# Patient Record
Sex: FEMALE | Race: WHITE | NOT HISPANIC OR LATINO | Employment: OTHER | URBAN - METROPOLITAN AREA
[De-identification: names, ages, dates, MRNs, and addresses within clinical notes are randomized per-mention and may not be internally consistent; named-entity substitution may affect disease eponyms.]

---

## 2019-09-17 DIAGNOSIS — M79.89 SWELLING OF LIMB: Primary | ICD-10-CM

## 2019-09-19 ENCOUNTER — TELEPHONE (OUTPATIENT)
Dept: FAMILY MEDICINE | Facility: CLINIC | Age: 69
End: 2019-09-19

## 2019-09-19 ENCOUNTER — HOSPITAL ENCOUNTER (OUTPATIENT)
Dept: RADIOLOGY | Facility: HOSPITAL | Age: 69
Discharge: HOME | End: 2019-09-19
Attending: SPECIALIST
Payer: COMMERCIAL

## 2019-09-19 DIAGNOSIS — M79.89 SWELLING OF LIMB: ICD-10-CM

## 2019-09-19 PROCEDURE — 93971 EXTREMITY STUDY: CPT | Mod: TC,PO,LT

## 2019-09-20 ENCOUNTER — TELEPHONE (OUTPATIENT)
Dept: FAMILY MEDICINE | Facility: CLINIC | Age: 69
End: 2019-09-20

## 2019-09-20 NOTE — TELEPHONE ENCOUNTER
----- Message from Hortencia Crabtree sent at 9/20/2019 10:00 AM CDT -----  - pt needs call back   [phone]

## 2019-11-26 ENCOUNTER — OFFICE VISIT (OUTPATIENT)
Dept: FAMILY MEDICINE | Facility: CLINIC | Age: 69
End: 2019-11-26
Payer: COMMERCIAL

## 2019-11-26 VITALS
DIASTOLIC BLOOD PRESSURE: 56 MMHG | WEIGHT: 208 LBS | HEART RATE: 60 BPM | HEIGHT: 64 IN | BODY MASS INDEX: 35.51 KG/M2 | SYSTOLIC BLOOD PRESSURE: 112 MMHG

## 2019-11-26 DIAGNOSIS — E11.69 TYPE 2 DIABETES MELLITUS WITH OTHER SPECIFIED COMPLICATION, UNSPECIFIED WHETHER LONG TERM INSULIN USE: ICD-10-CM

## 2019-11-26 DIAGNOSIS — I25.10 ATHEROSCLEROSIS OF CORONARY ARTERY, ANGINA PRESENCE UNSPECIFIED, UNSPECIFIED VESSEL OR LESION TYPE, UNSPECIFIED WHETHER NATIVE OR TRANSPLANTED HEART: ICD-10-CM

## 2019-11-26 DIAGNOSIS — M17.0 OSTEOARTHRITIS OF BOTH KNEES, UNSPECIFIED OSTEOARTHRITIS TYPE: ICD-10-CM

## 2019-11-26 DIAGNOSIS — R05.9 COUGH: ICD-10-CM

## 2019-11-26 DIAGNOSIS — K21.9 GASTROESOPHAGEAL REFLUX DISEASE, ESOPHAGITIS PRESENCE NOT SPECIFIED: ICD-10-CM

## 2019-11-26 DIAGNOSIS — E78.6 CHOLESTEROL DEPLETION: ICD-10-CM

## 2019-11-26 DIAGNOSIS — E78.5 HYPERLIPIDEMIA, UNSPECIFIED HYPERLIPIDEMIA TYPE: ICD-10-CM

## 2019-11-26 DIAGNOSIS — I10 HYPERTENSION, UNSPECIFIED TYPE: Primary | ICD-10-CM

## 2019-11-26 DIAGNOSIS — Z95.1 S/P CABG (CORONARY ARTERY BYPASS GRAFT): ICD-10-CM

## 2019-11-26 DIAGNOSIS — I10 ESSENTIAL HYPERTENSION: ICD-10-CM

## 2019-11-26 PROBLEM — M17.9 OSTEOARTHRITIS OF KNEE: Status: ACTIVE | Noted: 2019-05-14

## 2019-11-26 LAB — HBA1C MFR BLD: 7.9 %

## 2019-11-26 PROCEDURE — 99214 OFFICE O/P EST MOD 30 MIN: CPT | Mod: S$GLB,,, | Performed by: NURSE PRACTITIONER

## 2019-11-26 PROCEDURE — 1159F MED LIST DOCD IN RCRD: CPT | Mod: S$GLB,,, | Performed by: NURSE PRACTITIONER

## 2019-11-26 PROCEDURE — 3074F PR MOST RECENT SYSTOLIC BLOOD PRESSURE < 130 MM HG: ICD-10-PCS | Mod: S$GLB,,, | Performed by: NURSE PRACTITIONER

## 2019-11-26 PROCEDURE — 83036 HEMOGLOBIN GLYCOSYLATED A1C: CPT | Mod: QW,,, | Performed by: NURSE PRACTITIONER

## 2019-11-26 PROCEDURE — 1159F PR MEDICATION LIST DOCUMENTED IN MEDICAL RECORD: ICD-10-PCS | Mod: S$GLB,,, | Performed by: NURSE PRACTITIONER

## 2019-11-26 PROCEDURE — 3078F PR MOST RECENT DIASTOLIC BLOOD PRESSURE < 80 MM HG: ICD-10-PCS | Mod: S$GLB,,, | Performed by: NURSE PRACTITIONER

## 2019-11-26 PROCEDURE — 83036 POCT HEMOGLOBIN A1C: ICD-10-PCS | Mod: QW,,, | Performed by: NURSE PRACTITIONER

## 2019-11-26 PROCEDURE — 1101F PT FALLS ASSESS-DOCD LE1/YR: CPT | Mod: S$GLB,,, | Performed by: NURSE PRACTITIONER

## 2019-11-26 PROCEDURE — 3074F SYST BP LT 130 MM HG: CPT | Mod: S$GLB,,, | Performed by: NURSE PRACTITIONER

## 2019-11-26 PROCEDURE — 99214 PR OFFICE/OUTPT VISIT, EST, LEVL IV, 30-39 MIN: ICD-10-PCS | Mod: S$GLB,,, | Performed by: NURSE PRACTITIONER

## 2019-11-26 PROCEDURE — 1101F PR PT FALLS ASSESS DOC 0-1 FALLS W/OUT INJ PAST YR: ICD-10-PCS | Mod: S$GLB,,, | Performed by: NURSE PRACTITIONER

## 2019-11-26 PROCEDURE — 3078F DIAST BP <80 MM HG: CPT | Mod: S$GLB,,, | Performed by: NURSE PRACTITIONER

## 2019-11-26 RX ORDER — INSULIN GLARGINE 100 [IU]/ML
INJECTION, SOLUTION SUBCUTANEOUS
Qty: 3 ML | Refills: 5 | Status: SHIPPED | OUTPATIENT
Start: 2019-11-26 | End: 2020-03-26 | Stop reason: SDUPTHER

## 2019-11-26 RX ORDER — CALC/MAG/B COMPLEX/D3/HERB 61
TABLET ORAL
COMMUNITY

## 2019-11-26 RX ORDER — PHENOL 1.4 %
AEROSOL, SPRAY (ML) MUCOUS MEMBRANE
COMMUNITY

## 2019-11-26 RX ORDER — HYDROCHLOROTHIAZIDE 25 MG/1
25 TABLET ORAL DAILY
Qty: 90 TABLET | Refills: 1 | Status: SHIPPED | OUTPATIENT
Start: 2019-11-26 | End: 2020-02-26 | Stop reason: SDUPTHER

## 2019-11-26 RX ORDER — LORATADINE 10 MG/1
10 TABLET ORAL
COMMUNITY
End: 2020-04-02 | Stop reason: SDUPTHER

## 2019-11-26 RX ORDER — CYCLOBENZAPRINE HCL 10 MG
10 TABLET ORAL 2 TIMES DAILY
COMMUNITY
End: 2020-02-26 | Stop reason: SDUPTHER

## 2019-11-26 RX ORDER — CHOLECALCIFEROL (VITAMIN D3) 125 MCG
CAPSULE ORAL
COMMUNITY

## 2019-11-26 RX ORDER — HYDROCHLOROTHIAZIDE 25 MG/1
1 TABLET ORAL DAILY
COMMUNITY
End: 2019-11-26 | Stop reason: SDUPTHER

## 2019-11-26 RX ORDER — METOPROLOL SUCCINATE 50 MG/1
50 TABLET, EXTENDED RELEASE ORAL DAILY
Qty: 90 TABLET | Refills: 1 | Status: SHIPPED | OUTPATIENT
Start: 2019-11-26 | End: 2020-02-26 | Stop reason: SDUPTHER

## 2019-11-26 RX ORDER — PITAVASTATIN CALCIUM 4.18 MG/1
1 TABLET, FILM COATED ORAL DAILY
Qty: 90 TABLET | Refills: 1 | Status: SHIPPED | OUTPATIENT
Start: 2019-11-26 | End: 2020-02-26 | Stop reason: SDUPTHER

## 2019-11-26 RX ORDER — METOPROLOL SUCCINATE 50 MG/1
1 TABLET, EXTENDED RELEASE ORAL DAILY
COMMUNITY
End: 2019-11-26 | Stop reason: SDUPTHER

## 2019-11-26 RX ORDER — EPINEPHRINE 0.22MG
AEROSOL WITH ADAPTER (ML) INHALATION
COMMUNITY

## 2019-11-26 RX ORDER — PEN NEEDLE, DIABETIC 30 GX3/16"
NEEDLE, DISPOSABLE MISCELLANEOUS
COMMUNITY
End: 2020-02-26 | Stop reason: SDUPTHER

## 2019-11-26 RX ORDER — BENZONATATE 200 MG/1
200 CAPSULE ORAL 3 TIMES DAILY PRN
Qty: 90 CAPSULE | Refills: 0 | Status: SHIPPED | OUTPATIENT
Start: 2019-11-26 | End: 2020-02-24

## 2019-11-26 RX ORDER — LISINOPRIL 20 MG/1
20 TABLET ORAL DAILY
Qty: 90 TABLET | Refills: 1 | Status: SHIPPED | OUTPATIENT
Start: 2019-11-26 | End: 2020-02-26 | Stop reason: SDUPTHER

## 2019-11-26 RX ORDER — INSULIN GLARGINE 100 [IU]/ML
INJECTION, SOLUTION SUBCUTANEOUS
COMMUNITY
Start: 2019-03-14 | End: 2019-11-26 | Stop reason: SDUPTHER

## 2019-11-26 RX ORDER — PIOGLITAZONEHYDROCHLORIDE 30 MG/1
1 TABLET ORAL DAILY
COMMUNITY
End: 2020-02-27

## 2019-11-26 RX ORDER — PITAVASTATIN CALCIUM 4.18 MG/1
1 TABLET, FILM COATED ORAL DAILY
COMMUNITY
Start: 2012-12-13 | End: 2019-11-26 | Stop reason: SDUPTHER

## 2019-11-26 RX ORDER — LISINOPRIL 20 MG/1
20 TABLET ORAL DAILY
COMMUNITY
End: 2019-11-26 | Stop reason: SDUPTHER

## 2019-11-26 RX ORDER — BENZONATATE 100 MG/1
CAPSULE ORAL
COMMUNITY
End: 2020-06-03

## 2019-11-26 RX ORDER — ASPIRIN 81 MG/1
TABLET ORAL
COMMUNITY

## 2019-11-26 NOTE — PROGRESS NOTES
SUBJECTIVE:    Patient ID: Estefanía Ray is a 69 y.o. female.    Chief Complaint: Follow-up (brought bottles// SW)    PRESENTS FOR CHECK UP.  HAS BEEN FEELING GOOD LATELY.  CHECKING SUGARS AT HOME READINGS HAVE BEEN LESS THAN 130.  TAKING MEDS AS PRESCRIBED.  FOLLOWED REGULARLY BY DR. MADERA.  AT LAST VISIT WAS STARTED BACK ON LASIX DAILY.  EDEMA SEEMS TO BE WELL CONTROLLED SINCE THEN.  TRYING TO WATCH DIET AND EXERCISE.  HOWEVER ADMITS OCCASIONAL INTERMITTENT BILATERAL KNEE PAIN. TAKES OVER-THE-COUNTER MEDS AND USES A CENTRAL WELL AS WITH IMPROVEMENT.  HAS SEEN ORTHOPEDIST ABOUT THIS IN THE PAST BUT DOES NOT WANT TO DO ANY KIND OF SURGERIES.      Office Visit on 11/26/2019   Component Date Value Ref Range Status    Hemoglobin A1C 11/26/2019 7.9  % Final       History reviewed. No pertinent past medical history.  History reviewed. No pertinent surgical history.  History reviewed. No pertinent family history.    Marital Status:   Alcohol History:  reports that she does not drink alcohol.  Tobacco History:  reports that she has never smoked. She has never used smokeless tobacco.  Drug History:  reports that she does not use drugs.    Review of patient's allergies indicates:   Allergen Reactions    Ezetimibe      Other reaction(s): Other (See Comments), Unknown  Feels terrible when taking medication       Sitagliptin-metformin      Other reaction(s): diarrhea       Current Outpatient Medications:     aspirin (ECOTRIN) 81 MG EC tablet, Aspir-81 mg tablet,delayed release  Take 1 tablet every day by oral route., Disp: , Rfl:     benzonatate (TESSALON) 100 MG capsule, benzonatate 100 mg capsule  TK 1 C PO TID, Disp: , Rfl:     biotin 10,000 mcg TbDL, 1 tablet, Disp: , Rfl:     blood sugar diagnostic (FREESTYLE LITE STRIPS) Strp, as directed, Disp: , Rfl:     coenzyme Q10 (CO Q-10) 100 mg capsule, 1 capsule with a meal, Disp: , Rfl:     cyclobenzaprine (FLEXERIL) 10 MG tablet, Take 10 mg by mouth 2 (two)  "times daily., Disp: , Rfl:     ELDERBERRY FRUIT ORAL, Take by mouth., Disp: , Rfl:     hydroCHLOROthiazide (HYDRODIURIL) 25 MG tablet, Take 1 tablet (25 mg total) by mouth once daily., Disp: 90 tablet, Rfl: 1    insulin (LANTUS SOLOSTAR U-100 INSULIN) glargine 100 units/mL (3mL) SubQ pen, as directed, Disp: 3 mL, Rfl: 5    krill-om-3-dha-epa-phospho-ast 959-80-95-50 mg Cap, , Disp: , Rfl:     lansoprazole (PREVACID) 15 MG capsule, 1 capsule, Disp: , Rfl:     lisinopril (PRINIVIL,ZESTRIL) 20 MG tablet, Take 1 tablet (20 mg total) by mouth once daily., Disp: 90 tablet, Rfl: 1    loratadine (CLARITIN) 10 mg tablet, Take 10 mg by mouth., Disp: , Rfl:     metoprolol succinate (TOPROL-XL) 50 MG 24 hr tablet, Take 1 tablet (50 mg total) by mouth once daily., Disp: 90 tablet, Rfl: 1    pen needle, diabetic (PEN NEEDLE) 31 gauge x 3/16" Ndle, BD ULTRA FINE, Disp: , Rfl:     pioglitazone (ACTOS) 30 MG tablet, Take 1 tablet by mouth once daily., Disp: , Rfl:     pitavastatin calcium (LIVALO) 4 mg Tab, Take 1 tablet by mouth once daily., Disp: 90 tablet, Rfl: 1    SITagliptan-metformin (JANUMET XR) 100-1,000 mg TM24, Take 1 tablet by mouth nightly., Disp: , Rfl:     SITagliptan-metformin (JANUMET XR) 50-1,000 mg TM24, Janumet XR 50 mg-1,000 mg tablet,extended release  Take 1 tablet every day by oral route., Disp: , Rfl:     benzonatate (TESSALON) 200 MG capsule, Take 1 capsule (200 mg total) by mouth 3 (three) times daily as needed., Disp: 90 capsule, Rfl: 0    Review of Systems   Constitutional: Negative for chills, fever and unexpected weight change.   HENT: Negative for ear pain, rhinorrhea and sore throat.    Eyes: Negative for pain and visual disturbance.   Respiratory: Negative for cough and shortness of breath.    Cardiovascular: Negative for chest pain, palpitations and leg swelling.   Gastrointestinal: Negative for abdominal pain, diarrhea, nausea and vomiting.   Genitourinary: Negative for difficulty " "urinating.   Musculoskeletal: Negative for arthralgias.        KNEE   Skin: Negative for rash.   Neurological: Negative for dizziness, weakness and headaches.   Psychiatric/Behavioral: Negative for agitation and sleep disturbance. The patient is not nervous/anxious.           Objective:      Vitals:    11/26/19 1050   BP: (!) 112/56   Pulse: 60   Weight: 94.3 kg (208 lb)   Height: 5' 3.5" (1.613 m)     Body mass index is 36.27 kg/m².  Physical Exam   Constitutional: She is oriented to person, place, and time. She appears well-developed and well-nourished.   HENT:   Right Ear: External ear normal.   Left Ear: External ear normal.   Eyes: Pupils are equal, round, and reactive to light. EOM are normal.   Neck: Normal range of motion. Neck supple.   Cardiovascular: Normal rate, regular rhythm and normal heart sounds.   Pulses:       Dorsalis pedis pulses are 2+ on the right side.        Posterior tibial pulses are 2+ on the right side.   Pulmonary/Chest: Effort normal and breath sounds normal.   Abdominal: Soft. Bowel sounds are normal.   Musculoskeletal: Normal range of motion. She exhibits no edema or deformity.        Right foot: There is normal range of motion and no deformity.   CREPITUS TO KNEES BILATERALLY   Feet:   Right Foot:   Protective Sensation: 5 sites tested. 5 sites sensed.   Left Foot:   Protective Sensation: 5 sites tested. 5 sites sensed.   Skin Integrity: Negative for skin breakdown.   Lymphadenopathy:     She has no cervical adenopathy.   Neurological: She is alert and oriented to person, place, and time.   Skin: Skin is warm and dry.   Psychiatric: She has a normal mood and affect. Her behavior is normal.         Assessment:       1. Hypertension, unspecified type    2. Cholesterol depletion    3. Type 2 diabetes mellitus with other specified complication, unspecified whether long term insulin use    4. Cough    5. Atherosclerosis of coronary artery, angina presence unspecified, unspecified vessel " or lesion type, unspecified whether native or transplanted heart    6. Essential hypertension    7. Hyperlipidemia, unspecified hyperlipidemia type    8. S/P CABG (coronary artery bypass graft)    9. Gastroesophageal reflux disease, esophagitis presence not specified    10. Osteoarthritis of both knees, unspecified osteoarthritis type         Plan:       Hypertension, unspecified type  -     hydroCHLOROthiazide (HYDRODIURIL) 25 MG tablet; Take 1 tablet (25 mg total) by mouth once daily.  Dispense: 90 tablet; Refill: 1  -     lisinopril (PRINIVIL,ZESTRIL) 20 MG tablet; Take 1 tablet (20 mg total) by mouth once daily.  Dispense: 90 tablet; Refill: 1  -     metoprolol succinate (TOPROL-XL) 50 MG 24 hr tablet; Take 1 tablet (50 mg total) by mouth once daily.  Dispense: 90 tablet; Refill: 1    Cholesterol depletion  -     pitavastatin calcium (LIVALO) 4 mg Tab; Take 1 tablet by mouth once daily.  Dispense: 90 tablet; Refill: 1    Type 2 diabetes mellitus with other specified complication, unspecified whether long term insulin use  Comments:  MONITOR HER SUGAR DAILY READINGS TO OFFICE IN 2 WEEKS AND ENCOURAGE TO CUT BACK ON CARBOHYDRATES  Orders:  -     Hemoglobin A1C, POCT  -     insulin (LANTUS SOLOSTAR U-100 INSULIN) glargine 100 units/mL (3mL) SubQ pen; as directed  Dispense: 3 mL; Refill: 5    Cough  -     benzonatate (TESSALON) 200 MG capsule; Take 1 capsule (200 mg total) by mouth 3 (three) times daily as needed.  Dispense: 90 capsule; Refill: 0    Atherosclerosis of coronary artery, angina presence unspecified, unspecified vessel or lesion type, unspecified whether native or transplanted heart    Essential hypertension    Hyperlipidemia, unspecified hyperlipidemia type    S/P CABG (coronary artery bypass graft)    Gastroesophageal reflux disease, esophagitis presence not specified    Osteoarthritis of both knees, unspecified osteoarthritis type      Follow up in about 3 months (around 2/26/2020).

## 2019-12-17 DIAGNOSIS — M79.673 PAIN OF FOOT, UNSPECIFIED LATERALITY: Primary | ICD-10-CM

## 2020-02-26 ENCOUNTER — OFFICE VISIT (OUTPATIENT)
Dept: FAMILY MEDICINE | Facility: CLINIC | Age: 70
End: 2020-02-26
Payer: COMMERCIAL

## 2020-02-26 VITALS
DIASTOLIC BLOOD PRESSURE: 84 MMHG | HEART RATE: 72 BPM | HEIGHT: 64 IN | SYSTOLIC BLOOD PRESSURE: 122 MMHG | WEIGHT: 211 LBS | BODY MASS INDEX: 36.02 KG/M2

## 2020-02-26 DIAGNOSIS — M62.838 MUSCLE SPASM: ICD-10-CM

## 2020-02-26 DIAGNOSIS — I10 HYPERTENSION, UNSPECIFIED TYPE: ICD-10-CM

## 2020-02-26 DIAGNOSIS — E78.5 HYPERLIPIDEMIA, UNSPECIFIED HYPERLIPIDEMIA TYPE: ICD-10-CM

## 2020-02-26 DIAGNOSIS — J34.89 NASAL SORE: ICD-10-CM

## 2020-02-26 DIAGNOSIS — Z95.1 S/P CABG (CORONARY ARTERY BYPASS GRAFT): ICD-10-CM

## 2020-02-26 DIAGNOSIS — K21.9 GASTROESOPHAGEAL REFLUX DISEASE, ESOPHAGITIS PRESENCE NOT SPECIFIED: ICD-10-CM

## 2020-02-26 DIAGNOSIS — L30.9 DERMATITIS: ICD-10-CM

## 2020-02-26 DIAGNOSIS — E11.69 TYPE 2 DIABETES MELLITUS WITH OTHER SPECIFIED COMPLICATION, UNSPECIFIED WHETHER LONG TERM INSULIN USE: Primary | ICD-10-CM

## 2020-02-26 DIAGNOSIS — E11.69 TYPE 2 DIABETES MELLITUS WITH OTHER SPECIFIED COMPLICATION, UNSPECIFIED WHETHER LONG TERM INSULIN USE: ICD-10-CM

## 2020-02-26 LAB — HBA1C MFR BLD: 7.4 %

## 2020-02-26 PROCEDURE — 83036 POCT HEMOGLOBIN A1C: ICD-10-PCS | Mod: QW,,, | Performed by: NURSE PRACTITIONER

## 2020-02-26 PROCEDURE — 3074F SYST BP LT 130 MM HG: CPT | Mod: S$GLB,,, | Performed by: NURSE PRACTITIONER

## 2020-02-26 PROCEDURE — 1159F PR MEDICATION LIST DOCUMENTED IN MEDICAL RECORD: ICD-10-PCS | Mod: S$GLB,,, | Performed by: NURSE PRACTITIONER

## 2020-02-26 PROCEDURE — 99214 PR OFFICE/OUTPT VISIT, EST, LEVL IV, 30-39 MIN: ICD-10-PCS | Mod: S$GLB,,, | Performed by: NURSE PRACTITIONER

## 2020-02-26 PROCEDURE — 1159F MED LIST DOCD IN RCRD: CPT | Mod: S$GLB,,, | Performed by: NURSE PRACTITIONER

## 2020-02-26 PROCEDURE — 3051F PR MOST RECENT HEMOGLOBIN A1C LEVEL 7.0 - < 8.0%: ICD-10-PCS | Mod: S$GLB,,, | Performed by: NURSE PRACTITIONER

## 2020-02-26 PROCEDURE — 83036 HEMOGLOBIN GLYCOSYLATED A1C: CPT | Mod: QW,,, | Performed by: NURSE PRACTITIONER

## 2020-02-26 PROCEDURE — 3074F PR MOST RECENT SYSTOLIC BLOOD PRESSURE < 130 MM HG: ICD-10-PCS | Mod: S$GLB,,, | Performed by: NURSE PRACTITIONER

## 2020-02-26 PROCEDURE — 3079F PR MOST RECENT DIASTOLIC BLOOD PRESSURE 80-89 MM HG: ICD-10-PCS | Mod: S$GLB,,, | Performed by: NURSE PRACTITIONER

## 2020-02-26 PROCEDURE — 3051F HG A1C>EQUAL 7.0%<8.0%: CPT | Mod: S$GLB,,, | Performed by: NURSE PRACTITIONER

## 2020-02-26 PROCEDURE — 99214 OFFICE O/P EST MOD 30 MIN: CPT | Mod: S$GLB,,, | Performed by: NURSE PRACTITIONER

## 2020-02-26 PROCEDURE — 1101F PT FALLS ASSESS-DOCD LE1/YR: CPT | Mod: S$GLB,,, | Performed by: NURSE PRACTITIONER

## 2020-02-26 PROCEDURE — 1101F PR PT FALLS ASSESS DOC 0-1 FALLS W/OUT INJ PAST YR: ICD-10-PCS | Mod: S$GLB,,, | Performed by: NURSE PRACTITIONER

## 2020-02-26 PROCEDURE — 3079F DIAST BP 80-89 MM HG: CPT | Mod: S$GLB,,, | Performed by: NURSE PRACTITIONER

## 2020-02-26 RX ORDER — LISINOPRIL 20 MG/1
20 TABLET ORAL DAILY
Qty: 90 TABLET | Refills: 1 | Status: SHIPPED | OUTPATIENT
Start: 2020-02-26 | End: 2020-03-26 | Stop reason: SDUPTHER

## 2020-02-26 RX ORDER — METOPROLOL SUCCINATE 50 MG/1
50 TABLET, EXTENDED RELEASE ORAL DAILY
Qty: 90 TABLET | Refills: 1 | Status: SHIPPED | OUTPATIENT
Start: 2020-02-26 | End: 2020-02-26

## 2020-02-26 RX ORDER — MUPIROCIN 20 MG/G
OINTMENT TOPICAL 2 TIMES DAILY
Qty: 22 G | Refills: 0 | Status: SHIPPED | OUTPATIENT
Start: 2020-02-26 | End: 2020-02-26

## 2020-02-26 RX ORDER — PEN NEEDLE, DIABETIC 30 GX3/16"
1 NEEDLE, DISPOSABLE MISCELLANEOUS 3 TIMES DAILY
Qty: 100 EACH | Refills: 5 | Status: SHIPPED | OUTPATIENT
Start: 2020-02-26 | End: 2020-03-26 | Stop reason: SDUPTHER

## 2020-02-26 RX ORDER — PEN NEEDLE, DIABETIC 30 GX3/16"
NEEDLE, DISPOSABLE MISCELLANEOUS
Qty: 100 EACH | Refills: 5 | Status: SHIPPED | OUTPATIENT
Start: 2020-02-26 | End: 2020-02-26 | Stop reason: SDUPTHER

## 2020-02-26 RX ORDER — PITAVASTATIN CALCIUM 4.18 MG/1
1 TABLET, FILM COATED ORAL DAILY
Qty: 90 TABLET | Refills: 1 | Status: SHIPPED | OUTPATIENT
Start: 2020-02-26 | End: 2020-02-26

## 2020-02-26 RX ORDER — METOPROLOL SUCCINATE 50 MG/1
50 TABLET, EXTENDED RELEASE ORAL DAILY
Qty: 90 TABLET | Refills: 1 | Status: SHIPPED | OUTPATIENT
Start: 2020-02-26 | End: 2020-03-26 | Stop reason: SDUPTHER

## 2020-02-26 RX ORDER — TRIAMCINOLONE ACETONIDE 1 MG/G
OINTMENT TOPICAL 2 TIMES DAILY
Qty: 15 G | Refills: 0 | Status: SHIPPED | OUTPATIENT
Start: 2020-02-26 | End: 2020-03-26 | Stop reason: SDUPTHER

## 2020-02-26 RX ORDER — LISINOPRIL 20 MG/1
20 TABLET ORAL DAILY
Qty: 90 TABLET | Refills: 1 | Status: SHIPPED | OUTPATIENT
Start: 2020-02-26 | End: 2020-02-26

## 2020-02-26 RX ORDER — CYCLOBENZAPRINE HCL 10 MG
10 TABLET ORAL 2 TIMES DAILY
Qty: 90 TABLET | Refills: 1 | Status: SHIPPED | OUTPATIENT
Start: 2020-02-26 | End: 2020-03-26 | Stop reason: SDUPTHER

## 2020-02-26 RX ORDER — CYCLOBENZAPRINE HCL 10 MG
10 TABLET ORAL 2 TIMES DAILY
Qty: 90 TABLET | Refills: 1 | Status: SHIPPED | OUTPATIENT
Start: 2020-02-26 | End: 2020-02-26

## 2020-02-26 RX ORDER — PEN NEEDLE, DIABETIC 30 GX3/16"
NEEDLE, DISPOSABLE MISCELLANEOUS
Qty: 100 EACH | Refills: 5 | Status: SHIPPED | OUTPATIENT
Start: 2020-02-26 | End: 2020-02-26

## 2020-02-26 RX ORDER — HYDROCHLOROTHIAZIDE 25 MG/1
25 TABLET ORAL DAILY
Qty: 90 TABLET | Refills: 1 | Status: SHIPPED | OUTPATIENT
Start: 2020-02-26 | End: 2020-03-26 | Stop reason: SDUPTHER

## 2020-02-26 RX ORDER — HYDROCHLOROTHIAZIDE 25 MG/1
25 TABLET ORAL DAILY
Qty: 90 TABLET | Refills: 1 | Status: SHIPPED | OUTPATIENT
Start: 2020-02-26 | End: 2020-02-26

## 2020-02-26 RX ORDER — TRIAMCINOLONE ACETONIDE 1 MG/G
OINTMENT TOPICAL 2 TIMES DAILY
Qty: 15 G | Refills: 0 | Status: SHIPPED | OUTPATIENT
Start: 2020-02-26 | End: 2020-02-26

## 2020-02-26 RX ORDER — PITAVASTATIN CALCIUM 4.18 MG/1
1 TABLET, FILM COATED ORAL DAILY
Qty: 90 TABLET | Refills: 1 | Status: SHIPPED | OUTPATIENT
Start: 2020-02-26 | End: 2020-03-26 | Stop reason: SDUPTHER

## 2020-02-26 RX ORDER — MUPIROCIN 20 MG/G
OINTMENT TOPICAL 2 TIMES DAILY
Qty: 22 G | Refills: 0 | Status: SHIPPED | OUTPATIENT
Start: 2020-02-26 | End: 2020-03-26 | Stop reason: SDUPTHER

## 2020-02-26 NOTE — TELEPHONE ENCOUNTER
----- Message from Jacinta Torres sent at 2/26/2020 12:30 PM CST -----  Contact: Emiliana Caballero needs dispensing instructions for the RX for Pen needles. #[phone]

## 2020-02-26 NOTE — PROGRESS NOTES
SUBJECTIVE:    Patient ID: Estefanía Ray is a 69 y.o. female.    Chief Complaint: Diabetes (3 month checkup//brought bottles tb//speak about meds//refills)    69 year old female presents for check up. Treated for diabetes. Monitors blood sugar daily. Readings at home have been <130mg/dl fasting. Taking medication as prescribed. Occasionally still has some issues with knees. But has significantly improved. Followed by Dr. Gomez. Would like to discuss discontinuing actos.       Office Visit on 02/26/2020   Component Date Value Ref Range Status    Hemoglobin A1C 02/26/2020 7.4  % Final   Office Visit on 11/26/2019   Component Date Value Ref Range Status    Hemoglobin A1C 11/26/2019 7.9  % Final       History reviewed. No pertinent past medical history.  Past Surgical History:   Procedure Laterality Date    KNEE SURGERY Left     TONSILLECTOMY       History reviewed. No pertinent family history.    Marital Status:   Alcohol History:  reports that she does not drink alcohol.  Tobacco History:  reports that she has never smoked. She has never used smokeless tobacco.  Drug History:  reports that she does not use drugs.    Review of patient's allergies indicates:   Allergen Reactions    Ezetimibe      Other reaction(s): Other (See Comments), Unknown  Feels terrible when taking medication       Sitagliptin-metformin      Other reaction(s): diarrhea       Current Outpatient Medications:     aspirin (ECOTRIN) 81 MG EC tablet, Aspir-81 mg tablet,delayed release  Take 1 tablet every day by oral route., Disp: , Rfl:     biotin 10,000 mcg TbDL, 1 tablet, Disp: , Rfl:     blood sugar diagnostic (FREESTYLE LITE STRIPS) Strp, as directed, Disp: , Rfl:     coenzyme Q10 (CO Q-10) 100 mg capsule, 1 capsule with a meal, Disp: , Rfl:     cyclobenzaprine (FLEXERIL) 10 MG tablet, Take 1 tablet (10 mg total) by mouth 2 (two) times daily., Disp: 90 tablet, Rfl: 1    ELDERBERRY FRUIT ORAL, Take by mouth., Disp: , Rfl:      "hydroCHLOROthiazide (HYDRODIURIL) 25 MG tablet, Take 1 tablet (25 mg total) by mouth once daily., Disp: 90 tablet, Rfl: 1    insulin (LANTUS SOLOSTAR U-100 INSULIN) glargine 100 units/mL (3mL) SubQ pen, as directed, Disp: 3 mL, Rfl: 5    krill-om-3-dha-epa-phospho-ast 533-31-70-50 mg Cap, , Disp: , Rfl:     lansoprazole (PREVACID) 15 MG capsule, 1 capsule, Disp: , Rfl:     lisinopril (PRINIVIL,ZESTRIL) 20 MG tablet, Take 1 tablet (20 mg total) by mouth once daily., Disp: 90 tablet, Rfl: 1    loratadine (CLARITIN) 10 mg tablet, Take 10 mg by mouth., Disp: , Rfl:     metoprolol succinate (TOPROL-XL) 50 MG 24 hr tablet, Take 1 tablet (50 mg total) by mouth once daily., Disp: 90 tablet, Rfl: 1    pitavastatin calcium (LIVALO) 4 mg Tab, Take 1 tablet by mouth once daily., Disp: 90 tablet, Rfl: 1    SITagliptan-metformin (JANUMET XR) 100-1,000 mg TM24, Take 1 tablet by mouth nightly., Disp: 90 tablet, Rfl: 1    benzonatate (TESSALON) 100 MG capsule, benzonatate 100 mg capsule  TK 1 C PO TID, Disp: , Rfl:     mupirocin (BACTROBAN) 2 % ointment, Apply topically 2 (two) times daily., Disp: 22 g, Rfl: 0    pen needle, diabetic (PEN NEEDLE) 31 gauge x 3/16" Ndle, 1 each by In Vitro route 3 (three) times daily. BD ULTRA FINE, Disp: 100 each, Rfl: 5    triamcinolone acetonide 0.1% (KENALOG) 0.1 % ointment, Apply topically 2 (two) times daily., Disp: 15 g, Rfl: 0    Review of Systems   Constitutional: Negative for chills, fever and unexpected weight change.   HENT: Negative for ear pain, rhinorrhea and sore throat.    Eyes: Negative for pain and visual disturbance.   Respiratory: Negative for cough and shortness of breath.    Cardiovascular: Negative for chest pain, palpitations and leg swelling.   Gastrointestinal: Negative for abdominal pain, diarrhea, nausea and vomiting.   Genitourinary: Negative for difficulty urinating, hematuria and vaginal bleeding.   Musculoskeletal: Negative for arthralgias.   Skin: Negative " "for rash.   Neurological: Negative for dizziness, weakness and headaches.   Psychiatric/Behavioral: Negative for agitation and sleep disturbance. The patient is not nervous/anxious.           Objective:      Vitals:    02/26/20 1135   BP: 122/84   Pulse: 72   Weight: 95.7 kg (211 lb)   Height: 5' 3.5" (1.613 m)     Body mass index is 36.79 kg/m².  Physical Exam   Constitutional: She is oriented to person, place, and time. She appears well-developed and well-nourished.   HENT:   Right Ear: External ear normal.   Left Ear: External ear normal.   Nose: Rhinorrhea present.   Scaling to bilateral ear canals   Neck: Normal range of motion. Neck supple.   Cardiovascular: Normal rate, regular rhythm and normal heart sounds.   Pulses:       Dorsalis pedis pulses are 2+ on the right side.        Posterior tibial pulses are 2+ on the right side.   Pulmonary/Chest: Effort normal and breath sounds normal.   Abdominal: Soft. Bowel sounds are normal.   Musculoskeletal: Normal range of motion. She exhibits no edema or deformity.        Right foot: There is normal range of motion and no deformity.   Crepitus to right knee   Feet:   Right Foot:   Protective Sensation: 5 sites tested. 5 sites sensed.   Left Foot:   Protective Sensation: 5 sites tested. 5 sites sensed.   Skin Integrity: Negative for skin breakdown.   Lymphadenopathy:     She has no cervical adenopathy.   Neurological: She is alert and oriented to person, place, and time.   Skin: Skin is warm and dry.   Psychiatric: She has a normal mood and affect. Her behavior is normal.         Assessment:       1. Type 2 diabetes mellitus with other specified complication, unspecified whether long term insulin use    2. Hypertension, unspecified type    3. Hyperlipidemia, unspecified hyperlipidemia type    4. Dermatitis    5. Nasal sore    6. Muscle spasm    7. S/P CABG (coronary artery bypass graft)    8. Gastroesophageal reflux disease, esophagitis presence not specified       " "  Plan:       Type 2 diabetes mellitus with other specified complication, unspecified whether long term insulin use  Comments:  discussed increasing lantus by 2 units every 3 days until fbs <130. patient will d/c actos  Orders:  -     Hemoglobin A1C, POCT  -     Discontinue: pen needle, diabetic (PEN NEEDLE) 31 gauge x 3/16" Ndle; BD ULTRA FINE  Dispense: 100 each; Refill: 5  -     Discontinue: SITagliptan-metformin (JANUMET XR) 100-1,000 mg TM24; Take 1 tablet by mouth nightly.  Dispense: 90 tablet; Refill: 1  -     Discontinue: pen needle, diabetic (PEN NEEDLE) 31 gauge x 3/16" Ndle; BD ULTRA FINE  Dispense: 100 each; Refill: 5  -     SITagliptan-metformin (JANUMET XR) 100-1,000 mg TM24; Take 1 tablet by mouth nightly.  Dispense: 90 tablet; Refill: 1    Hypertension, unspecified type  -     Discontinue: hydroCHLOROthiazide (HYDRODIURIL) 25 MG tablet; Take 1 tablet (25 mg total) by mouth once daily.  Dispense: 90 tablet; Refill: 1  -     Discontinue: lisinopril (PRINIVIL,ZESTRIL) 20 MG tablet; Take 1 tablet (20 mg total) by mouth once daily.  Dispense: 90 tablet; Refill: 1  -     Discontinue: metoprolol succinate (TOPROL-XL) 50 MG 24 hr tablet; Take 1 tablet (50 mg total) by mouth once daily.  Dispense: 90 tablet; Refill: 1  -     hydroCHLOROthiazide (HYDRODIURIL) 25 MG tablet; Take 1 tablet (25 mg total) by mouth once daily.  Dispense: 90 tablet; Refill: 1  -     lisinopril (PRINIVIL,ZESTRIL) 20 MG tablet; Take 1 tablet (20 mg total) by mouth once daily.  Dispense: 90 tablet; Refill: 1  -     metoprolol succinate (TOPROL-XL) 50 MG 24 hr tablet; Take 1 tablet (50 mg total) by mouth once daily.  Dispense: 90 tablet; Refill: 1    Hyperlipidemia, unspecified hyperlipidemia type  -     Discontinue: pitavastatin calcium (LIVALO) 4 mg Tab; Take 1 tablet by mouth once daily.  Dispense: 90 tablet; Refill: 1  -     pitavastatin calcium (LIVALO) 4 mg Tab; Take 1 tablet by mouth once daily.  Dispense: 90 tablet; Refill: " 1    Dermatitis  -     Discontinue: triamcinolone acetonide 0.1% (KENALOG) 0.1 % ointment; Apply topically 2 (two) times daily.  Dispense: 15 g; Refill: 0  -     triamcinolone acetonide 0.1% (KENALOG) 0.1 % ointment; Apply topically 2 (two) times daily.  Dispense: 15 g; Refill: 0    Nasal sore  -     Discontinue: mupirocin (BACTROBAN) 2 % ointment; Apply topically 2 (two) times daily.  Dispense: 22 g; Refill: 0  -     mupirocin (BACTROBAN) 2 % ointment; Apply topically 2 (two) times daily.  Dispense: 22 g; Refill: 0    Muscle spasm  -     Discontinue: cyclobenzaprine (FLEXERIL) 10 MG tablet; Take 1 tablet (10 mg total) by mouth 2 (two) times daily.  Dispense: 90 tablet; Refill: 1  -     cyclobenzaprine (FLEXERIL) 10 MG tablet; Take 1 tablet (10 mg total) by mouth 2 (two) times daily.  Dispense: 90 tablet; Refill: 1    S/P CABG (coronary artery bypass graft)    Gastroesophageal reflux disease, esophagitis presence not specified      Follow up in about 3 months (around 5/26/2020) for Diabetic Check-Up.

## 2020-03-13 DIAGNOSIS — E11.69 TYPE 2 DIABETES MELLITUS WITH OTHER SPECIFIED COMPLICATION, UNSPECIFIED WHETHER LONG TERM INSULIN USE: ICD-10-CM

## 2020-03-13 NOTE — TELEPHONE ENCOUNTER
----- Message from Amina John sent at 3/13/2020  9:03 AM CDT -----  Refills Janument XR   Pharm [facility] [place]   Pt [phone]

## 2020-03-24 ENCOUNTER — TELEPHONE (OUTPATIENT)
Dept: FAMILY MEDICINE | Facility: CLINIC | Age: 70
End: 2020-03-24

## 2020-03-24 NOTE — TELEPHONE ENCOUNTER
Spoke to pt. Advised per Verna, take 53 units of lantus today and then starting tomorrow morning start 60 units of lantus in the AM and continue the janumet every night.

## 2020-03-24 NOTE — TELEPHONE ENCOUNTER
Lets change lantus to am and janumet to pm. Take 53 units in pm and tomorrow am take 60 units and janumet in pm

## 2020-03-24 NOTE — TELEPHONE ENCOUNTER
Spoke to pt. Advised to increase her Lantus to 60mg but she said she takes it at night along with the Janumet at night and at 10:53AM today her blood sugar was 279. Wants to know what she should do since she doesn't take her meds until tonight?

## 2020-03-24 NOTE — TELEPHONE ENCOUNTER
----- Message from Anali Obregon MA sent at 3/24/2020 10:48 AM CDT -----  Pt reports her blood sugar has been running over 200.  Please would like a call back to discuss what she needs to do.  She is currently taking 52 units of Lantus.    Pt - [phone]

## 2020-03-24 NOTE — TELEPHONE ENCOUNTER
So she can wait to take both meds tonight and don't take anything right now? Just clarifying so I don't say the wrong thing.

## 2020-03-26 ENCOUNTER — TELEPHONE (OUTPATIENT)
Dept: FAMILY MEDICINE | Facility: CLINIC | Age: 70
End: 2020-03-26

## 2020-03-26 DIAGNOSIS — E11.69 TYPE 2 DIABETES MELLITUS WITH OTHER SPECIFIED COMPLICATION, UNSPECIFIED WHETHER LONG TERM INSULIN USE: ICD-10-CM

## 2020-03-26 DIAGNOSIS — L30.9 DERMATITIS: ICD-10-CM

## 2020-03-26 DIAGNOSIS — I10 HYPERTENSION, UNSPECIFIED TYPE: ICD-10-CM

## 2020-03-26 DIAGNOSIS — J34.89 NASAL SORE: ICD-10-CM

## 2020-03-26 DIAGNOSIS — E78.5 HYPERLIPIDEMIA, UNSPECIFIED HYPERLIPIDEMIA TYPE: ICD-10-CM

## 2020-03-26 DIAGNOSIS — M62.838 MUSCLE SPASM: ICD-10-CM

## 2020-03-26 NOTE — TELEPHONE ENCOUNTER
"Anesthesia Transfer of Care Note    Patient: Gely Green    Procedure(s) Performed: Procedure(s) (LRB):  COLONOSCOPY (N/A)    Patient location: PACU    Anesthesia Type: general    Transport from OR: Transported from OR on room air with adequate spontaneous ventilation    Post pain: adequate analgesia    Post assessment: no apparent anesthetic complications    Post vital signs: stable    Level of consciousness: sedated    Nausea/Vomiting: no nausea/vomiting    Complications: none    Transfer of care protocol was followed      Last vitals:   Visit Vitals  BP (!) 148/81 (BP Location: Left arm, Patient Position: Lying)   Pulse (!) 54   Temp 36.6 °C (97.9 °F) (Temporal)   Resp 16   Ht 5' 6" (1.676 m)   Wt 76.7 kg (169 lb)   SpO2 98%   Breastfeeding? No   BMI 27.28 kg/m²     " Please call pt, everything was sent at last visit.

## 2020-03-26 NOTE — TELEPHONE ENCOUNTER
----- Message from Anali Obregon MA sent at 3/26/2020 12:56 PM CDT -----  Pt called in to report her BS is still running high, since she spoke with Verna last.  She was advised to increase 2 units every three days.  She is wondering if she should keep follow this regimen?    Pt - [phone]

## 2020-03-26 NOTE — TELEPHONE ENCOUNTER
----- Message from Myriam Woodard sent at 3/26/2020  2:29 PM CDT -----  Contact: GEOVANNY RIVAS 1:58  PT CALLED SHE NEEDS REFILLS ON ALL PRESCRIPTIONS. SHE CAN BE REACHED AT [phone].

## 2020-03-27 RX ORDER — TRIAMCINOLONE ACETONIDE 1 MG/G
OINTMENT TOPICAL 2 TIMES DAILY
Qty: 15 G | Refills: 2 | Status: SHIPPED | OUTPATIENT
Start: 2020-03-27 | End: 2020-04-02 | Stop reason: SDUPTHER

## 2020-03-27 RX ORDER — PITAVASTATIN CALCIUM 4.18 MG/1
1 TABLET, FILM COATED ORAL DAILY
Qty: 90 TABLET | Refills: 1 | Status: SHIPPED | OUTPATIENT
Start: 2020-03-27 | End: 2020-04-02 | Stop reason: SDUPTHER

## 2020-03-27 RX ORDER — METOPROLOL SUCCINATE 50 MG/1
50 TABLET, EXTENDED RELEASE ORAL DAILY
Qty: 90 TABLET | Refills: 1 | Status: SHIPPED | OUTPATIENT
Start: 2020-03-27 | End: 2020-04-02 | Stop reason: SDUPTHER

## 2020-03-27 RX ORDER — INSULIN GLARGINE 100 [IU]/ML
INJECTION, SOLUTION SUBCUTANEOUS
Qty: 3 ML | Refills: 5 | Status: SHIPPED | OUTPATIENT
Start: 2020-03-27 | End: 2020-04-02 | Stop reason: SDUPTHER

## 2020-03-27 RX ORDER — LISINOPRIL 20 MG/1
20 TABLET ORAL DAILY
Qty: 90 TABLET | Refills: 1 | Status: SHIPPED | OUTPATIENT
Start: 2020-03-27 | End: 2020-04-02 | Stop reason: SDUPTHER

## 2020-03-27 RX ORDER — MUPIROCIN 20 MG/G
OINTMENT TOPICAL 2 TIMES DAILY
Qty: 22 G | Refills: 0 | Status: SHIPPED | OUTPATIENT
Start: 2020-03-27 | End: 2020-04-02 | Stop reason: SDUPTHER

## 2020-03-27 RX ORDER — CYCLOBENZAPRINE HCL 10 MG
10 TABLET ORAL 2 TIMES DAILY
Qty: 90 TABLET | Refills: 1 | Status: SHIPPED | OUTPATIENT
Start: 2020-03-27 | End: 2020-04-02 | Stop reason: SDUPTHER

## 2020-03-27 RX ORDER — HYDROCHLOROTHIAZIDE 25 MG/1
25 TABLET ORAL DAILY
Qty: 90 TABLET | Refills: 1 | Status: SHIPPED | OUTPATIENT
Start: 2020-03-27 | End: 2020-04-02 | Stop reason: SDUPTHER

## 2020-03-27 RX ORDER — PEN NEEDLE, DIABETIC 30 GX3/16"
1 NEEDLE, DISPOSABLE MISCELLANEOUS 3 TIMES DAILY
Qty: 100 EACH | Refills: 5 | Status: SHIPPED | OUTPATIENT
Start: 2020-03-27 | End: 2020-04-02 | Stop reason: SDUPTHER

## 2020-04-02 DIAGNOSIS — M62.838 MUSCLE SPASM: ICD-10-CM

## 2020-04-02 DIAGNOSIS — I10 HYPERTENSION, UNSPECIFIED TYPE: ICD-10-CM

## 2020-04-02 DIAGNOSIS — J34.89 NASAL SORE: ICD-10-CM

## 2020-04-02 DIAGNOSIS — E78.5 HYPERLIPIDEMIA, UNSPECIFIED HYPERLIPIDEMIA TYPE: ICD-10-CM

## 2020-04-02 DIAGNOSIS — L30.9 DERMATITIS: ICD-10-CM

## 2020-04-02 DIAGNOSIS — E11.69 TYPE 2 DIABETES MELLITUS WITH OTHER SPECIFIED COMPLICATION, UNSPECIFIED WHETHER LONG TERM INSULIN USE: ICD-10-CM

## 2020-04-02 NOTE — TELEPHONE ENCOUNTER
----- Message from Cesilia Palma sent at 4/2/2020  2:29 PM CDT -----  Contact: Estefanía Ray  Pt says that she can't get into her mychart she has tried multiple ways but it's not working. She also says that the pharmacy is telling her they didn't receive her refills that were sent over last week, can the nurse please re-send them over ??   Please give the pt a call back # [phone]

## 2020-04-02 NOTE — TELEPHONE ENCOUNTER
Rx's set up to be sent.    Spoke to pt. Resent email link to set up portal.  Also [facility] says they didn't receive any of the scripts we sent last week so re set them up.

## 2020-04-03 ENCOUNTER — TELEPHONE (OUTPATIENT)
Dept: FAMILY MEDICINE | Facility: CLINIC | Age: 70
End: 2020-04-03

## 2020-04-03 RX ORDER — PEN NEEDLE, DIABETIC 30 GX3/16"
1 NEEDLE, DISPOSABLE MISCELLANEOUS 3 TIMES DAILY
Qty: 100 EACH | Refills: 5 | Status: SHIPPED | OUTPATIENT
Start: 2020-04-03 | End: 2020-04-16 | Stop reason: SDUPTHER

## 2020-04-03 RX ORDER — CYCLOBENZAPRINE HCL 10 MG
10 TABLET ORAL 2 TIMES DAILY
Qty: 90 TABLET | Refills: 1 | Status: SHIPPED | OUTPATIENT
Start: 2020-04-03 | End: 2020-04-16 | Stop reason: SDUPTHER

## 2020-04-03 RX ORDER — PITAVASTATIN CALCIUM 4.18 MG/1
1 TABLET, FILM COATED ORAL DAILY
Qty: 90 TABLET | Refills: 1 | Status: SHIPPED | OUTPATIENT
Start: 2020-04-03 | End: 2020-09-08 | Stop reason: SDUPTHER

## 2020-04-03 RX ORDER — LISINOPRIL 20 MG/1
20 TABLET ORAL DAILY
Qty: 90 TABLET | Refills: 1 | Status: SHIPPED | OUTPATIENT
Start: 2020-04-03 | End: 2020-09-08 | Stop reason: SDUPTHER

## 2020-04-03 RX ORDER — MUPIROCIN 20 MG/G
OINTMENT TOPICAL 2 TIMES DAILY
Qty: 22 G | Refills: 0 | Status: SHIPPED | OUTPATIENT
Start: 2020-04-03 | End: 2020-04-16 | Stop reason: SDUPTHER

## 2020-04-03 RX ORDER — TRIAMCINOLONE ACETONIDE 1 MG/G
OINTMENT TOPICAL 2 TIMES DAILY
Qty: 15 G | Refills: 2 | Status: SHIPPED | OUTPATIENT
Start: 2020-04-03 | End: 2020-04-16 | Stop reason: SDUPTHER

## 2020-04-03 RX ORDER — HYDROCHLOROTHIAZIDE 25 MG/1
25 TABLET ORAL DAILY
Qty: 90 TABLET | Refills: 1 | Status: SHIPPED | OUTPATIENT
Start: 2020-04-03 | End: 2020-09-08 | Stop reason: SDUPTHER

## 2020-04-03 RX ORDER — LORATADINE 10 MG/1
10 TABLET ORAL DAILY
Qty: 90 TABLET | Refills: 1 | Status: SHIPPED | OUTPATIENT
Start: 2020-04-03 | End: 2020-04-16 | Stop reason: SDUPTHER

## 2020-04-03 RX ORDER — INSULIN GLARGINE 100 [IU]/ML
INJECTION, SOLUTION SUBCUTANEOUS
Qty: 3 ML | Refills: 5 | Status: SHIPPED | OUTPATIENT
Start: 2020-04-03 | End: 2020-04-16 | Stop reason: SDUPTHER

## 2020-04-03 RX ORDER — METOPROLOL SUCCINATE 50 MG/1
50 TABLET, EXTENDED RELEASE ORAL DAILY
Qty: 90 TABLET | Refills: 1 | Status: SHIPPED | OUTPATIENT
Start: 2020-04-03 | End: 2020-06-03 | Stop reason: SDUPTHER

## 2020-04-03 NOTE — TELEPHONE ENCOUNTER
----- Message from Anali Obregon MA sent at 4/3/2020 11:36 AM CDT -----  Pt called in due to Verna requesting her blood sugar readings.  Please see below and advise.    3/25 - 199  3/26 - 189  3/27 - 186  3/27 - 190  3-/29 - 200 increased to 64 units  3/30 - 178  3/31 - 200  4/1 - 179 increased to 66 units  4/2 - 188  4/3 - 240 increased to 68 units    Pt - [phone]

## 2020-04-06 NOTE — TELEPHONE ENCOUNTER
Spoke with pt and let her know the below per Verna. Is going to have her  help her on the leonid and will call back to get scheduled.

## 2020-04-16 DIAGNOSIS — M62.838 MUSCLE SPASM: ICD-10-CM

## 2020-04-16 DIAGNOSIS — L30.9 DERMATITIS: ICD-10-CM

## 2020-04-16 DIAGNOSIS — J34.89 NASAL SORE: ICD-10-CM

## 2020-04-16 DIAGNOSIS — E11.69 TYPE 2 DIABETES MELLITUS WITH OTHER SPECIFIED COMPLICATION, UNSPECIFIED WHETHER LONG TERM INSULIN USE: ICD-10-CM

## 2020-04-16 RX ORDER — LORATADINE 10 MG/1
10 TABLET ORAL DAILY
Qty: 90 TABLET | Refills: 1 | Status: SHIPPED | OUTPATIENT
Start: 2020-04-16 | End: 2022-07-21 | Stop reason: SDUPTHER

## 2020-04-16 RX ORDER — INSULIN GLARGINE 100 [IU]/ML
INJECTION, SOLUTION SUBCUTANEOUS
Qty: 15 ML | Refills: 5 | Status: SHIPPED | OUTPATIENT
Start: 2020-04-16 | End: 2020-04-21 | Stop reason: SDUPTHER

## 2020-04-16 RX ORDER — TRIAMCINOLONE ACETONIDE 1 MG/G
OINTMENT TOPICAL 2 TIMES DAILY
Qty: 15 G | Refills: 2 | Status: SHIPPED | OUTPATIENT
Start: 2020-04-16 | End: 2022-08-17

## 2020-04-16 RX ORDER — PEN NEEDLE, DIABETIC 30 GX3/16"
1 NEEDLE, DISPOSABLE MISCELLANEOUS 3 TIMES DAILY
Qty: 100 EACH | Refills: 5 | Status: SHIPPED | OUTPATIENT
Start: 2020-04-16 | End: 2020-09-08 | Stop reason: SDUPTHER

## 2020-04-16 RX ORDER — CYCLOBENZAPRINE HCL 10 MG
10 TABLET ORAL 2 TIMES DAILY
Qty: 90 TABLET | Refills: 1 | Status: SHIPPED | OUTPATIENT
Start: 2020-04-16 | End: 2020-06-03 | Stop reason: SDUPTHER

## 2020-04-16 RX ORDER — MUPIROCIN 20 MG/G
OINTMENT TOPICAL 2 TIMES DAILY
Qty: 22 G | Refills: 0 | Status: SHIPPED | OUTPATIENT
Start: 2020-04-16 | End: 2022-08-17

## 2020-04-16 NOTE — TELEPHONE ENCOUNTER
Spoke with pharmacy they state they only received five of the rx and sent them to the pt they don't know what happened to the other one's. Spoke with pt and let her know we will resend in the other's. Pt state's she also takes 70 units of Lantus so she wants to know if we need to up it.

## 2020-04-16 NOTE — TELEPHONE ENCOUNTER
----- Message from Myriam Woodard sent at 4/16/2020 10:00 AM CDT -----  Contact: [facility] JINGWAGNER  HAS NOT RECEIVED HER PRESCRIPTIONS YET. [phone]

## 2020-04-21 ENCOUNTER — TELEPHONE (OUTPATIENT)
Dept: FAMILY MEDICINE | Facility: CLINIC | Age: 70
End: 2020-04-21

## 2020-04-21 DIAGNOSIS — E11.69 TYPE 2 DIABETES MELLITUS WITH OTHER SPECIFIED COMPLICATION, UNSPECIFIED WHETHER LONG TERM INSULIN USE: ICD-10-CM

## 2020-04-21 RX ORDER — INSULIN GLARGINE 100 [IU]/ML
INJECTION, SOLUTION SUBCUTANEOUS
Qty: 90 ML | Refills: 5 | Status: SHIPPED | OUTPATIENT
Start: 2020-04-21 | End: 2020-09-08

## 2020-04-21 NOTE — TELEPHONE ENCOUNTER
"Received a call to my VM from Jennifer at [facility]. They are trying to get clarification on the patients Lantus prescription. They need dosing and directions, cannot dispense "as needed".     Call back [phone] option 2  Reference # [id number]  "

## 2020-04-21 NOTE — TELEPHONE ENCOUNTER
Pt states she is take 76 units daily. Her sugars are around 145-150. She says you told her to take an additional 2 units daily until she is under 130. Please advise.

## 2020-05-19 ENCOUNTER — TELEPHONE (OUTPATIENT)
Dept: FAMILY MEDICINE | Facility: CLINIC | Age: 70
End: 2020-05-19

## 2020-05-19 NOTE — TELEPHONE ENCOUNTER
----- Message from Reshma Ford sent at 5/19/2020  4:51 PM CDT -----  vm @ 4:47 pt called wanted to check If the lab is open I tried to call her back no answer    # [phone]

## 2020-05-19 NOTE — TELEPHONE ENCOUNTER
----- Message from Jacinta Torres sent at 5/19/2020  9:18 AM CDT -----  Refill for Lantus solo pen did not go through. There is something wrong with the RX per the pharmacy. It is missing some key information, please contact the [facility] mail service to find out what is wrong. Pt #[phone]

## 2020-05-19 NOTE — TELEPHONE ENCOUNTER
Called pt first to verify how much she's taking. She said she is currently taking 73 Units once daily.    Spoke to pharmacy tech with [facility]. She stated she doesn't see an issue with her Lantus prescription. She said it was sent out for 76 units QD on April 22nd for a 90 day supply.    Called pt back to see what issue she thinks there is and to see if she received the Lantus that they mailed out. Patient did not answer.

## 2020-06-02 ENCOUNTER — TELEPHONE (OUTPATIENT)
Dept: FAMILY MEDICINE | Facility: CLINIC | Age: 70
End: 2020-06-02

## 2020-06-02 NOTE — TELEPHONE ENCOUNTER
----- Message from Anali Obregon MA sent at 6/2/2020  3:39 PM CDT -----  Pt called in to cancel her appt for tomorrow.  She states she is not willing to utilize the [employer] leonid for her appointments.  She states if that is the case, she appreciates Verna's time, but will find another provider.  She states if she can do an in office visit, she will continue to see Verna, however at NO time will she do visits through [facility].    Pt - [phone]

## 2020-06-03 ENCOUNTER — OFFICE VISIT (OUTPATIENT)
Dept: FAMILY MEDICINE | Facility: CLINIC | Age: 70
End: 2020-06-03
Payer: COMMERCIAL

## 2020-06-03 VITALS
SYSTOLIC BLOOD PRESSURE: 138 MMHG | DIASTOLIC BLOOD PRESSURE: 70 MMHG | TEMPERATURE: 98 F | BODY MASS INDEX: 34.31 KG/M2 | HEART RATE: 76 BPM | WEIGHT: 201 LBS | HEIGHT: 64 IN

## 2020-06-03 DIAGNOSIS — E11.69 TYPE 2 DIABETES MELLITUS WITH OTHER SPECIFIED COMPLICATION, UNSPECIFIED WHETHER LONG TERM INSULIN USE: Primary | ICD-10-CM

## 2020-06-03 DIAGNOSIS — K21.9 GASTROESOPHAGEAL REFLUX DISEASE, ESOPHAGITIS PRESENCE NOT SPECIFIED: ICD-10-CM

## 2020-06-03 DIAGNOSIS — Z95.1 S/P CABG (CORONARY ARTERY BYPASS GRAFT): ICD-10-CM

## 2020-06-03 DIAGNOSIS — M62.838 MUSCLE SPASM: ICD-10-CM

## 2020-06-03 DIAGNOSIS — I10 HYPERTENSION, UNSPECIFIED TYPE: ICD-10-CM

## 2020-06-03 DIAGNOSIS — I10 ESSENTIAL HYPERTENSION: ICD-10-CM

## 2020-06-03 DIAGNOSIS — E78.5 HYPERLIPIDEMIA, UNSPECIFIED HYPERLIPIDEMIA TYPE: ICD-10-CM

## 2020-06-03 DIAGNOSIS — I25.10 ATHEROSCLEROSIS OF CORONARY ARTERY, ANGINA PRESENCE UNSPECIFIED, UNSPECIFIED VESSEL OR LESION TYPE, UNSPECIFIED WHETHER NATIVE OR TRANSPLANTED HEART: ICD-10-CM

## 2020-06-03 DIAGNOSIS — M17.0 OSTEOARTHRITIS OF BOTH KNEES, UNSPECIFIED OSTEOARTHRITIS TYPE: ICD-10-CM

## 2020-06-03 LAB — HBA1C MFR BLD: 8.7 %

## 2020-06-03 PROCEDURE — 99214 OFFICE O/P EST MOD 30 MIN: CPT | Mod: S$GLB,,, | Performed by: NURSE PRACTITIONER

## 2020-06-03 PROCEDURE — 1101F PR PT FALLS ASSESS DOC 0-1 FALLS W/OUT INJ PAST YR: ICD-10-PCS | Mod: S$GLB,,, | Performed by: NURSE PRACTITIONER

## 2020-06-03 PROCEDURE — 1101F PT FALLS ASSESS-DOCD LE1/YR: CPT | Mod: S$GLB,,, | Performed by: NURSE PRACTITIONER

## 2020-06-03 PROCEDURE — 3075F SYST BP GE 130 - 139MM HG: CPT | Mod: S$GLB,,, | Performed by: NURSE PRACTITIONER

## 2020-06-03 PROCEDURE — 3075F PR MOST RECENT SYSTOLIC BLOOD PRESS GE 130-139MM HG: ICD-10-PCS | Mod: S$GLB,,, | Performed by: NURSE PRACTITIONER

## 2020-06-03 PROCEDURE — 3078F PR MOST RECENT DIASTOLIC BLOOD PRESSURE < 80 MM HG: ICD-10-PCS | Mod: S$GLB,,, | Performed by: NURSE PRACTITIONER

## 2020-06-03 PROCEDURE — 3051F HG A1C>EQUAL 7.0%<8.0%: CPT | Mod: S$GLB,,, | Performed by: NURSE PRACTITIONER

## 2020-06-03 PROCEDURE — 1159F MED LIST DOCD IN RCRD: CPT | Mod: S$GLB,,, | Performed by: NURSE PRACTITIONER

## 2020-06-03 PROCEDURE — 99214 PR OFFICE/OUTPT VISIT, EST, LEVL IV, 30-39 MIN: ICD-10-PCS | Mod: S$GLB,,, | Performed by: NURSE PRACTITIONER

## 2020-06-03 PROCEDURE — 1159F PR MEDICATION LIST DOCUMENTED IN MEDICAL RECORD: ICD-10-PCS | Mod: S$GLB,,, | Performed by: NURSE PRACTITIONER

## 2020-06-03 PROCEDURE — 3078F DIAST BP <80 MM HG: CPT | Mod: S$GLB,,, | Performed by: NURSE PRACTITIONER

## 2020-06-03 PROCEDURE — 83036 POCT HEMOGLOBIN A1C: ICD-10-PCS | Mod: QW,,, | Performed by: NURSE PRACTITIONER

## 2020-06-03 PROCEDURE — 83036 HEMOGLOBIN GLYCOSYLATED A1C: CPT | Mod: QW,,, | Performed by: NURSE PRACTITIONER

## 2020-06-03 PROCEDURE — 3051F PR MOST RECENT HEMOGLOBIN A1C LEVEL 7.0 - < 8.0%: ICD-10-PCS | Mod: S$GLB,,, | Performed by: NURSE PRACTITIONER

## 2020-06-03 RX ORDER — METOPROLOL SUCCINATE 50 MG/1
50 TABLET, EXTENDED RELEASE ORAL DAILY
Qty: 90 TABLET | Refills: 1 | Status: SHIPPED | OUTPATIENT
Start: 2020-06-03 | End: 2020-09-08 | Stop reason: SDUPTHER

## 2020-06-03 RX ORDER — CYCLOBENZAPRINE HCL 10 MG
10 TABLET ORAL 2 TIMES DAILY
Qty: 90 TABLET | Refills: 1 | Status: SHIPPED | OUTPATIENT
Start: 2020-06-03 | End: 2020-11-09 | Stop reason: SDUPTHER

## 2020-06-03 NOTE — LETTER
[address] [facility] [address]  [place] [address]  Phone: [phone]   [phone]                        MD Austin Beltrán MD Chequita Williams, MD Matthew Bassett, PA-C Allison Hoffritz, KIERA Torres, KIERA      Date: 06/03/2020        Patient: Estefanía Ray  YOB: 1950       Please fax over pt most recent labs.      Sincerely,     Renay Anderson MA

## 2020-06-03 NOTE — PROGRESS NOTES
SUBJECTIVE:    Patient ID: Estefanía Ray is a 69 y.o. female.    Chief Complaint: Diabetes (brought bottles, requested labs from Dr. Gomez, A1C was 8.7// SW)    69 year old female presents for check up. Treated for diabetes. Monitors blood sugar daily. Readings at home have been good in am but increase throughout the day. . Taking medication as prescribed.recently started trulicity weekly.  Occasionally still has some issues with knees. But has significantly improved. Followed by Dr. Gomez.             Office Visit on 02/26/2020   Component Date Value Ref Range Status    Hemoglobin A1C 02/26/2020 7.4  % Final       History reviewed. No pertinent past medical history.  Past Surgical History:   Procedure Laterality Date    KNEE SURGERY Left     TONSILLECTOMY       History reviewed. No pertinent family history.    Marital Status:   Alcohol History:  reports that she does not drink alcohol.  Tobacco History:  reports that she has never smoked. She has never used smokeless tobacco.  Drug History:  reports that she does not use drugs.    Review of patient's allergies indicates:   Allergen Reactions    Ezetimibe      Other reaction(s): Other (See Comments), Unknown  Feels terrible when taking medication       Sitagliptin-metformin      Other reaction(s): diarrhea       Current Outpatient Medications:     aspirin (ECOTRIN) 81 MG EC tablet, Aspir-81 mg tablet,delayed release  Take 1 tablet every day by oral route., Disp: , Rfl:     biotin 10,000 mcg TbDL, 1 tablet, Disp: , Rfl:     blood sugar diagnostic (FREESTYLE LITE STRIPS) Strp, as directed, Disp: 100 each, Rfl: 5    coenzyme Q10 (CO Q-10) 100 mg capsule, 1 capsule with a meal, Disp: , Rfl:     cyclobenzaprine (FLEXERIL) 10 MG tablet, Take 1 tablet (10 mg total) by mouth 2 (two) times daily., Disp: 90 tablet, Rfl: 1    ELDERBERRY FRUIT ORAL, Take by mouth., Disp: , Rfl:     hydroCHLOROthiazide (HYDRODIURIL) 25 MG tablet, Take 1 tablet (25 mg  "total) by mouth once daily., Disp: 90 tablet, Rfl: 1    insulin (LANTUS SOLOSTAR U-100 INSULIN) glargine 100 units/mL (3mL) SubQ pen, as directed, Disp: 90 mL, Rfl: 5    krill-om-3-dha-epa-phospho-ast 357-14-45-50 mg Cap, , Disp: , Rfl:     lansoprazole (PREVACID) 15 MG capsule, 1 capsule, Disp: , Rfl:     lisinopriL (PRINIVIL,ZESTRIL) 20 MG tablet, Take 1 tablet (20 mg total) by mouth once daily., Disp: 90 tablet, Rfl: 1    loratadine (CLARITIN) 10 mg tablet, Take 1 tablet (10 mg total) by mouth once daily., Disp: 90 tablet, Rfl: 1    metoprolol succinate (TOPROL-XL) 50 MG 24 hr tablet, Take 1 tablet (50 mg total) by mouth once daily., Disp: 90 tablet, Rfl: 1    mupirocin (BACTROBAN) 2 % ointment, Apply topically 2 (two) times daily., Disp: 22 g, Rfl: 0    pen needle, diabetic (PEN NEEDLE) 31 gauge x 3/16" Ndle, 1 each by In Vitro route 3 (three) times daily. BD ULTRA FINE, Disp: 100 each, Rfl: 5    pitavastatin calcium (LIVALO) 4 mg Tab, Take 1 tablet by mouth once daily., Disp: 90 tablet, Rfl: 1    SITagliptan-metformin (JANUMET XR) 100-1,000 mg TM24, Take 1 tablet by mouth nightly., Disp: 90 tablet, Rfl: 1    tobramycin-dexamethasone 0.3-0.1% (TOBRADEX) 0.3-0.1 % DrpS, INSTILL 1 DROP IN LEFT EYE  EVERY 2 TODAY THEN FOUR TIMES DAILY FOR ONE WEEK, Disp: , Rfl:     triamcinolone acetonide 0.1% (KENALOG) 0.1 % ointment, Apply topically 2 (two) times daily., Disp: 15 g, Rfl: 2    dulaglutide (TRULICITY) 1.5 mg/0.5 mL PnIj, Inject 1.5 mg into the skin once a week., Disp: 4 Syringe, Rfl: 3    Review of Systems   Constitutional: Negative for chills, fever and unexpected weight change.   HENT: Negative for ear pain, rhinorrhea and sore throat.    Eyes: Negative for pain and visual disturbance.   Respiratory: Negative for cough and shortness of breath.    Cardiovascular: Negative for chest pain, palpitations and leg swelling.   Gastrointestinal: Negative for abdominal pain, diarrhea, nausea and vomiting. " "  Genitourinary: Negative for difficulty urinating, hematuria and vaginal bleeding.   Musculoskeletal: Negative for arthralgias.   Skin: Negative for rash.   Neurological: Negative for dizziness, weakness and headaches.   Psychiatric/Behavioral: Negative for agitation and sleep disturbance. The patient is not nervous/anxious.           Objective:      Vitals:    06/03/20 1127   BP: 138/70   Pulse: 76   Temp: 97.5 °F (36.4 °C)   Weight: 91.2 kg (201 lb)   Height: 5' 3.5" (1.613 m)     Body mass index is 35.05 kg/m².  Physical Exam   Constitutional: She is oriented to person, place, and time. She appears well-developed and well-nourished.   HENT:   Right Ear: External ear normal.   Left Ear: External ear normal.   Eyes: Pupils are equal, round, and reactive to light. EOM are normal.   Neck: Normal range of motion. Neck supple.   Cardiovascular: Normal rate, regular rhythm and normal heart sounds.   Pulses:       Dorsalis pedis pulses are 2+ on the right side.        Posterior tibial pulses are 2+ on the right side.   Pulmonary/Chest: Effort normal and breath sounds normal.   Abdominal: Soft. Bowel sounds are normal.   Musculoskeletal: Normal range of motion. She exhibits no edema or deformity.        Right foot: There is normal range of motion and no deformity.   Feet:   Right Foot:   Protective Sensation: 5 sites tested. 5 sites sensed.   Left Foot:   Protective Sensation: 5 sites tested. 5 sites sensed.   Skin Integrity: Negative for skin breakdown.   Lymphadenopathy:     She has no cervical adenopathy.   Neurological: She is alert and oriented to person, place, and time.   Skin: Skin is warm and dry.   Psychiatric: She has a normal mood and affect. Her behavior is normal.         Assessment:       1. Type 2 diabetes mellitus with other specified complication, unspecified whether long term insulin use    2. Muscle spasm    3. Hypertension, unspecified type    4. Atherosclerosis of coronary artery, angina presence " unspecified, unspecified vessel or lesion type, unspecified whether native or transplanted heart    5. Essential hypertension    6. Hyperlipidemia, unspecified hyperlipidemia type    7. S/P CABG (coronary artery bypass graft)    8. Gastroesophageal reflux disease, esophagitis presence not specified    9. Osteoarthritis of both knees, unspecified osteoarthritis type         Plan:       Type 2 diabetes mellitus with other specified complication, unspecified whether long term insulin use  Comments:  increase trulicity as discussed. 1.5 call with effiacy.   Orders:  -     Hemoglobin A1C, POCT    Muscle spasm  -     cyclobenzaprine (FLEXERIL) 10 MG tablet; Take 1 tablet (10 mg total) by mouth 2 (two) times daily.  Dispense: 90 tablet; Refill: 1    Hypertension, unspecified type  -     metoprolol succinate (TOPROL-XL) 50 MG 24 hr tablet; Take 1 tablet (50 mg total) by mouth once daily.  Dispense: 90 tablet; Refill: 1    Atherosclerosis of coronary artery, angina presence unspecified, unspecified vessel or lesion type, unspecified whether native or transplanted heart    Essential hypertension    Hyperlipidemia, unspecified hyperlipidemia type    S/P CABG (coronary artery bypass graft)    Gastroesophageal reflux disease, esophagitis presence not specified    Osteoarthritis of both knees, unspecified osteoarthritis type    Other orders  -     dulaglutide (TRULICITY) 1.5 mg/0.5 mL PnIj; Inject 1.5 mg into the skin once a week.  Dispense: 4 Syringe; Refill: 3      Follow up in about 3 months (around 9/3/2020) for Diabetic Check-Up.

## 2020-06-15 ENCOUNTER — TELEPHONE (OUTPATIENT)
Dept: FAMILY MEDICINE | Facility: CLINIC | Age: 70
End: 2020-06-15

## 2020-06-15 NOTE — TELEPHONE ENCOUNTER
----- Message from Anali Obregon MA sent at 6/15/2020  9:09 AM CDT -----  Regarding: BS readings  Pt called in BS readings per Verna's request.  See below:    6/5 - 128 @ 6:25a           151 @ 2:16p after lunch    6/8 - 141 @ 7:40a            6/9 - 155 @ 7:10a           161 @ 2:59p after lunch    6/13 - 158 @ 6:28a            210 @ 8:20 p (two hours after dinner)    6/14 - 144 @ 7:12a    6/15 - 156 @ 6:45a    Please advise pt.  She is also requesting a copy of her blood work.  Please call.    Pt - [phone]

## 2020-06-15 NOTE — TELEPHONE ENCOUNTER
Spoke to pt. Advised per Verna to continue monitoring her sugars until Thursday and then give us a call with her sugar levels up until Thurs morning and Verna will decide what to do then.

## 2020-06-15 NOTE — TELEPHONE ENCOUNTER
Spoke to pt. She said she is on her 2nd week of increased trulicity. She states she is doing as she was told.

## 2020-06-18 ENCOUNTER — TELEPHONE (OUTPATIENT)
Dept: FAMILY MEDICINE | Facility: CLINIC | Age: 70
End: 2020-06-18

## 2020-06-18 NOTE — TELEPHONE ENCOUNTER
----- Message from Anali Obregon MA sent at 6/18/2020  3:28 PM CDT -----  VM @ 3:06p - Pt left message to advise she is calling in to give Verna her blood sugar numbers.  Please return call.    Pt - [phone]

## 2020-06-18 NOTE — TELEPHONE ENCOUNTER
Spoke to pt regarding message below. Pt stated she doesn't think it is necessary to give her blood sugar numbers,due to her  going back to see some one else for her diabetes,pt also stated she is thinking about stopping her trulicity injection, due to it hurting her every time she has to give herself the injection.

## 2020-06-18 NOTE — TELEPHONE ENCOUNTER
Please let her know that JOSE ANTONIO is out on vacation and wont be back until week after next. Please send to Dhruv after you speak with her.

## 2020-07-30 ENCOUNTER — TELEPHONE (OUTPATIENT)
Dept: FAMILY MEDICINE | Facility: CLINIC | Age: 70
End: 2020-07-30

## 2020-07-30 DIAGNOSIS — I10 HYPERTENSION, UNSPECIFIED TYPE: Primary | ICD-10-CM

## 2020-07-30 DIAGNOSIS — Z79.899 HIGH RISK MEDICATION USE: ICD-10-CM

## 2020-07-30 DIAGNOSIS — Z95.1 S/P CABG (CORONARY ARTERY BYPASS GRAFT): ICD-10-CM

## 2020-07-30 DIAGNOSIS — E78.5 HYPERLIPIDEMIA, UNSPECIFIED HYPERLIPIDEMIA TYPE: ICD-10-CM

## 2020-07-30 DIAGNOSIS — E11.69 TYPE 2 DIABETES MELLITUS WITH OTHER SPECIFIED COMPLICATION, UNSPECIFIED WHETHER LONG TERM INSULIN USE: ICD-10-CM

## 2020-07-30 DIAGNOSIS — I25.10 ATHEROSCLEROSIS OF CORONARY ARTERY, ANGINA PRESENCE UNSPECIFIED, UNSPECIFIED VESSEL OR LESION TYPE, UNSPECIFIED WHETHER NATIVE OR TRANSPLANTED HEART: ICD-10-CM

## 2020-07-30 NOTE — TELEPHONE ENCOUNTER
----- Message from Reshma Ford sent at 7/30/2020 10:26 AM CDT -----  Pt calling she would like to know will she needs labs before her next appointment, being she is diabetic.    # [phone]

## 2020-07-31 ENCOUNTER — TELEPHONE (OUTPATIENT)
Dept: FAMILY MEDICINE | Facility: CLINIC | Age: 70
End: 2020-07-31

## 2020-07-31 NOTE — TELEPHONE ENCOUNTER
----- Message from Reshma Ford sent at 7/31/2020  8:45 AM CDT -----  Pt called back to previous message about needing labs.  I advised the pt she does have labs that were put in yesterday. Cb # [phone]

## 2020-08-11 ENCOUNTER — TELEPHONE (OUTPATIENT)
Dept: FAMILY MEDICINE | Facility: CLINIC | Age: 70
End: 2020-08-11

## 2020-08-11 RX ORDER — DULAGLUTIDE 1.5 MG/.5ML
1.5 INJECTION, SOLUTION SUBCUTANEOUS WEEKLY
Qty: 12 PEN | Refills: 3 | Status: SHIPPED | OUTPATIENT
Start: 2020-08-11 | End: 2020-10-21 | Stop reason: SDUPTHER

## 2020-08-11 NOTE — TELEPHONE ENCOUNTER
----- Message from Reshma Ford sent at 8/11/2020 10:30 AM CDT -----  Pt calling said she will be out of her Trulicity 1.5 mg/0.5ml SDP 4X 0.5ml  once a week.  Stated [place] Ray NP increased her dose. Requesting refills be sent to her mail order pharm she does not know the name. Pt did state she is going to stay with Verna only moving forward.   Cb # [phone] or cell is [phone]     Patient Seen in: [facility]    History   Patient presents with:  Cough/URI    Stated Complaint: COLD X2 WEEKS     HPI    This 29-year-old female presents to the office with a two-week history of sinus congestion, postnasal drip, OR    Family History   Problem Relation Age of Onset   • Heart Disease Father    • Stroke Mother    • hypothyroidism [OTHER] Mother    • Cancer Maternal Grandmother      breast cancer       Smoking status: Never Smoker I refilled the patient's albuterol inhaler and Flonase. Usage and side effects are reviewed. Patient is instructed to go to the emergency room she has increased difficulty breathing.   She is to follow-up with her primary doctor in 3-5 days if not improvi

## 2020-09-01 ENCOUNTER — TELEPHONE (OUTPATIENT)
Dept: FAMILY MEDICINE | Facility: CLINIC | Age: 70
End: 2020-09-01

## 2020-09-03 LAB
ALBUMIN SERPL-MCNC: 4.2 G/DL (ref 3.6–5.1)
ALBUMIN/CREAT UR: 9 MCG/MG CREAT
ALBUMIN/GLOB SERPL: 1.4 (CALC) (ref 1–2.5)
ALP SERPL-CCNC: 79 U/L (ref 37–153)
ALT SERPL-CCNC: 17 U/L (ref 6–29)
APPEARANCE UR: CLEAR
AST SERPL-CCNC: 29 U/L (ref 10–35)
BACTERIA #/AREA URNS HPF: ABNORMAL /HPF
BACTERIA UR CULT: ABNORMAL
BACTERIA UR CULT: NORMAL
BASOPHILS # BLD AUTO: 40 CELLS/UL (ref 0–200)
BASOPHILS NFR BLD AUTO: 0.4 %
BILIRUB SERPL-MCNC: 0.3 MG/DL (ref 0.2–1.2)
BILIRUB UR QL STRIP: NEGATIVE
BUN SERPL-MCNC: 17 MG/DL (ref 7–25)
BUN/CREAT SERPL: NORMAL (CALC) (ref 6–22)
CALCIUM SERPL-MCNC: 9.6 MG/DL (ref 8.6–10.4)
CHLORIDE SERPL-SCNC: 98 MMOL/L (ref 98–110)
CHOLEST SERPL-MCNC: 167 MG/DL
CHOLEST/HDLC SERPL: 3.7 (CALC)
CO2 SERPL-SCNC: 26 MMOL/L (ref 20–32)
COLOR UR: YELLOW
CREAT SERPL-MCNC: 0.96 MG/DL (ref 0.5–0.99)
CREAT UR-MCNC: 46 MG/DL (ref 20–275)
EOSINOPHIL # BLD AUTO: 230 CELLS/UL (ref 15–500)
EOSINOPHIL NFR BLD AUTO: 2.3 %
ERYTHROCYTE [DISTWIDTH] IN BLOOD BY AUTOMATED COUNT: 14 % (ref 11–15)
GFRSERPLBLD MDRD-ARVRAT: 60 ML/MIN/1.73M2
GLOBULIN SER CALC-MCNC: 2.9 G/DL (CALC) (ref 1.9–3.7)
GLUCOSE SERPL-MCNC: 88 MG/DL (ref 65–99)
GLUCOSE UR QL STRIP: NEGATIVE
HCT VFR BLD AUTO: 39.9 % (ref 35–45)
HDLC SERPL-MCNC: 45 MG/DL
HGB BLD-MCNC: 12.6 G/DL (ref 11.7–15.5)
HGB UR QL STRIP: NEGATIVE
HYALINE CASTS #/AREA URNS LPF: ABNORMAL /LPF
KETONES UR QL STRIP: NEGATIVE
LDLC SERPL CALC-MCNC: 98 MG/DL (CALC)
LEUKOCYTE ESTERASE UR QL STRIP: ABNORMAL
LYMPHOCYTES # BLD AUTO: 2850 CELLS/UL (ref 850–3900)
LYMPHOCYTES NFR BLD AUTO: 28.5 %
MCH RBC QN AUTO: 26.8 PG (ref 27–33)
MCHC RBC AUTO-ENTMCNC: 31.6 G/DL (ref 32–36)
MCV RBC AUTO: 84.9 FL (ref 80–100)
MICROALBUMIN UR-MCNC: 0.4 MG/DL
MONOCYTES # BLD AUTO: 720 CELLS/UL (ref 200–950)
MONOCYTES NFR BLD AUTO: 7.2 %
NEUTROPHILS # BLD AUTO: 6160 CELLS/UL (ref 1500–7800)
NEUTROPHILS NFR BLD AUTO: 61.6 %
NITRITE UR QL STRIP: NEGATIVE
NONHDLC SERPL-MCNC: 122 MG/DL (CALC)
PH UR STRIP: 7.5 [PH] (ref 5–8)
PLATELET # BLD AUTO: 309 THOUSAND/UL (ref 140–400)
PMV BLD REES-ECKER: 10.2 FL (ref 7.5–12.5)
POTASSIUM SERPL-SCNC: 4.7 MMOL/L (ref 3.5–5.3)
PROT SERPL-MCNC: 7.1 G/DL (ref 6.1–8.1)
PROT UR QL STRIP: NEGATIVE
RBC # BLD AUTO: 4.7 MILLION/UL (ref 3.8–5.1)
RBC #/AREA URNS HPF: ABNORMAL /HPF
SODIUM SERPL-SCNC: 136 MMOL/L (ref 135–146)
SP GR UR STRIP: 1.01 (ref 1–1.03)
SQUAMOUS #/AREA URNS HPF: ABNORMAL /HPF
TRIGL SERPL-MCNC: 141 MG/DL
TSH SERPL-ACNC: 2.67 MIU/L (ref 0.4–4.5)
WBC # BLD AUTO: 10 THOUSAND/UL (ref 3.8–10.8)
WBC #/AREA URNS HPF: ABNORMAL /HPF

## 2020-09-08 ENCOUNTER — OFFICE VISIT (OUTPATIENT)
Dept: FAMILY MEDICINE | Facility: CLINIC | Age: 70
End: 2020-09-08
Payer: COMMERCIAL

## 2020-09-08 ENCOUNTER — TELEPHONE (OUTPATIENT)
Dept: FAMILY MEDICINE | Facility: CLINIC | Age: 70
End: 2020-09-08

## 2020-09-08 VITALS
WEIGHT: 195 LBS | BODY MASS INDEX: 33.29 KG/M2 | HEART RATE: 76 BPM | SYSTOLIC BLOOD PRESSURE: 110 MMHG | HEIGHT: 64 IN | DIASTOLIC BLOOD PRESSURE: 60 MMHG

## 2020-09-08 DIAGNOSIS — I10 ESSENTIAL HYPERTENSION: ICD-10-CM

## 2020-09-08 DIAGNOSIS — Z95.1 S/P CABG (CORONARY ARTERY BYPASS GRAFT): ICD-10-CM

## 2020-09-08 DIAGNOSIS — E11.69 TYPE 2 DIABETES MELLITUS WITH OTHER SPECIFIED COMPLICATION, UNSPECIFIED WHETHER LONG TERM INSULIN USE: ICD-10-CM

## 2020-09-08 DIAGNOSIS — I10 HYPERTENSION, UNSPECIFIED TYPE: ICD-10-CM

## 2020-09-08 DIAGNOSIS — Z79.899 HIGH RISK MEDICATION USE: ICD-10-CM

## 2020-09-08 DIAGNOSIS — K21.9 GASTROESOPHAGEAL REFLUX DISEASE, ESOPHAGITIS PRESENCE NOT SPECIFIED: ICD-10-CM

## 2020-09-08 DIAGNOSIS — E78.5 HYPERLIPIDEMIA, UNSPECIFIED HYPERLIPIDEMIA TYPE: ICD-10-CM

## 2020-09-08 DIAGNOSIS — I25.10 ATHEROSCLEROSIS OF CORONARY ARTERY, ANGINA PRESENCE UNSPECIFIED, UNSPECIFIED VESSEL OR LESION TYPE, UNSPECIFIED WHETHER NATIVE OR TRANSPLANTED HEART: ICD-10-CM

## 2020-09-08 DIAGNOSIS — Z78.0 MENOPAUSE: Primary | ICD-10-CM

## 2020-09-08 PROCEDURE — 3078F DIAST BP <80 MM HG: CPT | Mod: S$GLB,,, | Performed by: NURSE PRACTITIONER

## 2020-09-08 PROCEDURE — 3008F BODY MASS INDEX DOCD: CPT | Mod: S$GLB,,, | Performed by: NURSE PRACTITIONER

## 2020-09-08 PROCEDURE — 1159F PR MEDICATION LIST DOCUMENTED IN MEDICAL RECORD: ICD-10-PCS | Mod: S$GLB,,, | Performed by: NURSE PRACTITIONER

## 2020-09-08 PROCEDURE — 83036 POCT HEMOGLOBIN A1C: ICD-10-PCS | Mod: QW,,, | Performed by: NURSE PRACTITIONER

## 2020-09-08 PROCEDURE — 83036 HEMOGLOBIN GLYCOSYLATED A1C: CPT | Mod: QW,,, | Performed by: NURSE PRACTITIONER

## 2020-09-08 PROCEDURE — 3078F PR MOST RECENT DIASTOLIC BLOOD PRESSURE < 80 MM HG: ICD-10-PCS | Mod: S$GLB,,, | Performed by: NURSE PRACTITIONER

## 2020-09-08 PROCEDURE — 3074F PR MOST RECENT SYSTOLIC BLOOD PRESSURE < 130 MM HG: ICD-10-PCS | Mod: S$GLB,,, | Performed by: NURSE PRACTITIONER

## 2020-09-08 PROCEDURE — 1101F PR PT FALLS ASSESS DOC 0-1 FALLS W/OUT INJ PAST YR: ICD-10-PCS | Mod: S$GLB,,, | Performed by: NURSE PRACTITIONER

## 2020-09-08 PROCEDURE — 3044F HG A1C LEVEL LT 7.0%: CPT | Mod: S$GLB,,, | Performed by: NURSE PRACTITIONER

## 2020-09-08 PROCEDURE — 99214 OFFICE O/P EST MOD 30 MIN: CPT | Mod: S$GLB,,, | Performed by: NURSE PRACTITIONER

## 2020-09-08 PROCEDURE — 3074F SYST BP LT 130 MM HG: CPT | Mod: S$GLB,,, | Performed by: NURSE PRACTITIONER

## 2020-09-08 PROCEDURE — 99214 PR OFFICE/OUTPT VISIT, EST, LEVL IV, 30-39 MIN: ICD-10-PCS | Mod: S$GLB,,, | Performed by: NURSE PRACTITIONER

## 2020-09-08 PROCEDURE — 1101F PT FALLS ASSESS-DOCD LE1/YR: CPT | Mod: S$GLB,,, | Performed by: NURSE PRACTITIONER

## 2020-09-08 PROCEDURE — 3044F PR MOST RECENT HEMOGLOBIN A1C LEVEL <7.0%: ICD-10-PCS | Mod: S$GLB,,, | Performed by: NURSE PRACTITIONER

## 2020-09-08 PROCEDURE — 1159F MED LIST DOCD IN RCRD: CPT | Mod: S$GLB,,, | Performed by: NURSE PRACTITIONER

## 2020-09-08 PROCEDURE — 3008F PR BODY MASS INDEX (BMI) DOCUMENTED: ICD-10-PCS | Mod: S$GLB,,, | Performed by: NURSE PRACTITIONER

## 2020-09-08 RX ORDER — METOPROLOL SUCCINATE 50 MG/1
50 TABLET, EXTENDED RELEASE ORAL DAILY
Qty: 90 TABLET | Refills: 1 | Status: SHIPPED | OUTPATIENT
Start: 2020-09-08 | End: 2020-11-09 | Stop reason: SDUPTHER

## 2020-09-08 RX ORDER — LANCETS 28 GAUGE
1 EACH MISCELLANEOUS 2 TIMES DAILY
Qty: 200 EACH | Refills: 3 | Status: SHIPPED | OUTPATIENT
Start: 2020-09-08 | End: 2022-09-10 | Stop reason: SDUPTHER

## 2020-09-08 RX ORDER — SITAGLIPTIN AND METFORMIN HYDROCHLORIDE 1000; 100 MG/1; MG/1
1 TABLET, FILM COATED, EXTENDED RELEASE ORAL NIGHTLY
Qty: 90 TABLET | Refills: 1 | Status: SHIPPED | OUTPATIENT
Start: 2020-09-08 | End: 2020-11-09 | Stop reason: SDUPTHER

## 2020-09-08 RX ORDER — CHOLECALCIFEROL (VITAMIN D3) 25 MCG
5000 TABLET ORAL
COMMUNITY

## 2020-09-08 RX ORDER — PIOGLITAZONEHYDROCHLORIDE 30 MG/1
1 TABLET ORAL DAILY
COMMUNITY
Start: 2020-07-30 | End: 2021-06-01 | Stop reason: SDUPTHER

## 2020-09-08 RX ORDER — LISINOPRIL 20 MG/1
20 TABLET ORAL DAILY
Qty: 90 TABLET | Refills: 1 | Status: SHIPPED | OUTPATIENT
Start: 2020-09-08 | End: 2020-11-09 | Stop reason: SDUPTHER

## 2020-09-08 RX ORDER — INSULIN GLARGINE 100 [IU]/ML
INJECTION, SOLUTION SUBCUTANEOUS
Qty: 90 ML | Refills: 5 | Status: SHIPPED | OUTPATIENT
Start: 2020-09-08 | End: 2020-12-08 | Stop reason: SDUPTHER

## 2020-09-08 RX ORDER — HYDROCHLOROTHIAZIDE 25 MG/1
25 TABLET ORAL DAILY
Qty: 90 TABLET | Refills: 1 | Status: SHIPPED | OUTPATIENT
Start: 2020-09-08 | End: 2020-11-09 | Stop reason: SDUPTHER

## 2020-09-08 RX ORDER — PEN NEEDLE, DIABETIC 30 GX3/16"
1 NEEDLE, DISPOSABLE MISCELLANEOUS 3 TIMES DAILY
Qty: 100 EACH | Refills: 5 | Status: SHIPPED | OUTPATIENT
Start: 2020-09-08 | End: 2021-09-09 | Stop reason: SDUPTHER

## 2020-09-08 RX ORDER — PITAVASTATIN CALCIUM 4.18 MG/1
1 TABLET, FILM COATED ORAL DAILY
Qty: 90 TABLET | Refills: 1 | Status: SHIPPED | OUTPATIENT
Start: 2020-09-08 | End: 2020-11-09 | Stop reason: SDUPTHER

## 2020-09-08 NOTE — TELEPHONE ENCOUNTER
Spoke to pts  to let him know pts recent labs and handicap form will be up front for her to stop by and

## 2020-09-08 NOTE — TELEPHONE ENCOUNTER
----- Message from Reshma Ford sent at 9/8/2020  2:08 PM CDT -----  Pt seen today she had requested a print out of most recent labs and the handicap paperwork. She can come  radha if we can have it ready.     # [phone]

## 2020-09-08 NOTE — PROGRESS NOTES
SUBJECTIVE:    Patient ID: Estefanía Ray is a 70 y.o. female.    Chief Complaint: Diabetes (brought bottles, DEXA ordered, EYE exam pt will sched// SW)     70 y.o. female who presents for a follow up. . She is currently taking 62 units of Lantus insulin daily and her Trulicity  1.5 mg a week. Patient states that her sugars are coming down. She recently started back on actos.she reports that she has been watching her diet more and not snacking as much.    she occasionally has readings in the 70's. She reports feeling shaky during these periods. Numbers are quickly brought up with juice  she is treated for hypertension. She is currently prescribed lisinopril 20 mg p.o. daily, metoprolol 50 mg p.o. daily and HCTZ 25 mg daily as needed along with Lasix 20 mg p.o. daily. She reports readings at home have been <130/90/. She is also treated  hyperlipidemia. She is currently prescribed omega-3 Krill oil 350 mg twice a day and pravastatin 40 mg p.o nightly. Her last labs in may showed normal cholesterol levels        Telephone on 07/30/2020   Component Date Value Ref Range Status    WBC 09/01/2020 10.0  3.8 - 10.8 Thousand/uL Final    RBC 09/01/2020 4.70  3.80 - 5.10 Million/uL Final    Hemoglobin 09/01/2020 12.6  11.7 - 15.5 g/dL Final    Hematocrit 09/01/2020 39.9  35.0 - 45.0 % Final    Mean Corpuscular Volume 09/01/2020 84.9  80.0 - 100.0 fL Final    Mean Corpuscular Hemoglobin 09/01/2020 26.8* 27.0 - 33.0 pg Final    Mean Corpuscular Hemoglobin Conc 09/01/2020 31.6* 32.0 - 36.0 g/dL Final    RDW 09/01/2020 14.0  11.0 - 15.0 % Final    Platelets 09/01/2020 309  140 - 400 Thousand/uL Final    MPV 09/01/2020 10.2  7.5 - 12.5 fL Final    Neutrophils Absolute 09/01/2020 6,160  1,500 - 7,800 cells/uL Final    Lymph # 09/01/2020 2,850  850 - 3,900 cells/uL Final    Mono # 09/01/2020 720  200 - 950 cells/uL Final    Eos # 09/01/2020 230  15 - 500 cells/uL Final    Baso # 09/01/2020 40  0 - 200 cells/uL Final     Neutrophils Relative 09/01/2020 61.6  % Final    Lymph% 09/01/2020 28.5  % Final    Mono% 09/01/2020 7.2  % Final    Eosinophil% 09/01/2020 2.3  % Final    Basophil% 09/01/2020 0.4  % Final    Glucose 09/01/2020 88  65 - 99 mg/dL Final    BUN, Bld 09/01/2020 17  7 - 25 mg/dL Final    Creatinine 09/01/2020 0.96  0.50 - 0.99 mg/dL Final    eGFR if non African American 09/01/2020 60  > OR = 60 mL/min/1.73m2 Final    eGFR if African American 09/01/2020 70  > OR = 60 mL/min/1.73m2 Final    BUN/Creatinine Ratio 09/01/2020 NOT APPLICABLE  6 - 22 (calc) Final    Sodium 09/01/2020 136  135 - 146 mmol/L Final    Potassium 09/01/2020 4.7  3.5 - 5.3 mmol/L Final    Chloride 09/01/2020 98  98 - 110 mmol/L Final    CO2 09/01/2020 26  20 - 32 mmol/L Final    Calcium 09/01/2020 9.6  8.6 - 10.4 mg/dL Final    Total Protein 09/01/2020 7.1  6.1 - 8.1 g/dL Final    Albumin 09/01/2020 4.2  3.6 - 5.1 g/dL Final    Globulin, Total 09/01/2020 2.9  1.9 - 3.7 g/dL (calc) Final    Albumin/Globulin Ratio 09/01/2020 1.4  1.0 - 2.5 (calc) Final    Total Bilirubin 09/01/2020 0.3  0.2 - 1.2 mg/dL Final    Alkaline Phosphatase 09/01/2020 79  37 - 153 U/L Final    AST 09/01/2020 29  10 - 35 U/L Final    ALT 09/01/2020 17  6 - 29 U/L Final    Cholesterol 09/01/2020 167  <200 mg/dL Final    HDL 09/01/2020 45* > OR = 50 mg/dL Final    Triglycerides 09/01/2020 141  <150 mg/dL Final    LDL Cholesterol 09/01/2020 98  mg/dL (calc) Final    Hdl/Cholesterol Ratio 09/01/2020 3.7  <5.0 (calc) Final    Non HDL Chol. (LDL+VLDL) 09/01/2020 122  <130 mg/dL (calc) Final    TSH w/reflex to FT4 09/01/2020 2.67  0.40 - 4.50 mIU/L Final    Creatinine, Random Ur 09/01/2020 46  20 - 275 mg/dL Final    Microalb, Ur 09/01/2020 0.4  See Note: mg/dL Final    Microalb Creat Ratio 09/01/2020 9  <30 mcg/mg creat Final    Color, UA 09/01/2020 YELLOW  YELLOW Final    Appearance, UA 09/01/2020 CLEAR  CLEAR Final    Specific [place], UA  09/01/2020 1.009  1.001 - 1.035 Final    pH, UA 09/01/2020 7.5  5.0 - 8.0 Final    Glucose, UA 09/01/2020 NEGATIVE  NEGATIVE Final    Bilirubin, UA 09/01/2020 NEGATIVE  NEGATIVE Final    Ketones, UA 09/01/2020 NEGATIVE  NEGATIVE Final    Occult Blood UA 09/01/2020 NEGATIVE  NEGATIVE Final    Protein, UA 09/01/2020 NEGATIVE  NEGATIVE Final    Nitrite, UA 09/01/2020 NEGATIVE  NEGATIVE Final    Leukocytes, UA 09/01/2020 TRACE* NEGATIVE Final    WBC Casts, UA 09/01/2020 NONE SEEN  < OR = 5 /HPF Final    RBC Casts, UA 09/01/2020 NONE SEEN  < OR = 2 /HPF Final    Squam Epithel, UA 09/01/2020 NONE SEEN  < OR = 5 /HPF Final    Bacteria, UA 09/01/2020 NONE SEEN  NONE SEEN /HPF Final    Hyaline Casts, UA 09/01/2020 NONE SEEN  NONE SEEN /LPF Final    Reflexive Urine Culture 09/01/2020 CULTURE INDICATED - RESULTS TO FOLLOW   Final    Urine Culture, Routine 09/01/2020    Final   Office Visit on 06/03/2020   Component Date Value Ref Range Status    Hemoglobin A1C 06/03/2020 8.7  % Final       History reviewed. No pertinent past medical history.  Social History     Socioeconomic History    Marital status:      Spouse name: Not on file    Number of children: Not on file    Years of education: Not on file    Highest education level: Not on file   Occupational History    Not on file   Social Needs    Financial resource strain: Not on file    Food insecurity     Worry: Not on file     Inability: Not on file    Transportation needs     Medical: Not on file     Non-medical: Not on file   Tobacco Use    Smoking status: Never Smoker    Smokeless tobacco: Never Used   Substance and Sexual Activity    Alcohol use: Never     Frequency: Never    Drug use: Never    Sexual activity: Not on file   Lifestyle    Physical activity     Days per week: Not on file     Minutes per session: Not on file    Stress: Not on file   Relationships    Social connections     Talks on phone: Not on file     Gets together:  Not on file     Attends Baptism service: Not on file     Active member of club or organization: Not on file     Attends meetings of clubs or organizations: Not on file     Relationship status: Not on file   Other Topics Concern    Not on file   Social History Narrative    Not on file     Past Surgical History:   Procedure Laterality Date    KNEE SURGERY Left     TONSILLECTOMY       History reviewed. No pertinent family history.    Review of patient's allergies indicates:   Allergen Reactions    Ezetimibe      Other reaction(s): Other (See Comments), Unknown  Feels terrible when taking medication       Sitagliptin-metformin      Other reaction(s): diarrhea       Current Outpatient Medications:     aspirin (ECOTRIN) 81 MG EC tablet, Aspir-81 mg tablet,delayed release  Take 1 tablet every day by oral route., Disp: , Rfl:     biotin 10,000 mcg TbDL, 1 tablet, Disp: , Rfl:     blood sugar diagnostic (FREESTYLE LITE STRIPS) Strp, as directed, Disp: 100 each, Rfl: 5    coenzyme Q10 (CO Q-10) 100 mg capsule, 1 capsule with a meal, Disp: , Rfl:     cyclobenzaprine (FLEXERIL) 10 MG tablet, Take 1 tablet (10 mg total) by mouth 2 (two) times daily., Disp: 90 tablet, Rfl: 1    dulaglutide (TRULICITY) 1.5 mg/0.5 mL pen injector, Inject 1.5 mg into the skin once a week., Disp: 12 pen, Rfl: 3    ELDERBERRY FRUIT ORAL, Take by mouth., Disp: , Rfl:     FREESTYLE LANCETS MISC, 1 each by Misc.(Non-Drug; Combo Route) route 2 (two) times a day., Disp: , Rfl:     hydroCHLOROthiazide (HYDRODIURIL) 25 MG tablet, Take 1 tablet (25 mg total) by mouth once daily., Disp: 90 tablet, Rfl: 1    insulin (LANTUS SOLOSTAR U-100 INSULIN) glargine 100 units/mL (3mL) SubQ pen, as directed, Disp: 90 mL, Rfl: 5    krill-om-3-dha-epa-phospho-ast 344-55-52-50 mg Cap, , Disp: , Rfl:     lansoprazole (PREVACID) 15 MG capsule, 1 capsule, Disp: , Rfl:     lisinopriL (PRINIVIL,ZESTRIL) 20 MG tablet, Take 1 tablet (20 mg total) by mouth once  "daily., Disp: 90 tablet, Rfl: 1    loratadine (CLARITIN) 10 mg tablet, Take 1 tablet (10 mg total) by mouth once daily., Disp: 90 tablet, Rfl: 1    metoprolol succinate (TOPROL-XL) 50 MG 24 hr tablet, Take 1 tablet (50 mg total) by mouth once daily., Disp: 90 tablet, Rfl: 1    mupirocin (BACTROBAN) 2 % ointment, Apply topically 2 (two) times daily., Disp: 22 g, Rfl: 0    pen needle, diabetic (PEN NEEDLE) 31 gauge x 3/16" Ndle, 1 each by In Vitro route 3 (three) times daily. BD ULTRA FINE, Disp: 100 each, Rfl: 5    pioglitazone (ACTOS) 30 MG tablet, Take 1 tablet by mouth once daily., Disp: , Rfl:     pitavastatin calcium (LIVALO) 4 mg Tab, Take 1 tablet by mouth once daily., Disp: 90 tablet, Rfl: 1    SITagliptan-metformin (JANUMET XR) 100-1,000 mg TM24, Take 1 tablet by mouth nightly., Disp: 90 tablet, Rfl: 1    triamcinolone acetonide 0.1% (KENALOG) 0.1 % ointment, Apply topically 2 (two) times daily., Disp: 15 g, Rfl: 2    vitamin D (VITAMIN D3) 1000 units Tab, Take 5,000 Units by mouth., Disp: , Rfl:     Review of Systems   Constitutional: Negative for chills, fever and unexpected weight change.   HENT: Negative for ear pain, rhinorrhea and sore throat.    Eyes: Negative for pain and visual disturbance.   Respiratory: Negative for cough and shortness of breath.    Cardiovascular: Negative for chest pain, palpitations and leg swelling.   Gastrointestinal: Negative for abdominal pain, diarrhea, nausea and vomiting.   Genitourinary: Negative for difficulty urinating, hematuria and vaginal bleeding.   Musculoskeletal: Negative for arthralgias.   Skin: Negative for rash.   Neurological: Negative for dizziness, weakness and headaches.   Psychiatric/Behavioral: Negative for agitation and sleep disturbance. The patient is not nervous/anxious.           Objective:      Vitals:    09/08/20 1148   BP: 110/60   Pulse: 76   Weight: 88.5 kg (195 lb)   Height: 5' 3.5" (1.613 m)     Physical Exam  Constitutional:       " Appearance: She is well-developed.   HENT:      Right Ear: External ear normal.      Left Ear: External ear normal.   Eyes:      Pupils: Pupils are equal, round, and reactive to light.   Neck:      Musculoskeletal: Normal range of motion and neck supple.   Cardiovascular:      Rate and Rhythm: Normal rate and regular rhythm.      Pulses:           Dorsalis pedis pulses are 2+ on the right side.        Posterior tibial pulses are 2+ on the right side.      Heart sounds: Normal heart sounds.   Pulmonary:      Effort: Pulmonary effort is normal.      Breath sounds: Normal breath sounds.   Abdominal:      General: Bowel sounds are normal.      Palpations: Abdomen is soft.   Musculoskeletal: Normal range of motion.         General: No deformity.      Right foot: Normal range of motion. No deformity.   Feet:      Right foot:      Protective Sensation: 5 sites tested. 5 sites sensed.      Left foot:      Protective Sensation: 5 sites tested. 5 sites sensed.      Skin integrity: No skin breakdown.   Lymphadenopathy:      Cervical: No cervical adenopathy.   Skin:     General: Skin is warm and dry.   Neurological:      Mental Status: She is alert and oriented to person, place, and time.   Psychiatric:         Behavior: Behavior normal.           Assessment:       1. Menopause    2. Hypertension, unspecified type    3. Hyperlipidemia, unspecified hyperlipidemia type    4. Type 2 diabetes mellitus with other specified complication, unspecified whether long term insulin use         Plan:       Menopause    Hypertension, unspecified type    Hyperlipidemia, unspecified hyperlipidemia type    Type 2 diabetes mellitus with other specified complication, unspecified whether long term insulin use  Comments:  discussed increasing lantus by 2 units every 3 days until fbs <130. patient will d/c actos      No follow-ups on file.        9/8/2020 Verna Shah

## 2020-09-09 LAB — HBA1C MFR BLD: 6.9 %

## 2020-09-14 DIAGNOSIS — Z12.31 ENCOUNTER FOR SCREENING MAMMOGRAM FOR BREAST CANCER: Primary | ICD-10-CM

## 2020-09-23 ENCOUNTER — TELEPHONE (OUTPATIENT)
Dept: FAMILY MEDICINE | Facility: CLINIC | Age: 70
End: 2020-09-23

## 2020-09-23 NOTE — TELEPHONE ENCOUNTER
----- Message from Amina John sent at 9/23/2020 11:28 AM CDT -----  Regarding: needing call back  Pt is needing paperwork for a permanent [place]  parking decal for her window shield for her heart and surgery on left knee   Pt [phone]

## 2020-09-28 ENCOUNTER — HOSPITAL ENCOUNTER (OUTPATIENT)
Dept: RADIOLOGY | Facility: HOSPITAL | Age: 70
Discharge: HOME | End: 2020-09-28
Attending: NURSE PRACTITIONER
Payer: COMMERCIAL

## 2020-09-28 DIAGNOSIS — Z12.31 ENCOUNTER FOR SCREENING MAMMOGRAM FOR BREAST CANCER: ICD-10-CM

## 2020-09-28 PROCEDURE — 77067 SCR MAMMO BI INCL CAD: CPT | Mod: TC,PO

## 2020-09-29 ENCOUNTER — TELEPHONE (OUTPATIENT)
Dept: FAMILY MEDICINE | Facility: CLINIC | Age: 70
End: 2020-09-29

## 2020-09-29 NOTE — TELEPHONE ENCOUNTER
I spoke to patient who states that she gets SOB if she needs to walk long distances to and from the car and has no energy left for the event.   She will talk to Dr Gomez about it at her next visit in case he wants to give it to her. She understands about the knee/ba

## 2020-09-29 NOTE — TELEPHONE ENCOUNTER
Spoke to patient who wonders why she got a temporary handicap sticker again when she requested a permanent one.  Is there a reason why  She cannot have it?/ba

## 2020-10-21 RX ORDER — DULAGLUTIDE 1.5 MG/.5ML
1.5 INJECTION, SOLUTION SUBCUTANEOUS WEEKLY
Qty: 12 PEN | Refills: 3 | Status: CANCELLED | OUTPATIENT
Start: 2020-10-21

## 2020-10-21 RX ORDER — DULAGLUTIDE 1.5 MG/.5ML
1.5 INJECTION, SOLUTION SUBCUTANEOUS WEEKLY
Qty: 12 PEN | Refills: 3 | Status: SHIPPED | OUTPATIENT
Start: 2020-10-21 | End: 2020-10-27 | Stop reason: SDUPTHER

## 2020-10-21 NOTE — TELEPHONE ENCOUNTER
----- Message from Cesilia Palma sent at 10/21/2020  9:34 AM CDT -----  Regarding: Refill  Contact: Estefanía Ray  [facility]   [facility] to [facility]  Pt# [phone]

## 2020-10-27 DIAGNOSIS — E11.69 TYPE 2 DIABETES MELLITUS WITH OTHER SPECIFIED COMPLICATION, UNSPECIFIED WHETHER LONG TERM INSULIN USE: Primary | ICD-10-CM

## 2020-10-27 RX ORDER — DULAGLUTIDE 1.5 MG/.5ML
1.5 INJECTION, SOLUTION SUBCUTANEOUS WEEKLY
Qty: 1 PEN | Refills: 0 | Status: SHIPPED | OUTPATIENT
Start: 2020-10-27 | End: 2020-11-30 | Stop reason: SDUPTHER

## 2020-10-27 RX ORDER — DULAGLUTIDE 1.5 MG/.5ML
1.5 INJECTION, SOLUTION SUBCUTANEOUS WEEKLY
Qty: 12 PEN | Refills: 3 | Status: SHIPPED | OUTPATIENT
Start: 2020-10-27 | End: 2020-11-09 | Stop reason: SDUPTHER

## 2020-10-27 NOTE — TELEPHONE ENCOUNTER
----- Message from Cesilia Palma sent at 10/27/2020  3:38 PM CDT -----  Regarding: Refil  Contact: Estefanía Ray  [facility] states that they never received the Trulicity..can nurse please re-send this to [facility] and she would like a 1 week supply sent to the [facility]   Pt#[phone]

## 2020-11-09 DIAGNOSIS — E11.69 TYPE 2 DIABETES MELLITUS WITH OTHER SPECIFIED COMPLICATION, UNSPECIFIED WHETHER LONG TERM INSULIN USE: ICD-10-CM

## 2020-11-09 DIAGNOSIS — I10 HYPERTENSION, UNSPECIFIED TYPE: ICD-10-CM

## 2020-11-09 DIAGNOSIS — M62.838 MUSCLE SPASM: ICD-10-CM

## 2020-11-09 DIAGNOSIS — E78.5 HYPERLIPIDEMIA, UNSPECIFIED HYPERLIPIDEMIA TYPE: ICD-10-CM

## 2020-11-09 RX ORDER — PITAVASTATIN CALCIUM 4.18 MG/1
1 TABLET, FILM COATED ORAL DAILY
Qty: 90 TABLET | Refills: 1 | Status: SHIPPED | OUTPATIENT
Start: 2020-11-09 | End: 2020-12-08 | Stop reason: SDUPTHER

## 2020-11-09 RX ORDER — CYCLOBENZAPRINE HCL 10 MG
10 TABLET ORAL 2 TIMES DAILY
Qty: 90 TABLET | Refills: 1 | Status: SHIPPED | OUTPATIENT
Start: 2020-11-09 | End: 2020-12-08 | Stop reason: SDUPTHER

## 2020-11-09 RX ORDER — LISINOPRIL 20 MG/1
20 TABLET ORAL DAILY
Qty: 90 TABLET | Refills: 1 | Status: SHIPPED | OUTPATIENT
Start: 2020-11-09 | End: 2020-12-08 | Stop reason: SDUPTHER

## 2020-11-09 RX ORDER — SITAGLIPTIN AND METFORMIN HYDROCHLORIDE 1000; 100 MG/1; MG/1
1 TABLET, FILM COATED, EXTENDED RELEASE ORAL NIGHTLY
Qty: 90 TABLET | Refills: 1 | Status: SHIPPED | OUTPATIENT
Start: 2020-11-09 | End: 2020-12-08 | Stop reason: SDUPTHER

## 2020-11-09 RX ORDER — DULAGLUTIDE 1.5 MG/.5ML
1.5 INJECTION, SOLUTION SUBCUTANEOUS WEEKLY
Qty: 12 PEN | Refills: 3 | Status: SHIPPED | OUTPATIENT
Start: 2020-11-09 | End: 2020-12-08

## 2020-11-09 RX ORDER — HYDROCHLOROTHIAZIDE 25 MG/1
25 TABLET ORAL DAILY
Qty: 90 TABLET | Refills: 1 | Status: SHIPPED | OUTPATIENT
Start: 2020-11-09 | End: 2020-12-08 | Stop reason: SDUPTHER

## 2020-11-09 RX ORDER — METOPROLOL SUCCINATE 50 MG/1
50 TABLET, EXTENDED RELEASE ORAL DAILY
Qty: 90 TABLET | Refills: 1 | Status: SHIPPED | OUTPATIENT
Start: 2020-11-09 | End: 2020-12-08 | Stop reason: SDUPTHER

## 2020-11-09 NOTE — TELEPHONE ENCOUNTER
Spoke to pt regarding message below. Pt stated her meds were never sent to the [facility]. Let pt know meds were sent on 9/8 that I would give the pharmacy a call.

## 2020-11-09 NOTE — TELEPHONE ENCOUNTER
Spoke to sylwia from [facility] regarding message below. Pharmacist stated the only meds they receicved was the insulin lantus, lacets/pen needles. And janumet XR.

## 2020-11-09 NOTE — TELEPHONE ENCOUNTER
----- Message from Cesilia Palma sent at 11/9/2020  2:22 PM CST -----  Regarding: Needs call back from nurse  Contact: Estefanía Ray  Pt needs the nurse to give her a call back in regards to her prescriptions. She would rather explain everything to the nurse  Pt# [phone]

## 2020-11-09 NOTE — TELEPHONE ENCOUNTER
Spoke to pt to let her know message below. Meds setup to be sent in that pharmacist did not receive

## 2020-11-30 DIAGNOSIS — E11.69 TYPE 2 DIABETES MELLITUS WITH OTHER SPECIFIED COMPLICATION, UNSPECIFIED WHETHER LONG TERM INSULIN USE: ICD-10-CM

## 2020-11-30 RX ORDER — DULAGLUTIDE 1.5 MG/.5ML
1.5 INJECTION, SOLUTION SUBCUTANEOUS WEEKLY
Qty: 4 PEN | Refills: 0 | Status: SHIPPED | OUTPATIENT
Start: 2020-11-30 | End: 2020-12-08 | Stop reason: SDUPTHER

## 2020-11-30 NOTE — TELEPHONE ENCOUNTER
Spoke to [facility] to see when pt meds was shipped. pharmacist stated meds were never sent out due to pts middle initial being wrong on file. Pharmacist stated pt will need to call in to fix the initial.

## 2020-11-30 NOTE — TELEPHONE ENCOUNTER
----- Message from Cesilia Palma sent at 11/30/2020  8:51 AM CST -----  Regarding: Refill  Contact: Estefanía Ray  [facility] tomorrow  Send to [facility] on [address] [place]   Pt# [phone]

## 2020-12-08 ENCOUNTER — OFFICE VISIT (OUTPATIENT)
Dept: FAMILY MEDICINE | Facility: CLINIC | Age: 70
End: 2020-12-08
Payer: COMMERCIAL

## 2020-12-08 VITALS
HEIGHT: 64 IN | DIASTOLIC BLOOD PRESSURE: 54 MMHG | SYSTOLIC BLOOD PRESSURE: 110 MMHG | WEIGHT: 198 LBS | HEART RATE: 64 BPM | BODY MASS INDEX: 33.8 KG/M2

## 2020-12-08 DIAGNOSIS — E11.69 TYPE 2 DIABETES MELLITUS WITH OTHER SPECIFIED COMPLICATION, UNSPECIFIED WHETHER LONG TERM INSULIN USE: ICD-10-CM

## 2020-12-08 DIAGNOSIS — M62.838 MUSCLE SPASM: ICD-10-CM

## 2020-12-08 DIAGNOSIS — I10 HYPERTENSION, UNSPECIFIED TYPE: ICD-10-CM

## 2020-12-08 DIAGNOSIS — E78.5 HYPERLIPIDEMIA, UNSPECIFIED HYPERLIPIDEMIA TYPE: ICD-10-CM

## 2020-12-08 DIAGNOSIS — M17.0 OSTEOARTHRITIS OF BOTH KNEES, UNSPECIFIED OSTEOARTHRITIS TYPE: Primary | ICD-10-CM

## 2020-12-08 DIAGNOSIS — I25.10 ATHEROSCLEROSIS OF CORONARY ARTERY, ANGINA PRESENCE UNSPECIFIED, UNSPECIFIED VESSEL OR LESION TYPE, UNSPECIFIED WHETHER NATIVE OR TRANSPLANTED HEART: ICD-10-CM

## 2020-12-08 LAB — HBA1C MFR BLD: 7.4 %

## 2020-12-08 PROCEDURE — 83036 POCT HEMOGLOBIN A1C: ICD-10-PCS | Mod: QW,,, | Performed by: NURSE PRACTITIONER

## 2020-12-08 PROCEDURE — 3074F PR MOST RECENT SYSTOLIC BLOOD PRESSURE < 130 MM HG: ICD-10-PCS | Mod: S$GLB,,, | Performed by: NURSE PRACTITIONER

## 2020-12-08 PROCEDURE — 3008F BODY MASS INDEX DOCD: CPT | Mod: S$GLB,,, | Performed by: NURSE PRACTITIONER

## 2020-12-08 PROCEDURE — 83036 HEMOGLOBIN GLYCOSYLATED A1C: CPT | Mod: QW,,, | Performed by: NURSE PRACTITIONER

## 2020-12-08 PROCEDURE — 1159F MED LIST DOCD IN RCRD: CPT | Mod: S$GLB,,, | Performed by: NURSE PRACTITIONER

## 2020-12-08 PROCEDURE — 3008F PR BODY MASS INDEX (BMI) DOCUMENTED: ICD-10-PCS | Mod: S$GLB,,, | Performed by: NURSE PRACTITIONER

## 2020-12-08 PROCEDURE — 3074F SYST BP LT 130 MM HG: CPT | Mod: S$GLB,,, | Performed by: NURSE PRACTITIONER

## 2020-12-08 PROCEDURE — 3078F PR MOST RECENT DIASTOLIC BLOOD PRESSURE < 80 MM HG: ICD-10-PCS | Mod: S$GLB,,, | Performed by: NURSE PRACTITIONER

## 2020-12-08 PROCEDURE — 3051F HG A1C>EQUAL 7.0%<8.0%: CPT | Mod: S$GLB,,, | Performed by: NURSE PRACTITIONER

## 2020-12-08 PROCEDURE — 3078F DIAST BP <80 MM HG: CPT | Mod: S$GLB,,, | Performed by: NURSE PRACTITIONER

## 2020-12-08 PROCEDURE — 99214 PR OFFICE/OUTPT VISIT, EST, LEVL IV, 30-39 MIN: ICD-10-PCS | Mod: S$GLB,,, | Performed by: NURSE PRACTITIONER

## 2020-12-08 PROCEDURE — 1159F PR MEDICATION LIST DOCUMENTED IN MEDICAL RECORD: ICD-10-PCS | Mod: S$GLB,,, | Performed by: NURSE PRACTITIONER

## 2020-12-08 PROCEDURE — 99214 OFFICE O/P EST MOD 30 MIN: CPT | Mod: S$GLB,,, | Performed by: NURSE PRACTITIONER

## 2020-12-08 PROCEDURE — 3051F PR MOST RECENT HEMOGLOBIN A1C LEVEL 7.0 - < 8.0%: ICD-10-PCS | Mod: S$GLB,,, | Performed by: NURSE PRACTITIONER

## 2020-12-08 RX ORDER — METOPROLOL SUCCINATE 50 MG/1
50 TABLET, EXTENDED RELEASE ORAL DAILY
Qty: 90 TABLET | Refills: 1 | Status: SHIPPED | OUTPATIENT
Start: 2020-12-08 | End: 2021-09-09 | Stop reason: SDUPTHER

## 2020-12-08 RX ORDER — INSULIN GLARGINE 100 [IU]/ML
INJECTION, SOLUTION SUBCUTANEOUS
Qty: 90 ML | Refills: 5 | Status: SHIPPED | OUTPATIENT
Start: 2020-12-08 | End: 2021-09-09 | Stop reason: SDUPTHER

## 2020-12-08 RX ORDER — LISINOPRIL 20 MG/1
20 TABLET ORAL DAILY
Qty: 90 TABLET | Refills: 1 | Status: SHIPPED | OUTPATIENT
Start: 2020-12-08 | End: 2021-09-09 | Stop reason: SDUPTHER

## 2020-12-08 RX ORDER — DULAGLUTIDE 1.5 MG/.5ML
1.5 INJECTION, SOLUTION SUBCUTANEOUS WEEKLY
Qty: 4 PEN | Refills: 0 | Status: SHIPPED | OUTPATIENT
Start: 2020-12-08 | End: 2021-01-07

## 2020-12-08 RX ORDER — HYDROCHLOROTHIAZIDE 25 MG/1
25 TABLET ORAL DAILY
Qty: 90 TABLET | Refills: 1 | Status: SHIPPED | OUTPATIENT
Start: 2020-12-08 | End: 2021-07-01 | Stop reason: SDUPTHER

## 2020-12-08 RX ORDER — PITAVASTATIN CALCIUM 4.18 MG/1
1 TABLET, FILM COATED ORAL DAILY
Qty: 90 TABLET | Refills: 1 | Status: SHIPPED | OUTPATIENT
Start: 2020-12-08 | End: 2021-09-09 | Stop reason: SDUPTHER

## 2020-12-08 RX ORDER — CYCLOBENZAPRINE HCL 10 MG
10 TABLET ORAL 2 TIMES DAILY
Qty: 90 TABLET | Refills: 1 | Status: SHIPPED | OUTPATIENT
Start: 2020-12-08 | End: 2021-09-09 | Stop reason: SDUPTHER

## 2020-12-08 RX ORDER — SITAGLIPTIN AND METFORMIN HYDROCHLORIDE 1000; 100 MG/1; MG/1
1 TABLET, FILM COATED, EXTENDED RELEASE ORAL NIGHTLY
Qty: 90 TABLET | Refills: 1 | Status: SHIPPED | OUTPATIENT
Start: 2020-12-08 | End: 2021-08-24 | Stop reason: SDUPTHER

## 2020-12-08 NOTE — PROGRESS NOTES
SUBJECTIVE:    Patient ID: Estefanía Ray is a 70 y.o. female.    Chief Complaint: Diabetes (brought bottles, Eye exam req [place], Flu declined// SW)    70 year old female presents for check up. Treated for diabetes. Monitors blood sugar daily. Readings at home have been <150mg/dl fasting. Taking medication as prescribed. Needs refills.  Occasionally still has some issues with knees. But has significantly improved. Followed by Dr. Gomez.       Office Visit on 12/08/2020   Component Date Value Ref Range Status    Hemoglobin A1C 12/08/2020 7.4  % Final   Office Visit on 09/08/2020   Component Date Value Ref Range Status    Hemoglobin A1C 09/08/2020 6.9  % Final   Telephone on 07/30/2020   Component Date Value Ref Range Status    WBC 09/01/2020 10.0  3.8 - 10.8 Thousand/uL Final    RBC 09/01/2020 4.70  3.80 - 5.10 Million/uL Final    Hemoglobin 09/01/2020 12.6  11.7 - 15.5 g/dL Final    Hematocrit 09/01/2020 39.9  35.0 - 45.0 % Final    MCV 09/01/2020 84.9  80.0 - 100.0 fL Final    MCH 09/01/2020 26.8* 27.0 - 33.0 pg Final    MCHC 09/01/2020 31.6* 32.0 - 36.0 g/dL Final    RDW 09/01/2020 14.0  11.0 - 15.0 % Final    Platelets 09/01/2020 309  140 - 400 Thousand/uL Final    MPV 09/01/2020 10.2  7.5 - 12.5 fL Final    Neutrophils Absolute 09/01/2020 6,160  1,500 - 7,800 cells/uL Final    Lymph # 09/01/2020 2,850  850 - 3,900 cells/uL Final    Mono # 09/01/2020 720  200 - 950 cells/uL Final    Eos # 09/01/2020 230  15 - 500 cells/uL Final    Baso # 09/01/2020 40  0 - 200 cells/uL Final    Neutrophils Relative 09/01/2020 61.6  % Final    Lymph % 09/01/2020 28.5  % Final    Mono % 09/01/2020 7.2  % Final    Eosinophil % 09/01/2020 2.3  % Final    Basophil % 09/01/2020 0.4  % Final    Glucose 09/01/2020 88  65 - 99 mg/dL Final    BUN 09/01/2020 17  7 - 25 mg/dL Final    Creatinine 09/01/2020 0.96  0.50 - 0.99 mg/dL Final    eGFR if non African American 09/01/2020 60  > OR = 60 mL/min/1.73m2 Final     eGFR if African American 09/01/2020 70  > OR = 60 mL/min/1.73m2 Final    BUN/Creatinine Ratio 09/01/2020 NOT APPLICABLE  6 - 22 (calc) Final    Sodium 09/01/2020 136  135 - 146 mmol/L Final    Potassium 09/01/2020 4.7  3.5 - 5.3 mmol/L Final    Chloride 09/01/2020 98  98 - 110 mmol/L Final    CO2 09/01/2020 26  20 - 32 mmol/L Final    Calcium 09/01/2020 9.6  8.6 - 10.4 mg/dL Final    Total Protein 09/01/2020 7.1  6.1 - 8.1 g/dL Final    Albumin 09/01/2020 4.2  3.6 - 5.1 g/dL Final    Globulin, Total 09/01/2020 2.9  1.9 - 3.7 g/dL (calc) Final    Albumin/Globulin Ratio 09/01/2020 1.4  1.0 - 2.5 (calc) Final    Total Bilirubin 09/01/2020 0.3  0.2 - 1.2 mg/dL Final    Alkaline Phosphatase 09/01/2020 79  37 - 153 U/L Final    AST 09/01/2020 29  10 - 35 U/L Final    ALT 09/01/2020 17  6 - 29 U/L Final    Cholesterol 09/01/2020 167  <200 mg/dL Final    HDL 09/01/2020 45* > OR = 50 mg/dL Final    Triglycerides 09/01/2020 141  <150 mg/dL Final    LDL Cholesterol 09/01/2020 98  mg/dL (calc) Final    HDL/Cholesterol Ratio 09/01/2020 3.7  <5.0 (calc) Final    Non HDL Chol. (LDL+VLDL) 09/01/2020 122  <130 mg/dL (calc) Final    TSH w/reflex to FT4 09/01/2020 2.67  0.40 - 4.50 mIU/L Final    Creatinine, Urine 09/01/2020 46  20 - 275 mg/dL Final    Microalb, Ur 09/01/2020 0.4  See Note: mg/dL Final    Microalb/Creat Ratio 09/01/2020 9  <30 mcg/mg creat Final    Color, UA 09/01/2020 YELLOW  YELLOW Final    Appearance, UA 09/01/2020 CLEAR  CLEAR Final    Specific [place], UA 09/01/2020 1.009  1.001 - 1.035 Final    pH, UA 09/01/2020 7.5  5.0 - 8.0 Final    Glucose, UA 09/01/2020 NEGATIVE  NEGATIVE Final    Bilirubin, UA 09/01/2020 NEGATIVE  NEGATIVE Final    Ketones, UA 09/01/2020 NEGATIVE  NEGATIVE Final    Occult Blood UA 09/01/2020 NEGATIVE  NEGATIVE Final    Protein, UA 09/01/2020 NEGATIVE  NEGATIVE Final    Nitrite, UA 09/01/2020 NEGATIVE  NEGATIVE Final    Leukocytes, UA 09/01/2020 TRACE*  NEGATIVE Final    WBC Casts, UA 09/01/2020 NONE SEEN  < OR = 5 /HPF Final    RBC Casts, UA 09/01/2020 NONE SEEN  < OR = 2 /HPF Final    Squam Epithel, UA 09/01/2020 NONE SEEN  < OR = 5 /HPF Final    Bacteria, UA 09/01/2020 NONE SEEN  NONE SEEN /HPF Final    Hyaline Casts, UA 09/01/2020 NONE SEEN  NONE SEEN /LPF Final    Reflexive Urine Culture 09/01/2020 CULTURE INDICATED - RESULTS TO FOLLOW   Final    Urine Culture, Routine 09/01/2020    Final       History reviewed. No pertinent past medical history.  Social History     Socioeconomic History    Marital status:      Spouse name: Not on file    Number of children: Not on file    Years of education: Not on file    Highest education level: Not on file   Occupational History    Not on file   Social Needs    Financial resource strain: Not on file    Food insecurity     Worry: Not on file     Inability: Not on file    Transportation needs     Medical: Not on file     Non-medical: Not on file   Tobacco Use    Smoking status: Never Smoker    Smokeless tobacco: Never Used   Substance and Sexual Activity    Alcohol use: Never     Frequency: Never    Drug use: Never    Sexual activity: Not on file   Lifestyle    Physical activity     Days per week: Not on file     Minutes per session: Not on file    Stress: Not on file   Relationships    Social connections     Talks on phone: Not on file     Gets together: Not on file     Attends [employer] service: Not on file     Active member of club or organization: Not on file     Attends meetings of clubs or organizations: Not on file     Relationship status: Not on file   Other Topics Concern    Not on file   Social History Narrative    Not on file     Past Surgical History:   Procedure Laterality Date    KNEE SURGERY Left     TONSILLECTOMY       History reviewed. No pertinent family history.    Review of patient's allergies indicates:   Allergen Reactions    Ezetimibe      Other reaction(s): Other  "(See Comments), Unknown  Feels terrible when taking medication       Sitagliptin-metformin      Other reaction(s): diarrhea       Current Outpatient Medications:     aspirin (ECOTRIN) 81 MG EC tablet, Aspir-81 mg tablet,delayed release  Take 1 tablet every day by oral route., Disp: , Rfl:     biotin 10,000 mcg TbDL, 1 tablet, Disp: , Rfl:     blood sugar diagnostic (FREESTYLE LITE STRIPS) Strp, as directed, Disp: 100 each, Rfl: 5    coenzyme Q10 (CO Q-10) 100 mg capsule, 1 capsule with a meal, Disp: , Rfl:     cyclobenzaprine (FLEXERIL) 10 MG tablet, Take 1 tablet (10 mg total) by mouth 2 (two) times daily., Disp: 90 tablet, Rfl: 1    dulaglutide (TRULICITY) 1.5 mg/0.5 mL pen injector, Inject 1.5 mg into the skin once a week., Disp: 4 pen, Rfl: 0    ELDERBERRY FRUIT ORAL, Take by mouth., Disp: , Rfl:     hydroCHLOROthiazide (HYDRODIURIL) 25 MG tablet, Take 1 tablet (25 mg total) by mouth once daily., Disp: 90 tablet, Rfl: 1    insulin (LANTUS SOLOSTAR U-100 INSULIN) glargine 100 units/mL (3mL) SubQ pen, as directed, Disp: 90 mL, Rfl: 5    krill-om-3-dha-epa-phospho-ast 879-08-02-50 mg Cap, , Disp: , Rfl:     lancets (FREESTYLE LANCETS) 28 gauge Misc, 1 lancet by Misc.(Non-Drug; Combo Route) route 2 (two) times a day., Disp: 200 each, Rfl: 3    lisinopriL (PRINIVIL,ZESTRIL) 20 MG tablet, Take 1 tablet (20 mg total) by mouth once daily., Disp: 90 tablet, Rfl: 1    metoprolol succinate (TOPROL-XL) 50 MG 24 hr tablet, Take 1 tablet (50 mg total) by mouth once daily., Disp: 90 tablet, Rfl: 1    pen needle, diabetic (PEN NEEDLE) 31 gauge x 3/16" Ndle, 1 each by In Vitro route 3 (three) times daily. BD ULTRA FINE, Disp: 100 each, Rfl: 5    pitavastatin calcium (LIVALO) 4 mg Tab, Take 1 tablet by mouth once daily., Disp: 90 tablet, Rfl: 1    SITagliptan-metformin (JANUMET XR) 100-1,000 mg TM24, Take 1 tablet by mouth nightly., Disp: 90 tablet, Rfl: 1    UNABLE TO FIND, medication name: Prevagen, Disp: , " "Rfl:     vitamin D (VITAMIN D3) 1000 units Tab, Take 5,000 Units by mouth., Disp: , Rfl:     lansoprazole (PREVACID) 15 MG capsule, 1 capsule, Disp: , Rfl:     loratadine (CLARITIN) 10 mg tablet, Take 1 tablet (10 mg total) by mouth once daily., Disp: 90 tablet, Rfl: 1    mupirocin (BACTROBAN) 2 % ointment, Apply topically 2 (two) times daily., Disp: 22 g, Rfl: 0    pioglitazone (ACTOS) 30 MG tablet, Take 1 tablet by mouth once daily., Disp: , Rfl:     triamcinolone acetonide 0.1% (KENALOG) 0.1 % ointment, Apply topically 2 (two) times daily., Disp: 15 g, Rfl: 2    Review of Systems   Constitutional: Negative for chills, fever and unexpected weight change.   HENT: Negative for ear pain, rhinorrhea and sore throat.    Eyes: Negative for pain and visual disturbance.   Respiratory: Negative for cough and shortness of breath.    Cardiovascular: Negative for chest pain, palpitations and leg swelling.   Gastrointestinal: Negative for abdominal pain, diarrhea, nausea and vomiting.   Genitourinary: Negative for difficulty urinating, hematuria and vaginal bleeding.   Musculoskeletal: Negative for arthralgias.   Skin: Negative for rash.   Neurological: Negative for dizziness, weakness and headaches.   Psychiatric/Behavioral: Negative for agitation and sleep disturbance. The patient is not nervous/anxious.           Objective:      Vitals:    12/08/20 1137   BP: (!) 110/54   Pulse: 64   Weight: 89.8 kg (198 lb)   Height: 5' 3.5" (1.613 m)     Physical Exam  Constitutional:       Appearance: She is well-developed.   HENT:      Right Ear: External ear normal.      Left Ear: External ear normal.   Eyes:      Pupils: Pupils are equal, round, and reactive to light.   Neck:      Musculoskeletal: Normal range of motion and neck supple.   Cardiovascular:      Rate and Rhythm: Normal rate and regular rhythm.      Pulses:           Dorsalis pedis pulses are 2+ on the right side.        Posterior tibial pulses are 2+ on the right " side.      Heart sounds: Normal heart sounds.   Pulmonary:      Effort: Pulmonary effort is normal.      Breath sounds: Normal breath sounds.   Abdominal:      General: Bowel sounds are normal.      Palpations: Abdomen is soft.   Musculoskeletal: Normal range of motion.         General: No deformity.      Right foot: Normal range of motion. No deformity.   Feet:      Right foot:      Protective Sensation: 5 sites tested. 5 sites sensed.      Left foot:      Protective Sensation: 5 sites tested. 5 sites sensed.      Skin integrity: No skin breakdown.   Lymphadenopathy:      Cervical: No cervical adenopathy.   Skin:     General: Skin is warm and dry.   Neurological:      Mental Status: She is alert and oriented to person, place, and time.   Psychiatric:         Behavior: Behavior normal.           Assessment:       1. Osteoarthritis of both knees, unspecified osteoarthritis type    2. Muscle spasm    3. Type 2 diabetes mellitus with other specified complication, unspecified whether long term insulin use    4. Hypertension, unspecified type    5. Type 2 diabetes mellitus with other specified complication, unspecified whether long term insulin use    6. Hyperlipidemia, unspecified hyperlipidemia type    7. Atherosclerosis of coronary artery, angina presence unspecified, unspecified vessel or lesion type, unspecified whether native or transplanted heart         Plan:       Osteoarthritis of both knees, unspecified osteoarthritis type  Comments:  handicapped form completed.     Muscle spasm  -     cyclobenzaprine (FLEXERIL) 10 MG tablet; Take 1 tablet (10 mg total) by mouth 2 (two) times daily.  Dispense: 90 tablet; Refill: 1    Type 2 diabetes mellitus with other specified complication, unspecified whether long term insulin use  Comments:  will monitor sugar 2 hours after eating and send to office in 1 week.   Orders:  -     dulaglutide (TRULICITY) 1.5 mg/0.5 mL pen injector; Inject 1.5 mg into the skin once a week.   Dispense: 4 pen; Refill: 0  -     insulin (LANTUS SOLOSTAR U-100 INSULIN) glargine 100 units/mL (3mL) SubQ pen; as directed  Dispense: 90 mL; Refill: 5  -     SITagliptan-metformin (JANUMET XR) 100-1,000 mg TM24; Take 1 tablet by mouth nightly.  Dispense: 90 tablet; Refill: 1  -     Hemoglobin A1C, POCT    Hypertension, unspecified type  -     hydroCHLOROthiazide (HYDRODIURIL) 25 MG tablet; Take 1 tablet (25 mg total) by mouth once daily.  Dispense: 90 tablet; Refill: 1  -     lisinopriL (PRINIVIL,ZESTRIL) 20 MG tablet; Take 1 tablet (20 mg total) by mouth once daily.  Dispense: 90 tablet; Refill: 1  -     metoprolol succinate (TOPROL-XL) 50 MG 24 hr tablet; Take 1 tablet (50 mg total) by mouth once daily.  Dispense: 90 tablet; Refill: 1    Type 2 diabetes mellitus with other specified complication, unspecified whether long term insulin use  Comments:  ok to take 1/2 actos  Orders:  -     dulaglutide (TRULICITY) 1.5 mg/0.5 mL pen injector; Inject 1.5 mg into the skin once a week.  Dispense: 4 pen; Refill: 0  -     insulin (LANTUS SOLOSTAR U-100 INSULIN) glargine 100 units/mL (3mL) SubQ pen; as directed  Dispense: 90 mL; Refill: 5  -     SITagliptan-metformin (JANUMET XR) 100-1,000 mg TM24; Take 1 tablet by mouth nightly.  Dispense: 90 tablet; Refill: 1  -     Hemoglobin A1C, POCT    Hyperlipidemia, unspecified hyperlipidemia type  -     pitavastatin calcium (LIVALO) 4 mg Tab; Take 1 tablet by mouth once daily.  Dispense: 90 tablet; Refill: 1    Atherosclerosis of coronary artery, angina presence unspecified, unspecified vessel or lesion type, unspecified whether native or transplanted heart      Follow up in about 3 months (around 3/8/2021) for Diabetic Check-Up.        12/13/2020 Verna Shah

## 2021-01-12 ENCOUNTER — TELEPHONE (OUTPATIENT)
Dept: FAMILY MEDICINE | Facility: CLINIC | Age: 71
End: 2021-01-12

## 2021-02-01 ENCOUNTER — OFFICE VISIT (OUTPATIENT)
Dept: FAMILY MEDICINE | Facility: CLINIC | Age: 71
End: 2021-02-01
Payer: COMMERCIAL

## 2021-02-01 ENCOUNTER — TELEPHONE (OUTPATIENT)
Dept: FAMILY MEDICINE | Facility: CLINIC | Age: 71
End: 2021-02-01

## 2021-02-01 VITALS
WEIGHT: 196 LBS | TEMPERATURE: 98 F | HEART RATE: 72 BPM | SYSTOLIC BLOOD PRESSURE: 132 MMHG | BODY MASS INDEX: 33.46 KG/M2 | HEIGHT: 64 IN | DIASTOLIC BLOOD PRESSURE: 60 MMHG

## 2021-02-01 DIAGNOSIS — E11.69 TYPE 2 DIABETES MELLITUS WITH OTHER SPECIFIED COMPLICATION, UNSPECIFIED WHETHER LONG TERM INSULIN USE: Primary | ICD-10-CM

## 2021-02-01 DIAGNOSIS — H65.20 CHRONIC SEROUS OTITIS MEDIA, UNSPECIFIED LATERALITY: ICD-10-CM

## 2021-02-01 DIAGNOSIS — M17.0 OSTEOARTHRITIS OF BOTH KNEES, UNSPECIFIED OSTEOARTHRITIS TYPE: ICD-10-CM

## 2021-02-01 DIAGNOSIS — J06.9 UPPER RESPIRATORY TRACT INFECTION, UNSPECIFIED TYPE: ICD-10-CM

## 2021-02-01 DIAGNOSIS — I25.10 ATHEROSCLEROSIS OF CORONARY ARTERY, ANGINA PRESENCE UNSPECIFIED, UNSPECIFIED VESSEL OR LESION TYPE, UNSPECIFIED WHETHER NATIVE OR TRANSPLANTED HEART: ICD-10-CM

## 2021-02-01 DIAGNOSIS — I10 HYPERTENSION, UNSPECIFIED TYPE: ICD-10-CM

## 2021-02-01 DIAGNOSIS — Z79.899 HIGH RISK MEDICATION USE: ICD-10-CM

## 2021-02-01 DIAGNOSIS — E78.5 HYPERLIPIDEMIA, UNSPECIFIED HYPERLIPIDEMIA TYPE: ICD-10-CM

## 2021-02-01 DIAGNOSIS — Z95.1 S/P CABG (CORONARY ARTERY BYPASS GRAFT): ICD-10-CM

## 2021-02-01 PROCEDURE — 3051F PR MOST RECENT HEMOGLOBIN A1C LEVEL 7.0 - < 8.0%: ICD-10-PCS | Mod: S$GLB,,, | Performed by: NURSE PRACTITIONER

## 2021-02-01 PROCEDURE — 1159F MED LIST DOCD IN RCRD: CPT | Mod: S$GLB,,, | Performed by: NURSE PRACTITIONER

## 2021-02-01 PROCEDURE — 3051F HG A1C>EQUAL 7.0%<8.0%: CPT | Mod: S$GLB,,, | Performed by: NURSE PRACTITIONER

## 2021-02-01 PROCEDURE — 3078F DIAST BP <80 MM HG: CPT | Mod: S$GLB,,, | Performed by: NURSE PRACTITIONER

## 2021-02-01 PROCEDURE — 1159F PR MEDICATION LIST DOCUMENTED IN MEDICAL RECORD: ICD-10-PCS | Mod: S$GLB,,, | Performed by: NURSE PRACTITIONER

## 2021-02-01 PROCEDURE — 3078F PR MOST RECENT DIASTOLIC BLOOD PRESSURE < 80 MM HG: ICD-10-PCS | Mod: S$GLB,,, | Performed by: NURSE PRACTITIONER

## 2021-02-01 PROCEDURE — 99213 OFFICE O/P EST LOW 20 MIN: CPT | Mod: S$GLB,,, | Performed by: NURSE PRACTITIONER

## 2021-02-01 PROCEDURE — 3075F SYST BP GE 130 - 139MM HG: CPT | Mod: S$GLB,,, | Performed by: NURSE PRACTITIONER

## 2021-02-01 PROCEDURE — 3075F PR MOST RECENT SYSTOLIC BLOOD PRESS GE 130-139MM HG: ICD-10-PCS | Mod: S$GLB,,, | Performed by: NURSE PRACTITIONER

## 2021-02-01 PROCEDURE — 3008F PR BODY MASS INDEX (BMI) DOCUMENTED: ICD-10-PCS | Mod: S$GLB,,, | Performed by: NURSE PRACTITIONER

## 2021-02-01 PROCEDURE — 99213 PR OFFICE/OUTPT VISIT, EST, LEVL III, 20-29 MIN: ICD-10-PCS | Mod: S$GLB,,, | Performed by: NURSE PRACTITIONER

## 2021-02-01 PROCEDURE — 3008F BODY MASS INDEX DOCD: CPT | Mod: S$GLB,,, | Performed by: NURSE PRACTITIONER

## 2021-02-01 RX ORDER — AZITHROMYCIN 250 MG/1
TABLET, FILM COATED ORAL
Qty: 6 TABLET | Refills: 0 | Status: SHIPPED | OUTPATIENT
Start: 2021-02-01 | End: 2021-02-06

## 2021-02-17 ENCOUNTER — TELEPHONE (OUTPATIENT)
Dept: FAMILY MEDICINE | Facility: CLINIC | Age: 71
End: 2021-02-17

## 2021-02-17 DIAGNOSIS — Z79.899 ENCOUNTER FOR LONG-TERM (CURRENT) USE OF OTHER MEDICATIONS: Primary | ICD-10-CM

## 2021-02-25 ENCOUNTER — TELEPHONE (OUTPATIENT)
Dept: FAMILY MEDICINE | Facility: CLINIC | Age: 71
End: 2021-02-25

## 2021-03-02 ENCOUNTER — OFFICE VISIT (OUTPATIENT)
Dept: FAMILY MEDICINE | Facility: CLINIC | Age: 71
End: 2021-03-02
Payer: COMMERCIAL

## 2021-03-02 VITALS
WEIGHT: 192 LBS | HEART RATE: 69 BPM | DIASTOLIC BLOOD PRESSURE: 84 MMHG | HEIGHT: 64 IN | SYSTOLIC BLOOD PRESSURE: 136 MMHG | BODY MASS INDEX: 32.78 KG/M2

## 2021-03-02 DIAGNOSIS — E78.5 HYPERLIPIDEMIA, UNSPECIFIED HYPERLIPIDEMIA TYPE: ICD-10-CM

## 2021-03-02 DIAGNOSIS — E11.69 TYPE 2 DIABETES MELLITUS WITH OTHER SPECIFIED COMPLICATION, UNSPECIFIED WHETHER LONG TERM INSULIN USE: Primary | ICD-10-CM

## 2021-03-02 DIAGNOSIS — K21.9 GASTROESOPHAGEAL REFLUX DISEASE, UNSPECIFIED WHETHER ESOPHAGITIS PRESENT: ICD-10-CM

## 2021-03-02 DIAGNOSIS — Z78.0 MENOPAUSE: ICD-10-CM

## 2021-03-02 DIAGNOSIS — Z95.1 S/P CABG (CORONARY ARTERY BYPASS GRAFT): ICD-10-CM

## 2021-03-02 DIAGNOSIS — Z79.899 HIGH RISK MEDICATION USE: ICD-10-CM

## 2021-03-02 DIAGNOSIS — H65.20 CHRONIC SEROUS OTITIS MEDIA, UNSPECIFIED LATERALITY: ICD-10-CM

## 2021-03-02 DIAGNOSIS — I10 ESSENTIAL HYPERTENSION: ICD-10-CM

## 2021-03-02 DIAGNOSIS — I10 HYPERTENSION, UNSPECIFIED TYPE: ICD-10-CM

## 2021-03-02 DIAGNOSIS — I25.10 ATHEROSCLEROSIS OF CORONARY ARTERY, ANGINA PRESENCE UNSPECIFIED, UNSPECIFIED VESSEL OR LESION TYPE, UNSPECIFIED WHETHER NATIVE OR TRANSPLANTED HEART: ICD-10-CM

## 2021-03-02 DIAGNOSIS — M17.0 OSTEOARTHRITIS OF BOTH KNEES, UNSPECIFIED OSTEOARTHRITIS TYPE: ICD-10-CM

## 2021-03-02 LAB — HBA1C MFR BLD: 7.1 %

## 2021-03-02 PROCEDURE — 3075F SYST BP GE 130 - 139MM HG: CPT | Mod: S$GLB,,, | Performed by: NURSE PRACTITIONER

## 2021-03-02 PROCEDURE — 99214 PR OFFICE/OUTPT VISIT, EST, LEVL IV, 30-39 MIN: ICD-10-PCS | Mod: S$GLB,,, | Performed by: NURSE PRACTITIONER

## 2021-03-02 PROCEDURE — 1101F PR PT FALLS ASSESS DOC 0-1 FALLS W/OUT INJ PAST YR: ICD-10-PCS | Mod: S$GLB,,, | Performed by: NURSE PRACTITIONER

## 2021-03-02 PROCEDURE — 3051F HG A1C>EQUAL 7.0%<8.0%: CPT | Mod: S$GLB,,, | Performed by: NURSE PRACTITIONER

## 2021-03-02 PROCEDURE — 1101F PT FALLS ASSESS-DOCD LE1/YR: CPT | Mod: S$GLB,,, | Performed by: NURSE PRACTITIONER

## 2021-03-02 PROCEDURE — 1159F MED LIST DOCD IN RCRD: CPT | Mod: S$GLB,,, | Performed by: NURSE PRACTITIONER

## 2021-03-02 PROCEDURE — 1159F PR MEDICATION LIST DOCUMENTED IN MEDICAL RECORD: ICD-10-PCS | Mod: S$GLB,,, | Performed by: NURSE PRACTITIONER

## 2021-03-02 PROCEDURE — 3288F FALL RISK ASSESSMENT DOCD: CPT | Mod: S$GLB,,, | Performed by: NURSE PRACTITIONER

## 2021-03-02 PROCEDURE — 3288F PR FALLS RISK ASSESSMENT DOCUMENTED: ICD-10-PCS | Mod: S$GLB,,, | Performed by: NURSE PRACTITIONER

## 2021-03-02 PROCEDURE — 99214 OFFICE O/P EST MOD 30 MIN: CPT | Mod: S$GLB,,, | Performed by: NURSE PRACTITIONER

## 2021-03-02 PROCEDURE — 3008F BODY MASS INDEX DOCD: CPT | Mod: S$GLB,,, | Performed by: NURSE PRACTITIONER

## 2021-03-02 PROCEDURE — 3079F PR MOST RECENT DIASTOLIC BLOOD PRESSURE 80-89 MM HG: ICD-10-PCS | Mod: S$GLB,,, | Performed by: NURSE PRACTITIONER

## 2021-03-02 PROCEDURE — 3051F PR MOST RECENT HEMOGLOBIN A1C LEVEL 7.0 - < 8.0%: ICD-10-PCS | Mod: S$GLB,,, | Performed by: NURSE PRACTITIONER

## 2021-03-02 PROCEDURE — 3008F PR BODY MASS INDEX (BMI) DOCUMENTED: ICD-10-PCS | Mod: S$GLB,,, | Performed by: NURSE PRACTITIONER

## 2021-03-02 PROCEDURE — 3075F PR MOST RECENT SYSTOLIC BLOOD PRESS GE 130-139MM HG: ICD-10-PCS | Mod: S$GLB,,, | Performed by: NURSE PRACTITIONER

## 2021-03-02 PROCEDURE — 83036 POCT HEMOGLOBIN A1C: ICD-10-PCS | Mod: QW,,, | Performed by: NURSE PRACTITIONER

## 2021-03-02 PROCEDURE — 83036 HEMOGLOBIN GLYCOSYLATED A1C: CPT | Mod: QW,,, | Performed by: NURSE PRACTITIONER

## 2021-03-02 PROCEDURE — 3079F DIAST BP 80-89 MM HG: CPT | Mod: S$GLB,,, | Performed by: NURSE PRACTITIONER

## 2021-03-02 RX ORDER — CEPHALEXIN 500 MG/1
500 CAPSULE ORAL EVERY 8 HOURS
Qty: 30 CAPSULE | Refills: 0 | Status: SHIPPED | OUTPATIENT
Start: 2021-03-02 | End: 2021-06-01

## 2021-03-03 DIAGNOSIS — E11.69 TYPE 2 DIABETES MELLITUS WITH OTHER SPECIFIED COMPLICATION, UNSPECIFIED WHETHER LONG TERM INSULIN USE: Primary | ICD-10-CM

## 2021-03-10 ENCOUNTER — TELEPHONE (OUTPATIENT)
Dept: FAMILY MEDICINE | Facility: CLINIC | Age: 71
End: 2021-03-10

## 2021-03-10 DIAGNOSIS — M54.2 CERVICALGIA: ICD-10-CM

## 2021-03-10 DIAGNOSIS — M54.12 CERVICAL RADICULOPATHY: Primary | ICD-10-CM

## 2021-03-16 ENCOUNTER — TELEPHONE (OUTPATIENT)
Dept: FAMILY MEDICINE | Facility: CLINIC | Age: 71
End: 2021-03-16

## 2021-03-18 ENCOUNTER — TELEPHONE (OUTPATIENT)
Dept: FAMILY MEDICINE | Facility: CLINIC | Age: 71
End: 2021-03-18

## 2021-04-16 ENCOUNTER — TELEPHONE (OUTPATIENT)
Dept: FAMILY MEDICINE | Facility: CLINIC | Age: 71
End: 2021-04-16

## 2021-05-10 ENCOUNTER — TELEPHONE (OUTPATIENT)
Dept: FAMILY MEDICINE | Facility: CLINIC | Age: 71
End: 2021-05-10

## 2021-05-27 ENCOUNTER — TELEPHONE (OUTPATIENT)
Dept: FAMILY MEDICINE | Facility: CLINIC | Age: 71
End: 2021-05-27

## 2021-06-01 ENCOUNTER — OFFICE VISIT (OUTPATIENT)
Dept: FAMILY MEDICINE | Facility: CLINIC | Age: 71
End: 2021-06-01
Payer: COMMERCIAL

## 2021-06-01 VITALS
BODY MASS INDEX: 33.09 KG/M2 | HEIGHT: 64 IN | DIASTOLIC BLOOD PRESSURE: 64 MMHG | HEART RATE: 72 BPM | SYSTOLIC BLOOD PRESSURE: 120 MMHG | WEIGHT: 193.81 LBS

## 2021-06-01 DIAGNOSIS — K21.9 GASTROESOPHAGEAL REFLUX DISEASE, UNSPECIFIED WHETHER ESOPHAGITIS PRESENT: ICD-10-CM

## 2021-06-01 DIAGNOSIS — Z95.1 S/P CABG (CORONARY ARTERY BYPASS GRAFT): ICD-10-CM

## 2021-06-01 DIAGNOSIS — E78.5 HYPERLIPIDEMIA, UNSPECIFIED HYPERLIPIDEMIA TYPE: ICD-10-CM

## 2021-06-01 DIAGNOSIS — I10 HYPERTENSION, UNSPECIFIED TYPE: ICD-10-CM

## 2021-06-01 DIAGNOSIS — Z79.899 HIGH RISK MEDICATION USE: ICD-10-CM

## 2021-06-01 DIAGNOSIS — Z78.0 MENOPAUSE: ICD-10-CM

## 2021-06-01 DIAGNOSIS — E11.69 TYPE 2 DIABETES MELLITUS WITH OTHER SPECIFIED COMPLICATION, UNSPECIFIED WHETHER LONG TERM INSULIN USE: Primary | ICD-10-CM

## 2021-06-01 DIAGNOSIS — I10 ESSENTIAL HYPERTENSION: ICD-10-CM

## 2021-06-01 DIAGNOSIS — I25.10 ATHEROSCLEROSIS OF CORONARY ARTERY, ANGINA PRESENCE UNSPECIFIED, UNSPECIFIED VESSEL OR LESION TYPE, UNSPECIFIED WHETHER NATIVE OR TRANSPLANTED HEART: ICD-10-CM

## 2021-06-01 DIAGNOSIS — M17.0 OSTEOARTHRITIS OF BOTH KNEES, UNSPECIFIED OSTEOARTHRITIS TYPE: ICD-10-CM

## 2021-06-01 LAB — HBA1C MFR BLD: 7.5 %

## 2021-06-01 PROCEDURE — 1170F FXNL STATUS ASSESSED: CPT | Mod: S$GLB,,, | Performed by: NURSE PRACTITIONER

## 2021-06-01 PROCEDURE — 3288F FALL RISK ASSESSMENT DOCD: CPT | Mod: S$GLB,,, | Performed by: NURSE PRACTITIONER

## 2021-06-01 PROCEDURE — 1126F PR PAIN SEVERITY QUANTIFIED, NO PAIN PRESENT: ICD-10-PCS | Mod: S$GLB,,, | Performed by: NURSE PRACTITIONER

## 2021-06-01 PROCEDURE — 3288F PR FALLS RISK ASSESSMENT DOCUMENTED: ICD-10-PCS | Mod: S$GLB,,, | Performed by: NURSE PRACTITIONER

## 2021-06-01 PROCEDURE — 3051F HG A1C>EQUAL 7.0%<8.0%: CPT | Mod: S$GLB,,, | Performed by: NURSE PRACTITIONER

## 2021-06-01 PROCEDURE — 83036 HEMOGLOBIN GLYCOSYLATED A1C: CPT | Mod: QW,,, | Performed by: NURSE PRACTITIONER

## 2021-06-01 PROCEDURE — 1101F PT FALLS ASSESS-DOCD LE1/YR: CPT | Mod: S$GLB,,, | Performed by: NURSE PRACTITIONER

## 2021-06-01 PROCEDURE — 99214 OFFICE O/P EST MOD 30 MIN: CPT | Mod: S$GLB,,, | Performed by: NURSE PRACTITIONER

## 2021-06-01 PROCEDURE — 99214 PR OFFICE/OUTPT VISIT, EST, LEVL IV, 30-39 MIN: ICD-10-PCS | Mod: S$GLB,,, | Performed by: NURSE PRACTITIONER

## 2021-06-01 PROCEDURE — 1126F AMNT PAIN NOTED NONE PRSNT: CPT | Mod: S$GLB,,, | Performed by: NURSE PRACTITIONER

## 2021-06-01 PROCEDURE — 1170F PR FUNCTIONAL STATUS ASSESSED: ICD-10-PCS | Mod: S$GLB,,, | Performed by: NURSE PRACTITIONER

## 2021-06-01 PROCEDURE — 3008F PR BODY MASS INDEX (BMI) DOCUMENTED: ICD-10-PCS | Mod: S$GLB,,, | Performed by: NURSE PRACTITIONER

## 2021-06-01 PROCEDURE — 3051F PR MOST RECENT HEMOGLOBIN A1C LEVEL 7.0 - < 8.0%: ICD-10-PCS | Mod: S$GLB,,, | Performed by: NURSE PRACTITIONER

## 2021-06-01 PROCEDURE — 1101F PR PT FALLS ASSESS DOC 0-1 FALLS W/OUT INJ PAST YR: ICD-10-PCS | Mod: S$GLB,,, | Performed by: NURSE PRACTITIONER

## 2021-06-01 PROCEDURE — 83036 POCT HEMOGLOBIN A1C: ICD-10-PCS | Mod: QW,,, | Performed by: NURSE PRACTITIONER

## 2021-06-01 PROCEDURE — 1159F MED LIST DOCD IN RCRD: CPT | Mod: S$GLB,,, | Performed by: NURSE PRACTITIONER

## 2021-06-01 PROCEDURE — 3008F BODY MASS INDEX DOCD: CPT | Mod: S$GLB,,, | Performed by: NURSE PRACTITIONER

## 2021-06-01 PROCEDURE — 1159F PR MEDICATION LIST DOCUMENTED IN MEDICAL RECORD: ICD-10-PCS | Mod: S$GLB,,, | Performed by: NURSE PRACTITIONER

## 2021-06-01 RX ORDER — PIOGLITAZONEHYDROCHLORIDE 30 MG/1
30 TABLET ORAL DAILY
Qty: 90 TABLET | Refills: 1 | Status: CANCELLED | OUTPATIENT
Start: 2021-06-01

## 2021-06-01 RX ORDER — INSULIN ASPART 100 [IU]/ML
5 INJECTION, SOLUTION INTRAVENOUS; SUBCUTANEOUS
Qty: 3 ML | Refills: 0 | Status: SHIPPED | OUTPATIENT
Start: 2021-06-01 | End: 2022-09-27

## 2021-06-01 RX ORDER — PIOGLITAZONEHYDROCHLORIDE 30 MG/1
30 TABLET ORAL DAILY
Qty: 90 TABLET | Refills: 1 | Status: SHIPPED | OUTPATIENT
Start: 2021-06-01 | End: 2021-09-09 | Stop reason: SDUPTHER

## 2021-06-02 LAB
ALBUMIN SERPL-MCNC: 4.4 G/DL (ref 3.6–5.1)
ALBUMIN/CREAT UR: 7 MCG/MG CREAT
ALBUMIN/GLOB SERPL: 1.4 (CALC) (ref 1–2.5)
ALP SERPL-CCNC: 90 U/L (ref 37–153)
ALT SERPL-CCNC: 16 U/L (ref 6–29)
APPEARANCE UR: CLEAR
AST SERPL-CCNC: 22 U/L (ref 10–35)
BACTERIA #/AREA URNS HPF: NORMAL /HPF
BACTERIA UR CULT: NORMAL
BASOPHILS # BLD AUTO: 69 CELLS/UL (ref 0–200)
BASOPHILS NFR BLD AUTO: 0.6 %
BILIRUB SERPL-MCNC: 0.3 MG/DL (ref 0.2–1.2)
BILIRUB UR QL STRIP: NEGATIVE
BUN SERPL-MCNC: 17 MG/DL (ref 7–25)
BUN/CREAT SERPL: ABNORMAL (CALC) (ref 6–22)
CALCIUM SERPL-MCNC: 9.8 MG/DL (ref 8.6–10.4)
CHLORIDE SERPL-SCNC: 96 MMOL/L (ref 98–110)
CHOLEST SERPL-MCNC: 195 MG/DL
CHOLEST/HDLC SERPL: 4.4 (CALC)
CO2 SERPL-SCNC: 30 MMOL/L (ref 20–32)
COLOR UR: YELLOW
CREAT SERPL-MCNC: 0.98 MG/DL (ref 0.5–0.99)
CREAT UR-MCNC: 42 MG/DL (ref 20–275)
EOSINOPHIL # BLD AUTO: 334 CELLS/UL (ref 15–500)
EOSINOPHIL NFR BLD AUTO: 2.9 %
ERYTHROCYTE [DISTWIDTH] IN BLOOD BY AUTOMATED COUNT: 14 % (ref 11–15)
GLOBULIN SER CALC-MCNC: 3.1 G/DL (CALC) (ref 1.9–3.7)
GLUCOSE SERPL-MCNC: 98 MG/DL (ref 65–139)
GLUCOSE UR QL STRIP: NEGATIVE
HCT VFR BLD AUTO: 41 % (ref 35–45)
HDLC SERPL-MCNC: 44 MG/DL
HGB BLD-MCNC: 13 G/DL (ref 11.7–15.5)
HGB UR QL STRIP: NEGATIVE
HYALINE CASTS #/AREA URNS LPF: NORMAL /LPF
KETONES UR QL STRIP: NEGATIVE
LDLC SERPL CALC-MCNC: 110 MG/DL (CALC)
LEUKOCYTE ESTERASE UR QL STRIP: NEGATIVE
LYMPHOCYTES # BLD AUTO: 3611 CELLS/UL (ref 850–3900)
LYMPHOCYTES NFR BLD AUTO: 31.4 %
MCH RBC QN AUTO: 27.4 PG (ref 27–33)
MCHC RBC AUTO-ENTMCNC: 31.7 G/DL (ref 32–36)
MCV RBC AUTO: 86.5 FL (ref 80–100)
MICROALBUMIN UR-MCNC: 0.3 MG/DL
MONOCYTES # BLD AUTO: 748 CELLS/UL (ref 200–950)
MONOCYTES NFR BLD AUTO: 6.5 %
NEUTROPHILS # BLD AUTO: 6739 CELLS/UL (ref 1500–7800)
NEUTROPHILS NFR BLD AUTO: 58.6 %
NITRITE UR QL STRIP: NEGATIVE
NONHDLC SERPL-MCNC: 151 MG/DL (CALC)
PH UR STRIP: 5.5 [PH] (ref 5–8)
PLATELET # BLD AUTO: 312 THOUSAND/UL (ref 140–400)
PMV BLD REES-ECKER: 9.9 FL (ref 7.5–12.5)
POTASSIUM SERPL-SCNC: 4.6 MMOL/L (ref 3.5–5.3)
PROT SERPL-MCNC: 7.5 G/DL (ref 6.1–8.1)
PROT UR QL STRIP: NEGATIVE
RBC # BLD AUTO: 4.74 MILLION/UL (ref 3.8–5.1)
RBC #/AREA URNS HPF: NORMAL /HPF
SODIUM SERPL-SCNC: 137 MMOL/L (ref 135–146)
SP GR UR STRIP: 1.01 (ref 1–1.03)
SQUAMOUS #/AREA URNS HPF: NORMAL /HPF
T4 FREE SERPL-MCNC: 0.8 NG/DL (ref 0.8–1.8)
TRIGL SERPL-MCNC: 287 MG/DL
TSH SERPL-ACNC: 5.38 MIU/L (ref 0.4–4.5)
WBC # BLD AUTO: 11.5 THOUSAND/UL (ref 3.8–10.8)
WBC #/AREA URNS HPF: NORMAL /HPF

## 2021-06-03 ENCOUNTER — TELEPHONE (OUTPATIENT)
Dept: FAMILY MEDICINE | Facility: CLINIC | Age: 71
End: 2021-06-03

## 2021-06-08 DIAGNOSIS — E11.69 TYPE 2 DIABETES MELLITUS WITH OTHER SPECIFIED COMPLICATION, UNSPECIFIED WHETHER LONG TERM INSULIN USE: ICD-10-CM

## 2021-06-23 ENCOUNTER — TELEPHONE (OUTPATIENT)
Dept: FAMILY MEDICINE | Facility: CLINIC | Age: 71
End: 2021-06-23

## 2021-07-01 DIAGNOSIS — I10 HYPERTENSION, UNSPECIFIED TYPE: ICD-10-CM

## 2021-07-01 RX ORDER — HYDROCHLOROTHIAZIDE 25 MG/1
25 TABLET ORAL DAILY
Qty: 90 TABLET | Refills: 1 | Status: SHIPPED | OUTPATIENT
Start: 2021-07-01 | End: 2021-09-09 | Stop reason: SDUPTHER

## 2021-08-24 ENCOUNTER — TELEPHONE (OUTPATIENT)
Dept: FAMILY MEDICINE | Facility: CLINIC | Age: 71
End: 2021-08-24

## 2021-08-24 DIAGNOSIS — E11.69 TYPE 2 DIABETES MELLITUS WITH OTHER SPECIFIED COMPLICATION, UNSPECIFIED WHETHER LONG TERM INSULIN USE: ICD-10-CM

## 2021-08-24 RX ORDER — SITAGLIPTIN AND METFORMIN HYDROCHLORIDE 1000; 100 MG/1; MG/1
1 TABLET, FILM COATED, EXTENDED RELEASE ORAL NIGHTLY
Qty: 90 TABLET | Refills: 1 | Status: SHIPPED | OUTPATIENT
Start: 2021-08-24 | End: 2022-02-08 | Stop reason: SDUPTHER

## 2021-09-02 ENCOUNTER — TELEPHONE (OUTPATIENT)
Dept: FAMILY MEDICINE | Facility: CLINIC | Age: 71
End: 2021-09-02

## 2021-09-09 ENCOUNTER — OFFICE VISIT (OUTPATIENT)
Dept: FAMILY MEDICINE | Facility: CLINIC | Age: 71
End: 2021-09-09
Payer: COMMERCIAL

## 2021-09-09 VITALS
DIASTOLIC BLOOD PRESSURE: 56 MMHG | BODY MASS INDEX: 34.31 KG/M2 | HEART RATE: 72 BPM | SYSTOLIC BLOOD PRESSURE: 118 MMHG | HEIGHT: 64 IN | WEIGHT: 201 LBS

## 2021-09-09 DIAGNOSIS — M62.838 MUSCLE SPASM: ICD-10-CM

## 2021-09-09 DIAGNOSIS — I10 ESSENTIAL HYPERTENSION: ICD-10-CM

## 2021-09-09 DIAGNOSIS — Z13.820 SPECIAL SCREENING FOR OSTEOPOROSIS: ICD-10-CM

## 2021-09-09 DIAGNOSIS — Z78.0 MENOPAUSE: ICD-10-CM

## 2021-09-09 DIAGNOSIS — I10 HYPERTENSION, UNSPECIFIED TYPE: ICD-10-CM

## 2021-09-09 DIAGNOSIS — E78.5 HYPERLIPIDEMIA, UNSPECIFIED HYPERLIPIDEMIA TYPE: ICD-10-CM

## 2021-09-09 DIAGNOSIS — Z12.31 OTHER SCREENING MAMMOGRAM: Primary | ICD-10-CM

## 2021-09-09 DIAGNOSIS — E11.69 TYPE 2 DIABETES MELLITUS WITH OTHER SPECIFIED COMPLICATION, UNSPECIFIED WHETHER LONG TERM INSULIN USE: ICD-10-CM

## 2021-09-09 DIAGNOSIS — K76.0 FATTY LIVER: ICD-10-CM

## 2021-09-09 DIAGNOSIS — K21.9 GASTROESOPHAGEAL REFLUX DISEASE, UNSPECIFIED WHETHER ESOPHAGITIS PRESENT: ICD-10-CM

## 2021-09-09 DIAGNOSIS — I25.10 ATHEROSCLEROSIS OF CORONARY ARTERY, ANGINA PRESENCE UNSPECIFIED, UNSPECIFIED VESSEL OR LESION TYPE, UNSPECIFIED WHETHER NATIVE OR TRANSPLANTED HEART: ICD-10-CM

## 2021-09-09 LAB — HBA1C MFR BLD: 7.3 %

## 2021-09-09 PROCEDURE — 3066F PR DOCUMENTATION OF TREATMENT FOR NEPHROPATHY: ICD-10-PCS | Mod: S$GLB,,, | Performed by: NURSE PRACTITIONER

## 2021-09-09 PROCEDURE — 3061F PR NEG MICROALBUMINURIA RESULT DOCUMENTED/REVIEW: ICD-10-PCS | Mod: S$GLB,,, | Performed by: NURSE PRACTITIONER

## 2021-09-09 PROCEDURE — 83036 HEMOGLOBIN GLYCOSYLATED A1C: CPT | Mod: QW,,, | Performed by: NURSE PRACTITIONER

## 2021-09-09 PROCEDURE — 3061F NEG MICROALBUMINURIA REV: CPT | Mod: S$GLB,,, | Performed by: NURSE PRACTITIONER

## 2021-09-09 PROCEDURE — 4010F PR ACE/ARB THEARPY RXD/TAKEN: ICD-10-PCS | Mod: S$GLB,,, | Performed by: NURSE PRACTITIONER

## 2021-09-09 PROCEDURE — 3051F HG A1C>EQUAL 7.0%<8.0%: CPT | Mod: S$GLB,,, | Performed by: NURSE PRACTITIONER

## 2021-09-09 PROCEDURE — 83036 POCT HEMOGLOBIN A1C: ICD-10-PCS | Mod: QW,,, | Performed by: NURSE PRACTITIONER

## 2021-09-09 PROCEDURE — 3078F PR MOST RECENT DIASTOLIC BLOOD PRESSURE < 80 MM HG: ICD-10-PCS | Mod: S$GLB,,, | Performed by: NURSE PRACTITIONER

## 2021-09-09 PROCEDURE — 3074F PR MOST RECENT SYSTOLIC BLOOD PRESSURE < 130 MM HG: ICD-10-PCS | Mod: S$GLB,,, | Performed by: NURSE PRACTITIONER

## 2021-09-09 PROCEDURE — 1160F RVW MEDS BY RX/DR IN RCRD: CPT | Mod: S$GLB,,, | Performed by: NURSE PRACTITIONER

## 2021-09-09 PROCEDURE — 3008F BODY MASS INDEX DOCD: CPT | Mod: S$GLB,,, | Performed by: NURSE PRACTITIONER

## 2021-09-09 PROCEDURE — 99214 PR OFFICE/OUTPT VISIT, EST, LEVL IV, 30-39 MIN: ICD-10-PCS | Mod: S$GLB,,, | Performed by: NURSE PRACTITIONER

## 2021-09-09 PROCEDURE — 4010F ACE/ARB THERAPY RXD/TAKEN: CPT | Mod: S$GLB,,, | Performed by: NURSE PRACTITIONER

## 2021-09-09 PROCEDURE — 3288F PR FALLS RISK ASSESSMENT DOCUMENTED: ICD-10-PCS | Mod: S$GLB,,, | Performed by: NURSE PRACTITIONER

## 2021-09-09 PROCEDURE — 1159F PR MEDICATION LIST DOCUMENTED IN MEDICAL RECORD: ICD-10-PCS | Mod: S$GLB,,, | Performed by: NURSE PRACTITIONER

## 2021-09-09 PROCEDURE — 3074F SYST BP LT 130 MM HG: CPT | Mod: S$GLB,,, | Performed by: NURSE PRACTITIONER

## 2021-09-09 PROCEDURE — 3051F PR MOST RECENT HEMOGLOBIN A1C LEVEL 7.0 - < 8.0%: ICD-10-PCS | Mod: S$GLB,,, | Performed by: NURSE PRACTITIONER

## 2021-09-09 PROCEDURE — 3008F PR BODY MASS INDEX (BMI) DOCUMENTED: ICD-10-PCS | Mod: S$GLB,,, | Performed by: NURSE PRACTITIONER

## 2021-09-09 PROCEDURE — 99214 OFFICE O/P EST MOD 30 MIN: CPT | Mod: S$GLB,,, | Performed by: NURSE PRACTITIONER

## 2021-09-09 PROCEDURE — 1101F PT FALLS ASSESS-DOCD LE1/YR: CPT | Mod: S$GLB,,, | Performed by: NURSE PRACTITIONER

## 2021-09-09 PROCEDURE — 1101F PR PT FALLS ASSESS DOC 0-1 FALLS W/OUT INJ PAST YR: ICD-10-PCS | Mod: S$GLB,,, | Performed by: NURSE PRACTITIONER

## 2021-09-09 PROCEDURE — 3066F NEPHROPATHY DOC TX: CPT | Mod: S$GLB,,, | Performed by: NURSE PRACTITIONER

## 2021-09-09 PROCEDURE — 1159F MED LIST DOCD IN RCRD: CPT | Mod: S$GLB,,, | Performed by: NURSE PRACTITIONER

## 2021-09-09 PROCEDURE — 1160F PR REVIEW ALL MEDS BY PRESCRIBER/CLIN PHARMACIST DOCUMENTED: ICD-10-PCS | Mod: S$GLB,,, | Performed by: NURSE PRACTITIONER

## 2021-09-09 PROCEDURE — 3288F FALL RISK ASSESSMENT DOCD: CPT | Mod: S$GLB,,, | Performed by: NURSE PRACTITIONER

## 2021-09-09 PROCEDURE — 3078F DIAST BP <80 MM HG: CPT | Mod: S$GLB,,, | Performed by: NURSE PRACTITIONER

## 2021-09-09 RX ORDER — LISINOPRIL 20 MG/1
20 TABLET ORAL DAILY
Qty: 90 TABLET | Refills: 1 | Status: SHIPPED | OUTPATIENT
Start: 2021-09-09 | End: 2022-02-08 | Stop reason: SDUPTHER

## 2021-09-09 RX ORDER — INSULIN GLARGINE 100 [IU]/ML
INJECTION, SOLUTION SUBCUTANEOUS
Qty: 3 BOX | Refills: 1 | Status: SHIPPED | OUTPATIENT
Start: 2021-09-09 | End: 2022-02-21

## 2021-09-09 RX ORDER — HYDROCHLOROTHIAZIDE 25 MG/1
25 TABLET ORAL DAILY
Qty: 90 TABLET | Refills: 1 | Status: SHIPPED | OUTPATIENT
Start: 2021-09-09 | End: 2022-02-24 | Stop reason: SDUPTHER

## 2021-09-09 RX ORDER — METOPROLOL SUCCINATE 50 MG/1
50 TABLET, EXTENDED RELEASE ORAL DAILY
Qty: 90 TABLET | Refills: 1 | Status: SHIPPED | OUTPATIENT
Start: 2021-09-09 | End: 2022-02-08 | Stop reason: SDUPTHER

## 2021-09-09 RX ORDER — CYCLOBENZAPRINE HCL 10 MG
10 TABLET ORAL 2 TIMES DAILY
Qty: 180 TABLET | Refills: 1 | Status: SHIPPED | OUTPATIENT
Start: 2021-09-09 | End: 2022-09-27

## 2021-09-09 RX ORDER — PEN NEEDLE, DIABETIC 30 GX3/16"
1 NEEDLE, DISPOSABLE MISCELLANEOUS 3 TIMES DAILY
Qty: 100 EACH | Refills: 5 | Status: SHIPPED | OUTPATIENT
Start: 2021-09-09 | End: 2022-02-24 | Stop reason: SDUPTHER

## 2021-09-09 RX ORDER — PITAVASTATIN CALCIUM 4.18 MG/1
1 TABLET, FILM COATED ORAL DAILY
Qty: 90 TABLET | Refills: 1 | Status: SHIPPED | OUTPATIENT
Start: 2021-09-09 | End: 2022-02-08 | Stop reason: SDUPTHER

## 2021-09-09 RX ORDER — PIOGLITAZONEHYDROCHLORIDE 30 MG/1
30 TABLET ORAL DAILY
Qty: 90 TABLET | Refills: 1 | Status: SHIPPED | OUTPATIENT
Start: 2021-09-09 | End: 2022-02-08

## 2021-09-15 ENCOUNTER — TELEPHONE (OUTPATIENT)
Dept: FAMILY MEDICINE | Facility: CLINIC | Age: 71
End: 2021-09-15

## 2021-09-21 RX ORDER — FLASH GLUCOSE SENSOR
1 KIT MISCELLANEOUS DAILY
Qty: 1 KIT | Refills: 11 | Status: SHIPPED | OUTPATIENT
Start: 2021-09-21 | End: 2022-10-12 | Stop reason: SDUPTHER

## 2021-09-21 RX ORDER — FLASH GLUCOSE SCANNING READER
1 EACH MISCELLANEOUS
Qty: 1 EACH | Refills: 3 | Status: SHIPPED | OUTPATIENT
Start: 2021-09-21 | End: 2022-09-27

## 2021-09-29 ENCOUNTER — HOSPITAL ENCOUNTER (OUTPATIENT)
Dept: RADIOLOGY | Facility: HOSPITAL | Age: 71
Discharge: HOME | End: 2021-09-29
Attending: NURSE PRACTITIONER
Payer: COMMERCIAL

## 2021-09-29 DIAGNOSIS — Z78.0 MENOPAUSE: ICD-10-CM

## 2021-09-29 DIAGNOSIS — Z12.31 OTHER SCREENING MAMMOGRAM: ICD-10-CM

## 2021-09-29 DIAGNOSIS — Z13.820 SPECIAL SCREENING FOR OSTEOPOROSIS: ICD-10-CM

## 2021-09-29 PROCEDURE — 77067 SCR MAMMO BI INCL CAD: CPT | Mod: TC,PO

## 2021-09-29 PROCEDURE — 77080 DXA BONE DENSITY AXIAL: CPT | Mod: TC,PO

## 2021-10-04 DIAGNOSIS — R92.8 ABNORMAL MAMMOGRAM: Primary | ICD-10-CM

## 2021-10-04 DIAGNOSIS — N63.10 BREAST MASS, RIGHT: ICD-10-CM

## 2021-10-05 ENCOUNTER — TELEPHONE (OUTPATIENT)
Dept: FAMILY MEDICINE | Facility: CLINIC | Age: 71
End: 2021-10-05

## 2021-11-30 ENCOUNTER — OFFICE VISIT (OUTPATIENT)
Dept: FAMILY MEDICINE | Facility: CLINIC | Age: 71
End: 2021-11-30
Payer: COMMERCIAL

## 2021-11-30 VITALS
DIASTOLIC BLOOD PRESSURE: 84 MMHG | WEIGHT: 201 LBS | SYSTOLIC BLOOD PRESSURE: 136 MMHG | HEIGHT: 64 IN | BODY MASS INDEX: 34.31 KG/M2 | HEART RATE: 84 BPM

## 2021-11-30 DIAGNOSIS — I10 HYPERTENSION, UNSPECIFIED TYPE: ICD-10-CM

## 2021-11-30 DIAGNOSIS — Z78.0 MENOPAUSE: ICD-10-CM

## 2021-11-30 DIAGNOSIS — K76.0 FATTY LIVER: ICD-10-CM

## 2021-11-30 DIAGNOSIS — E78.5 HYPERLIPIDEMIA, UNSPECIFIED HYPERLIPIDEMIA TYPE: Primary | ICD-10-CM

## 2021-11-30 DIAGNOSIS — K21.9 GASTROESOPHAGEAL REFLUX DISEASE, UNSPECIFIED WHETHER ESOPHAGITIS PRESENT: ICD-10-CM

## 2021-11-30 DIAGNOSIS — E11.69 TYPE 2 DIABETES MELLITUS WITH OTHER SPECIFIED COMPLICATION, UNSPECIFIED WHETHER LONG TERM INSULIN USE: ICD-10-CM

## 2021-11-30 DIAGNOSIS — Z79.899 HIGH RISK MEDICATION USE: ICD-10-CM

## 2021-11-30 DIAGNOSIS — I25.10 ATHEROSCLEROSIS OF CORONARY ARTERY, UNSPECIFIED VESSEL OR LESION TYPE, UNSPECIFIED WHETHER ANGINA PRESENT, UNSPECIFIED WHETHER NATIVE OR TRANSPLANTED HEART: ICD-10-CM

## 2021-11-30 LAB — HBA1C MFR BLD: 7.5 %

## 2021-11-30 PROCEDURE — 99214 OFFICE O/P EST MOD 30 MIN: CPT | Mod: S$GLB,,, | Performed by: NURSE PRACTITIONER

## 2021-11-30 PROCEDURE — 4010F ACE/ARB THERAPY RXD/TAKEN: CPT | Mod: S$GLB,,, | Performed by: NURSE PRACTITIONER

## 2021-11-30 PROCEDURE — 4010F PR ACE/ARB THEARPY RXD/TAKEN: ICD-10-PCS | Mod: S$GLB,,, | Performed by: NURSE PRACTITIONER

## 2021-11-30 PROCEDURE — 3066F PR DOCUMENTATION OF TREATMENT FOR NEPHROPATHY: ICD-10-PCS | Mod: S$GLB,,, | Performed by: NURSE PRACTITIONER

## 2021-11-30 PROCEDURE — 83036 POCT HEMOGLOBIN A1C: ICD-10-PCS | Mod: QW,,, | Performed by: NURSE PRACTITIONER

## 2021-11-30 PROCEDURE — 99214 PR OFFICE/OUTPT VISIT, EST, LEVL IV, 30-39 MIN: ICD-10-PCS | Mod: S$GLB,,, | Performed by: NURSE PRACTITIONER

## 2021-11-30 PROCEDURE — 3061F PR NEG MICROALBUMINURIA RESULT DOCUMENTED/REVIEW: ICD-10-PCS | Mod: S$GLB,,, | Performed by: NURSE PRACTITIONER

## 2021-11-30 PROCEDURE — 83036 HEMOGLOBIN GLYCOSYLATED A1C: CPT | Mod: QW,,, | Performed by: NURSE PRACTITIONER

## 2021-11-30 PROCEDURE — 3066F NEPHROPATHY DOC TX: CPT | Mod: S$GLB,,, | Performed by: NURSE PRACTITIONER

## 2021-11-30 PROCEDURE — 3061F NEG MICROALBUMINURIA REV: CPT | Mod: S$GLB,,, | Performed by: NURSE PRACTITIONER

## 2021-12-15 ENCOUNTER — CLINICAL SUPPORT (OUTPATIENT)
Dept: DIABETES | Facility: CLINIC | Age: 71
End: 2021-12-15
Payer: COMMERCIAL

## 2021-12-15 VITALS — HEIGHT: 64 IN | WEIGHT: 201.06 LBS | BODY MASS INDEX: 34.33 KG/M2

## 2021-12-15 DIAGNOSIS — Z79.4 TYPE 2 DIABETES MELLITUS WITH BOTH EYES AFFECTED BY MILD NONPROLIFERATIVE RETINOPATHY AND MACULAR EDEMA, WITH LONG-TERM CURRENT USE OF INSULIN: Primary | ICD-10-CM

## 2021-12-15 DIAGNOSIS — E11.69 TYPE 2 DIABETES MELLITUS WITH OTHER SPECIFIED COMPLICATION, UNSPECIFIED WHETHER LONG TERM INSULIN USE: ICD-10-CM

## 2021-12-15 DIAGNOSIS — E11.3213 TYPE 2 DIABETES MELLITUS WITH BOTH EYES AFFECTED BY MILD NONPROLIFERATIVE RETINOPATHY AND MACULAR EDEMA, WITH LONG-TERM CURRENT USE OF INSULIN: Primary | ICD-10-CM

## 2021-12-15 PROCEDURE — G0108 PR DIAB MANAGE TRN  PER INDIV: ICD-10-PCS | Mod: S$GLB,,, | Performed by: DIETITIAN, REGISTERED

## 2021-12-15 PROCEDURE — 99999 PR PBB SHADOW E&M-EST. PATIENT-LVL II: ICD-10-PCS | Mod: PBBFAC,,, | Performed by: DIETITIAN, REGISTERED

## 2021-12-15 PROCEDURE — G0108 DIAB MANAGE TRN  PER INDIV: HCPCS | Mod: S$GLB,,, | Performed by: DIETITIAN, REGISTERED

## 2021-12-15 PROCEDURE — 99999 PR PBB SHADOW E&M-EST. PATIENT-LVL II: CPT | Mod: PBBFAC,,, | Performed by: DIETITIAN, REGISTERED

## 2021-12-15 RX ORDER — BLOOD-GLUCOSE SENSOR
1 EACH MISCELLANEOUS
Qty: 3 EACH | Refills: 12 | Status: SHIPPED | OUTPATIENT
Start: 2021-12-15 | End: 2022-09-27

## 2021-12-15 RX ORDER — BLOOD-GLUCOSE TRANSMITTER
1 EACH MISCELLANEOUS DAILY
Qty: 1 EACH | Refills: 4 | Status: SHIPPED | OUTPATIENT
Start: 2021-12-15 | End: 2022-09-27

## 2021-12-27 ENCOUNTER — HOSPITAL ENCOUNTER (OUTPATIENT)
Dept: RADIOLOGY | Facility: HOSPITAL | Age: 71
Discharge: HOME | End: 2021-12-27
Attending: NURSE PRACTITIONER
Payer: COMMERCIAL

## 2021-12-27 DIAGNOSIS — R92.8 ABNORMAL MAMMOGRAM: ICD-10-CM

## 2021-12-27 DIAGNOSIS — N63.10 BREAST MASS, RIGHT: ICD-10-CM

## 2021-12-27 PROCEDURE — 76642 ULTRASOUND BREAST LIMITED: CPT | Mod: TC,PO,RT

## 2021-12-27 PROCEDURE — 77061 BREAST TOMOSYNTHESIS UNI: CPT | Mod: TC,PO,RT

## 2021-12-29 ENCOUNTER — TELEPHONE (OUTPATIENT)
Dept: FAMILY MEDICINE | Facility: CLINIC | Age: 71
End: 2021-12-29
Payer: COMMERCIAL

## 2021-12-29 ENCOUNTER — CLINICAL SUPPORT (OUTPATIENT)
Dept: DIABETES | Facility: CLINIC | Age: 71
End: 2021-12-29
Payer: COMMERCIAL

## 2021-12-29 DIAGNOSIS — E11.69 TYPE 2 DIABETES MELLITUS WITH OTHER SPECIFIED COMPLICATION, UNSPECIFIED WHETHER LONG TERM INSULIN USE: ICD-10-CM

## 2021-12-29 PROCEDURE — 99999 PR PBB SHADOW E&M-EST. PATIENT-LVL II: ICD-10-PCS | Mod: PBBFAC,,, | Performed by: DIETITIAN, REGISTERED

## 2021-12-29 PROCEDURE — 99999 PR PBB SHADOW E&M-EST. PATIENT-LVL II: CPT | Mod: PBBFAC,,, | Performed by: DIETITIAN, REGISTERED

## 2021-12-29 PROCEDURE — G0108 DIAB MANAGE TRN  PER INDIV: HCPCS | Mod: S$GLB,,, | Performed by: DIETITIAN, REGISTERED

## 2021-12-29 PROCEDURE — G0108 PR DIAB MANAGE TRN  PER INDIV: ICD-10-PCS | Mod: S$GLB,,, | Performed by: DIETITIAN, REGISTERED

## 2021-12-30 VITALS — BODY MASS INDEX: 34.33 KG/M2 | WEIGHT: 201.06 LBS | HEIGHT: 64 IN

## 2021-12-30 NOTE — TELEPHONE ENCOUNTER
----- Message from Heena Rankin RD, CDE sent at 12/29/2021  2:24 PM CST -----  Ricky Gillespie,     I am with Estefanía Ray and she is doing really well with her diet.  And her Dexcom CGMS just started.  She is taking 1.5mg Trulicity weekly.  I would like her to increase to 3mg weekly (she has a few boxes at home) she could try with your permission.  She reports even with balanced meals and healthy snacks she has cravings at times.  Really believe upping the dose will help.    Your thoughts thanks in advance!    Heena

## 2022-01-04 NOTE — PROGRESS NOTES
Diabetes Care Specialist Progress Note  Author: Heena Rankin RD, CDE  Date: 1/4/2022    Program Intake  Reason for Diabetes Program Visit:: Intervention  Type of Intervention:: Individual  Individual: Device Training  Device Training: Personal CGM  Current diabetes risk level:: low  In the last 12 months, have you:: none  Permission to speak with others about care:: no    Lab Results   Component Value Date    HGBA1C 7.5 11/30/2021       Clinical    Patient Health Rating  Compared to other people your age, how would you rate your health?: Fair    Problem Review  Reviewed Problem List with Patient: yes  Active comorbidities affecting diabetes self-care.: no  Reviewed health maintenance: yes    Clinical Assessment  Current Diabetes Treatment: Oral Medication,Insulin,Injectable  In the last month, how often have you experienced low blood sugar?: once every other week  Are you able to tell when your blood sugar is low?: Yes  What symptoms do you experience?: Anxious/nervous,Other,Tiredness  Have you ever been hospitalized because your blood sugar was too low?: no  Have you ever experienced hyperglycemia (high blood sugar)?: no  Are you able to tell when your blood sugar is high?: No (comment)  Have you ever been hospitalized because your blood sugar was high?: no    Medication Information  How do you obtain your medications?: Patient drives  How many days a week do you miss your medications?: Never  Do you use a pill box or medication chart to help you manage your medications?: No  Medication adherence impacting ability to self-manage diabetes?: Yes    Labs  Do you have regular lab work to monitor your medications?: Yes  Type of Regular Lab Work: A1c,Cholesterol  Where do you get your labs drawn?: [facility]  Lab Compliance Barriers: No    Nutritional Status  Diet: Regular (Has reduced portions/calories and is now consuming protein shake in am which has helped.)  Change in appetite?: No  Dentation:: Intact  Recent  Changes in Weight: No Recent Weight Change  Current nutritional status an area of need that is impacting patient's ability to self-manage diabetes?: Yes    Additional Social History    Support  Does anyone support you with your diabetes care?: yes  Who supports you?: self  Who takes you to your medical appointments?: self  Does the current support meet the patient's needs?: Yes  Is Support an area impacting ability to self-manage diabetes?: No    Access to Mass Media & Technology  Does the patient have access to any of the following devices or technologies?: Smart phone,Internet Access  Media or technology needs impacting ability to self-manage diabetes?: No    Cognitive/Behavioral Health  Alert and Oriented: Yes  Difficulty Thinking: No  Requires Prompting: No  Requires assistance for routine expression?: No  Cognitive or behavioral barriers impacting ability to self-manage diabetes?: No    Culture/Episcopal  Culture or Orthodox beliefs that may impact ability to access healthcare: No    Communication  Language preference: English  Hearing Problems: No  Vision Problems: Yes  Vision problem type:: Decreased Vision  Vision Assistance: Glasses  Communication needs impacting ability to self-manage diabetes?: No    Health Literacy  Preferred Learning Method: Face to Face,Demonstration,Video,Hands On,Reading Materials  How often do you need to have someone help you read instructions, pamphlets, or written material from your doctor or pharmacy?: Never  Health literacy needs impacting ability to self-manage diabetes?: No      Diabetes Self-Management Skills Assessment    Diabetes Disease Process/Treatment Options  Patient/caregiver able to state what happens when someone has diabetes.: yes  Patient/caregiver knows what type of diabetes they have.: yes  Diabetes Type : Type II  Patient/caregiver able to identify at least three signs and symptoms of diabetes.: yes  Identified signs and symptoms:: fatigue,increased  thirst,frequent urination  Patient able to identify at least three risk factors for diabetes.: yes  Identified risk factors:: being overweight,family history,reduced activity  Assessment indicates:: Adequate understanding  Area of need?: No    Nutrition/Healthy Eating  Challenges to healthy eating:: eating out, going to parties,portion control,snacking between meals and at night,lack of will power  Method of carbohydrate measurement:: carb counting/reading labels,portion plate  Patient can identify foods that impact blood sugar.: yes  Patient-identified foods:: fruit/fruit juice,soda,starches (bread, pasta, rice, cereal),sweets  Nutrition/Healthy Eating Skills Assessment Completed:: Yes  Assessment indicates:: Adequate understanding  Area of need?: No    Physical Activity/Exercise  Patient's daily activity level:: lightly active  Patient formally exercises outside of work.: no  Patient can identify forms of physical activity.: yes  Stated forms of physical activity:: any movement performed by muscles that uses energy  Patient can identify reasons why exercise/physical activity is important in diabetes management.: yes  Identified reasons:: helps insulin work better  Physical Activity/Exercise Skills Assessment Completed: : Yes  Assessment indicates:: Adequate understanding  Area of need?: No    Medications  Patient is able to describe current diabetes management routine.: yes  Diabetes management routine:: insulin,oral medications,injectable medications  Patient is able to identify current diabetes medications, dosages, and appropriate timing of medications.: yes  Patient understands the purpose of the medications taken for diabetes.: yes  Patient reports problems or concerns with current medication regimen.: yes  Medication regimen problems/concerns:: does not feel like regimen is working  Medication Skills Assessment Completed:: Yes  Assessment indicates:: Instruction Needed  Area of need?: Yes    Home Blood  Glucose Monitoring  Patient states that blood sugar is checked at home daily.: yes  Monitoring Method:: personal continuous glucose monitor  Personal CGM type:: Dexcom G6  Patient is able to use personal CGM appropriately.: yes  CGM Report reviewed?: yes  Home Blood Glucose Monitoring Skills Assessment Completed: : Yes  Assessment indicates:: Adequate understanding  Area of need?: No              Psychosocial/Coping  Patient can identify ways of coping with chronic disease.: yes  Patient-stated ways of coping with chronic disease:: support from loved ones  Psychosocial/Coping Skills Assessment Completed: : Yes  Assessment indicates:: Adequate understanding  Area of need?: No      Diabetes Self Support Plan         Assessment Summary and Plan    Based on today's diabetes care assessment, the following areas of need were identified:      Social 12/29/2021   Support No   Access to Mass Media/Tech No   Cognitive/Behavioral Health No   Culture/Congregational No   Communication No   Health Literacy No        Clinical 12/29/2021   Medication Adherence Yes   Lab Compliance No   Nutritional Status Yes        Diabetes Self-Management Skills 12/29/2021   Diabetes Disease Process/Treatment Options No   Nutrition/Healthy Eating No   Physical Activity/Exercise No   Medication Yes See Care Plan   Home Blood Glucose Monitoring No   Acute Complications No   Chronic Complications No   Psychosocial/Coping No        Today's interventions were provided through individual discussion, instruction, and written materials were provided.      Patient verbalized understanding of instruction and written materials.  Pt was able to return back demonstration of instructions today. Patient understood key points, needs reinforcement and further instruction.     Diabetes Self-Management Care Plan:    Today's Diabetes Self-Management Care Plan was developed with Estefanía's input. Estefanía has agreed to work toward the following goal(s) to improve his/her overall  diabetes control.      Care Plan: Diabetes Management   Updates made since 12/5/2021 12:00 AM      Problem: Healthy Eating       Goal: Eat 3 meals daily with 30g/2 servings of Carbohydrate per meal.    Start Date: 12/15/2021   Expected End Date: 3/15/2022   This Visit's Progress: On track   Note:    Educated patient on plate method with carb limit set to 30 grams per meal and 15 grams or less per snack.  Educated patient on how to read nutrition labels for carb, protein, fiber and fat content.  Provided Carb counting and meal planning book for reference. Instructed patient on the importance of eating three times per day.      Task: Reviewed the sources and role of Carbohydrate, Protein, and Fat and how each nutrient impacts blood sugar. Completed 12/15/2021         Task: Explained how to count carbohydrates using the food label and the use of dry measuring cups for accurate carb counting. Completed 1/4/2022      Task: Discussed strategies for choosing healthier menu options when dining out. Completed 12/15/2021      Task: Recommended replacing beverages containing high sugar content with noncaloric/sugar free options and/or water. Completed 12/15/2021      Task: Review the importance of balancing carbohydrates with each meal using portion control techniques to count servings of carbohydrate and label reading to identify serving size and amount of total carbs per serving. Completed 12/15/2021      Task: Provided Sample plate method and reviewed the use of the plate to estimate amounts of carbohydrate per meal. Completed 12/15/2021      Problem: Medications       Goal: Patient Agrees to take Diabetes Medication(s) Lantus as prescribed.    Start Date: 12/15/2021   Expected End Date: 3/15/2022   This Visit's Progress: On track   Priority: High   Note:    Patient was taking Lantus at different times each day, so we agreed that she would take her insulin at 8 am every day. Intern helped patient set reminder on her phone.  Consider increasing Trulicity.    Per agreement with Verna Shah NP will increase Trulicity to 3mg           Task: Instructed patient on how to increase Trulicity to 3mg weekly Completed 1/4/2022      Task: Discussed guidelines for preventing, detecting and treating hypoglycemia and hyperglycemia and reviewed the importance of meal and medication timing with diabetes mediations for prevention of hypoglycemia and maximum drug benefit. Completed 12/15/2021          Follow Up Plan     Today's care plan and follow up schedule was discussed with patient.  Estefanía verbalized understanding of the care plan, goals, and agrees to follow up plan.        The patient was encouraged to communicate with his/her health care provider/physician and care team regarding his/her condition(s) and treatment.  I provided the patient with my contact information today and encouraged to contact me via phone or [facility] Patient Portal as needed.     Length of Visit   Total Time: 60 Minutes

## 2022-01-25 ENCOUNTER — TELEPHONE (OUTPATIENT)
Dept: FAMILY MEDICINE | Facility: CLINIC | Age: 72
End: 2022-01-25
Payer: COMMERCIAL

## 2022-01-25 NOTE — TELEPHONE ENCOUNTER
----- Message from Emma Carver sent at 1/25/2022  4:12 PM CST -----   3:53   She thinks she has Covid. She has a cough no fever and very very low energy. She is taking the cough pills and OTC med's Please call Pt's # [phone] GH

## 2022-01-25 NOTE — TELEPHONE ENCOUNTER
Spoke with pt who refused an appointment. States she doesn't want to be tested for covid, just wanted to see if there was anything else that needed to be done.

## 2022-02-08 ENCOUNTER — OFFICE VISIT (OUTPATIENT)
Dept: FAMILY MEDICINE | Facility: CLINIC | Age: 72
End: 2022-02-08
Payer: COMMERCIAL

## 2022-02-08 VITALS
WEIGHT: 199 LBS | HEIGHT: 64 IN | DIASTOLIC BLOOD PRESSURE: 60 MMHG | SYSTOLIC BLOOD PRESSURE: 124 MMHG | HEART RATE: 76 BPM | BODY MASS INDEX: 33.97 KG/M2

## 2022-02-08 DIAGNOSIS — M17.0 OSTEOARTHRITIS OF BOTH KNEES, UNSPECIFIED OSTEOARTHRITIS TYPE: ICD-10-CM

## 2022-02-08 DIAGNOSIS — I25.10 ATHEROSCLEROSIS OF CORONARY ARTERY, UNSPECIFIED VESSEL OR LESION TYPE, UNSPECIFIED WHETHER ANGINA PRESENT, UNSPECIFIED WHETHER NATIVE OR TRANSPLANTED HEART: ICD-10-CM

## 2022-02-08 DIAGNOSIS — E78.5 HYPERLIPIDEMIA, UNSPECIFIED HYPERLIPIDEMIA TYPE: ICD-10-CM

## 2022-02-08 DIAGNOSIS — Z78.0 MENOPAUSE: ICD-10-CM

## 2022-02-08 DIAGNOSIS — I10 HYPERTENSION, UNSPECIFIED TYPE: ICD-10-CM

## 2022-02-08 DIAGNOSIS — Z79.899 HIGH RISK MEDICATION USE: ICD-10-CM

## 2022-02-08 DIAGNOSIS — Z95.1 S/P CABG (CORONARY ARTERY BYPASS GRAFT): ICD-10-CM

## 2022-02-08 DIAGNOSIS — E11.69 TYPE 2 DIABETES MELLITUS WITH OTHER SPECIFIED COMPLICATION, UNSPECIFIED WHETHER LONG TERM INSULIN USE: ICD-10-CM

## 2022-02-08 DIAGNOSIS — E11.69 TYPE 2 DIABETES MELLITUS WITH OTHER SPECIFIED COMPLICATION, UNSPECIFIED WHETHER LONG TERM INSULIN USE: Primary | ICD-10-CM

## 2022-02-08 DIAGNOSIS — K21.9 GASTROESOPHAGEAL REFLUX DISEASE, UNSPECIFIED WHETHER ESOPHAGITIS PRESENT: ICD-10-CM

## 2022-02-08 LAB — HBA1C MFR BLD: 8 %

## 2022-02-08 PROCEDURE — 4010F ACE/ARB THERAPY RXD/TAKEN: CPT | Mod: S$GLB,,, | Performed by: NURSE PRACTITIONER

## 2022-02-08 PROCEDURE — 3052F HG A1C>EQUAL 8.0%<EQUAL 9.0%: CPT | Mod: S$GLB,,, | Performed by: NURSE PRACTITIONER

## 2022-02-08 PROCEDURE — 83036 HEMOGLOBIN GLYCOSYLATED A1C: CPT | Mod: QW,,, | Performed by: NURSE PRACTITIONER

## 2022-02-08 PROCEDURE — 99214 OFFICE O/P EST MOD 30 MIN: CPT | Mod: S$GLB,,, | Performed by: NURSE PRACTITIONER

## 2022-02-08 PROCEDURE — 3074F SYST BP LT 130 MM HG: CPT | Mod: S$GLB,,, | Performed by: NURSE PRACTITIONER

## 2022-02-08 PROCEDURE — 1159F MED LIST DOCD IN RCRD: CPT | Mod: S$GLB,,, | Performed by: NURSE PRACTITIONER

## 2022-02-08 PROCEDURE — 3074F PR MOST RECENT SYSTOLIC BLOOD PRESSURE < 130 MM HG: ICD-10-PCS | Mod: S$GLB,,, | Performed by: NURSE PRACTITIONER

## 2022-02-08 PROCEDURE — 3078F DIAST BP <80 MM HG: CPT | Mod: S$GLB,,, | Performed by: NURSE PRACTITIONER

## 2022-02-08 PROCEDURE — 3061F PR NEG MICROALBUMINURIA RESULT DOCUMENTED/REVIEW: ICD-10-PCS | Mod: S$GLB,,, | Performed by: NURSE PRACTITIONER

## 2022-02-08 PROCEDURE — 3066F NEPHROPATHY DOC TX: CPT | Mod: S$GLB,,, | Performed by: NURSE PRACTITIONER

## 2022-02-08 PROCEDURE — 1159F PR MEDICATION LIST DOCUMENTED IN MEDICAL RECORD: ICD-10-PCS | Mod: S$GLB,,, | Performed by: NURSE PRACTITIONER

## 2022-02-08 PROCEDURE — 3052F PR MOST RECENT HEMOGLOBIN A1C LEVEL 8.0 - < 9.0%: ICD-10-PCS | Mod: S$GLB,,, | Performed by: NURSE PRACTITIONER

## 2022-02-08 PROCEDURE — 4010F PR ACE/ARB THEARPY RXD/TAKEN: ICD-10-PCS | Mod: S$GLB,,, | Performed by: NURSE PRACTITIONER

## 2022-02-08 PROCEDURE — 99214 PR OFFICE/OUTPT VISIT, EST, LEVL IV, 30-39 MIN: ICD-10-PCS | Mod: S$GLB,,, | Performed by: NURSE PRACTITIONER

## 2022-02-08 PROCEDURE — 3078F PR MOST RECENT DIASTOLIC BLOOD PRESSURE < 80 MM HG: ICD-10-PCS | Mod: S$GLB,,, | Performed by: NURSE PRACTITIONER

## 2022-02-08 PROCEDURE — 3066F PR DOCUMENTATION OF TREATMENT FOR NEPHROPATHY: ICD-10-PCS | Mod: S$GLB,,, | Performed by: NURSE PRACTITIONER

## 2022-02-08 PROCEDURE — 83036 POCT HEMOGLOBIN A1C: ICD-10-PCS | Mod: QW,,, | Performed by: NURSE PRACTITIONER

## 2022-02-08 PROCEDURE — 3008F BODY MASS INDEX DOCD: CPT | Mod: S$GLB,,, | Performed by: NURSE PRACTITIONER

## 2022-02-08 PROCEDURE — 3061F NEG MICROALBUMINURIA REV: CPT | Mod: S$GLB,,, | Performed by: NURSE PRACTITIONER

## 2022-02-08 PROCEDURE — 3008F PR BODY MASS INDEX (BMI) DOCUMENTED: ICD-10-PCS | Mod: S$GLB,,, | Performed by: NURSE PRACTITIONER

## 2022-02-08 RX ORDER — METOPROLOL SUCCINATE 50 MG/1
50 TABLET, EXTENDED RELEASE ORAL DAILY
Qty: 90 TABLET | Refills: 1 | Status: SHIPPED | OUTPATIENT
Start: 2022-02-08 | End: 2022-06-23 | Stop reason: SDUPTHER

## 2022-02-08 RX ORDER — PIOGLITAZONEHYDROCHLORIDE 30 MG/1
30 TABLET ORAL DAILY
Qty: 90 TABLET | Refills: 1 | Status: CANCELLED | OUTPATIENT
Start: 2022-02-08

## 2022-02-08 RX ORDER — INSULIN LISPRO 100 [IU]/ML
12 INJECTION, SOLUTION INTRAVENOUS; SUBCUTANEOUS 3 TIMES DAILY
Qty: 10.8 ML | Refills: 11 | Status: SHIPPED | OUTPATIENT
Start: 2022-02-08 | End: 2022-07-21 | Stop reason: SDUPTHER

## 2022-02-08 RX ORDER — SITAGLIPTIN AND METFORMIN HYDROCHLORIDE 1000; 100 MG/1; MG/1
1 TABLET, FILM COATED, EXTENDED RELEASE ORAL NIGHTLY
Qty: 90 TABLET | Refills: 1 | Status: SHIPPED | OUTPATIENT
Start: 2022-02-08 | End: 2022-02-24 | Stop reason: SDUPTHER

## 2022-02-08 RX ORDER — PITAVASTATIN CALCIUM 4.18 MG/1
1 TABLET, FILM COATED ORAL DAILY
Qty: 90 TABLET | Refills: 1 | Status: SHIPPED | OUTPATIENT
Start: 2022-02-08 | End: 2022-06-23 | Stop reason: SDUPTHER

## 2022-02-08 RX ORDER — LANCETS 28 GAUGE
1 EACH MISCELLANEOUS 2 TIMES DAILY
Qty: 200 EACH | Refills: 3 | Status: CANCELLED | OUTPATIENT
Start: 2022-02-08

## 2022-02-08 RX ORDER — LISINOPRIL 20 MG/1
20 TABLET ORAL DAILY
Qty: 90 TABLET | Refills: 1 | Status: SHIPPED | OUTPATIENT
Start: 2022-02-08 | End: 2022-06-23 | Stop reason: SDUPTHER

## 2022-02-08 NOTE — PROGRESS NOTES
SUBJECTIVE:    Patient ID: Estefanía Ray is a 71 y.o. female.    Chief Complaint: Diabetes (Brought bottles// SW)    71 y.o. female who presents for check up. She is treated for diabetes, cad, hyperlipidemia, htn, gerd, oa.  . She is currently taking  Lantus insulin daily and her Trulicity  1.5 mg a week. Her a1c in the office today is8.0 mg/dl. This has increased from 7.5mg/dl. she is wearing a dexcom monitor. Sugars seems to be all over the place. She saw dietician at [facility] which seemed to help some. Enjoys sweets. .Eating frequent snacks. Eating more since  being home.    she is treated for hypertension. She is currently prescribed lisinopril 20 mg p.o. daily, metoprolol 50 mg p.o. daily and HCTZ 25 mg daily as needed along with Lasix 20 mg p.o. daily. She reports readings at home have been <130/90/. Her bp in office today it 124/60.   Feels good otherwise. Plans to do labs today . Patient would like to discuss getting off of actos      Office Visit on 02/08/2022   Component Date Value Ref Range Status    Hemoglobin A1C 02/08/2022 8.0  % Final    WBC 02/08/2022 11.3* 3.8 - 10.8 Thousand/uL Final    RBC 02/08/2022 4.61  3.80 - 5.10 Million/uL Final    Hemoglobin 02/08/2022 12.8  11.7 - 15.5 g/dL Final    Hematocrit 02/08/2022 40.1  35.0 - 45.0 % Final    MCV 02/08/2022 87.0  80.0 - 100.0 fL Final    MCH 02/08/2022 27.8  27.0 - 33.0 pg Final    MCHC 02/08/2022 31.9* 32.0 - 36.0 g/dL Final    RDW 02/08/2022 13.7  11.0 - 15.0 % Final    Platelets 02/08/2022 292  140 - 400 Thousand/uL Final    MPV 02/08/2022 9.5  7.5 - 12.5 fL Final    Neutrophils, Abs 02/08/2022 7,650  1,500 - 7,800 cells/uL Final    Lymph # 02/08/2022 2,576  850 - 3,900 cells/uL Final    Mono # 02/08/2022 802  200 - 950 cells/uL Final    Eos # 02/08/2022 226  15 - 500 cells/uL Final    Baso # 02/08/2022 45  0 - 200 cells/uL Final    Neutrophils Relative 02/08/2022 67.7  % Final    Lymph % 02/08/2022 22.8  % Final    Mono  % 02/08/2022 7.1  % Final    Eosinophil % 02/08/2022 2.0  % Final    Basophil % 02/08/2022 0.4  % Final    Glucose 02/08/2022 181* 65 - 99 mg/dL Final    BUN 02/08/2022 16  7 - 25 mg/dL Final    Creatinine 02/08/2022 0.94* 0.60 - 0.93 mg/dL Final    eGFR if non  02/08/2022 61  > OR = 60 mL/min/1.73m2 Final    eGFR if  02/08/2022 71  > OR = 60 mL/min/1.73m2 Final    BUN/Creatinine Ratio 02/08/2022 17  6 - 22 (calc) Final    Sodium 02/08/2022 136  135 - 146 mmol/L Final    Potassium 02/08/2022 4.3  3.5 - 5.3 mmol/L Final    Chloride 02/08/2022 98  98 - 110 mmol/L Final    CO2 02/08/2022 28  20 - 32 mmol/L Final    Calcium 02/08/2022 9.8  8.6 - 10.4 mg/dL Final    Total Protein 02/08/2022 7.1  6.1 - 8.1 g/dL Final    Albumin 02/08/2022 4.4  3.6 - 5.1 g/dL Final    Globulin, Total 02/08/2022 2.7  1.9 - 3.7 g/dL (calc) Final    Albumin/Globulin Ratio 02/08/2022 1.6  1.0 - 2.5 (calc) Final    Total Bilirubin 02/08/2022 0.4  0.2 - 1.2 mg/dL Final    Alkaline Phosphatase 02/08/2022 74  37 - 153 U/L Final    AST 02/08/2022 18  10 - 35 U/L Final    ALT 02/08/2022 14  6 - 29 U/L Final    TSH w/reflex to FT4 02/08/2022 2.57  0.40 - 4.50 mIU/L Final    Color, UA 02/08/2022 YELLOW  YELLOW Final    Appearance, UA 02/08/2022 CLEAR  CLEAR Final    Specific Gravity, UA 02/08/2022 1.009  1.001 - 1.035 Final    pH, UA 02/08/2022 5.5  5.0 - 8.0 Final    Glucose, UA 02/08/2022 NEGATIVE  NEGATIVE Final    Bilirubin, UA 02/08/2022 NEGATIVE  NEGATIVE Final    Ketones, UA 02/08/2022 NEGATIVE  NEGATIVE Final    Occult Blood UA 02/08/2022 NEGATIVE  NEGATIVE Final    Protein, UA 02/08/2022 NEGATIVE  NEGATIVE Final    Nitrite, UA 02/08/2022 NEGATIVE  NEGATIVE Final    Leukocytes, UA 02/08/2022 1+* NEGATIVE Final    WBC Casts, UA 02/08/2022 0-5  < OR = 5 /HPF Final    RBC Casts, UA 02/08/2022 NONE SEEN  < OR = 2 /HPF Final    Squam Epithel, UA 02/08/2022 0-5  < OR = 5 /HPF Final     Bacteria, UA 02/08/2022 NONE SEEN  NONE SEEN /HPF Final    Hyaline Casts, UA 02/08/2022 NONE SEEN  NONE SEEN /LPF Final    Reflexive Urine Culture 02/08/2022    Final    Urine Culture, Routine 02/08/2022    Final    Creatinine, Urine 02/08/2022 39  20 - 275 mg/dL Final    Microalb, Ur 02/08/2022 0.6  See Note: mg/dL Final    Microalb/Creat Ratio 02/08/2022 15  <30 mcg/mg creat Final   Office Visit on 11/30/2021   Component Date Value Ref Range Status    Hemoglobin A1C 11/30/2021 7.5  % Final   Office Visit on 09/09/2021   Component Date Value Ref Range Status    Hemoglobin A1C 09/09/2021 7.3  % Final       Past Medical History:   Diagnosis Date    Allergy     Diabetes mellitus, type 2     GERD (gastroesophageal reflux disease)     Hyperlipidemia     Hypertension      Social History     Socioeconomic History    Marital status:    Tobacco Use    Smoking status: Never Smoker    Smokeless tobacco: Never Used   Substance and Sexual Activity    Alcohol use: Never    Drug use: Never     Past Surgical History:   Procedure Laterality Date    HYSTERECTOMY      KNEE SURGERY Left     TONSILLECTOMY       History reviewed. No pertinent family history.    Review of patient's allergies indicates:   Allergen Reactions    Ezetimibe      Other reaction(s): Other (See Comments), Unknown  Feels terrible when taking medication       Sitagliptin-metformin      Other reaction(s): diarrhea       Current Outpatient Medications:     aspirin (ECOTRIN) 81 MG EC tablet, Aspir-81 mg tablet,delayed release  Take 1 tablet every day by oral route., Disp: , Rfl:     biotin 10,000 mcg TbDL, 1 tablet, Disp: , Rfl:     blood sugar diagnostic (FREESTYLE LITE STRIPS) Strp, as directed, Disp: 100 each, Rfl: 5    blood-glucose sensor (DEXCOM G6 SENSOR) Kina, Use 1 sensor every 10 days., Disp: 3 each, Rfl: 12    blood-glucose transmitter (DEXCOM G6 TRANSMITTER) Kina, Use 1 transmitter every 90 days, Disp: 1 each, Rfl:  "4    coenzyme Q10 100 mg capsule, 1 capsule with a meal, Disp: , Rfl:     cyclobenzaprine (FLEXERIL) 10 MG tablet, Take 1 tablet (10 mg total) by mouth 2 (two) times daily., Disp: 180 tablet, Rfl: 1    ELDERBERRY FRUIT ORAL, Take by mouth., Disp: , Rfl:     flash glucose scanning reader (FREESTYLE KARINA 14 DAY READER) Misc, 1 Units by Misc.(Non-Drug; Combo Route) route 5 (five) times daily., Disp: 1 each, Rfl: 3    flash glucose sensor (FREESTYLE KARINA 14 DAY SENSOR) Kit, 1 kit by Misc.(Non-Drug; Combo Route) route once daily., Disp: 1 kit, Rfl: 11    hydroCHLOROthiazide (HYDRODIURIL) 25 MG tablet, Take 1 tablet (25 mg total) by mouth once daily., Disp: 90 tablet, Rfl: 1    insulin (LANTUS SOLOSTAR U-100 INSULIN) glargine 100 units/mL (3mL) SubQ pen, as directed, Disp: 3 Box, Rfl: 1    insulin aspart U-100 (NOVOLOG FLEXPEN U-100 INSULIN) 100 unit/mL (3 mL) InPn pen, Inject 5 Units into the skin before meals and at bedtime as needed., Disp: 3 mL, Rfl: 0    krill-om-3-dha-epa-phospho-ast 939-95-45-50 mg Cap, , Disp: , Rfl:     lancets (FREESTYLE LANCETS) 28 gauge Misc, 1 lancet by Misc.(Non-Drug; Combo Route) route 2 (two) times a day., Disp: 200 each, Rfl: 3    lansoprazole (PREVACID) 15 MG capsule, 1 capsule, Disp: , Rfl:     loratadine (CLARITIN) 10 mg tablet, Take 1 tablet (10 mg total) by mouth once daily., Disp: 90 tablet, Rfl: 1    mupirocin (BACTROBAN) 2 % ointment, Apply topically 2 (two) times daily., Disp: 22 g, Rfl: 0    pen needle, diabetic (PEN NEEDLE) 31 gauge x 3/16" Ndle, 1 each by In Vitro route 3 (three) times daily. BD ULTRA FINE, Disp: 100 each, Rfl: 5    triamcinolone acetonide 0.1% (KENALOG) 0.1 % ointment, Apply topically 2 (two) times daily., Disp: 15 g, Rfl: 2    UNABLE TO FIND, multivit topical patch, Disp: , Rfl:     vitamin D (VITAMIN D3) 1000 units Tab, Take 5,000 Units by mouth., Disp: , Rfl:     insulin lispro (HUMALOG KWIKPEN INSULIN) 100 unit/mL pen, Inject 12 Units " "into the skin 3 (three) times daily., Disp: 10.8 mL, Rfl: 11    lisinopriL (PRINIVIL,ZESTRIL) 20 MG tablet, Take 1 tablet (20 mg total) by mouth once daily., Disp: 90 tablet, Rfl: 1    metoprolol succinate (TOPROL-XL) 50 MG 24 hr tablet, Take 1 tablet (50 mg total) by mouth once daily., Disp: 90 tablet, Rfl: 1    pitavastatin calcium (LIVALO) 4 mg Tab, Take 1 tablet by mouth once daily., Disp: 90 tablet, Rfl: 1    semaglutide (OZEMPIC) 1 mg/dose (4 mg/3 mL), Inject 1 mg into the skin every 7 days., Disp: 1 pen, Rfl: 11    SITagliptan-metformin (JANUMET XR) 100-1,000 mg TM24, Take 1 tablet by mouth nightly., Disp: 90 tablet, Rfl: 1    Review of Systems   Constitutional: Negative for chills, fever and unexpected weight change.   HENT: Negative for ear pain, rhinorrhea and sore throat.    Eyes: Negative for pain and visual disturbance.   Respiratory: Negative for cough and shortness of breath.    Cardiovascular: Negative for chest pain, palpitations and leg swelling.   Gastrointestinal: Negative for abdominal pain, diarrhea, nausea and vomiting.   Genitourinary: Negative for difficulty urinating, hematuria and vaginal bleeding.   Musculoskeletal: Negative for arthralgias.   Skin: Negative for rash.   Neurological: Negative for dizziness, weakness and headaches.   Psychiatric/Behavioral: Negative for agitation and sleep disturbance. The patient is not nervous/anxious.           Objective:      Vitals:    02/08/22 1329   BP: 124/60   Pulse: 76   Weight: 90.3 kg (199 lb)   Height: 5' 3.5" (1.613 m)     Physical Exam  Constitutional:       Appearance: She is well-developed and well-nourished.   HENT:      Head: Normocephalic.      Right Ear: External ear normal.      Left Ear: External ear normal.      Mouth/Throat:      Mouth: Oropharynx is clear and moist.   Eyes:      Conjunctiva/sclera: Conjunctivae normal.      Pupils: Pupils are equal, round, and reactive to light.   Neck:      Vascular: No JVD. "   Cardiovascular:      Rate and Rhythm: Normal rate and regular rhythm.      Heart sounds: No murmur heard.      Pulmonary:      Effort: Pulmonary effort is normal.      Breath sounds: Normal breath sounds.   Abdominal:      General: Bowel sounds are normal.      Palpations: Abdomen is soft.   Musculoskeletal:         General: No deformity or edema. Normal range of motion.      Cervical back: Normal range of motion and neck supple.   Feet:      Right foot:      Protective Sensation: 5 sites tested. 5 sites sensed.      Left foot:      Protective Sensation: 5 sites tested. 5 sites sensed.   Lymphadenopathy:      Cervical: No cervical adenopathy.   Skin:     General: Skin is warm, dry and intact.      Findings: No rash.   Neurological:      Mental Status: She is alert and oriented to person, place, and time.      Gait: Gait normal.   Psychiatric:         Mood and Affect: Mood and affect normal.         Speech: Speech normal.         Behavior: Behavior normal.           Assessment:       1. Type 2 diabetes mellitus with other specified complication, unspecified whether long term insulin use    2. Type 2 diabetes mellitus with other specified complication, unspecified whether long term insulin use    3. Hypertension, unspecified type    4. Hyperlipidemia, unspecified hyperlipidemia type    5. High risk medication use    6. Menopause    7. Gastroesophageal reflux disease, unspecified whether esophagitis present    8. Atherosclerosis of coronary artery, unspecified vessel or lesion type, unspecified whether angina present, unspecified whether native or transplanted heart    9. S/P CABG (coronary artery bypass graft)    10. Osteoarthritis of both knees, unspecified osteoarthritis type         Plan:       Type 2 diabetes mellitus with other specified complication, unspecified whether long term insulin use  Comments:  bs to office next week  Orders:  -     Hemoglobin A1C, POCT  -     SITagliptan-metformin (JANUMET XR)  100-1,000 mg TM24; Take 1 tablet by mouth nightly.  Dispense: 90 tablet; Refill: 1  -     CBC Auto Differential; Future; Expected date: 02/08/2022  -     Comprehensive Metabolic Panel; Future; Expected date: 02/08/2022  -     TSH w/reflex to FT4; Future; Expected date: 02/08/2022  -     Urinalysis, Reflex to Urine Culture Urine, Clean Catch; Future; Expected date: 02/08/2022  -     Microalbumin/Creatinine Ratio, Urine; Future; Expected date: 02/08/2022  -     semaglutide (OZEMPIC) 1 mg/dose (4 mg/3 mL); Inject 1 mg into the skin every 7 days.  Dispense: 1 pen; Refill: 11    Type 2 diabetes mellitus with other specified complication, unspecified whether long term insulin use  Comments:  stop actos. stop trulicity. start ozempic  Orders:  -     Hemoglobin A1C, POCT  -     SITagliptan-metformin (JANUMET XR) 100-1,000 mg TM24; Take 1 tablet by mouth nightly.  Dispense: 90 tablet; Refill: 1  -     CBC Auto Differential; Future; Expected date: 02/08/2022  -     Comprehensive Metabolic Panel; Future; Expected date: 02/08/2022  -     TSH w/reflex to FT4; Future; Expected date: 02/08/2022  -     Urinalysis, Reflex to Urine Culture Urine, Clean Catch; Future; Expected date: 02/08/2022  -     Microalbumin/Creatinine Ratio, Urine; Future; Expected date: 02/08/2022  -     semaglutide (OZEMPIC) 1 mg/dose (4 mg/3 mL); Inject 1 mg into the skin every 7 days.  Dispense: 1 pen; Refill: 11    Hypertension, unspecified type  -     lisinopriL (PRINIVIL,ZESTRIL) 20 MG tablet; Take 1 tablet (20 mg total) by mouth once daily.  Dispense: 90 tablet; Refill: 1  -     metoprolol succinate (TOPROL-XL) 50 MG 24 hr tablet; Take 1 tablet (50 mg total) by mouth once daily.  Dispense: 90 tablet; Refill: 1    Hyperlipidemia, unspecified hyperlipidemia type  -     pitavastatin calcium (LIVALO) 4 mg Tab; Take 1 tablet by mouth once daily.  Dispense: 90 tablet; Refill: 1    High risk medication use    Menopause    Gastroesophageal reflux disease,  unspecified whether esophagitis present    Atherosclerosis of coronary artery, unspecified vessel or lesion type, unspecified whether angina present, unspecified whether native or transplanted heart    S/P CABG (coronary artery bypass graft)    Osteoarthritis of both knees, unspecified osteoarthritis type    Other orders  -     insulin lispro (HUMALOG KWIKPEN INSULIN) 100 unit/mL pen; Inject 12 Units into the skin 3 (three) times daily.  Dispense: 10.8 mL; Refill: 11      Follow up in about 3 months (around 5/8/2022), or if symptoms worsen or fail to improve, for medication management.        2/14/2022 Verna Shah

## 2022-02-09 ENCOUNTER — TELEPHONE (OUTPATIENT)
Dept: FAMILY MEDICINE | Facility: CLINIC | Age: 72
End: 2022-02-09
Payer: COMMERCIAL

## 2022-02-09 NOTE — TELEPHONE ENCOUNTER
----- Message from Hortencia Crabtree sent at 2/9/2022  3:20 PM CST -----  Pt is returning a call   [phone]

## 2022-02-10 LAB
ALBUMIN SERPL-MCNC: 4.4 G/DL (ref 3.6–5.1)
ALBUMIN/CREAT UR: 15 MCG/MG CREAT
ALBUMIN/GLOB SERPL: 1.6 (CALC) (ref 1–2.5)
ALP SERPL-CCNC: 74 U/L (ref 37–153)
ALT SERPL-CCNC: 14 U/L (ref 6–29)
APPEARANCE UR: CLEAR
AST SERPL-CCNC: 18 U/L (ref 10–35)
BACTERIA #/AREA URNS HPF: ABNORMAL /HPF
BACTERIA UR CULT: ABNORMAL
BACTERIA UR CULT: ABNORMAL
BASOPHILS # BLD AUTO: 45 CELLS/UL (ref 0–200)
BASOPHILS NFR BLD AUTO: 0.4 %
BILIRUB SERPL-MCNC: 0.4 MG/DL (ref 0.2–1.2)
BILIRUB UR QL STRIP: NEGATIVE
BUN SERPL-MCNC: 16 MG/DL (ref 7–25)
BUN/CREAT SERPL: 17 (CALC) (ref 6–22)
CALCIUM SERPL-MCNC: 9.8 MG/DL (ref 8.6–10.4)
CHLORIDE SERPL-SCNC: 98 MMOL/L (ref 98–110)
CO2 SERPL-SCNC: 28 MMOL/L (ref 20–32)
COLOR UR: YELLOW
CREAT SERPL-MCNC: 0.94 MG/DL (ref 0.6–0.93)
CREAT UR-MCNC: 39 MG/DL (ref 20–275)
EOSINOPHIL # BLD AUTO: 226 CELLS/UL (ref 15–500)
EOSINOPHIL NFR BLD AUTO: 2 %
ERYTHROCYTE [DISTWIDTH] IN BLOOD BY AUTOMATED COUNT: 13.7 % (ref 11–15)
GLOBULIN SER CALC-MCNC: 2.7 G/DL (CALC) (ref 1.9–3.7)
GLUCOSE SERPL-MCNC: 181 MG/DL (ref 65–99)
GLUCOSE UR QL STRIP: NEGATIVE
HCT VFR BLD AUTO: 40.1 % (ref 35–45)
HGB BLD-MCNC: 12.8 G/DL (ref 11.7–15.5)
HGB UR QL STRIP: NEGATIVE
HYALINE CASTS #/AREA URNS LPF: ABNORMAL /LPF
KETONES UR QL STRIP: NEGATIVE
LEUKOCYTE ESTERASE UR QL STRIP: ABNORMAL
LYMPHOCYTES # BLD AUTO: 2576 CELLS/UL (ref 850–3900)
LYMPHOCYTES NFR BLD AUTO: 22.8 %
MCH RBC QN AUTO: 27.8 PG (ref 27–33)
MCHC RBC AUTO-ENTMCNC: 31.9 G/DL (ref 32–36)
MCV RBC AUTO: 87 FL (ref 80–100)
MICROALBUMIN UR-MCNC: 0.6 MG/DL
MONOCYTES # BLD AUTO: 802 CELLS/UL (ref 200–950)
MONOCYTES NFR BLD AUTO: 7.1 %
NEUTROPHILS # BLD AUTO: 7650 CELLS/UL (ref 1500–7800)
NEUTROPHILS NFR BLD AUTO: 67.7 %
NITRITE UR QL STRIP: NEGATIVE
PH UR STRIP: 5.5 [PH] (ref 5–8)
PLATELET # BLD AUTO: 292 THOUSAND/UL (ref 140–400)
PMV BLD REES-ECKER: 9.5 FL (ref 7.5–12.5)
POTASSIUM SERPL-SCNC: 4.3 MMOL/L (ref 3.5–5.3)
PROT SERPL-MCNC: 7.1 G/DL (ref 6.1–8.1)
PROT UR QL STRIP: NEGATIVE
RBC # BLD AUTO: 4.61 MILLION/UL (ref 3.8–5.1)
RBC #/AREA URNS HPF: ABNORMAL /HPF
SODIUM SERPL-SCNC: 136 MMOL/L (ref 135–146)
SP GR UR STRIP: 1.01 (ref 1–1.03)
SQUAMOUS #/AREA URNS HPF: ABNORMAL /HPF
TSH SERPL-ACNC: 2.57 MIU/L (ref 0.4–4.5)
WBC # BLD AUTO: 11.3 THOUSAND/UL (ref 3.8–10.8)
WBC #/AREA URNS HPF: ABNORMAL /HPF

## 2022-02-11 ENCOUNTER — TELEPHONE (OUTPATIENT)
Dept: FAMILY MEDICINE | Facility: CLINIC | Age: 72
End: 2022-02-11
Payer: COMMERCIAL

## 2022-02-14 ENCOUNTER — TELEPHONE (OUTPATIENT)
Dept: FAMILY MEDICINE | Facility: CLINIC | Age: 72
End: 2022-02-14
Payer: COMMERCIAL

## 2022-02-14 NOTE — TELEPHONE ENCOUNTER
----- Message from Hortencia Crabtree sent at 2/14/2022  3:32 PM CST -----  - pt would like to know if the dexcom lady came to help get he numbers   [phone]

## 2022-02-14 NOTE — TELEPHONE ENCOUNTER
Spoke with pt she stated she wanted to know if the Dexcom lady came in and got her numbers for Verna to see, pt stated Verna told her the dexcom lady was coming here to set up for Verna to be able to see the pt numbers online.

## 2022-02-15 ENCOUNTER — TELEPHONE (OUTPATIENT)
Dept: FAMILY MEDICINE | Facility: CLINIC | Age: 72
End: 2022-02-15
Payer: COMMERCIAL

## 2022-02-15 NOTE — PATIENT INSTRUCTIONS
"Patient Education       Diabetes and Diet   The Basics   Written by the doctors and editors at [facility]   Why is diet important in diabetes? -- Diet is important because it is part of diabetes treatment. Many people need to change what they eat and how much they eat to help treat their diabetes. It is important for people to treat their diabetes so that they:  · Keep their blood sugar at or near a normal level  · Prevent long-term problems, such as heart or kidney problems, that can happen in people with diabetes  Changing your diet can also help treat obesity, high blood pressure, and high cholesterol. These conditions can affect people with diabetes and can lead to future problems, such as heart attacks or strokes.  Who will work with me to change my diet? -- Your doctor or nurse will work with you to make a food plan to change your diet. They might also recommend that you work with a "dietitian." A dietitian is an expert on food and eating.  Do I need to eat at the same times every day? -- When and how often you should eat depends, in part, on the diabetes medicines you take. For example:  · People who take about the same amount of insulin at the same time each day (called a "fixed regimen") should eat meals at the same times. This is also true for people who take pills that increase insulin levels, such as sulfonylureas. Eating meals at the same time every day helps prevent low blood sugar.  · People who adjust the dose and timing of their insulin each day (called a "flexible regimen") do not always have to eat meals at the same time. That's because they can time their insulin dose for before they plan to eat, and also adjust the dose for how much they plan to eat.  · People who take medicines that don't usually cause low blood sugar, such as metformin, don't have to eat meals at the same time every day.  What do I need to think about when planning what to eat? -- Our bodies break down the food we eat into small " "pieces called carbohydrates, proteins, and fats.  When planning what to eat, people with diabetes need to think about:  · Carbohydrates (or "carbs") - Carbohydrates, which are sugars that our bodies use for energy, can raise a person's blood sugar level. Your doctor, nurse, or dietitian will tell you how many carbohydrates you should eat at each meal or snack. Foods that have carbohydrates include:  ? Bread, pasta, and rice  ? Vegetables and fruits  ? Dairy foods  ? Foods and drinks with added sugar  It is best to get your carbohydrates from fruits, vegetables, whole grains, and low-fat milk. It is best to avoid drinks with added sugar, like soda, juices, and sports drinks.   · Protein - Your doctor, nurse, or dietitian will tell you how much protein you should eat each day. It is best to eat lean meats, fish, eggs, beans, peas, soy products, nuts, and seeds.  · Fats - The type of fat you eat is more important than the amount of fat. "Saturated" and "trans" fats can increase your risk for heart problems, like a heart attack.  ? Foods that have saturated fats include meat, butter, cheese, and ice cream.  ? Foods that have trans fats include processed food with "partially hydrogenated oils" on the ingredient list. This may include fried foods, store bought cookies, muffins, pies, and cakes.  "Monounsaturated" and "polyunsaturated" fats are better for you. Foods with these types of fat include fish, avocado, olive oil, and nuts.  · Calories - People need to eat a certain amount of calories each day to keep their weight the same. People who are overweight and want to lose weight need to eat fewer calories each day.  · Fiber - Eating foods with a lot of fiber can help control a person's blood sugar level. Foods that have a lot of fiber include apples, green beans, peas, beans, lentils, nuts, oatmeal, and whole grains.  · Salt - People who have high blood pressure should not eat foods that contain a lot of salt (also " called sodium). People with high blood pressure should also eat healthy foods, such as fruits, vegetables, and low-fat dairy foods.  · Alcohol - Having more than 1 drink (for women) or 2 drinks (for men) a day can raise blood sugar levels. Also, drinks that have fruit juice or soda in them can raise blood sugar levels.  What can I do if I need to lose weight? -- If you need to lose weight, you can:  · Exercise - Try to get at least 30 minutes of physical activity a day, most days of the week. Even gentle exercise, like walking, is good for your health. Some people with diabetes need to change their medicine dose before they exercise. They might also need to check their blood sugar levels before and after exercising.  · Eat fewer calories - Your doctor, nurse, or dietitian can tell you how many calories you should eat each day in order to lose weight.  If you are worried about your weight, size, or shape, talk with your doctor, nurse, or dietitian. They can help you make changes to improve your health.  Can I eat the same foods as my family? -- Yes. You do not need to eat special foods if you have diabetes. You and your family can eat the same foods. Changing your diet is mostly about eating healthy foods and not eating too much.  What are the other parts of diabetes treatment? -- Besides changing your diet, the other parts of diabetes treatment are:  · Exercise  · Medicines  Some people with diabetes need to learn how to match their diet and exercise with their medicine dose. For example, people who use insulin might need to choose the dose of insulin they give themselves. To choose their dose, they need to think about:  · What they plan to eat at the next meal  · How much exercise they plan to do  · What their blood sugar level is  If the diet and exercise do not match the medicine dose, a person's blood sugar level can get too low or too high. Blood sugar levels that are too low or too high can cause  problems.  All topics are updated as new evidence becomes available and our peer review process is complete.  This topic retrieved from [employer] on: Sep 21, 2021.  Topic 35731 Version 7.0  Release: 29.4.2 - C29.263  © 2021 UpToDate, Inc. and/or its affiliates. All rights reserved.  Consumer Information Use and Disclaimer   This information is not specific medical advice and does not replace information you receive from your health care provider. This is only a brief summary of general information. It does NOT include all information about conditions, illnesses, injuries, tests, procedures, treatments, therapies, discharge instructions or life-style choices that may apply to you. You must talk with your health care provider for complete information about your health and treatment options. This information should not be used to decide whether or not to accept your health care provider's advice, instructions or recommendations. Only your health care provider has the knowledge and training to provide advice that is right for you. The use of this information is governed by the [employer] End User License Agreement, available at https://www.Telepathy.[employer] SoupMan/en/solutions/Promobucket/about/kandy.The use of [employer] content is governed by the [employer] Terms of Use. ©2021 UpToDate, Inc. All rights reserved.  Copyright   © 2021 UpToDate, Inc. and/or its affiliates. All rights reserved.

## 2022-02-15 NOTE — TELEPHONE ENCOUNTER
----- Message from Hortencia Crabtree sent at 2/15/2022  1:30 PM CST -----  Vm- pt is calling about the ozempic. She just took .5 she is not sure if she is supposed to take another dose as well

## 2022-02-21 DIAGNOSIS — E11.69 TYPE 2 DIABETES MELLITUS WITH OTHER SPECIFIED COMPLICATION, UNSPECIFIED WHETHER LONG TERM INSULIN USE: ICD-10-CM

## 2022-02-21 RX ORDER — INSULIN GLARGINE 100 [IU]/ML
INJECTION, SOLUTION SUBCUTANEOUS
Qty: 3 EACH | Refills: 1
Start: 2022-02-21 | End: 2022-07-21 | Stop reason: SDUPTHER

## 2022-02-21 NOTE — TELEPHONE ENCOUNTER
Spoke with pt, states the Lantus increased to 30 units from 26. Pt agrees to increase to 40 units per Verna.   Also states she needs a prescription for Ozempic to [facility] in [place]

## 2022-02-21 NOTE — TELEPHONE ENCOUNTER
----- Message from Miya Bynum sent at 2/21/2022 10:36 AM CST -----  Patient called and stated that she glucose numbers are running a little higher she would like to speak to the nurse she has a few questions please give her a call at [phone]

## 2022-02-21 NOTE — TELEPHONE ENCOUNTER
"Spoke with pt who states she moved her long acting insulin to 30 units 2 days ago. States her sugar is uncontrolled and she is having to give herself a lot of short acting. Her "goal is 100-180." but Verna told her if she was 150 and she was about to eat a meal she could take accordingly how many units of fast acting insulin and she feels like she has been using a lot.    Dexcom readings in media.   "

## 2022-02-24 DIAGNOSIS — I10 HYPERTENSION, UNSPECIFIED TYPE: ICD-10-CM

## 2022-02-24 DIAGNOSIS — E11.69 TYPE 2 DIABETES MELLITUS WITH OTHER SPECIFIED COMPLICATION, UNSPECIFIED WHETHER LONG TERM INSULIN USE: ICD-10-CM

## 2022-02-24 RX ORDER — PEN NEEDLE, DIABETIC 30 GX3/16"
1 NEEDLE, DISPOSABLE MISCELLANEOUS 3 TIMES DAILY
Qty: 100 EACH | Refills: 5 | Status: SHIPPED | OUTPATIENT
Start: 2022-02-24 | End: 2022-08-19 | Stop reason: SDUPTHER

## 2022-02-24 RX ORDER — HYDROCHLOROTHIAZIDE 25 MG/1
25 TABLET ORAL DAILY
Qty: 90 TABLET | Refills: 1 | Status: SHIPPED | OUTPATIENT
Start: 2022-02-24 | End: 2022-07-21 | Stop reason: SDUPTHER

## 2022-02-24 RX ORDER — SITAGLIPTIN AND METFORMIN HYDROCHLORIDE 1000; 100 MG/1; MG/1
1 TABLET, FILM COATED, EXTENDED RELEASE ORAL NIGHTLY
Qty: 90 TABLET | Refills: 1 | Status: SHIPPED | OUTPATIENT
Start: 2022-02-24 | End: 2022-06-23 | Stop reason: SDUPTHER

## 2022-02-24 NOTE — TELEPHONE ENCOUNTER
Spoke with pt who states she needs a refill on janumet, pen needles, and hctz. States she would like them all resent because the pharmacy states they do not have them.

## 2022-02-24 NOTE — TELEPHONE ENCOUNTER
----- Message from Hortencia Crabtree sent at 2/24/2022 11:55 AM CST -----  Vm- pt needs refill on her janumet and metformin   [facility]   [phone]

## 2022-03-07 ENCOUNTER — TELEPHONE (OUTPATIENT)
Dept: FAMILY MEDICINE | Facility: CLINIC | Age: 72
End: 2022-03-07
Payer: COMMERCIAL

## 2022-03-07 NOTE — TELEPHONE ENCOUNTER
----- Message from Hortencia Crabtree sent at 3/7/2022  3:23 PM CST -----  - pt is having a reaction to ozempic   [phone]

## 2022-03-07 NOTE — TELEPHONE ENCOUNTER
Spoke with pt,  on 2/28, 3/2, 3/6, and today she has had vomiting and diarrhea. States at first she thought it was just a virus but now that it is happening again she is realizing it must be a reaction to the Ozempic. States she took her last dose last Tuesday at 1mg which would mean she needs another dose tomorrow. She still has Trulicity at home so she wants to know if she needs to go back to this. Also wants something sent in for nausea and diarrhea.

## 2022-03-22 ENCOUNTER — TELEPHONE (OUTPATIENT)
Dept: FAMILY MEDICINE | Facility: CLINIC | Age: 72
End: 2022-03-22
Payer: COMMERCIAL

## 2022-03-22 NOTE — TELEPHONE ENCOUNTER
----- Message from Reshma Ford sent at 3/22/2022 11:02 AM CDT -----  Pt calling wants to be seen asap for a left ear infection said it is the same as las time.  # [phone]

## 2022-03-23 ENCOUNTER — OFFICE VISIT (OUTPATIENT)
Dept: FAMILY MEDICINE | Facility: CLINIC | Age: 72
End: 2022-03-23
Payer: COMMERCIAL

## 2022-03-23 VITALS
SYSTOLIC BLOOD PRESSURE: 112 MMHG | WEIGHT: 195 LBS | BODY MASS INDEX: 33.29 KG/M2 | HEART RATE: 76 BPM | HEIGHT: 64 IN | DIASTOLIC BLOOD PRESSURE: 60 MMHG

## 2022-03-23 DIAGNOSIS — E11.69 TYPE 2 DIABETES MELLITUS WITH OTHER SPECIFIED COMPLICATION, UNSPECIFIED WHETHER LONG TERM INSULIN USE: Primary | ICD-10-CM

## 2022-03-23 DIAGNOSIS — J30.1 ALLERGIC RHINITIS DUE TO POLLEN, UNSPECIFIED SEASONALITY: ICD-10-CM

## 2022-03-23 DIAGNOSIS — Z79.899 HIGH RISK MEDICATION USE: ICD-10-CM

## 2022-03-23 DIAGNOSIS — I10 ESSENTIAL HYPERTENSION: ICD-10-CM

## 2022-03-23 DIAGNOSIS — E78.5 HYPERLIPIDEMIA, UNSPECIFIED HYPERLIPIDEMIA TYPE: ICD-10-CM

## 2022-03-23 DIAGNOSIS — Z95.1 S/P CABG (CORONARY ARTERY BYPASS GRAFT): ICD-10-CM

## 2022-03-23 DIAGNOSIS — Z78.0 MENOPAUSE: ICD-10-CM

## 2022-03-23 DIAGNOSIS — I10 HYPERTENSION, UNSPECIFIED TYPE: ICD-10-CM

## 2022-03-23 DIAGNOSIS — I25.10 ATHEROSCLEROSIS OF CORONARY ARTERY, UNSPECIFIED VESSEL OR LESION TYPE, UNSPECIFIED WHETHER ANGINA PRESENT, UNSPECIFIED WHETHER NATIVE OR TRANSPLANTED HEART: ICD-10-CM

## 2022-03-23 DIAGNOSIS — K21.9 GASTROESOPHAGEAL REFLUX DISEASE, UNSPECIFIED WHETHER ESOPHAGITIS PRESENT: ICD-10-CM

## 2022-03-23 PROCEDURE — 1160F PR REVIEW ALL MEDS BY PRESCRIBER/CLIN PHARMACIST DOCUMENTED: ICD-10-PCS | Mod: S$GLB,,, | Performed by: NURSE PRACTITIONER

## 2022-03-23 PROCEDURE — 3078F DIAST BP <80 MM HG: CPT | Mod: S$GLB,,, | Performed by: NURSE PRACTITIONER

## 2022-03-23 PROCEDURE — 99214 OFFICE O/P EST MOD 30 MIN: CPT | Mod: S$GLB,,, | Performed by: NURSE PRACTITIONER

## 2022-03-23 PROCEDURE — 3061F NEG MICROALBUMINURIA REV: CPT | Mod: S$GLB,,, | Performed by: NURSE PRACTITIONER

## 2022-03-23 PROCEDURE — 3066F NEPHROPATHY DOC TX: CPT | Mod: S$GLB,,, | Performed by: NURSE PRACTITIONER

## 2022-03-23 PROCEDURE — 3008F BODY MASS INDEX DOCD: CPT | Mod: S$GLB,,, | Performed by: NURSE PRACTITIONER

## 2022-03-23 PROCEDURE — 4010F ACE/ARB THERAPY RXD/TAKEN: CPT | Mod: S$GLB,,, | Performed by: NURSE PRACTITIONER

## 2022-03-23 PROCEDURE — 1160F RVW MEDS BY RX/DR IN RCRD: CPT | Mod: S$GLB,,, | Performed by: NURSE PRACTITIONER

## 2022-03-23 PROCEDURE — 3074F PR MOST RECENT SYSTOLIC BLOOD PRESSURE < 130 MM HG: ICD-10-PCS | Mod: S$GLB,,, | Performed by: NURSE PRACTITIONER

## 2022-03-23 PROCEDURE — 3074F SYST BP LT 130 MM HG: CPT | Mod: S$GLB,,, | Performed by: NURSE PRACTITIONER

## 2022-03-23 PROCEDURE — 3008F PR BODY MASS INDEX (BMI) DOCUMENTED: ICD-10-PCS | Mod: S$GLB,,, | Performed by: NURSE PRACTITIONER

## 2022-03-23 PROCEDURE — 4010F PR ACE/ARB THEARPY RXD/TAKEN: ICD-10-PCS | Mod: S$GLB,,, | Performed by: NURSE PRACTITIONER

## 2022-03-23 PROCEDURE — 3052F HG A1C>EQUAL 8.0%<EQUAL 9.0%: CPT | Mod: S$GLB,,, | Performed by: NURSE PRACTITIONER

## 2022-03-23 PROCEDURE — 3052F PR MOST RECENT HEMOGLOBIN A1C LEVEL 8.0 - < 9.0%: ICD-10-PCS | Mod: S$GLB,,, | Performed by: NURSE PRACTITIONER

## 2022-03-23 PROCEDURE — 1159F PR MEDICATION LIST DOCUMENTED IN MEDICAL RECORD: ICD-10-PCS | Mod: S$GLB,,, | Performed by: NURSE PRACTITIONER

## 2022-03-23 PROCEDURE — 3078F PR MOST RECENT DIASTOLIC BLOOD PRESSURE < 80 MM HG: ICD-10-PCS | Mod: S$GLB,,, | Performed by: NURSE PRACTITIONER

## 2022-03-23 PROCEDURE — 3066F PR DOCUMENTATION OF TREATMENT FOR NEPHROPATHY: ICD-10-PCS | Mod: S$GLB,,, | Performed by: NURSE PRACTITIONER

## 2022-03-23 PROCEDURE — 1159F MED LIST DOCD IN RCRD: CPT | Mod: S$GLB,,, | Performed by: NURSE PRACTITIONER

## 2022-03-23 PROCEDURE — 3061F PR NEG MICROALBUMINURIA RESULT DOCUMENTED/REVIEW: ICD-10-PCS | Mod: S$GLB,,, | Performed by: NURSE PRACTITIONER

## 2022-03-23 PROCEDURE — 99214 PR OFFICE/OUTPT VISIT, EST, LEVL IV, 30-39 MIN: ICD-10-PCS | Mod: S$GLB,,, | Performed by: NURSE PRACTITIONER

## 2022-03-29 NOTE — PROGRESS NOTES
SUBJECTIVE:    Patient ID: Estefanía Ray is a 71 y.o. female.    Chief Complaint: Otalgia (Left ear, x3 days, has not taken anything, no bottles// SW)    71 y.o. female who presents for urgent visit. She is treated for diabetes, cad, hyperlipidemia, htn, gerd, oa. She is complaining of ear pain. However this has since resolved. Occurs intermittently. Seems to be worse THIS Past weekend no drainage. No cogestion  . No fever. Stopped ozempic since last visit. Reports was causing vomiting. Quit taking and started trulicity. Sugars have improved      Office Visit on 02/08/2022   Component Date Value Ref Range Status    Hemoglobin A1C 02/08/2022 8.0  % Final    WBC 02/08/2022 11.3 (A) 3.8 - 10.8 Thousand/uL Final    RBC 02/08/2022 4.61  3.80 - 5.10 Million/uL Final    Hemoglobin 02/08/2022 12.8  11.7 - 15.5 g/dL Final    Hematocrit 02/08/2022 40.1  35.0 - 45.0 % Final    MCV 02/08/2022 87.0  80.0 - 100.0 fL Final    MCH 02/08/2022 27.8  27.0 - 33.0 pg Final    MCHC 02/08/2022 31.9 (A) 32.0 - 36.0 g/dL Final    RDW 02/08/2022 13.7  11.0 - 15.0 % Final    Platelets 02/08/2022 292  140 - 400 Thousand/uL Final    MPV 02/08/2022 9.5  7.5 - 12.5 fL Final    Neutrophils, Abs 02/08/2022 7,650  1,500 - 7,800 cells/uL Final    Lymph # 02/08/2022 2,576  850 - 3,900 cells/uL Final    Mono # 02/08/2022 802  200 - 950 cells/uL Final    Eos # 02/08/2022 226  15 - 500 cells/uL Final    Baso # 02/08/2022 45  0 - 200 cells/uL Final    Neutrophils Relative 02/08/2022 67.7  % Final    Lymph % 02/08/2022 22.8  % Final    Mono % 02/08/2022 7.1  % Final    Eosinophil % 02/08/2022 2.0  % Final    Basophil % 02/08/2022 0.4  % Final    Glucose 02/08/2022 181 (A) 65 - 99 mg/dL Final    BUN 02/08/2022 16  7 - 25 mg/dL Final    Creatinine 02/08/2022 0.94 (A) 0.60 - 0.93 mg/dL Final    eGFR if non  02/08/2022 61  > OR = 60 mL/min/1.73m2 Final    eGFR if  02/08/2022 71  > OR = 60 mL/min/1.73m2  Final    BUN/Creatinine Ratio 02/08/2022 17  6 - 22 (calc) Final    Sodium 02/08/2022 136  135 - 146 mmol/L Final    Potassium 02/08/2022 4.3  3.5 - 5.3 mmol/L Final    Chloride 02/08/2022 98  98 - 110 mmol/L Final    CO2 02/08/2022 28  20 - 32 mmol/L Final    Calcium 02/08/2022 9.8  8.6 - 10.4 mg/dL Final    Total Protein 02/08/2022 7.1  6.1 - 8.1 g/dL Final    Albumin 02/08/2022 4.4  3.6 - 5.1 g/dL Final    Globulin, Total 02/08/2022 2.7  1.9 - 3.7 g/dL (calc) Final    Albumin/Globulin Ratio 02/08/2022 1.6  1.0 - 2.5 (calc) Final    Total Bilirubin 02/08/2022 0.4  0.2 - 1.2 mg/dL Final    Alkaline Phosphatase 02/08/2022 74  37 - 153 U/L Final    AST 02/08/2022 18  10 - 35 U/L Final    ALT 02/08/2022 14  6 - 29 U/L Final    TSH w/reflex to FT4 02/08/2022 2.57  0.40 - 4.50 mIU/L Final    Color, UA 02/08/2022 YELLOW  YELLOW Final    Appearance, UA 02/08/2022 CLEAR  CLEAR Final    Specific Gravity, UA 02/08/2022 1.009  1.001 - 1.035 Final    pH, UA 02/08/2022 5.5  5.0 - 8.0 Final    Glucose, UA 02/08/2022 NEGATIVE  NEGATIVE Final    Bilirubin, UA 02/08/2022 NEGATIVE  NEGATIVE Final    Ketones, UA 02/08/2022 NEGATIVE  NEGATIVE Final    Occult Blood UA 02/08/2022 NEGATIVE  NEGATIVE Final    Protein, UA 02/08/2022 NEGATIVE  NEGATIVE Final    Nitrite, UA 02/08/2022 NEGATIVE  NEGATIVE Final    Leukocytes, UA 02/08/2022 1+ (A) NEGATIVE Final    WBC Casts, UA 02/08/2022 0-5  < OR = 5 /HPF Final    RBC Casts, UA 02/08/2022 NONE SEEN  < OR = 2 /HPF Final    Squam Epithel, UA 02/08/2022 0-5  < OR = 5 /HPF Final    Bacteria, UA 02/08/2022 NONE SEEN  NONE SEEN /HPF Final    Hyaline Casts, UA 02/08/2022 NONE SEEN  NONE SEEN /LPF Final    Reflexive Urine Culture 02/08/2022    Final    Urine Culture, Routine 02/08/2022    Final    Creatinine, Urine 02/08/2022 39  20 - 275 mg/dL Final    Microalb, Ur 02/08/2022 0.6  See Note: mg/dL Final    Microalb/Creat Ratio 02/08/2022 15  <30 mcg/mg creat Final    Office Visit on 11/30/2021   Component Date Value Ref Range Status    Hemoglobin A1C 11/30/2021 7.5  % Final       Past Medical History:   Diagnosis Date    Allergy     Diabetes mellitus, type 2     GERD (gastroesophageal reflux disease)     Hyperlipidemia     Hypertension      Social History     Socioeconomic History    Marital status:    Tobacco Use    Smoking status: Never Smoker    Smokeless tobacco: Never Used   Substance and Sexual Activity    Alcohol use: Never    Drug use: Never     Past Surgical History:   Procedure Laterality Date    HYSTERECTOMY      KNEE SURGERY Left     TONSILLECTOMY       History reviewed. No pertinent family history.    Review of patient's allergies indicates:   Allergen Reactions    Ezetimibe      Other reaction(s): Other (See Comments), Unknown  Feels terrible when taking medication       Sitagliptin-metformin      Other reaction(s): diarrhea       Current Outpatient Medications:     aspirin (ECOTRIN) 81 MG EC tablet, Aspir-81 mg tablet,delayed release  Take 1 tablet every day by oral route., Disp: , Rfl:     biotin 10,000 mcg TbDL, 1 tablet, Disp: , Rfl:     blood sugar diagnostic (FREESTYLE LITE STRIPS) Strp, as directed, Disp: 100 each, Rfl: 5    blood-glucose sensor (DEXCOM G6 SENSOR) Kina, Use 1 sensor every 10 days., Disp: 3 each, Rfl: 12    blood-glucose transmitter (DEXCOM G6 TRANSMITTER) Kina, Use 1 transmitter every 90 days, Disp: 1 each, Rfl: 4    coenzyme Q10 100 mg capsule, 1 capsule with a meal, Disp: , Rfl:     cyclobenzaprine (FLEXERIL) 10 MG tablet, Take 1 tablet (10 mg total) by mouth 2 (two) times daily., Disp: 180 tablet, Rfl: 1    ELDERBERRY FRUIT ORAL, Take by mouth., Disp: , Rfl:     flash glucose scanning reader (FREESTYLE KARINA 14 DAY READER) Misc, 1 Units by Misc.(Non-Drug; Combo Route) route 5 (five) times daily., Disp: 1 each, Rfl: 3    flash glucose sensor (FREESTYLE KARINA 14 DAY SENSOR) Kit, 1 kit by Misc.(Non-Drug;  "Combo Route) route once daily., Disp: 1 kit, Rfl: 11    hydroCHLOROthiazide (HYDRODIURIL) 25 MG tablet, Take 1 tablet (25 mg total) by mouth once daily., Disp: 90 tablet, Rfl: 1    insulin (LANTUS SOLOSTAR U-100 INSULIN) glargine 100 units/mL (3mL) SubQ pen, 40 units daily., Disp: 3 each, Rfl: 1    insulin aspart U-100 (NOVOLOG FLEXPEN U-100 INSULIN) 100 unit/mL (3 mL) InPn pen, Inject 5 Units into the skin before meals and at bedtime as needed., Disp: 3 mL, Rfl: 0    insulin lispro (HUMALOG KWIKPEN INSULIN) 100 unit/mL pen, Inject 12 Units into the skin 3 (three) times daily., Disp: 10.8 mL, Rfl: 11    krill-om-3-dha-epa-phospho-ast 250-92-06-50 mg Cap, , Disp: , Rfl:     lancets (FREESTYLE LANCETS) 28 gauge Misc, 1 lancet by Misc.(Non-Drug; Combo Route) route 2 (two) times a day., Disp: 200 each, Rfl: 3    lansoprazole (PREVACID) 15 MG capsule, 1 capsule, Disp: , Rfl:     lisinopriL (PRINIVIL,ZESTRIL) 20 MG tablet, Take 1 tablet (20 mg total) by mouth once daily., Disp: 90 tablet, Rfl: 1    loratadine (CLARITIN) 10 mg tablet, Take 1 tablet (10 mg total) by mouth once daily., Disp: 90 tablet, Rfl: 1    metoprolol succinate (TOPROL-XL) 50 MG 24 hr tablet, Take 1 tablet (50 mg total) by mouth once daily., Disp: 90 tablet, Rfl: 1    mupirocin (BACTROBAN) 2 % ointment, Apply topically 2 (two) times daily., Disp: 22 g, Rfl: 0    pen needle, diabetic (PEN NEEDLE) 31 gauge x 3/16" Ndle, 1 each by In Vitro route 3 (three) times daily. BD ULTRA FINE, Disp: 100 each, Rfl: 5    pitavastatin calcium (LIVALO) 4 mg Tab, Take 1 tablet by mouth once daily., Disp: 90 tablet, Rfl: 1    SITagliptan-metformin (JANUMET XR) 100-1,000 mg TM24, Take 1 tablet by mouth nightly., Disp: 90 tablet, Rfl: 1    triamcinolone acetonide 0.1% (KENALOG) 0.1 % ointment, Apply topically 2 (two) times daily., Disp: 15 g, Rfl: 2    UNABLE TO FIND, multivit topical patch, Disp: , Rfl:     vitamin D (VITAMIN D3) 1000 units Tab, Take 5,000 " "Units by mouth., Disp: , Rfl:     Review of Systems   Constitutional: Negative for chills, fever and unexpected weight change.   HENT: Positive for ear pain. Negative for rhinorrhea and sore throat.    Eyes: Negative for pain and visual disturbance.   Respiratory: Negative for cough and shortness of breath.    Cardiovascular: Negative for chest pain, palpitations and leg swelling.   Gastrointestinal: Negative for abdominal pain, diarrhea, nausea and vomiting.   Genitourinary: Negative for difficulty urinating, hematuria and vaginal bleeding.   Musculoskeletal: Negative for arthralgias.   Skin: Negative for rash.   Neurological: Negative for dizziness, weakness and headaches.   Psychiatric/Behavioral: Negative for agitation and sleep disturbance. The patient is not nervous/anxious.           Objective:      Vitals:    03/23/22 1254   BP: 112/60   Pulse: 76   Weight: 88.5 kg (195 lb)   Height: 5' 3.5" (1.613 m)     Physical Exam  Vitals and nursing note reviewed.   Constitutional:       General: She is not in acute distress.     Appearance: She is well-developed. She is not ill-appearing.   HENT:      Right Ear: External ear normal. No middle ear effusion. There is no impacted cerumen.      Left Ear: External ear normal.  No middle ear effusion. There is no impacted cerumen.      Nose:      Right Turbinates: Pale.      Left Turbinates: Pale.      Right Sinus: Frontal sinus tenderness present.      Left Sinus: Frontal sinus tenderness present.      Mouth/Throat:      Palate: No mass.      Pharynx: No posterior oropharyngeal erythema.   Neck:      Vascular: No JVD.   Cardiovascular:      Rate and Rhythm: Normal rate and regular rhythm.      Heart sounds: No murmur heard.  Pulmonary:      Effort: Pulmonary effort is normal.      Breath sounds: Normal breath sounds.   Abdominal:      General: Bowel sounds are normal.      Palpations: Abdomen is soft.   Musculoskeletal:         General: No deformity. Normal range of " motion.      Cervical back: Normal range of motion and neck supple.   Lymphadenopathy:      Cervical: No cervical adenopathy.   Skin:     General: Skin is warm and dry.      Findings: No rash.   Neurological:      Mental Status: She is alert and oriented to person, place, and time.      Gait: Gait normal.   Psychiatric:         Speech: Speech normal.         Behavior: Behavior normal.           Assessment:       1. Type 2 diabetes mellitus with other specified complication, unspecified whether long term insulin use    2. Atherosclerosis of coronary artery, unspecified vessel or lesion type, unspecified whether angina present, unspecified whether native or transplanted heart    3. Essential hypertension    4. Hyperlipidemia, unspecified hyperlipidemia type    5. S/P CABG (coronary artery bypass graft)    6. High risk medication use    7. Hypertension, unspecified type    8. Menopause    9. Gastroesophageal reflux disease, unspecified whether esophagitis present    10. Allergic rhinitis due to pollen, unspecified seasonality         Plan:       Type 2 diabetes mellitus with other specified complication, unspecified whether long term insulin use  Comments:  continue trulicity. increase lantus.     Atherosclerosis of coronary artery, unspecified vessel or lesion type, unspecified whether angina present, unspecified whether native or transplanted heart    Essential hypertension    Hyperlipidemia, unspecified hyperlipidemia type    S/P CABG (coronary artery bypass graft)    High risk medication use    Hypertension, unspecified type    Menopause    Gastroesophageal reflux disease, unspecified whether esophagitis present    Allergic rhinitis due to pollen, unspecified seasonality  Comments:  try otc zyrtec. call if symptoms do not resolve      Follow up in about 3 months (around 6/23/2022) for medication management.        3/29/2022 Verna Shah

## 2022-04-27 ENCOUNTER — TELEPHONE (OUTPATIENT)
Dept: FAMILY MEDICINE | Facility: CLINIC | Age: 72
End: 2022-04-27

## 2022-04-27 NOTE — TELEPHONE ENCOUNTER
----- Message from Hortencia Crabtree sent at 4/27/2022  2:24 PM CDT -----  Vm- pt is taking 60 units of her long acting insulin. She is taking the short acting twice a day. She also needs her trulicity sent to pharmacy   [phone]

## 2022-04-27 NOTE — TELEPHONE ENCOUNTER
Spoke with pt who states she is doing Trulicity 1.5mg weekly. Wants to know what demetrio thinks of her current regimen. Dexcom readings uploaded to portal.

## 2022-04-28 NOTE — TELEPHONE ENCOUNTER
Still over 200 primarily in afternoon  Is she doing the short actiing bid everyday? Needs to increase afternnon short acting for now. Increase by 2 units

## 2022-05-09 NOTE — TELEPHONE ENCOUNTER
----- Message from Hortencia Crabtree sent at 5/9/2022  1:09 PM CDT -----  - pt needs refill on [facility]   [phone]

## 2022-06-17 ENCOUNTER — TELEPHONE (OUTPATIENT)
Dept: FAMILY MEDICINE | Facility: CLINIC | Age: 72
End: 2022-06-17

## 2022-06-17 NOTE — TELEPHONE ENCOUNTER
I would advise to get a bottle of mag citrate from the pharmacy and consume the entire bottle. Should have complete BM within a few hours. Would warrant further evaluation with her PCP if this persists

## 2022-06-17 NOTE — TELEPHONE ENCOUNTER
States she is very constipated. She has eaten fruit and prunes. She will have a BM but it is very hard. Wants to know if she can get something called in. Allergies and pharmacy have been verified.

## 2022-06-17 NOTE — TELEPHONE ENCOUNTER
----- Message from Hortencia Crabtree sent at 6/17/2022 10:19 AM CDT -----  Vm- pt has used miralax and docolax and needs something more   [phone]

## 2022-06-20 ENCOUNTER — TELEPHONE (OUTPATIENT)
Dept: FAMILY MEDICINE | Facility: CLINIC | Age: 72
End: 2022-06-20

## 2022-06-20 NOTE — TELEPHONE ENCOUNTER
----- Message from Miya Bynum sent at 6/20/2022  9:36 AM CDT -----  VM 06/17/22 @12:47 PM :patient called and was calling Ting back please give her a call at [phone]

## 2022-06-23 ENCOUNTER — OFFICE VISIT (OUTPATIENT)
Dept: FAMILY MEDICINE | Facility: CLINIC | Age: 72
End: 2022-06-23
Payer: COMMERCIAL

## 2022-06-23 VITALS
SYSTOLIC BLOOD PRESSURE: 118 MMHG | HEIGHT: 64 IN | HEART RATE: 80 BPM | DIASTOLIC BLOOD PRESSURE: 68 MMHG | BODY MASS INDEX: 33.63 KG/M2 | WEIGHT: 197 LBS

## 2022-06-23 DIAGNOSIS — I25.10 ATHEROSCLEROSIS OF CORONARY ARTERY, UNSPECIFIED VESSEL OR LESION TYPE, UNSPECIFIED WHETHER ANGINA PRESENT, UNSPECIFIED WHETHER NATIVE OR TRANSPLANTED HEART: ICD-10-CM

## 2022-06-23 DIAGNOSIS — Z79.899 HIGH RISK MEDICATION USE: ICD-10-CM

## 2022-06-23 DIAGNOSIS — Z95.1 S/P CABG (CORONARY ARTERY BYPASS GRAFT): ICD-10-CM

## 2022-06-23 DIAGNOSIS — M17.0 OSTEOARTHRITIS OF BOTH KNEES, UNSPECIFIED OSTEOARTHRITIS TYPE: ICD-10-CM

## 2022-06-23 DIAGNOSIS — Z78.0 MENOPAUSE: ICD-10-CM

## 2022-06-23 DIAGNOSIS — E11.69 TYPE 2 DIABETES MELLITUS WITH OTHER SPECIFIED COMPLICATION, UNSPECIFIED WHETHER LONG TERM INSULIN USE: Primary | ICD-10-CM

## 2022-06-23 DIAGNOSIS — E78.5 HYPERLIPIDEMIA, UNSPECIFIED HYPERLIPIDEMIA TYPE: ICD-10-CM

## 2022-06-23 DIAGNOSIS — I10 HYPERTENSION, UNSPECIFIED TYPE: ICD-10-CM

## 2022-06-23 DIAGNOSIS — E11.69 TYPE 2 DIABETES MELLITUS WITH OTHER SPECIFIED COMPLICATION, UNSPECIFIED WHETHER LONG TERM INSULIN USE: ICD-10-CM

## 2022-06-23 DIAGNOSIS — I10 ESSENTIAL HYPERTENSION: ICD-10-CM

## 2022-06-23 DIAGNOSIS — K21.9 GASTROESOPHAGEAL REFLUX DISEASE, UNSPECIFIED WHETHER ESOPHAGITIS PRESENT: ICD-10-CM

## 2022-06-23 LAB — HBA1C MFR BLD: 7.8 %

## 2022-06-23 PROCEDURE — 3066F PR DOCUMENTATION OF TREATMENT FOR NEPHROPATHY: ICD-10-PCS | Mod: CPTII,S$GLB,, | Performed by: NURSE PRACTITIONER

## 2022-06-23 PROCEDURE — 3078F DIAST BP <80 MM HG: CPT | Mod: CPTII,S$GLB,, | Performed by: NURSE PRACTITIONER

## 2022-06-23 PROCEDURE — 3074F SYST BP LT 130 MM HG: CPT | Mod: CPTII,S$GLB,, | Performed by: NURSE PRACTITIONER

## 2022-06-23 PROCEDURE — 4010F ACE/ARB THERAPY RXD/TAKEN: CPT | Mod: CPTII,S$GLB,, | Performed by: NURSE PRACTITIONER

## 2022-06-23 PROCEDURE — 3078F PR MOST RECENT DIASTOLIC BLOOD PRESSURE < 80 MM HG: ICD-10-PCS | Mod: CPTII,S$GLB,, | Performed by: NURSE PRACTITIONER

## 2022-06-23 PROCEDURE — 3288F PR FALLS RISK ASSESSMENT DOCUMENTED: ICD-10-PCS | Mod: CPTII,S$GLB,, | Performed by: NURSE PRACTITIONER

## 2022-06-23 PROCEDURE — 3052F PR MOST RECENT HEMOGLOBIN A1C LEVEL 8.0 - < 9.0%: ICD-10-PCS | Mod: CPTII,S$GLB,, | Performed by: NURSE PRACTITIONER

## 2022-06-23 PROCEDURE — 4010F PR ACE/ARB THEARPY RXD/TAKEN: ICD-10-PCS | Mod: CPTII,S$GLB,, | Performed by: NURSE PRACTITIONER

## 2022-06-23 PROCEDURE — 99214 OFFICE O/P EST MOD 30 MIN: CPT | Mod: S$GLB,,, | Performed by: NURSE PRACTITIONER

## 2022-06-23 PROCEDURE — 1159F MED LIST DOCD IN RCRD: CPT | Mod: CPTII,S$GLB,, | Performed by: NURSE PRACTITIONER

## 2022-06-23 PROCEDURE — 3061F NEG MICROALBUMINURIA REV: CPT | Mod: CPTII,S$GLB,, | Performed by: NURSE PRACTITIONER

## 2022-06-23 PROCEDURE — 3061F PR NEG MICROALBUMINURIA RESULT DOCUMENTED/REVIEW: ICD-10-PCS | Mod: CPTII,S$GLB,, | Performed by: NURSE PRACTITIONER

## 2022-06-23 PROCEDURE — 3288F FALL RISK ASSESSMENT DOCD: CPT | Mod: CPTII,S$GLB,, | Performed by: NURSE PRACTITIONER

## 2022-06-23 PROCEDURE — 3052F HG A1C>EQUAL 8.0%<EQUAL 9.0%: CPT | Mod: CPTII,S$GLB,, | Performed by: NURSE PRACTITIONER

## 2022-06-23 PROCEDURE — 3074F PR MOST RECENT SYSTOLIC BLOOD PRESSURE < 130 MM HG: ICD-10-PCS | Mod: CPTII,S$GLB,, | Performed by: NURSE PRACTITIONER

## 2022-06-23 PROCEDURE — 1159F PR MEDICATION LIST DOCUMENTED IN MEDICAL RECORD: ICD-10-PCS | Mod: CPTII,S$GLB,, | Performed by: NURSE PRACTITIONER

## 2022-06-23 PROCEDURE — 83036 POCT HEMOGLOBIN A1C: ICD-10-PCS | Mod: QW,,, | Performed by: NURSE PRACTITIONER

## 2022-06-23 PROCEDURE — 3066F NEPHROPATHY DOC TX: CPT | Mod: CPTII,S$GLB,, | Performed by: NURSE PRACTITIONER

## 2022-06-23 PROCEDURE — 1160F RVW MEDS BY RX/DR IN RCRD: CPT | Mod: CPTII,S$GLB,, | Performed by: NURSE PRACTITIONER

## 2022-06-23 PROCEDURE — 3008F BODY MASS INDEX DOCD: CPT | Mod: CPTII,S$GLB,, | Performed by: NURSE PRACTITIONER

## 2022-06-23 PROCEDURE — 1101F PT FALLS ASSESS-DOCD LE1/YR: CPT | Mod: CPTII,S$GLB,, | Performed by: NURSE PRACTITIONER

## 2022-06-23 PROCEDURE — 99214 PR OFFICE/OUTPT VISIT, EST, LEVL IV, 30-39 MIN: ICD-10-PCS | Mod: S$GLB,,, | Performed by: NURSE PRACTITIONER

## 2022-06-23 PROCEDURE — 1160F PR REVIEW ALL MEDS BY PRESCRIBER/CLIN PHARMACIST DOCUMENTED: ICD-10-PCS | Mod: CPTII,S$GLB,, | Performed by: NURSE PRACTITIONER

## 2022-06-23 PROCEDURE — 1101F PR PT FALLS ASSESS DOC 0-1 FALLS W/OUT INJ PAST YR: ICD-10-PCS | Mod: CPTII,S$GLB,, | Performed by: NURSE PRACTITIONER

## 2022-06-23 PROCEDURE — 3008F PR BODY MASS INDEX (BMI) DOCUMENTED: ICD-10-PCS | Mod: CPTII,S$GLB,, | Performed by: NURSE PRACTITIONER

## 2022-06-23 PROCEDURE — 83036 HEMOGLOBIN GLYCOSYLATED A1C: CPT | Mod: QW,,, | Performed by: NURSE PRACTITIONER

## 2022-06-23 RX ORDER — CETIRIZINE HYDROCHLORIDE 10 MG/1
10 TABLET ORAL DAILY
COMMUNITY

## 2022-06-23 RX ORDER — EMPAGLIFLOZIN 25 MG/1
25 TABLET, FILM COATED ORAL DAILY
Qty: 30 TABLET | Refills: 1 | Status: SHIPPED | OUTPATIENT
Start: 2022-06-23 | End: 2022-07-21 | Stop reason: SDUPTHER

## 2022-06-23 RX ORDER — PITAVASTATIN CALCIUM 4.18 MG/1
1 TABLET, FILM COATED ORAL DAILY
Qty: 90 TABLET | Refills: 1 | Status: SHIPPED | OUTPATIENT
Start: 2022-06-23 | End: 2022-07-21 | Stop reason: SDUPTHER

## 2022-06-23 RX ORDER — LISINOPRIL 20 MG/1
20 TABLET ORAL DAILY
Qty: 90 TABLET | Refills: 1 | Status: SHIPPED | OUTPATIENT
Start: 2022-06-23 | End: 2022-07-21 | Stop reason: SDUPTHER

## 2022-06-23 RX ORDER — METOPROLOL SUCCINATE 50 MG/1
50 TABLET, EXTENDED RELEASE ORAL DAILY
Qty: 90 TABLET | Refills: 1 | Status: SHIPPED | OUTPATIENT
Start: 2022-06-23 | End: 2022-07-21 | Stop reason: SDUPTHER

## 2022-06-23 RX ORDER — SITAGLIPTIN AND METFORMIN HYDROCHLORIDE 1000; 100 MG/1; MG/1
1 TABLET, FILM COATED, EXTENDED RELEASE ORAL NIGHTLY
Qty: 90 TABLET | Refills: 1 | Status: SHIPPED | OUTPATIENT
Start: 2022-06-23 | End: 2022-07-21 | Stop reason: SDUPTHER

## 2022-06-23 NOTE — PROGRESS NOTES
SUBJECTIVE:    Patient ID: Estefanía Ray is a 72 y.o. female.    Chief Complaint: Hypertension (Brought bottles//needs A1c & foot exam today//bs), Hyperlipidemia, and Diabetes    72 y.o. female who presents for check up. She is treated for diabetes, cad, hyperlipidemia, htn, gerd, oa. She is currently using dexcom for continuous monitoring.  a1c today is 7.8. which is down from 8.0. she has stopped actos since last visit. She is interested in decreasing dose of insulin. She would like to discuss options. Trying to watch diet more since  is working out of town. Sleeps well. bp is well controlled. Last labs were 2/8/22. Last colonoscopy was 2016 . 1o year old  Follow up. Last mammogram was 12/21. No further complaints today   f      Office Visit on 02/08/2022   Component Date Value Ref Range Status    Hemoglobin A1C 02/08/2022 8.0  % Final    WBC 02/08/2022 11.3 (A) 3.8 - 10.8 Thousand/uL Final    RBC 02/08/2022 4.61  3.80 - 5.10 Million/uL Final    Hemoglobin 02/08/2022 12.8  11.7 - 15.5 g/dL Final    Hematocrit 02/08/2022 40.1  35.0 - 45.0 % Final    MCV 02/08/2022 87.0  80.0 - 100.0 fL Final    MCH 02/08/2022 27.8  27.0 - 33.0 pg Final    MCHC 02/08/2022 31.9 (A) 32.0 - 36.0 g/dL Final    RDW 02/08/2022 13.7  11.0 - 15.0 % Final    Platelets 02/08/2022 292  140 - 400 Thousand/uL Final    MPV 02/08/2022 9.5  7.5 - 12.5 fL Final    Neutrophils, Abs 02/08/2022 7,650  1,500 - 7,800 cells/uL Final    Lymph # 02/08/2022 2,576  850 - 3,900 cells/uL Final    Mono # 02/08/2022 802  200 - 950 cells/uL Final    Eos # 02/08/2022 226  15 - 500 cells/uL Final    Baso # 02/08/2022 45  0 - 200 cells/uL Final    Neutrophils Relative 02/08/2022 67.7  % Final    Lymph % 02/08/2022 22.8  % Final    Mono % 02/08/2022 7.1  % Final    Eosinophil % 02/08/2022 2.0  % Final    Basophil % 02/08/2022 0.4  % Final    Glucose 02/08/2022 181 (A) 65 - 99 mg/dL Final    BUN 02/08/2022 16  7 - 25 mg/dL Final     Creatinine 02/08/2022 0.94 (A) 0.60 - 0.93 mg/dL Final    eGFR if non  02/08/2022 61  > OR = 60 mL/min/1.73m2 Final    eGFR if  02/08/2022 71  > OR = 60 mL/min/1.73m2 Final    BUN/Creatinine Ratio 02/08/2022 17  6 - 22 (calc) Final    Sodium 02/08/2022 136  135 - 146 mmol/L Final    Potassium 02/08/2022 4.3  3.5 - 5.3 mmol/L Final    Chloride 02/08/2022 98  98 - 110 mmol/L Final    CO2 02/08/2022 28  20 - 32 mmol/L Final    Calcium 02/08/2022 9.8  8.6 - 10.4 mg/dL Final    Total Protein 02/08/2022 7.1  6.1 - 8.1 g/dL Final    Albumin 02/08/2022 4.4  3.6 - 5.1 g/dL Final    Globulin, Total 02/08/2022 2.7  1.9 - 3.7 g/dL (calc) Final    Albumin/Globulin Ratio 02/08/2022 1.6  1.0 - 2.5 (calc) Final    Total Bilirubin 02/08/2022 0.4  0.2 - 1.2 mg/dL Final    Alkaline Phosphatase 02/08/2022 74  37 - 153 U/L Final    AST 02/08/2022 18  10 - 35 U/L Final    ALT 02/08/2022 14  6 - 29 U/L Final    TSH w/reflex to FT4 02/08/2022 2.57  0.40 - 4.50 mIU/L Final    Color, UA 02/08/2022 YELLOW  YELLOW Final    Appearance, UA 02/08/2022 CLEAR  CLEAR Final    Specific Gravity, UA 02/08/2022 1.009  1.001 - 1.035 Final    pH, UA 02/08/2022 5.5  5.0 - 8.0 Final    Glucose, UA 02/08/2022 NEGATIVE  NEGATIVE Final    Bilirubin, UA 02/08/2022 NEGATIVE  NEGATIVE Final    Ketones, UA 02/08/2022 NEGATIVE  NEGATIVE Final    Occult Blood UA 02/08/2022 NEGATIVE  NEGATIVE Final    Protein, UA 02/08/2022 NEGATIVE  NEGATIVE Final    Nitrite, UA 02/08/2022 NEGATIVE  NEGATIVE Final    Leukocytes, UA 02/08/2022 1+ (A) NEGATIVE Final    WBC Casts, UA 02/08/2022 0-5  < OR = 5 /HPF Final    RBC Casts, UA 02/08/2022 NONE SEEN  < OR = 2 /HPF Final    Squam Epithel, UA 02/08/2022 0-5  < OR = 5 /HPF Final    Bacteria, UA 02/08/2022 NONE SEEN  NONE SEEN /HPF Final    Hyaline Casts, UA 02/08/2022 NONE SEEN  NONE SEEN /LPF Final    Reflexive Urine Culture 02/08/2022    Final    Urine Culture,  Routine 02/08/2022    Final    Creatinine, Urine 02/08/2022 39  20 - 275 mg/dL Final    Microalb, Ur 02/08/2022 0.6  See Note: mg/dL Final    Microalb/Creat Ratio 02/08/2022 15  <30 mcg/mg creat Final       Past Medical History:   Diagnosis Date    Allergy     Diabetes mellitus, type 2     GERD (gastroesophageal reflux disease)     Hyperlipidemia     Hypertension      Social History     Socioeconomic History    Marital status:    Tobacco Use    Smoking status: Never Smoker    Smokeless tobacco: Never Used   Substance and Sexual Activity    Alcohol use: Never    Drug use: Never     Past Surgical History:   Procedure Laterality Date    HYSTERECTOMY      KNEE SURGERY Left     TONSILLECTOMY       History reviewed. No pertinent family history.    Review of patient's allergies indicates:   Allergen Reactions    Ezetimibe      Other reaction(s): Other (See Comments), Unknown  Feels terrible when taking medication       Sitagliptin-metformin      Other reaction(s): diarrhea       Current Outpatient Medications:     aspirin (ECOTRIN) 81 MG EC tablet, Aspir-81 mg tablet,delayed release  Take 1 tablet every day by oral route., Disp: , Rfl:     biotin 10,000 mcg TbDL, 1 tablet, Disp: , Rfl:     blood-glucose sensor (DEXCOM G6 SENSOR) Kina, Use 1 sensor every 10 days., Disp: 3 each, Rfl: 12    blood-glucose transmitter (DEXCOM G6 TRANSMITTER) Kina, Use 1 transmitter every 90 days, Disp: 1 each, Rfl: 4    coenzyme Q10 100 mg capsule, 1 capsule with a meal, Disp: , Rfl:     dulaglutide (TRULICITY) 1.5 mg/0.5 mL pen injector, Inject 1.5 mg into the skin every 7 days., Disp: 12 pen, Rfl: 0    ELDERBERRY FRUIT ORAL, Take by mouth., Disp: , Rfl:     hydroCHLOROthiazide (HYDRODIURIL) 25 MG tablet, Take 1 tablet (25 mg total) by mouth once daily., Disp: 90 tablet, Rfl: 1    insulin (LANTUS SOLOSTAR U-100 INSULIN) glargine 100 units/mL (3mL) SubQ pen, 40 units daily., Disp: 3 each, Rfl: 1    insulin  lispro (HUMALOG KWIKPEN INSULIN) 100 unit/mL pen, Inject 12 Units into the skin 3 (three) times daily., Disp: 10.8 mL, Rfl: 11    krill-om-3-dha-epa-phospho-ast 300-28-87-50 mg Cap, , Disp: , Rfl:     loratadine (CLARITIN) 10 mg tablet, Take 1 tablet (10 mg total) by mouth once daily., Disp: 90 tablet, Rfl: 1    vitamin D (VITAMIN D3) 1000 units Tab, Take 5,000 Units by mouth., Disp: , Rfl:     blood sugar diagnostic (FREESTYLE LITE STRIPS) Strp, as directed (Patient not taking: Reported on 6/23/2022), Disp: 100 each, Rfl: 5    cetirizine (ZYRTEC) 10 MG tablet, Take 10 mg by mouth once daily., Disp: , Rfl:     cyclobenzaprine (FLEXERIL) 10 MG tablet, Take 1 tablet (10 mg total) by mouth 2 (two) times daily. (Patient not taking: Reported on 6/23/2022), Disp: 180 tablet, Rfl: 1    empagliflozin (JARDIANCE) 25 mg tablet, Take 1 tablet (25 mg total) by mouth once daily., Disp: 30 tablet, Rfl: 1    flash glucose scanning reader (FREESTYLE KARINA 14 DAY READER) Misc, 1 Units by Misc.(Non-Drug; Combo Route) route 5 (five) times daily. (Patient not taking: Reported on 6/23/2022), Disp: 1 each, Rfl: 3    flash glucose sensor (FREESTYLE KARINA 14 DAY SENSOR) Kit, 1 kit by Misc.(Non-Drug; Combo Route) route once daily. (Patient not taking: Reported on 6/23/2022), Disp: 1 kit, Rfl: 11    insulin aspart U-100 (NOVOLOG FLEXPEN U-100 INSULIN) 100 unit/mL (3 mL) InPn pen, Inject 5 Units into the skin before meals and at bedtime as needed., Disp: 3 mL, Rfl: 0    lancets (FREESTYLE LANCETS) 28 gauge Misc, 1 lancet by Misc.(Non-Drug; Combo Route) route 2 (two) times a day. (Patient not taking: Reported on 6/23/2022), Disp: 200 each, Rfl: 3    lansoprazole (PREVACID) 15 MG capsule, 1 capsule, Disp: , Rfl:     lisinopriL (PRINIVIL,ZESTRIL) 20 MG tablet, Take 1 tablet (20 mg total) by mouth once daily., Disp: 90 tablet, Rfl: 1    metoprolol succinate (TOPROL-XL) 50 MG 24 hr tablet, Take 1 tablet (50 mg total) by mouth once  "daily., Disp: 90 tablet, Rfl: 1    mupirocin (BACTROBAN) 2 % ointment, Apply topically 2 (two) times daily. (Patient not taking: Reported on 6/23/2022), Disp: 22 g, Rfl: 0    pen needle, diabetic (PEN NEEDLE) 31 gauge x 3/16" Ndle, 1 each by In Vitro route 3 (three) times daily. BD ULTRA FINE (Patient not taking: Reported on 6/23/2022), Disp: 100 each, Rfl: 5    pitavastatin calcium (LIVALO) 4 mg Tab, Take 1 tablet by mouth once daily., Disp: 90 tablet, Rfl: 1    SITagliptan-metformin (JANUMET XR) 100-1,000 mg TM24, Take 1 tablet by mouth nightly., Disp: 90 tablet, Rfl: 1    triamcinolone acetonide 0.1% (KENALOG) 0.1 % ointment, Apply topically 2 (two) times daily. (Patient not taking: Reported on 6/23/2022), Disp: 15 g, Rfl: 2    UNABLE TO FIND, multivit topical patch, Disp: , Rfl:     Review of Systems   Constitutional: Negative for chills, fever and unexpected weight change.   HENT: Negative for ear pain, rhinorrhea and sore throat.    Eyes: Negative for pain and visual disturbance.   Respiratory: Negative for cough and shortness of breath.    Cardiovascular: Negative for chest pain, palpitations and leg swelling.   Gastrointestinal: Negative for abdominal pain, diarrhea, nausea and vomiting.   Genitourinary: Negative for difficulty urinating, hematuria and vaginal bleeding.   Musculoskeletal: Negative for arthralgias.   Skin: Negative for rash.   Neurological: Negative for dizziness, weakness and headaches.   Psychiatric/Behavioral: Negative for agitation and sleep disturbance. The patient is not nervous/anxious.           Objective:      Vitals:    06/23/22 1222   BP: 118/68   Pulse: 80   Weight: 89.4 kg (197 lb)   Height: 5' 3.5" (1.613 m)     Physical Exam  Constitutional:       General: She is not in acute distress.     Appearance: Normal appearance. She is well-developed and normal weight. She is not ill-appearing.   HENT:      Head: Normocephalic and atraumatic.      Right Ear: External ear normal. "      Left Ear: External ear normal.   Eyes:      Pupils: Pupils are equal, round, and reactive to light.   Cardiovascular:      Rate and Rhythm: Normal rate and regular rhythm.      Pulses:           Dorsalis pedis pulses are 2+ on the right side.        Posterior tibial pulses are 2+ on the right side.      Heart sounds: Normal heart sounds.   Pulmonary:      Effort: Pulmonary effort is normal.      Breath sounds: Normal breath sounds.   Abdominal:      General: Bowel sounds are normal.      Palpations: Abdomen is soft.   Musculoskeletal:         General: No deformity. Normal range of motion.      Cervical back: Normal range of motion and neck supple.      Right foot: Normal range of motion. No deformity.   Feet:      Right foot:      Protective Sensation: 5 sites tested. 5 sites sensed.      Left foot:      Protective Sensation: 5 sites tested. 5 sites sensed.      Skin integrity: No skin breakdown.   Lymphadenopathy:      Cervical: No cervical adenopathy.   Skin:     General: Skin is warm and dry.   Neurological:      Mental Status: She is alert and oriented to person, place, and time.   Psychiatric:         Behavior: Behavior normal.           Assessment:       1. Type 2 diabetes mellitus with other specified complication, unspecified whether long term insulin use    2. Hypertension, unspecified type    3. Hyperlipidemia, unspecified hyperlipidemia type    4. Type 2 diabetes mellitus with other specified complication, unspecified whether long term insulin use    5. Gastroesophageal reflux disease, unspecified whether esophagitis present    6. S/P CABG (coronary artery bypass graft)    7. Atherosclerosis of coronary artery, unspecified vessel or lesion type, unspecified whether angina present, unspecified whether native or transplanted heart    8. Essential hypertension    9. Menopause    10. High risk medication use    11. Osteoarthritis of both knees, unspecified osteoarthritis type         Plan:       Type 2  diabetes mellitus with other specified complication, unspecified whether long term insulin use  -     Foot Exam Performed  -     POCT HEMOGLOBIN A1C  -     SITagliptan-metformin (JANUMET XR) 100-1,000 mg TM24; Take 1 tablet by mouth nightly.  Dispense: 90 tablet; Refill: 1    Hypertension, unspecified type  -     lisinopriL (PRINIVIL,ZESTRIL) 20 MG tablet; Take 1 tablet (20 mg total) by mouth once daily.  Dispense: 90 tablet; Refill: 1  -     metoprolol succinate (TOPROL-XL) 50 MG 24 hr tablet; Take 1 tablet (50 mg total) by mouth once daily.  Dispense: 90 tablet; Refill: 1    Hyperlipidemia, unspecified hyperlipidemia type  -     pitavastatin calcium (LIVALO) 4 mg Tab; Take 1 tablet by mouth once daily.  Dispense: 90 tablet; Refill: 1    Type 2 diabetes mellitus with other specified complication, unspecified whether long term insulin use  Comments:  stop actos.  try jardiance  Orders:  -     Foot Exam Performed  -     POCT HEMOGLOBIN A1C  -     SITagliptan-metformin (JANUMET XR) 100-1,000 mg TM24; Take 1 tablet by mouth nightly.  Dispense: 90 tablet; Refill: 1    Gastroesophageal reflux disease, unspecified whether esophagitis present    S/P CABG (coronary artery bypass graft)    Atherosclerosis of coronary artery, unspecified vessel or lesion type, unspecified whether angina present, unspecified whether native or transplanted heart    Essential hypertension    Menopause    High risk medication use    Osteoarthritis of both knees, unspecified osteoarthritis type    Other orders  -     empagliflozin (JARDIANCE) 25 mg tablet; Take 1 tablet (25 mg total) by mouth once daily.  Dispense: 30 tablet; Refill: 1      Follow up in about 3 months (around 9/23/2022), or if symptoms worsen or fail to improve, for medication management.        6/27/2022 Verna Shah

## 2022-07-11 ENCOUNTER — TELEPHONE (OUTPATIENT)
Dept: FAMILY MEDICINE | Facility: CLINIC | Age: 72
End: 2022-07-11

## 2022-07-11 NOTE — TELEPHONE ENCOUNTER
----- Message from Hortencia Crabtree sent at 7/11/2022 12:52 PM CDT -----  Vm- pt stopped the jardiance because it gave her pain in her stomach and heartburn. Would like to know what to do. She is doing the insulin. [facility] needs refill on all of her medications. They have no refills. She was calling in with her blood sugar readings and was told she had to send them in the mail from the . She has a month's worth of them written down.   She has had low sugar for 2 nights and her dexcom alerted her   Blood sugar 07/2-160 am 7/4-170 am 7/5-135am 160pm   [phone]

## 2022-07-11 NOTE — TELEPHONE ENCOUNTER
----- Message from Miya Bynum sent at 7/11/2022  7:22 AM CDT -----   07/08/22 @7:43 PM :patient called but no message was left she only stated she needed a call back

## 2022-07-14 ENCOUNTER — TELEPHONE (OUTPATIENT)
Dept: FAMILY MEDICINE | Facility: CLINIC | Age: 72
End: 2022-07-14

## 2022-07-14 DIAGNOSIS — E11.69 TYPE 2 DIABETES MELLITUS WITH OTHER SPECIFIED COMPLICATION, UNSPECIFIED WHETHER LONG TERM INSULIN USE: Primary | ICD-10-CM

## 2022-07-14 NOTE — TELEPHONE ENCOUNTER
Spoke with pt who states she needs a referral to the endocrinologist, Dr Surya [facility] in [facility] [phone] Fax number [phone].    7/12 at 5:20am she drank cold water and took a pepcid due to her stomach burning, her sugar was 121  1:04pm 155  6:26 126 - took her trulicity    7/13 7:14am 140    Today 135 at 11:30am.     She said those were the only readings she got because she said she forgot.

## 2022-07-14 NOTE — TELEPHONE ENCOUNTER
----- Message from Hortencia Crabtree sent at 7/14/2022  2:53 PM CDT -----  - pt is calling for a referral and to give the info demetrio busch   [phone]

## 2022-07-21 DIAGNOSIS — E11.69 TYPE 2 DIABETES MELLITUS WITH OTHER SPECIFIED COMPLICATION, UNSPECIFIED WHETHER LONG TERM INSULIN USE: ICD-10-CM

## 2022-07-21 DIAGNOSIS — E78.5 HYPERLIPIDEMIA, UNSPECIFIED HYPERLIPIDEMIA TYPE: ICD-10-CM

## 2022-07-21 DIAGNOSIS — I10 HYPERTENSION, UNSPECIFIED TYPE: ICD-10-CM

## 2022-07-21 RX ORDER — INSULIN LISPRO 100 [IU]/ML
12 INJECTION, SOLUTION INTRAVENOUS; SUBCUTANEOUS 3 TIMES DAILY
Qty: 10.8 ML | Refills: 11 | Status: SHIPPED | OUTPATIENT
Start: 2022-07-21 | End: 2022-09-27

## 2022-07-21 RX ORDER — SITAGLIPTIN AND METFORMIN HYDROCHLORIDE 1000; 100 MG/1; MG/1
1 TABLET, FILM COATED, EXTENDED RELEASE ORAL NIGHTLY
Qty: 90 TABLET | Refills: 1 | Status: SHIPPED | OUTPATIENT
Start: 2022-07-21 | End: 2022-08-17

## 2022-07-21 RX ORDER — HYDROCHLOROTHIAZIDE 25 MG/1
25 TABLET ORAL DAILY
Qty: 90 TABLET | Refills: 1 | Status: SHIPPED | OUTPATIENT
Start: 2022-07-21 | End: 2022-12-14 | Stop reason: SDUPTHER

## 2022-07-21 RX ORDER — PITAVASTATIN CALCIUM 4.18 MG/1
1 TABLET, FILM COATED ORAL DAILY
Qty: 90 TABLET | Refills: 1 | Status: SHIPPED | OUTPATIENT
Start: 2022-07-21 | End: 2022-12-14 | Stop reason: SDUPTHER

## 2022-07-21 RX ORDER — EMPAGLIFLOZIN 25 MG/1
25 TABLET, FILM COATED ORAL DAILY
Qty: 30 TABLET | Refills: 1 | Status: SHIPPED | OUTPATIENT
Start: 2022-07-21 | End: 2022-08-17

## 2022-07-21 RX ORDER — METOPROLOL SUCCINATE 50 MG/1
50 TABLET, EXTENDED RELEASE ORAL DAILY
Qty: 90 TABLET | Refills: 1 | Status: SHIPPED | OUTPATIENT
Start: 2022-07-21 | End: 2022-12-14 | Stop reason: SDUPTHER

## 2022-07-21 RX ORDER — LISINOPRIL 20 MG/1
20 TABLET ORAL DAILY
Qty: 90 TABLET | Refills: 1 | Status: SHIPPED | OUTPATIENT
Start: 2022-07-21 | End: 2022-12-14 | Stop reason: SDUPTHER

## 2022-07-21 RX ORDER — INSULIN GLARGINE 100 [IU]/ML
INJECTION, SOLUTION SUBCUTANEOUS
Qty: 3 EACH | Refills: 1
Start: 2022-07-21 | End: 2022-09-27

## 2022-07-21 RX ORDER — LORATADINE 10 MG/1
10 TABLET ORAL DAILY
Qty: 90 TABLET | Refills: 1 | Status: SHIPPED | OUTPATIENT
Start: 2022-07-21 | End: 2022-08-17

## 2022-07-21 NOTE — TELEPHONE ENCOUNTER
----- Message from Hortencia Crabtree sent at 7/21/2022 10:34 AM CDT -----  - pt needs refills on her all her medications with [facility]   [phone]

## 2022-08-02 ENCOUNTER — TELEPHONE (OUTPATIENT)
Dept: FAMILY MEDICINE | Facility: CLINIC | Age: 72
End: 2022-08-02

## 2022-08-02 NOTE — TELEPHONE ENCOUNTER
----- Message from Miya Bynum sent at 8/2/2022  8:03 AM CDT -----  Elia  with the ENT office called and stated that the patient was referred to them and she has an appointment  today but they did not received any type of paper work and they need it ASAP please fax over office notes,med list ,lab work ,demographic information please fax to [phone] if any questions please give her a call at [phone]

## 2022-08-09 ENCOUNTER — TELEPHONE (OUTPATIENT)
Dept: FAMILY MEDICINE | Facility: CLINIC | Age: 72
End: 2022-08-09

## 2022-08-09 ENCOUNTER — HOSPITAL ENCOUNTER (OUTPATIENT)
Dept: RADIOLOGY | Facility: HOSPITAL | Age: 72
Discharge: HOME | End: 2022-08-09
Attending: NURSE PRACTITIONER
Payer: COMMERCIAL

## 2022-08-09 DIAGNOSIS — M25.561 RIGHT KNEE PAIN, UNSPECIFIED CHRONICITY: ICD-10-CM

## 2022-08-09 DIAGNOSIS — M25.561 RIGHT KNEE PAIN, UNSPECIFIED CHRONICITY: Primary | ICD-10-CM

## 2022-08-09 PROCEDURE — 73562 X-RAY EXAM OF KNEE 3: CPT | Mod: TC,PO,RT

## 2022-08-09 NOTE — TELEPHONE ENCOUNTER
----- Message from Ashwini Olvera MA sent at 8/9/2022  8:46 AM CDT -----  Regarding: pain in right leg behind knee  Pain behind right knee for appx 3 weeks and worsening.   [phone]

## 2022-08-10 ENCOUNTER — PATIENT MESSAGE (OUTPATIENT)
Dept: FAMILY MEDICINE | Facility: CLINIC | Age: 72
End: 2022-08-10

## 2022-08-17 ENCOUNTER — OFFICE VISIT (OUTPATIENT)
Dept: FAMILY MEDICINE | Facility: CLINIC | Age: 72
End: 2022-08-17
Payer: COMMERCIAL

## 2022-08-17 ENCOUNTER — TELEPHONE (OUTPATIENT)
Dept: FAMILY MEDICINE | Facility: CLINIC | Age: 72
End: 2022-08-17

## 2022-08-17 VITALS
SYSTOLIC BLOOD PRESSURE: 136 MMHG | OXYGEN SATURATION: 98 % | HEIGHT: 64 IN | BODY MASS INDEX: 31.99 KG/M2 | TEMPERATURE: 98 F | DIASTOLIC BLOOD PRESSURE: 78 MMHG | HEART RATE: 88 BPM | WEIGHT: 187.38 LBS

## 2022-08-17 DIAGNOSIS — E11.69 TYPE 2 DIABETES MELLITUS WITH OTHER SPECIFIED COMPLICATION, UNSPECIFIED WHETHER LONG TERM INSULIN USE: ICD-10-CM

## 2022-08-17 DIAGNOSIS — E78.5 HYPERLIPIDEMIA, UNSPECIFIED HYPERLIPIDEMIA TYPE: ICD-10-CM

## 2022-08-17 DIAGNOSIS — I10 HYPERTENSION, UNSPECIFIED TYPE: ICD-10-CM

## 2022-08-17 DIAGNOSIS — Z95.1 S/P CABG (CORONARY ARTERY BYPASS GRAFT): ICD-10-CM

## 2022-08-17 DIAGNOSIS — K59.01 SLOW TRANSIT CONSTIPATION: ICD-10-CM

## 2022-08-17 DIAGNOSIS — R55 SYNCOPE AND COLLAPSE: Primary | ICD-10-CM

## 2022-08-17 DIAGNOSIS — I77.89 OTHER SPECIFIED DISORDERS OF ARTERIES AND ARTERIOLES: ICD-10-CM

## 2022-08-17 PROCEDURE — 99214 OFFICE O/P EST MOD 30 MIN: CPT | Mod: S$GLB,,, | Performed by: PHYSICIAN ASSISTANT

## 2022-08-17 PROCEDURE — 1160F RVW MEDS BY RX/DR IN RCRD: CPT | Mod: CPTII,S$GLB,, | Performed by: PHYSICIAN ASSISTANT

## 2022-08-17 PROCEDURE — 3075F PR MOST RECENT SYSTOLIC BLOOD PRESS GE 130-139MM HG: ICD-10-PCS | Mod: CPTII,S$GLB,, | Performed by: PHYSICIAN ASSISTANT

## 2022-08-17 PROCEDURE — 3078F PR MOST RECENT DIASTOLIC BLOOD PRESSURE < 80 MM HG: ICD-10-PCS | Mod: CPTII,S$GLB,, | Performed by: PHYSICIAN ASSISTANT

## 2022-08-17 PROCEDURE — 3075F SYST BP GE 130 - 139MM HG: CPT | Mod: CPTII,S$GLB,, | Performed by: PHYSICIAN ASSISTANT

## 2022-08-17 PROCEDURE — 99214 PR OFFICE/OUTPT VISIT, EST, LEVL IV, 30-39 MIN: ICD-10-PCS | Mod: S$GLB,,, | Performed by: PHYSICIAN ASSISTANT

## 2022-08-17 PROCEDURE — 3061F NEG MICROALBUMINURIA REV: CPT | Mod: CPTII,S$GLB,, | Performed by: PHYSICIAN ASSISTANT

## 2022-08-17 PROCEDURE — 3061F PR NEG MICROALBUMINURIA RESULT DOCUMENTED/REVIEW: ICD-10-PCS | Mod: CPTII,S$GLB,, | Performed by: PHYSICIAN ASSISTANT

## 2022-08-17 PROCEDURE — 1100F PTFALLS ASSESS-DOCD GE2>/YR: CPT | Mod: CPTII,S$GLB,, | Performed by: PHYSICIAN ASSISTANT

## 2022-08-17 PROCEDURE — 3288F FALL RISK ASSESSMENT DOCD: CPT | Mod: CPTII,S$GLB,, | Performed by: PHYSICIAN ASSISTANT

## 2022-08-17 PROCEDURE — 3066F PR DOCUMENTATION OF TREATMENT FOR NEPHROPATHY: ICD-10-PCS | Mod: CPTII,S$GLB,, | Performed by: PHYSICIAN ASSISTANT

## 2022-08-17 PROCEDURE — 3008F PR BODY MASS INDEX (BMI) DOCUMENTED: ICD-10-PCS | Mod: CPTII,S$GLB,, | Performed by: PHYSICIAN ASSISTANT

## 2022-08-17 PROCEDURE — 3051F HG A1C>EQUAL 7.0%<8.0%: CPT | Mod: CPTII,S$GLB,, | Performed by: PHYSICIAN ASSISTANT

## 2022-08-17 PROCEDURE — 1100F PR PT FALLS ASSESS DOC 2+ FALLS/FALL W/INJURY/YR: ICD-10-PCS | Mod: CPTII,S$GLB,, | Performed by: PHYSICIAN ASSISTANT

## 2022-08-17 PROCEDURE — 3051F PR MOST RECENT HEMOGLOBIN A1C LEVEL 7.0 - < 8.0%: ICD-10-PCS | Mod: CPTII,S$GLB,, | Performed by: PHYSICIAN ASSISTANT

## 2022-08-17 PROCEDURE — 3008F BODY MASS INDEX DOCD: CPT | Mod: CPTII,S$GLB,, | Performed by: PHYSICIAN ASSISTANT

## 2022-08-17 PROCEDURE — 1160F PR REVIEW ALL MEDS BY PRESCRIBER/CLIN PHARMACIST DOCUMENTED: ICD-10-PCS | Mod: CPTII,S$GLB,, | Performed by: PHYSICIAN ASSISTANT

## 2022-08-17 PROCEDURE — 1159F MED LIST DOCD IN RCRD: CPT | Mod: CPTII,S$GLB,, | Performed by: PHYSICIAN ASSISTANT

## 2022-08-17 PROCEDURE — 4010F PR ACE/ARB THEARPY RXD/TAKEN: ICD-10-PCS | Mod: CPTII,S$GLB,, | Performed by: PHYSICIAN ASSISTANT

## 2022-08-17 PROCEDURE — 3078F DIAST BP <80 MM HG: CPT | Mod: CPTII,S$GLB,, | Performed by: PHYSICIAN ASSISTANT

## 2022-08-17 PROCEDURE — 3288F PR FALLS RISK ASSESSMENT DOCUMENTED: ICD-10-PCS | Mod: CPTII,S$GLB,, | Performed by: PHYSICIAN ASSISTANT

## 2022-08-17 PROCEDURE — 3066F NEPHROPATHY DOC TX: CPT | Mod: CPTII,S$GLB,, | Performed by: PHYSICIAN ASSISTANT

## 2022-08-17 PROCEDURE — 4010F ACE/ARB THERAPY RXD/TAKEN: CPT | Mod: CPTII,S$GLB,, | Performed by: PHYSICIAN ASSISTANT

## 2022-08-17 PROCEDURE — 1159F PR MEDICATION LIST DOCUMENTED IN MEDICAL RECORD: ICD-10-PCS | Mod: CPTII,S$GLB,, | Performed by: PHYSICIAN ASSISTANT

## 2022-08-17 RX ORDER — DAPAGLIFLOZIN 5 MG/1
TABLET, FILM COATED ORAL
COMMUNITY
End: 2022-11-07 | Stop reason: SDUPTHER

## 2022-08-17 RX ORDER — METFORMIN HYDROCHLORIDE 500 MG/1
TABLET, EXTENDED RELEASE ORAL
COMMUNITY
End: 2022-11-07 | Stop reason: SDUPTHER

## 2022-08-17 NOTE — TELEPHONE ENCOUNTER
----- Message from Hortencia Crabtree sent at 8/17/2022  8:32 AM CDT -----  Pt fainted yesterday and hit the bathroom tile floor. Hurt her forehead and both of her hands. Put her on carolynn's schedule at 9:30 today

## 2022-08-17 NOTE — PROGRESS NOTES
"  SUBJECTIVE:    Patient ID: Estefanía Ray is a 72 y.o. female.    Chief Complaint: Fall (No bottles// pt states she fainted yesterday. Fell on bathroom tile, pt hit her head, c/o left arm and shoulder soreness. Pt denies headache or vision changes. States 6 weeks ago she fainted-MJ)    Kristian is a 72 y.o. female who presents today for an urgent visit with a CC of "passing out yesterday." She reports being constipated for nearly a week and took Dulcolax yesterday.  While sitting on the commode and attempting to have a BM, she reports a syncopal episode that resulted in her falling forward, landing on the left shoulder and arm, as well as hitting the left side of her forehead on the floor.  The syncopal episode was unwitnessed and she was unable to recall how long she was unconscious.  She was able to get up under her own ability and did not call EMS or present to the ER.  After this syncopal episode, she did not check her BP or her blood sugar.  She went about her ADLs without any difficulty after suffering this event.  She denies any CP, palpitations, dizziness, lightheadedness, upper extremity weakness or numbness, headaches, seizures, tremors, or vision changes.    She recently completed a cardiac stress test and echocardiogram on 08/08/2022 - normal myocardial perfusion, stress test negative for ischemia, no wall motion abnormalities, EF: 54%.    Office Visit on 06/23/2022   Component Date Value Ref Range Status    Hemoglobin A1C 06/23/2022 7.8  % Final       Past Medical History:   Diagnosis Date    Allergy     Diabetes mellitus, type 2     GERD (gastroesophageal reflux disease)     Hyperlipidemia     Hypertension      Past Surgical History:   Procedure Laterality Date    HYSTERECTOMY      KNEE SURGERY Left     TONSILLECTOMY       History reviewed. No pertinent family history.    Marital Status:   Alcohol History:  reports no history of alcohol use.  Tobacco History:  reports that she has never " smoked. She has never used smokeless tobacco.  Drug History:  reports no history of drug use.    Health Maintenance Topics with due status: Not Due       Topic Last Completion Date    TETANUS VACCINE 04/07/2015    Colorectal Cancer Screening 09/07/2016    Influenza Vaccine 09/09/2016    Eye Exam 09/21/2021    DEXA Scan 09/29/2021    Mammogram 12/27/2021    Diabetes Urine Screening 02/08/2022    Foot Exam 06/23/2022    Hemoglobin A1c 06/23/2022    High Dose Statin 08/17/2022    Aspirin/Antiplatelet Therapy 08/17/2022     Immunization History   Administered Date(s) Administered    Influenza - High Dose - PF (65 years and older) 09/09/2016    Pneumococcal Conjugate - 13 Valent 03/10/2017    Pneumococcal Polysaccharide - 23 Valent 04/12/2018    Tdap 04/07/2015       Review of patient's allergies indicates:   Allergen Reactions    Ezetimibe      Other reaction(s): Other (See Comments), Unknown  Feels terrible when taking medication       Sitagliptin-metformin      Other reaction(s): diarrhea       Current Outpatient Medications:     aspirin (ECOTRIN) 81 MG EC tablet, Aspir-81 mg tablet,delayed release  Take 1 tablet every day by oral route., Disp: , Rfl:     blood-glucose sensor (DEXCOM G6 SENSOR) Kina, Use 1 sensor every 10 days., Disp: 3 each, Rfl: 12    blood-glucose transmitter (DEXCOM G6 TRANSMITTER) Kina, Use 1 transmitter every 90 days, Disp: 1 each, Rfl: 4    cyclobenzaprine (FLEXERIL) 10 MG tablet, Take 1 tablet (10 mg total) by mouth 2 (two) times daily., Disp: 180 tablet, Rfl: 1    dapagliflozin (FARXIGA) 5 mg Tab tablet, Farxiga 5 mg tablet  TAKE 1 TABLET BY MOUTH EVERY DAY, Disp: , Rfl:     dulaglutide (TRULICITY) 1.5 mg/0.5 mL pen injector, Inject 1.5 mg into the skin every 7 days., Disp: 12 pen, Rfl: 0    hydroCHLOROthiazide (HYDRODIURIL) 25 MG tablet, Take 1 tablet (25 mg total) by mouth once daily., Disp: 90 tablet, Rfl: 1    insulin (LANTUS SOLOSTAR U-100 INSULIN) glargine 100 units/mL  SubQ pen, 40 units daily., Disp: 3 each, Rfl: 1    insulin lispro (HUMALOG KWIKPEN INSULIN) 100 unit/mL pen, Inject 12 Units into the skin 3 (three) times daily., Disp: 10.8 mL, Rfl: 11    lisinopriL (PRINIVIL,ZESTRIL) 20 MG tablet, Take 1 tablet (20 mg total) by mouth once daily., Disp: 90 tablet, Rfl: 1    metFORMIN (GLUCOPHAGE-XR) 500 MG ER 24hr tablet, metformin  mg tablet,extended release 24 hr  TAKE 2 TABLETS BY MOUTH TWICE DAILY, Disp: , Rfl:     metoprolol succinate (TOPROL-XL) 50 MG 24 hr tablet, Take 1 tablet (50 mg total) by mouth once daily., Disp: 90 tablet, Rfl: 1    pitavastatin calcium (LIVALO) 4 mg Tab, Take 1 tablet by mouth once daily., Disp: 90 tablet, Rfl: 1    biotin 10,000 mcg TbDL, 1 tablet, Disp: , Rfl:     blood sugar diagnostic (FREESTYLE LITE STRIPS) Strp, as directed (Patient not taking: No sig reported), Disp: 100 each, Rfl: 5    cetirizine (ZYRTEC) 10 MG tablet, Take 10 mg by mouth once daily., Disp: , Rfl:     coenzyme Q10 100 mg capsule, 1 capsule with a meal, Disp: , Rfl:     ELDERBERRY FRUIT ORAL, Take by mouth., Disp: , Rfl:     flash glucose scanning reader (FREESTYLE KARINA 14 DAY READER) Misc, 1 Units by Misc.(Non-Drug; Combo Route) route 5 (five) times daily. (Patient not taking: Reported on 8/17/2022), Disp: 1 each, Rfl: 3    flash glucose sensor (FREESTYLE KARINA 14 DAY SENSOR) Kit, 1 kit by Misc.(Non-Drug; Combo Route) route once daily. (Patient not taking: No sig reported), Disp: 1 kit, Rfl: 11    insulin aspart U-100 (NOVOLOG FLEXPEN U-100 INSULIN) 100 unit/mL (3 mL) InPn pen, Inject 5 Units into the skin before meals and at bedtime as needed., Disp: 3 mL, Rfl: 0    krill-om-3-dha-epa-phospho-ast 245-36-75-50 mg Cap, , Disp: , Rfl:     lancets (FREESTYLE LANCETS) 28 gauge Misc, 1 lancet by Misc.(Non-Drug; Combo Route) route 2 (two) times a day. (Patient not taking: Reported on 6/23/2022), Disp: 200 each, Rfl: 3    lansoprazole (PREVACID) 15 MG capsule,  "1 capsule, Disp: , Rfl:     linaCLOtide (LINZESS) 72 mcg Cap capsule, Take 1 capsule (72 mcg total) by mouth before breakfast., Disp: 30 capsule, Rfl: 0    pen needle, diabetic (PEN NEEDLE) 31 gauge x 3/16" Ndle, 1 each by In Vitro route 3 (three) times daily. BD ULTRA FINE (Patient not taking: Reported on 6/23/2022), Disp: 100 each, Rfl: 5    UNABLE TO FIND, multivit topical patch, Disp: , Rfl:     vitamin D (VITAMIN D3) 1000 units Tab, Take 5,000 Units by mouth., Disp: , Rfl:     Review of Systems   Constitutional: Negative for chills, fatigue and fever.   Respiratory: Negative for cough, shortness of breath and wheezing.    Cardiovascular: Negative for chest pain, palpitations and leg swelling.   Gastrointestinal: Positive for constipation. Negative for abdominal pain, diarrhea, nausea and vomiting.   Genitourinary: Negative for difficulty urinating, dysuria, frequency, hematuria and urgency.   Musculoskeletal: Positive for arthralgias ("Left shoulder, arm, and left index finger."). Negative for myalgias.   Neurological: Positive for syncope. Negative for dizziness, weakness and light-headedness.   Hematological: Bruises/bleeds easily ("Bruising on the left arm and above the left eye.").   Psychiatric/Behavioral: Negative for behavioral problems.          Objective:      Vitals:    08/17/22 0949   BP: 136/78   Pulse: 88   Temp: 98.2 °F (36.8 °C)   SpO2: 98%   Weight: 85 kg (187 lb 6.4 oz)   Height: 5' 3.5" (1.613 m)     Physical Exam  Vitals and nursing note reviewed.   Constitutional:       General: She is not in acute distress.     Appearance: Normal appearance. She is obese. She is not ill-appearing, toxic-appearing or diaphoretic.      Comments: Obese WF sitting erect in an office chair in no acute distress.   HENT:      Head: Normocephalic and atraumatic.        Right Ear: External ear normal.      Left Ear: External ear normal.      Nose: Nose normal. No rhinorrhea.      Mouth/Throat:      Mouth: Mucous " membranes are moist.      Pharynx: Oropharynx is clear.   Eyes:      General: No visual field deficit or scleral icterus.     Extraocular Movements: Extraocular movements intact.      Conjunctiva/sclera: Conjunctivae normal.      Pupils: Pupils are equal, round, and reactive to light.      Comments: Direct and consensual pupillary response intact bilaterally.  Patient successfully tract in all 6 cardinal gazes.  No vertical or horizontal nystagmus noted on rapid eye movement.   Neck:      Vascular: No carotid bruit.      Comments: No bony abnormalities or step-offs appreciated upon palpation of the cervical spine.  Neck has full ROM on flexion, extension, and bilateral rotation.  Cardiovascular:      Rate and Rhythm: Normal rate and regular rhythm.      Pulses: Normal pulses.      Heart sounds: Normal heart sounds. No murmur heard.    No friction rub.      Comments: No JVD noted  Pulmonary:      Effort: Pulmonary effort is normal. No respiratory distress.      Breath sounds: Normal breath sounds. No wheezing, rhonchi or rales.   Abdominal:      General: There is no distension.      Palpations: Abdomen is soft.      Tenderness: There is no abdominal tenderness. There is no guarding or rebound.   Musculoskeletal:      Cervical back: Normal range of motion and neck supple. No rigidity or tenderness.      Right lower leg: No edema.      Left lower leg: No edema.      Comments: Left shoulder has full ROM.  5/5 strength against resistance in the bilateral upper extremities.  5/5  strength appreciated bilaterally.   Skin:     General: Skin is warm and dry.      Findings: Bruising (Noted on the left forearm and above the left eye.) present.   Neurological:      General: No focal deficit present.      Mental Status: She is alert and oriented to person, place, and time. Mental status is at baseline.      GCS: GCS eye subscore is 4. GCS verbal subscore is 5. GCS motor subscore is 6.      Cranial Nerves: Cranial nerves 2-12  are intact. No cranial nerve deficit, dysarthria or facial asymmetry.      Sensory: Sensation is intact. No sensory deficit.      Motor: Motor function is intact. No weakness, tremor, atrophy, abnormal muscle tone or seizure activity.      Coordination: Coordination normal. Finger-Nose-Finger Test and Heel to Shin Test normal.      Gait: Gait is intact. Gait and tandem walk normal.   Psychiatric:         Mood and Affect: Mood normal.         Behavior: Behavior normal. Behavior is cooperative.         Thought Content: Thought content normal.         Judgment: Judgment normal.           Assessment:       1. Syncope and collapse    2. Slow transit constipation    3. Other specified disorders of arteries and arterioles    4. S/P CABG (coronary artery bypass graft)    5. Type 2 diabetes mellitus with other specified complication, unspecified whether long term insulin use    6. Hyperlipidemia, unspecified hyperlipidemia type    7. Hypertension, unspecified type           Plan:       Syncope and collapse  UTD cardiac stress test and echocardiogram (08/08/2022) - normal myocardial perfusion, stress test negative for ischemia, no wall motion abnormalities, EF: 54%.  Will obtain bilateral carotid ultrasound to r/o carotid stenosis and a 48 hour Holter monitor to rule out arrhythmia as a possible cause.  Suspected cause of her syncopal episode is secondary to bradying down with Valsalva.  -     US Carotid Bilateral; Future; Expected date: 08/17/2022  -     Holter monitor - 48 hour; Future    Slow transit constipation  Start Linzess 72mcg q.d. and increase daily water intake.  -     linaCLOtide (LINZESS) 72 mcg Cap capsule; Take 1 capsule (72 mcg total) by mouth before breakfast.  Dispense: 30 capsule; Refill: 0    Other specified disorders of arteries and arterioles  -     US Carotid Bilateral; Future; Expected date: 08/17/2022    S/P CABG (coronary artery bypass graft)    Type 2 diabetes mellitus with other specified  complication, unspecified whether long term insulin use  Begin monitoring blood sugar daily and provide a blood sugar log on follow-up visit to determine if hypoglycemic events are occurring, which could be leading to her syncopal episodes.    Hyperlipidemia, unspecified hyperlipidemia type    Hypertension, unspecified type  Currently stable and controlled.  Continue as is    Follow up in about 5 weeks (around 9/21/2022) for Keep follow-up appointment as scheduled with Verna.        8/17/2022 Giorgi Serrano PA-C

## 2022-08-19 DIAGNOSIS — E11.69 TYPE 2 DIABETES MELLITUS WITH OTHER SPECIFIED COMPLICATION, UNSPECIFIED WHETHER LONG TERM INSULIN USE: ICD-10-CM

## 2022-08-19 NOTE — TELEPHONE ENCOUNTER
----- Message from Hortencia Crabtree sent at 8/19/2022 11:06 AM CDT -----  Contact: patient  Vm- pt needs a refill on her bd pen needles   [facility] [place] [facility]   [phone]

## 2022-08-22 ENCOUNTER — TELEPHONE (OUTPATIENT)
Dept: FAMILY MEDICINE | Facility: CLINIC | Age: 72
End: 2022-08-22

## 2022-08-22 DIAGNOSIS — E11.69 TYPE 2 DIABETES MELLITUS WITH OTHER SPECIFIED COMPLICATION, UNSPECIFIED WHETHER LONG TERM INSULIN USE: ICD-10-CM

## 2022-08-22 RX ORDER — PEN NEEDLE, DIABETIC 30 GX3/16"
1 NEEDLE, DISPOSABLE MISCELLANEOUS 3 TIMES DAILY
Qty: 100 EACH | Refills: 5 | Status: SHIPPED | OUTPATIENT
Start: 2022-08-22 | End: 2022-09-10 | Stop reason: SDUPTHER

## 2022-08-22 RX ORDER — PEN NEEDLE, DIABETIC 30 GX3/16"
1 NEEDLE, DISPOSABLE MISCELLANEOUS 3 TIMES DAILY
Qty: 100 EACH | Refills: 5 | Status: SHIPPED | OUTPATIENT
Start: 2022-08-22 | End: 2022-08-22 | Stop reason: SDUPTHER

## 2022-08-22 NOTE — TELEPHONE ENCOUNTER
----- Message from Yany Ramachandran sent at 8/22/2022  9:38 AM CDT -----  Contact: patient  Pt need prior auth linsess / pt also needs a refill for pin needles for diabetic insulin   [facility] on [address] in [place]  [phone]

## 2022-08-30 ENCOUNTER — TELEPHONE (OUTPATIENT)
Dept: FAMILY MEDICINE | Facility: CLINIC | Age: 72
End: 2022-08-30

## 2022-08-30 DIAGNOSIS — R19.7 DIARRHEA, UNSPECIFIED TYPE: Primary | ICD-10-CM

## 2022-08-30 RX ORDER — CHOLESTYRAMINE 4 G/9G
4 POWDER, FOR SUSPENSION ORAL 2 TIMES DAILY
Qty: 10 PACKET | Refills: 0 | Status: SHIPPED | OUTPATIENT
Start: 2022-08-30 | End: 2022-09-27

## 2022-08-30 NOTE — TELEPHONE ENCOUNTER
Pt states she had diarrhea all last night into this morning. Pt denies blood in stool. Pt denies black stool. Pt states she only took the medication one day and the diarrhea hasn't stopped. Please advise.

## 2022-08-30 NOTE — TELEPHONE ENCOUNTER
Stop Linzess.   Start Cholestyramine powder b.i.d x 5 days.   This was sent to her pharmacy listed. Increase daily fiber intake and water intake.

## 2022-08-30 NOTE — TELEPHONE ENCOUNTER
----- Message from Hortencia Crabtree sent at 8/30/2022 12:57 PM CDT -----  Vm- pt saw carolynn 2 weeks ago and was given linzess and she is still having diarrhea with it   [phone]

## 2022-08-31 ENCOUNTER — TELEPHONE (OUTPATIENT)
Dept: FAMILY MEDICINE | Facility: CLINIC | Age: 72
End: 2022-08-31

## 2022-08-31 NOTE — TELEPHONE ENCOUNTER
Spoke to pt. She is asking what foods to eat to help her diarrhea. Pt asking if soup would be ok to eat. Advised pt she can try soup, but it wont help with formed stool. Advised pt to eat a bland diet and increase fiber intake. Pt voiced understanding.

## 2022-08-31 NOTE — TELEPHONE ENCOUNTER
----- Message from Hortencia Crabtree sent at 8/31/2022  3:48 PM CDT -----  Vm- pt would like to talk to someone about her medication. It is very important   [phone]

## 2022-09-12 ENCOUNTER — TELEPHONE (OUTPATIENT)
Dept: FAMILY MEDICINE | Facility: CLINIC | Age: 72
End: 2022-09-12

## 2022-09-12 NOTE — TELEPHONE ENCOUNTER
----- Message from Miya Bynum sent at 9/12/2022  9:06 AM CDT -----  Patient called and stated that she had diarrhea for three weeks and she stated that she spoke to the nurse on call with [employer] and they advised her to see someone within three days and today make the third day she stated that she only want to see Verna so she need to come in today please give her a call at [phone]

## 2022-09-12 NOTE — TELEPHONE ENCOUNTER
Spoke with pt and got her scheduled. States she couldn't come in tomorrow because she has another appointment.

## 2022-09-13 ENCOUNTER — OFFICE VISIT (OUTPATIENT)
Dept: ORTHOPEDICS | Facility: CLINIC | Age: 72
End: 2022-09-13
Payer: COMMERCIAL

## 2022-09-13 VITALS
SYSTOLIC BLOOD PRESSURE: 140 MMHG | DIASTOLIC BLOOD PRESSURE: 80 MMHG | BODY MASS INDEX: 31.92 KG/M2 | WEIGHT: 187 LBS | HEIGHT: 64 IN

## 2022-09-13 DIAGNOSIS — M17.12 PRIMARY OSTEOARTHRITIS OF LEFT KNEE: ICD-10-CM

## 2022-09-13 DIAGNOSIS — Z98.890 HISTORY OF ARTHROSCOPY OF LEFT KNEE: ICD-10-CM

## 2022-09-13 DIAGNOSIS — M17.11 PRIMARY OSTEOARTHRITIS OF RIGHT KNEE: Primary | ICD-10-CM

## 2022-09-13 PROCEDURE — 1160F PR REVIEW ALL MEDS BY PRESCRIBER/CLIN PHARMACIST DOCUMENTED: ICD-10-PCS | Mod: CPTII,S$GLB,, | Performed by: ORTHOPAEDIC SURGERY

## 2022-09-13 PROCEDURE — 3051F PR MOST RECENT HEMOGLOBIN A1C LEVEL 7.0 - < 8.0%: ICD-10-PCS | Mod: CPTII,S$GLB,, | Performed by: ORTHOPAEDIC SURGERY

## 2022-09-13 PROCEDURE — 3051F HG A1C>EQUAL 7.0%<8.0%: CPT | Mod: CPTII,S$GLB,, | Performed by: ORTHOPAEDIC SURGERY

## 2022-09-13 PROCEDURE — 3008F BODY MASS INDEX DOCD: CPT | Mod: CPTII,S$GLB,, | Performed by: ORTHOPAEDIC SURGERY

## 2022-09-13 PROCEDURE — 99203 PR OFFICE/OUTPT VISIT, NEW, LEVL III, 30-44 MIN: ICD-10-PCS | Mod: S$GLB,,, | Performed by: ORTHOPAEDIC SURGERY

## 2022-09-13 PROCEDURE — 3077F SYST BP >= 140 MM HG: CPT | Mod: CPTII,S$GLB,, | Performed by: ORTHOPAEDIC SURGERY

## 2022-09-13 PROCEDURE — 1125F PR PAIN SEVERITY QUANTIFIED, PAIN PRESENT: ICD-10-PCS | Mod: CPTII,S$GLB,, | Performed by: ORTHOPAEDIC SURGERY

## 2022-09-13 PROCEDURE — 3008F PR BODY MASS INDEX (BMI) DOCUMENTED: ICD-10-PCS | Mod: CPTII,S$GLB,, | Performed by: ORTHOPAEDIC SURGERY

## 2022-09-13 PROCEDURE — 3077F PR MOST RECENT SYSTOLIC BLOOD PRESSURE >= 140 MM HG: ICD-10-PCS | Mod: CPTII,S$GLB,, | Performed by: ORTHOPAEDIC SURGERY

## 2022-09-13 PROCEDURE — 3079F DIAST BP 80-89 MM HG: CPT | Mod: CPTII,S$GLB,, | Performed by: ORTHOPAEDIC SURGERY

## 2022-09-13 PROCEDURE — 3288F PR FALLS RISK ASSESSMENT DOCUMENTED: ICD-10-PCS | Mod: CPTII,S$GLB,, | Performed by: ORTHOPAEDIC SURGERY

## 2022-09-13 PROCEDURE — 1159F MED LIST DOCD IN RCRD: CPT | Mod: CPTII,S$GLB,, | Performed by: ORTHOPAEDIC SURGERY

## 2022-09-13 PROCEDURE — 99203 OFFICE O/P NEW LOW 30 MIN: CPT | Mod: S$GLB,,, | Performed by: ORTHOPAEDIC SURGERY

## 2022-09-13 PROCEDURE — 1159F PR MEDICATION LIST DOCUMENTED IN MEDICAL RECORD: ICD-10-PCS | Mod: CPTII,S$GLB,, | Performed by: ORTHOPAEDIC SURGERY

## 2022-09-13 PROCEDURE — 3061F NEG MICROALBUMINURIA REV: CPT | Mod: CPTII,S$GLB,, | Performed by: ORTHOPAEDIC SURGERY

## 2022-09-13 PROCEDURE — 3061F PR NEG MICROALBUMINURIA RESULT DOCUMENTED/REVIEW: ICD-10-PCS | Mod: CPTII,S$GLB,, | Performed by: ORTHOPAEDIC SURGERY

## 2022-09-13 PROCEDURE — 4010F ACE/ARB THERAPY RXD/TAKEN: CPT | Mod: CPTII,S$GLB,, | Performed by: ORTHOPAEDIC SURGERY

## 2022-09-13 PROCEDURE — 1125F AMNT PAIN NOTED PAIN PRSNT: CPT | Mod: CPTII,S$GLB,, | Performed by: ORTHOPAEDIC SURGERY

## 2022-09-13 PROCEDURE — 1101F PT FALLS ASSESS-DOCD LE1/YR: CPT | Mod: CPTII,S$GLB,, | Performed by: ORTHOPAEDIC SURGERY

## 2022-09-13 PROCEDURE — 1101F PR PT FALLS ASSESS DOC 0-1 FALLS W/OUT INJ PAST YR: ICD-10-PCS | Mod: CPTII,S$GLB,, | Performed by: ORTHOPAEDIC SURGERY

## 2022-09-13 PROCEDURE — 3079F PR MOST RECENT DIASTOLIC BLOOD PRESSURE 80-89 MM HG: ICD-10-PCS | Mod: CPTII,S$GLB,, | Performed by: ORTHOPAEDIC SURGERY

## 2022-09-13 PROCEDURE — 4010F PR ACE/ARB THEARPY RXD/TAKEN: ICD-10-PCS | Mod: CPTII,S$GLB,, | Performed by: ORTHOPAEDIC SURGERY

## 2022-09-13 PROCEDURE — 3066F PR DOCUMENTATION OF TREATMENT FOR NEPHROPATHY: ICD-10-PCS | Mod: CPTII,S$GLB,, | Performed by: ORTHOPAEDIC SURGERY

## 2022-09-13 PROCEDURE — 3066F NEPHROPATHY DOC TX: CPT | Mod: CPTII,S$GLB,, | Performed by: ORTHOPAEDIC SURGERY

## 2022-09-13 PROCEDURE — 3288F FALL RISK ASSESSMENT DOCD: CPT | Mod: CPTII,S$GLB,, | Performed by: ORTHOPAEDIC SURGERY

## 2022-09-13 PROCEDURE — 1160F RVW MEDS BY RX/DR IN RCRD: CPT | Mod: CPTII,S$GLB,, | Performed by: ORTHOPAEDIC SURGERY

## 2022-09-13 NOTE — PROGRESS NOTES
[facility]    Subjective:     Chief Complaint:   Chief Complaint   Patient presents with    Left Knee - Pain     BL knee pain that started about 6 weeks ago. States that the pain does affect her walk. Right is worse than left.    Right Knee - Pain     BL knee pain that started about 6 weeks ago. States that the pain does affect her walk. Right is worse than left. She was told by chiropractor that she has a bakers cyst on her right knee       Past Medical History:   Diagnosis Date    Allergy     Diabetes mellitus, type 2     GERD (gastroesophageal reflux disease)     Hyperlipidemia     Hypertension        Past Surgical History:   Procedure Laterality Date    HYSTERECTOMY      KNEE SURGERY Left     TONSILLECTOMY         Current Outpatient Medications   Medication Sig    aspirin (ECOTRIN) 81 MG EC tablet Aspir-81 mg tablet,delayed release   Take 1 tablet every day by oral route.    blood sugar diagnostic (FREESTYLE LITE STRIPS) Strp as directed    cetirizine (ZYRTEC) 10 MG tablet Take 10 mg by mouth once daily.    cyclobenzaprine (FLEXERIL) 10 MG tablet Take 1 tablet (10 mg total) by mouth 2 (two) times daily.    dapagliflozin (FARXIGA) 5 mg Tab tablet Farxiga 5 mg tablet   TAKE 1 TABLET BY MOUTH EVERY DAY    dulaglutide (TRULICITY) 1.5 mg/0.5 mL pen injector Inject 1.5 mg into the skin every 7 days.    flash glucose scanning reader (SoonrSTYLE KARINA 14 DAY READER) Misc 1 Units by Misc.(Non-Drug; Combo Route) route 5 (five) times daily.    hydroCHLOROthiazide (HYDRODIURIL) 25 MG tablet Take 1 tablet (25 mg total) by mouth once daily.    insulin (LANTUS SOLOSTAR U-100 INSULIN) glargine 100 units/mL SubQ pen 40 units daily.    insulin lispro (HUMALOG KWIKPEN INSULIN) 100 unit/mL pen Inject 12 Units into the skin 3 (three) times daily.    dprfp-cy-8-dha-epa-phospho-ast 800-48-76-50 mg Cap     lancets (FREESTYLE LANCETS) 28 gauge Misc 1 lancet by Misc.(Non-Drug; Combo Route) route 2 (two) times a day.     "lansoprazole (PREVACID) 15 MG capsule 1 capsule    lisinopriL (PRINIVIL,ZESTRIL) 20 MG tablet Take 1 tablet (20 mg total) by mouth once daily.    metFORMIN (GLUCOPHAGE-XR) 500 MG ER 24hr tablet metformin  mg tablet,extended release 24 hr   TAKE 2 TABLETS BY MOUTH TWICE DAILY    metoprolol succinate (TOPROL-XL) 50 MG 24 hr tablet Take 1 tablet (50 mg total) by mouth once daily.    pen needle, diabetic (PEN NEEDLE) 31 gauge x 3/16" Ndle 1 each by In Vitro route 4 (four) times daily. BD ULTRA FINE    pitavastatin calcium (LIVALO) 4 mg Tab Take 1 tablet by mouth once daily.    UNABLE TO FIND multivit topical patch    biotin 10,000 mcg TbDL 1 tablet    blood-glucose sensor (DEXCOM G6 SENSOR) Kina Use 1 sensor every 10 days.    blood-glucose transmitter (DEXCOM G6 TRANSMITTER) Kina Use 1 transmitter every 90 days    cholestyramine (QUESTRAN) 4 gram packet Take 1 packet (4 g total) by mouth 2 (two) times daily. for 5 days    coenzyme Q10 100 mg capsule 1 capsule with a meal    ELDERBERRY FRUIT ORAL Take by mouth.    flash glucose sensor (FREESTYLE KARINA 14 DAY SENSOR) Kit 1 kit by Misc.(Non-Drug; Combo Route) route once daily. (Patient not taking: No sig reported)    insulin aspart U-100 (NOVOLOG FLEXPEN U-100 INSULIN) 100 unit/mL (3 mL) InPn pen Inject 5 Units into the skin before meals and at bedtime as needed.    linaCLOtide (LINZESS) 72 mcg Cap capsule Take 1 capsule (72 mcg total) by mouth before breakfast. (Patient not taking: Reported on 9/13/2022)    vitamin D (VITAMIN D3) 1000 units Tab Take 5,000 Units by mouth.     No current facility-administered medications for this visit.       Review of patient's allergies indicates:   Allergen Reactions    Empagliflozin     Ezetimibe      Other reaction(s): Other (See Comments), Unknown  Feels terrible when taking medication       Linzess [linaclotide] Diarrhea    Sitagliptin-metformin      Other reaction(s): diarrhea       History reviewed. No pertinent family " history.    Social History     Socioeconomic History    Marital status:    Tobacco Use    Smoking status: Never    Smokeless tobacco: Never   Substance and Sexual Activity    Alcohol use: Never    Drug use: Never       History of present illness:  Ms. José comes to the clinic today as a new patient chief complaint of bilateral knee pain that has been going on for approximately 2 months.  She reports right greater than left knee pain.  She does have a remote history of a left knee arthroscopy years ago by an outside orthopedist and had a partial meniscectomy she reports.  She denies much pain with the left knee.  She reports mostly in the right knee.  She also reports she has Baker cyst in her right.  She had this treated with her chiropractor here recently which did provide some relief for.    Review of Systems:    Constitution: Negative for chills, fever, and sweats.  Negative for unexplained weight loss.    HENT:  Negative for headaches and blurry vision.    Cardiovascular:Negative for chest pain or irregular heart beat. Negative for hypertension.    Respiratory:  Negative for cough and shortness of breath.    Gastrointestinal: Negative for abdominal pain, heartburn, melena, nausea, and vomitting.    Genitourinary:  Negative bladder incontinence and dysuria.    Musculoskeletal:  See HPI for details.     Neurological: Negative for numbness.    Psychiatric/Behavioral: Negative for depression.  The patient is not nervous/anxious.      Endocrine: Negative for polyuria    Hematologic/Lymphatic: Negative for bleeding problem.  Does not bruise/bleed easily.    Skin: Negative for poor would healing and rash    Objective:      Physical Examination:    Vital Signs:    Vitals:    09/13/22 1240   BP: (!) 140/80       Body mass index is 32.61 kg/m².    This a well-developed, well nourished patient in no acute distress.  They are alert and oriented and cooperative to examination.        Bilateral knee exam:  Skin to  "bilateral knees is clean dry and intact.  There is no erythema or ecchymosis.  There are no signs or symptoms of infection.  Patient is neurovascular intact throughout bilateral lower extremities.  Bilateral calves are soft and nontender.  Bilateral knee range of motion is approximately 0-110 degrees.  Both knees are stable to varus and valgus stresses while held in extension.  She does have a small palpable popliteal cyst in the right knee.  She has a negative straight leg raise bilaterally.  She can weightbear as tolerated on bilateral lower extremities.    Pertinent New Results:    XRAY Report / Interpretation:   Three views were taken of her left knee today:  AP, lateral, and sunrise views.  They reveal no acute fractures or dislocations.  Patient does have severe arthrosis in the medial compartment of her left knee with near bone-on-bone deformity.  Did review images taken of her right knee approximately 1 month ago by an outside provider.  She has mild-moderate tricompartmental osteoarthrosis in the right knee.    Assessment/Plan:      1.  Bilateral knee osteoarthritis, left greater than right.    Treatment options reviewed with the patient today.  Patient declined intra-articular corticosteroid injections as she reports her symptoms are not as severe as they once were.  I did advise her do the severity of the osteoarthritis in her left knee, she is likely looking at a total knee arthroplasty at some point in the future.  However, since she has not even had intra-articular corticosteroids, that would be my initial treatment recommendation.  She will follow up with us on an as-needed basis if any flare-ups or issues arise.    Gordy Alatorre, Physician Assistant, served in the capacity as a "scribe" for this patient encounter.  A "face-to-face" encounter occurred with Dr. Mohsen Holland on this date.  The treatment plan and medical decision-making is outlined above. Patient was seen and examined with a " chaperone.       This note was created using Dragon voice recognition software that occasionally misinterpreted phrases or words.

## 2022-09-14 ENCOUNTER — HOSPITAL ENCOUNTER (OUTPATIENT)
Dept: RADIOLOGY | Facility: HOSPITAL | Age: 72
Discharge: HOME | End: 2022-09-14
Attending: PHYSICIAN ASSISTANT
Payer: COMMERCIAL

## 2022-09-14 DIAGNOSIS — R55 SYNCOPE AND COLLAPSE: ICD-10-CM

## 2022-09-14 DIAGNOSIS — I77.89 OTHER SPECIFIED DISORDERS OF ARTERIES AND ARTERIOLES: ICD-10-CM

## 2022-09-14 PROCEDURE — 93880 EXTRACRANIAL BILAT STUDY: CPT | Mod: TC,PO

## 2022-09-15 ENCOUNTER — TELEPHONE (OUTPATIENT)
Dept: FAMILY MEDICINE | Facility: CLINIC | Age: 72
End: 2022-09-15

## 2022-09-15 DIAGNOSIS — Z79.899 ENCOUNTER FOR LONG-TERM (CURRENT) USE OF OTHER MEDICATIONS: Primary | ICD-10-CM

## 2022-09-15 DIAGNOSIS — E78.5 HYPERLIPIDEMIA, UNSPECIFIED HYPERLIPIDEMIA TYPE: ICD-10-CM

## 2022-09-15 NOTE — TELEPHONE ENCOUNTER
----- Message from Ashwini Olvera MA sent at 9/15/2022  9:00 AM CDT -----  Regarding: needs carotid bilat rsults  Please call patient to explain carotid bilateral result  [phone]

## 2022-09-15 NOTE — TELEPHONE ENCOUNTER
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 However not siginificant enough to warrant treatment or cause problems at this time. Make sure cholesterol is well controlled. Due for labs at upcoming visit.

## 2022-09-16 ENCOUNTER — TELEPHONE (OUTPATIENT)
Dept: FAMILY MEDICINE | Facility: CLINIC | Age: 72
End: 2022-09-16

## 2022-09-16 DIAGNOSIS — I65.29 OCCLUSION AND STENOSIS OF UNSPECIFIED CAROTID ARTERY: Primary | ICD-10-CM

## 2022-09-16 DIAGNOSIS — I77.89 OTHER SPECIFIED DISORDERS OF ARTERIES AND ARTERIOLES: ICD-10-CM

## 2022-09-16 NOTE — TELEPHONE ENCOUNTER
Spoke with pt in regards recent ultrasound results. Verbalized per Giorgi that her bilateral carotid ultrasound was reviewed and reveals mild plaque buildup in the carotid arteries, right side greater than left. NO hemodynamic significant blockage is appreciated, but secondary to these findings and her reported symptoms on last office visit, I will order a CTA to further assess the degree of this plaque buildup. CTA has been ordered. Pt acknowledge understanding. Pt would like for this report to be faxed to Dr. Gomez office.

## 2022-09-16 NOTE — TELEPHONE ENCOUNTER
----- Message from MAGO Lopez sent at 9/16/2022  8:29 AM CDT -----  Please contact patient and inform her that her bilateral carotid ultrasound was reviewed and reveals mild plaque buildup in the carotid arteries, right side greater than left. NO hemodynamic significant blockage is appreciated, but secondary to these findings and her reported symptoms on last office visit, I will order a CTA to further assess the degree of this plaque buildup. CTA has been ordered.

## 2022-09-23 LAB
ALBUMIN SERPL-MCNC: 4.1 G/DL (ref 3.6–5.1)
ALBUMIN/GLOB SERPL: 1.5 (CALC) (ref 1–2.5)
ALP SERPL-CCNC: 73 U/L (ref 37–153)
ALT SERPL-CCNC: 17 U/L (ref 6–29)
AST SERPL-CCNC: 21 U/L (ref 10–35)
BILIRUB SERPL-MCNC: 0.4 MG/DL (ref 0.2–1.2)
BUN SERPL-MCNC: 16 MG/DL (ref 7–25)
BUN/CREAT SERPL: ABNORMAL (CALC) (ref 6–22)
CALCIUM SERPL-MCNC: 9.6 MG/DL (ref 8.6–10.4)
CHLORIDE SERPL-SCNC: 100 MMOL/L (ref 98–110)
CHOLEST SERPL-MCNC: 147 MG/DL
CHOLEST/HDLC SERPL: 3.8 (CALC)
CO2 SERPL-SCNC: 27 MMOL/L (ref 20–32)
CREAT SERPL-MCNC: 0.91 MG/DL (ref 0.6–1)
EGFR: 67 ML/MIN/1.73M2
GLOBULIN SER CALC-MCNC: 2.7 G/DL (CALC) (ref 1.9–3.7)
GLUCOSE SERPL-MCNC: 132 MG/DL (ref 65–99)
HDLC SERPL-MCNC: 39 MG/DL
LDLC SERPL CALC-MCNC: 83 MG/DL (CALC)
NONHDLC SERPL-MCNC: 108 MG/DL (CALC)
POTASSIUM SERPL-SCNC: 5 MMOL/L (ref 3.5–5.3)
PROT SERPL-MCNC: 6.8 G/DL (ref 6.1–8.1)
SODIUM SERPL-SCNC: 137 MMOL/L (ref 135–146)
TRIGL SERPL-MCNC: 157 MG/DL

## 2022-09-27 ENCOUNTER — OFFICE VISIT (OUTPATIENT)
Dept: FAMILY MEDICINE | Facility: CLINIC | Age: 72
End: 2022-09-27
Payer: COMMERCIAL

## 2022-09-27 VITALS
DIASTOLIC BLOOD PRESSURE: 56 MMHG | HEIGHT: 64 IN | HEART RATE: 76 BPM | WEIGHT: 188 LBS | SYSTOLIC BLOOD PRESSURE: 122 MMHG | BODY MASS INDEX: 32.1 KG/M2

## 2022-09-27 DIAGNOSIS — E11.69 TYPE 2 DIABETES MELLITUS WITH OTHER SPECIFIED COMPLICATION, UNSPECIFIED WHETHER LONG TERM INSULIN USE: Primary | ICD-10-CM

## 2022-09-27 DIAGNOSIS — K21.9 GASTROESOPHAGEAL REFLUX DISEASE, UNSPECIFIED WHETHER ESOPHAGITIS PRESENT: ICD-10-CM

## 2022-09-27 DIAGNOSIS — M25.561 RIGHT KNEE PAIN, UNSPECIFIED CHRONICITY: ICD-10-CM

## 2022-09-27 DIAGNOSIS — M17.11 PRIMARY OSTEOARTHRITIS OF RIGHT KNEE: ICD-10-CM

## 2022-09-27 DIAGNOSIS — E78.5 HYPERLIPIDEMIA, UNSPECIFIED HYPERLIPIDEMIA TYPE: ICD-10-CM

## 2022-09-27 DIAGNOSIS — Z79.899 ENCOUNTER FOR LONG-TERM (CURRENT) USE OF OTHER MEDICATIONS: ICD-10-CM

## 2022-09-27 DIAGNOSIS — I25.10 ATHEROSCLEROSIS OF CORONARY ARTERY, UNSPECIFIED VESSEL OR LESION TYPE, UNSPECIFIED WHETHER ANGINA PRESENT, UNSPECIFIED WHETHER NATIVE OR TRANSPLANTED HEART: ICD-10-CM

## 2022-09-27 DIAGNOSIS — I10 ESSENTIAL HYPERTENSION: ICD-10-CM

## 2022-09-27 LAB — HBA1C MFR BLD: 7 %

## 2022-09-27 PROCEDURE — 3051F PR MOST RECENT HEMOGLOBIN A1C LEVEL 7.0 - < 8.0%: ICD-10-PCS | Mod: CPTII,S$GLB,, | Performed by: NURSE PRACTITIONER

## 2022-09-27 PROCEDURE — 3061F PR NEG MICROALBUMINURIA RESULT DOCUMENTED/REVIEW: ICD-10-PCS | Mod: CPTII,S$GLB,, | Performed by: NURSE PRACTITIONER

## 2022-09-27 PROCEDURE — 3051F HG A1C>EQUAL 7.0%<8.0%: CPT | Mod: CPTII,S$GLB,, | Performed by: NURSE PRACTITIONER

## 2022-09-27 PROCEDURE — 4010F ACE/ARB THERAPY RXD/TAKEN: CPT | Mod: CPTII,S$GLB,, | Performed by: NURSE PRACTITIONER

## 2022-09-27 PROCEDURE — 99214 OFFICE O/P EST MOD 30 MIN: CPT | Mod: S$GLB,,, | Performed by: NURSE PRACTITIONER

## 2022-09-27 PROCEDURE — 4010F PR ACE/ARB THEARPY RXD/TAKEN: ICD-10-PCS | Mod: CPTII,S$GLB,, | Performed by: NURSE PRACTITIONER

## 2022-09-27 PROCEDURE — 83036 HEMOGLOBIN GLYCOSYLATED A1C: CPT | Mod: QW,,, | Performed by: NURSE PRACTITIONER

## 2022-09-27 PROCEDURE — 3074F PR MOST RECENT SYSTOLIC BLOOD PRESSURE < 130 MM HG: ICD-10-PCS | Mod: CPTII,S$GLB,, | Performed by: NURSE PRACTITIONER

## 2022-09-27 PROCEDURE — 83036 POCT HEMOGLOBIN A1C: ICD-10-PCS | Mod: QW,,, | Performed by: NURSE PRACTITIONER

## 2022-09-27 PROCEDURE — 3066F NEPHROPATHY DOC TX: CPT | Mod: CPTII,S$GLB,, | Performed by: NURSE PRACTITIONER

## 2022-09-27 PROCEDURE — 1160F RVW MEDS BY RX/DR IN RCRD: CPT | Mod: CPTII,S$GLB,, | Performed by: NURSE PRACTITIONER

## 2022-09-27 PROCEDURE — 3066F PR DOCUMENTATION OF TREATMENT FOR NEPHROPATHY: ICD-10-PCS | Mod: CPTII,S$GLB,, | Performed by: NURSE PRACTITIONER

## 2022-09-27 PROCEDURE — 3008F BODY MASS INDEX DOCD: CPT | Mod: CPTII,S$GLB,, | Performed by: NURSE PRACTITIONER

## 2022-09-27 PROCEDURE — 3074F SYST BP LT 130 MM HG: CPT | Mod: CPTII,S$GLB,, | Performed by: NURSE PRACTITIONER

## 2022-09-27 PROCEDURE — 1159F PR MEDICATION LIST DOCUMENTED IN MEDICAL RECORD: ICD-10-PCS | Mod: CPTII,S$GLB,, | Performed by: NURSE PRACTITIONER

## 2022-09-27 PROCEDURE — 99214 PR OFFICE/OUTPT VISIT, EST, LEVL IV, 30-39 MIN: ICD-10-PCS | Mod: S$GLB,,, | Performed by: NURSE PRACTITIONER

## 2022-09-27 PROCEDURE — 3078F PR MOST RECENT DIASTOLIC BLOOD PRESSURE < 80 MM HG: ICD-10-PCS | Mod: CPTII,S$GLB,, | Performed by: NURSE PRACTITIONER

## 2022-09-27 PROCEDURE — 3008F PR BODY MASS INDEX (BMI) DOCUMENTED: ICD-10-PCS | Mod: CPTII,S$GLB,, | Performed by: NURSE PRACTITIONER

## 2022-09-27 PROCEDURE — 1159F MED LIST DOCD IN RCRD: CPT | Mod: CPTII,S$GLB,, | Performed by: NURSE PRACTITIONER

## 2022-09-27 PROCEDURE — 3078F DIAST BP <80 MM HG: CPT | Mod: CPTII,S$GLB,, | Performed by: NURSE PRACTITIONER

## 2022-09-27 PROCEDURE — 3061F NEG MICROALBUMINURIA REV: CPT | Mod: CPTII,S$GLB,, | Performed by: NURSE PRACTITIONER

## 2022-09-27 PROCEDURE — 1160F PR REVIEW ALL MEDS BY PRESCRIBER/CLIN PHARMACIST DOCUMENTED: ICD-10-PCS | Mod: CPTII,S$GLB,, | Performed by: NURSE PRACTITIONER

## 2022-09-27 NOTE — PROGRESS NOTES
SUBJECTIVE:    Patient ID: Estefanía Ray is a 72 y.o. female.    Chief Complaint: Diabetes (3mth, no bottles, Eye exam Dr. Jennifer Funes, Flu wants to know name first// SW)    72 y.o. female who presents for check up. She is treated for diabetes, cad, hyperlipidemia, htn, gerd, oa. She is currently using dexcom for continuous monitoring.  a1c today is 7.0. which is down from 7.8 she is trying to be more mindful of diet. Sleeps well. bp is well controlled. Followed by dr. oGmez. Last labs were 9/22. Would like to discuss.  Last colonoscopy was 2016 . 1o year old  Follow up. Last mammogram was 12/21. Considering knee surgery. Knee pain seems to be getting worse.       Office Visit on 09/27/2022   Component Date Value Ref Range Status    Hemoglobin A1C 09/27/2022 7.0  % Final   Telephone on 09/15/2022   Component Date Value Ref Range Status    Glucose 09/22/2022 132 (H)  65 - 99 mg/dL Final    BUN 09/22/2022 16  7 - 25 mg/dL Final    Creatinine 09/22/2022 0.91  0.60 - 1.00 mg/dL Final    EGFR 09/22/2022 67  > OR = 60 mL/min/1.73m2 Final    BUN/Creatinine Ratio 09/22/2022 NOT APPLICABLE  6 - 22 (calc) Final    Sodium 09/22/2022 137  135 - 146 mmol/L Final    Potassium 09/22/2022 5.0  3.5 - 5.3 mmol/L Final    Chloride 09/22/2022 100  98 - 110 mmol/L Final    CO2 09/22/2022 27  20 - 32 mmol/L Final    Calcium 09/22/2022 9.6  8.6 - 10.4 mg/dL Final    Total Protein 09/22/2022 6.8  6.1 - 8.1 g/dL Final    Albumin 09/22/2022 4.1  3.6 - 5.1 g/dL Final    Globulin, Total 09/22/2022 2.7  1.9 - 3.7 g/dL (calc) Final    Albumin/Globulin Ratio 09/22/2022 1.5  1.0 - 2.5 (calc) Final    Total Bilirubin 09/22/2022 0.4  0.2 - 1.2 mg/dL Final    Alkaline Phosphatase 09/22/2022 73  37 - 153 U/L Final    AST 09/22/2022 21  10 - 35 U/L Final    ALT 09/22/2022 17  6 - 29 U/L Final    Cholesterol 09/22/2022 147  <200 mg/dL Final    HDL 09/22/2022 39 (L)  > OR = 50 mg/dL Final    Triglycerides 09/22/2022 157 (H)  <150 mg/dL Final    LDL  Cholesterol 09/22/2022 83  mg/dL (calc) Final    HDL/Cholesterol Ratio 09/22/2022 3.8  <5.0 (calc) Final    Non HDL Chol. (LDL+VLDL) 09/22/2022 108  <130 mg/dL (calc) Final   Office Visit on 06/23/2022   Component Date Value Ref Range Status    Hemoglobin A1C 06/23/2022 7.8  % Final       Past Medical History:   Diagnosis Date    Allergy     Diabetes mellitus, type 2     GERD (gastroesophageal reflux disease)     Hyperlipidemia     Hypertension      Social History     Socioeconomic History    Marital status:    Tobacco Use    Smoking status: Never    Smokeless tobacco: Never   Substance and Sexual Activity    Alcohol use: Never    Drug use: Never     Past Surgical History:   Procedure Laterality Date    HYSTERECTOMY      KNEE SURGERY Left     TONSILLECTOMY       History reviewed. No pertinent family history.    Review of patient's allergies indicates:   Allergen Reactions    Empagliflozin     Ezetimibe      Other reaction(s): Other (See Comments), Unknown  Feels terrible when taking medication       Linzess [linaclotide] Diarrhea    Sitagliptin-metformin      Other reaction(s): diarrhea       Current Outpatient Medications:     aspirin (ECOTRIN) 81 MG EC tablet, Aspir-81 mg tablet,delayed release  Take 1 tablet every day by oral route., Disp: , Rfl:     biotin 10,000 mcg TbDL, 1 tablet, Disp: , Rfl:     blood sugar diagnostic (FREESTYLE LITE STRIPS) Strp, as directed, Disp: 100 each, Rfl: 5    cetirizine (ZYRTEC) 10 MG tablet, Take 10 mg by mouth once daily., Disp: , Rfl:     coenzyme Q10 100 mg capsule, 1 capsule with a meal, Disp: , Rfl:     dapagliflozin (FARXIGA) 5 mg Tab tablet, Farxiga 5 mg tablet  TAKE 1 TABLET BY MOUTH EVERY DAY, Disp: , Rfl:     dulaglutide (TRULICITY) 1.5 mg/0.5 mL pen injector, Inject 1.5 mg into the skin every 7 days., Disp: 12 pen, Rfl: 0    flash glucose sensor (FREESTYLE KARINA 14 DAY SENSOR) Kit, 1 kit by Misc.(Non-Drug; Combo Route) route once daily. (Patient not taking: No  "sig reported), Disp: 1 kit, Rfl: 11    hydroCHLOROthiazide (HYDRODIURIL) 25 MG tablet, Take 1 tablet (25 mg total) by mouth once daily., Disp: 90 tablet, Rfl: 1    krill-om-3-dha-epa-phospho-ast 762-96-96-50 mg Cap, , Disp: , Rfl:     lancets (FREESTYLE LANCETS) 28 gauge Misc, 1 lancet by Misc.(Non-Drug; Combo Route) route 2 (two) times a day., Disp: 200 each, Rfl: 3    lansoprazole (PREVACID) 15 MG capsule, 1 capsule, Disp: , Rfl:     lisinopriL (PRINIVIL,ZESTRIL) 20 MG tablet, Take 1 tablet (20 mg total) by mouth once daily., Disp: 90 tablet, Rfl: 1    metFORMIN (GLUCOPHAGE-XR) 500 MG ER 24hr tablet, metformin  mg tablet,extended release 24 hr  TAKE 2 TABLETS BY MOUTH TWICE DAILY, Disp: , Rfl:     metoprolol succinate (TOPROL-XL) 50 MG 24 hr tablet, Take 1 tablet (50 mg total) by mouth once daily., Disp: 90 tablet, Rfl: 1    pen needle, diabetic (PEN NEEDLE) 31 gauge x 3/16" Ndle, 1 each by In Vitro route 4 (four) times daily. BD ULTRA FINE, Disp: 100 each, Rfl: 5    pitavastatin calcium (LIVALO) 4 mg Tab, Take 1 tablet by mouth once daily., Disp: 90 tablet, Rfl: 1    UNABLE TO FIND, multivit topical patch, Disp: , Rfl:     vitamin D (VITAMIN D3) 1000 units Tab, Take 5,000 Units by mouth., Disp: , Rfl:     Review of Systems   Constitutional:  Negative for chills, fever and unexpected weight change.   HENT:  Negative for ear pain, rhinorrhea and sore throat.    Eyes:  Negative for pain and visual disturbance.   Respiratory:  Negative for cough and shortness of breath.    Cardiovascular:  Negative for chest pain, palpitations and leg swelling.   Gastrointestinal:  Negative for abdominal pain, diarrhea, nausea and vomiting.   Genitourinary:  Negative for difficulty urinating, hematuria and vaginal bleeding.   Musculoskeletal:  Negative for arthralgias.        Knee   Skin:  Negative for rash.   Neurological:  Negative for dizziness, weakness and headaches.   Psychiatric/Behavioral:  Negative for agitation and " "sleep disturbance. The patient is not nervous/anxious.         Objective:      Vitals:    09/27/22 1322   BP: (!) 122/56   Pulse: 76   Weight: 85.3 kg (188 lb)   Height: 5' 3.5" (1.613 m)     Physical Exam  Vitals and nursing note reviewed.   Constitutional:       Appearance: Normal appearance. She is well-developed. She is obese.   HENT:      Head: Normocephalic and atraumatic.      Right Ear: External ear normal.      Left Ear: External ear normal.   Eyes:      Pupils: Pupils are equal, round, and reactive to light.   Cardiovascular:      Rate and Rhythm: Normal rate and regular rhythm.      Pulses:           Dorsalis pedis pulses are 2+ on the right side.        Posterior tibial pulses are 2+ on the right side.      Heart sounds: Normal heart sounds.   Pulmonary:      Effort: Pulmonary effort is normal.      Breath sounds: Normal breath sounds.   Abdominal:      General: Bowel sounds are normal.      Palpations: Abdomen is soft.   Musculoskeletal:         General: No deformity. Normal range of motion.      Cervical back: Normal range of motion and neck supple.      Right knee: Crepitus present.      Left knee: Crepitus present.      Right foot: Normal range of motion. No deformity.   Feet:      Right foot:      Protective Sensation: 5 sites tested.  5 sites sensed.      Left foot:      Protective Sensation: 5 sites tested.  5 sites sensed.      Skin integrity: No skin breakdown.   Lymphadenopathy:      Cervical: No cervical adenopathy.   Skin:     General: Skin is warm and dry.   Neurological:      Mental Status: She is alert and oriented to person, place, and time.   Psychiatric:         Behavior: Behavior normal.         Assessment:       1. Type 2 diabetes mellitus with other specified complication, unspecified whether long term insulin use    2. Gastroesophageal reflux disease, unspecified whether esophagitis present    3. Hyperlipidemia, unspecified hyperlipidemia type    4. Essential hypertension    5. " Primary osteoarthritis of right knee    6. Atherosclerosis of coronary artery, unspecified vessel or lesion type, unspecified whether angina present, unspecified whether native or transplanted heart    7. Right knee pain, unspecified chronicity    8. Encounter for long-term (current) use of other medications           Plan:       Type 2 diabetes mellitus with other specified complication, unspecified whether long term insulin use  -     Hemoglobin A1C, POCT  -     Ambulatory referral/consult to Endocrinology; Future; Expected date: 10/04/2022    Gastroesophageal reflux disease, unspecified whether esophagitis present    Hyperlipidemia, unspecified hyperlipidemia type    Essential hypertension    Primary osteoarthritis of right knee    Atherosclerosis of coronary artery, unspecified vessel or lesion type, unspecified whether angina present, unspecified whether native or transplanted heart    Right knee pain, unspecified chronicity    Encounter for long-term (current) use of other medications    Follow up in about 3 months (around 12/27/2022), or if symptoms worsen or fail to improve, for medication management.        10/8/2022 Verna Shah

## 2022-10-12 RX ORDER — FLASH GLUCOSE SENSOR
1 KIT MISCELLANEOUS DAILY
Qty: 1 KIT | Refills: 11 | Status: SHIPPED | OUTPATIENT
Start: 2022-10-12

## 2022-10-12 NOTE — TELEPHONE ENCOUNTER
----- Message from Hortencia Crabtree sent at 10/12/2022  1:53 PM CDT -----  - pt needs a refill on her freestyle nora sensor   [phone]

## 2022-11-02 ENCOUNTER — TELEPHONE (OUTPATIENT)
Dept: FAMILY MEDICINE | Facility: CLINIC | Age: 72
End: 2022-11-02

## 2022-11-02 DIAGNOSIS — E11.69 TYPE 2 DIABETES MELLITUS WITH OTHER SPECIFIED COMPLICATION, UNSPECIFIED WHETHER LONG TERM INSULIN USE: ICD-10-CM

## 2022-11-02 NOTE — TELEPHONE ENCOUNTER
----- Message from Miya Bynum sent at 11/2/2022  2:36 PM CDT -----  Patient called and stated that she called and went to the nurse line from the menu and left a message on Monday she stated that she need refills on her lancets ,she need another type of sensor she need the noar two sensor, cyclobenzaprine 10 mg, and her  [place] [facility] [place] please give her a call at [phone]

## 2022-11-03 ENCOUNTER — PATIENT MESSAGE (OUTPATIENT)
Dept: FAMILY MEDICINE | Facility: CLINIC | Age: 72
End: 2022-11-03

## 2022-11-03 ENCOUNTER — CLINICAL SUPPORT (OUTPATIENT)
Dept: FAMILY MEDICINE | Facility: CLINIC | Age: 72
End: 2022-11-03
Payer: COMMERCIAL

## 2022-11-03 DIAGNOSIS — Z23 NEED FOR INFLUENZA VACCINATION: Primary | ICD-10-CM

## 2022-11-03 PROCEDURE — 90662 IIV NO PRSV INCREASED AG IM: CPT | Mod: S$GLB,,, | Performed by: NURSE PRACTITIONER

## 2022-11-03 PROCEDURE — 90471 FLU VACCINE - QUADRIVALENT - HIGH DOSE (65+) PRESERVATIVE FREE IM: ICD-10-PCS | Mod: S$GLB,,, | Performed by: NURSE PRACTITIONER

## 2022-11-03 PROCEDURE — 90662 FLU VACCINE - QUADRIVALENT - HIGH DOSE (65+) PRESERVATIVE FREE IM: ICD-10-PCS | Mod: S$GLB,,, | Performed by: NURSE PRACTITIONER

## 2022-11-03 PROCEDURE — 90471 IMMUNIZATION ADMIN: CPT | Mod: S$GLB,,, | Performed by: NURSE PRACTITIONER

## 2022-11-03 RX ORDER — CYCLOBENZAPRINE HCL 10 MG
10 TABLET ORAL 3 TIMES DAILY PRN
Qty: 30 TABLET | Refills: 0 | Status: SHIPPED | OUTPATIENT
Start: 2022-11-03 | End: 2022-11-13

## 2022-11-04 ENCOUNTER — PATIENT MESSAGE (OUTPATIENT)
Dept: FAMILY MEDICINE | Facility: CLINIC | Age: 72
End: 2022-11-04

## 2022-11-07 ENCOUNTER — PATIENT MESSAGE (OUTPATIENT)
Dept: FAMILY MEDICINE | Facility: CLINIC | Age: 72
End: 2022-11-07

## 2022-11-08 RX ORDER — DAPAGLIFLOZIN 5 MG/1
TABLET, FILM COATED ORAL
Qty: 30 TABLET | Refills: 2 | OUTPATIENT
Start: 2022-11-08 | End: 2022-12-19

## 2022-11-08 RX ORDER — METFORMIN HYDROCHLORIDE 500 MG/1
TABLET, EXTENDED RELEASE ORAL
Qty: 120 TABLET | Refills: 2 | OUTPATIENT
Start: 2022-11-08

## 2022-11-08 RX ORDER — INSULIN LISPRO 100 [IU]/ML
10 INJECTION, SOLUTION INTRAVENOUS; SUBCUTANEOUS 3 TIMES DAILY
Qty: 9 ML | Refills: 2 | Status: SHIPPED | OUTPATIENT
Start: 2022-11-08 | End: 2022-12-01 | Stop reason: SDUPTHER

## 2022-11-09 ENCOUNTER — TELEPHONE (OUTPATIENT)
Dept: FAMILY MEDICINE | Facility: CLINIC | Age: 72
End: 2022-11-09

## 2022-11-09 ENCOUNTER — PATIENT MESSAGE (OUTPATIENT)
Dept: FAMILY MEDICINE | Facility: CLINIC | Age: 72
End: 2022-11-09

## 2022-11-09 NOTE — TELEPHONE ENCOUNTER
"Spoke with pt who states today her blood pressure is 120/72 but when the cold sweat actually occurred yesterday she did not think to take her blood pressure. She does not think this is normal - she is inside just doing "very light things" and she breaks out in a cold sweat. It has been happening more often in the last 2-3 weeks. It has happened before in the past and they just went away eventually. She wants to know if this could be her thyroid, I let her know the lab was done in February and wnl.  "

## 2022-11-09 NOTE — TELEPHONE ENCOUNTER
----- Message from Miay Bynum sent at 11/9/2022 10:20 AM CST -----  Patient called and stated that she is having cold sweats from her head to her breast and she would like to know Verna thinks about it she stated that it has happen before please give her a call at [phone]

## 2022-11-21 NOTE — TELEPHONE ENCOUNTER
----- Message from Homa Rocha MA sent at 11/21/2022  3:21 PM CST -----  Pt is calling for a refill on her trulicity. [phone]

## 2022-11-22 RX ORDER — CYCLOBENZAPRINE HCL 10 MG
10 TABLET ORAL 3 TIMES DAILY PRN
Qty: 30 TABLET | Refills: 0 | Status: SHIPPED | OUTPATIENT
Start: 2022-11-22 | End: 2022-12-01 | Stop reason: SDUPTHER

## 2022-11-22 NOTE — TELEPHONE ENCOUNTER
----- Message from Ashwini Olvera MA sent at 11/22/2022  2:10 PM CST -----  Regarding: wrong pharmacy for trulicity  Trulicity sent to [facility], should have gone to [email or web address]. please correct and send. She also needs refill on cyclobenzaprine sent to [facility]  Pt: [phone]

## 2022-11-28 ENCOUNTER — TELEPHONE (OUTPATIENT)
Dept: FAMILY MEDICINE | Facility: CLINIC | Age: 72
End: 2022-11-28

## 2022-11-28 NOTE — TELEPHONE ENCOUNTER
"Spoke with patient who states she saw Giorgi 3 months ago because she fainted and hit her head on the tile floor. States for the last 8-10 days her forehead is a little swollen and tender to touch. States she doesn't remember if it was tender before this, she just noticed it within the last few weeks. States she hasn't reported this since she had knee surgery recently. She wanted this message to go to Dr. Merida because he is a "seasoned doctor" and may have more insight     Printed  "

## 2022-11-28 NOTE — TELEPHONE ENCOUNTER
----- Message from Homa Rocha MA sent at 11/28/2022 10:14 AM CST -----  Regarding: Fall  Pt calling for  opinion regarding a fall she had 3 months ago and her forehead has been tender and sore with a little swelling. [phone]

## 2022-11-28 NOTE — TELEPHONE ENCOUNTER
"Per Dr. Merida "the new tenderness is probably not related to the fall 3 months ago. It could be a skin structure or some sinus pressure. OV can be done to check this week or next." Spoke with patient who requested to be seen this week.   "

## 2022-12-01 ENCOUNTER — OFFICE VISIT (OUTPATIENT)
Dept: FAMILY MEDICINE | Facility: CLINIC | Age: 72
End: 2022-12-01
Payer: COMMERCIAL

## 2022-12-01 VITALS
HEART RATE: 80 BPM | HEIGHT: 64 IN | BODY MASS INDEX: 31.24 KG/M2 | WEIGHT: 183 LBS | DIASTOLIC BLOOD PRESSURE: 60 MMHG | SYSTOLIC BLOOD PRESSURE: 120 MMHG

## 2022-12-01 DIAGNOSIS — S09.93XD: ICD-10-CM

## 2022-12-01 DIAGNOSIS — K21.9 GASTROESOPHAGEAL REFLUX DISEASE, UNSPECIFIED WHETHER ESOPHAGITIS PRESENT: ICD-10-CM

## 2022-12-01 DIAGNOSIS — E11.69 TYPE 2 DIABETES MELLITUS WITH OTHER SPECIFIED COMPLICATION, UNSPECIFIED WHETHER LONG TERM INSULIN USE: ICD-10-CM

## 2022-12-01 DIAGNOSIS — I25.10 ATHEROSCLEROSIS OF CORONARY ARTERY, UNSPECIFIED VESSEL OR LESION TYPE, UNSPECIFIED WHETHER ANGINA PRESENT, UNSPECIFIED WHETHER NATIVE OR TRANSPLANTED HEART: ICD-10-CM

## 2022-12-01 DIAGNOSIS — I10 HYPERTENSION, UNSPECIFIED TYPE: ICD-10-CM

## 2022-12-01 DIAGNOSIS — I10 ESSENTIAL HYPERTENSION: ICD-10-CM

## 2022-12-01 DIAGNOSIS — E78.5 HYPERLIPIDEMIA, UNSPECIFIED HYPERLIPIDEMIA TYPE: ICD-10-CM

## 2022-12-01 DIAGNOSIS — Z12.31 ENCOUNTER FOR SCREENING MAMMOGRAM FOR MALIGNANT NEOPLASM OF BREAST: Primary | ICD-10-CM

## 2022-12-01 LAB — HBA1C MFR BLD: 7 %

## 2022-12-01 PROCEDURE — 3078F PR MOST RECENT DIASTOLIC BLOOD PRESSURE < 80 MM HG: ICD-10-PCS | Mod: CPTII,S$GLB,, | Performed by: NURSE PRACTITIONER

## 2022-12-01 PROCEDURE — 3008F PR BODY MASS INDEX (BMI) DOCUMENTED: ICD-10-PCS | Mod: CPTII,S$GLB,, | Performed by: NURSE PRACTITIONER

## 2022-12-01 PROCEDURE — 3288F PR FALLS RISK ASSESSMENT DOCUMENTED: ICD-10-PCS | Mod: CPTII,S$GLB,, | Performed by: NURSE PRACTITIONER

## 2022-12-01 PROCEDURE — 1159F MED LIST DOCD IN RCRD: CPT | Mod: CPTII,S$GLB,, | Performed by: NURSE PRACTITIONER

## 2022-12-01 PROCEDURE — 1159F PR MEDICATION LIST DOCUMENTED IN MEDICAL RECORD: ICD-10-PCS | Mod: CPTII,S$GLB,, | Performed by: NURSE PRACTITIONER

## 2022-12-01 PROCEDURE — 3008F BODY MASS INDEX DOCD: CPT | Mod: CPTII,S$GLB,, | Performed by: NURSE PRACTITIONER

## 2022-12-01 PROCEDURE — 1160F RVW MEDS BY RX/DR IN RCRD: CPT | Mod: CPTII,S$GLB,, | Performed by: NURSE PRACTITIONER

## 2022-12-01 PROCEDURE — 3288F FALL RISK ASSESSMENT DOCD: CPT | Mod: CPTII,S$GLB,, | Performed by: NURSE PRACTITIONER

## 2022-12-01 PROCEDURE — 99214 PR OFFICE/OUTPT VISIT, EST, LEVL IV, 30-39 MIN: ICD-10-PCS | Mod: S$GLB,,, | Performed by: NURSE PRACTITIONER

## 2022-12-01 PROCEDURE — 99214 OFFICE O/P EST MOD 30 MIN: CPT | Mod: S$GLB,,, | Performed by: NURSE PRACTITIONER

## 2022-12-01 PROCEDURE — 3066F PR DOCUMENTATION OF TREATMENT FOR NEPHROPATHY: ICD-10-PCS | Mod: CPTII,S$GLB,, | Performed by: NURSE PRACTITIONER

## 2022-12-01 PROCEDURE — 83036 HEMOGLOBIN GLYCOSYLATED A1C: CPT | Mod: QW,,, | Performed by: NURSE PRACTITIONER

## 2022-12-01 PROCEDURE — 83036 POCT HEMOGLOBIN A1C: ICD-10-PCS | Mod: QW,,, | Performed by: NURSE PRACTITIONER

## 2022-12-01 PROCEDURE — 4010F ACE/ARB THERAPY RXD/TAKEN: CPT | Mod: CPTII,S$GLB,, | Performed by: NURSE PRACTITIONER

## 2022-12-01 PROCEDURE — 3061F PR NEG MICROALBUMINURIA RESULT DOCUMENTED/REVIEW: ICD-10-PCS | Mod: CPTII,S$GLB,, | Performed by: NURSE PRACTITIONER

## 2022-12-01 PROCEDURE — 1100F PR PT FALLS ASSESS DOC 2+ FALLS/FALL W/INJURY/YR: ICD-10-PCS | Mod: CPTII,S$GLB,, | Performed by: NURSE PRACTITIONER

## 2022-12-01 PROCEDURE — 4010F PR ACE/ARB THEARPY RXD/TAKEN: ICD-10-PCS | Mod: CPTII,S$GLB,, | Performed by: NURSE PRACTITIONER

## 2022-12-01 PROCEDURE — 3061F NEG MICROALBUMINURIA REV: CPT | Mod: CPTII,S$GLB,, | Performed by: NURSE PRACTITIONER

## 2022-12-01 PROCEDURE — 1100F PTFALLS ASSESS-DOCD GE2>/YR: CPT | Mod: CPTII,S$GLB,, | Performed by: NURSE PRACTITIONER

## 2022-12-01 PROCEDURE — 3078F DIAST BP <80 MM HG: CPT | Mod: CPTII,S$GLB,, | Performed by: NURSE PRACTITIONER

## 2022-12-01 PROCEDURE — 3066F NEPHROPATHY DOC TX: CPT | Mod: CPTII,S$GLB,, | Performed by: NURSE PRACTITIONER

## 2022-12-01 PROCEDURE — 3051F HG A1C>EQUAL 7.0%<8.0%: CPT | Mod: CPTII,S$GLB,, | Performed by: NURSE PRACTITIONER

## 2022-12-01 PROCEDURE — 3074F PR MOST RECENT SYSTOLIC BLOOD PRESSURE < 130 MM HG: ICD-10-PCS | Mod: CPTII,S$GLB,, | Performed by: NURSE PRACTITIONER

## 2022-12-01 PROCEDURE — 1160F PR REVIEW ALL MEDS BY PRESCRIBER/CLIN PHARMACIST DOCUMENTED: ICD-10-PCS | Mod: CPTII,S$GLB,, | Performed by: NURSE PRACTITIONER

## 2022-12-01 PROCEDURE — 3074F SYST BP LT 130 MM HG: CPT | Mod: CPTII,S$GLB,, | Performed by: NURSE PRACTITIONER

## 2022-12-01 PROCEDURE — 3051F PR MOST RECENT HEMOGLOBIN A1C LEVEL 7.0 - < 8.0%: ICD-10-PCS | Mod: CPTII,S$GLB,, | Performed by: NURSE PRACTITIONER

## 2022-12-01 RX ORDER — DAPAGLIFLOZIN 5 MG/1
TABLET, FILM COATED ORAL
Qty: 30 TABLET | Refills: 2 | Status: CANCELLED | OUTPATIENT
Start: 2022-12-01

## 2022-12-01 RX ORDER — INSULIN GLARGINE 100 [IU]/ML
INJECTION, SOLUTION SUBCUTANEOUS
COMMUNITY
End: 2022-12-01 | Stop reason: SDUPTHER

## 2022-12-01 NOTE — LETTER
[address] [facility] [address]  [place] [address]  Phone: [phone]   [phone]                        MD Austin Beltrán MD Chequita Williams, MD Matthew Bassett, PA-C Linda Melerine, NP Jodi Powell, NP Hunter Reed, PA-C      Date: 12/01/2022        Patient: Estefanía Ray  YOB: 1950    Please fax over pt most recent eye exam.        Sincerely,     Electronically Signed By: Verna Shah NP

## 2022-12-01 NOTE — PROGRESS NOTES
SUBJECTIVE:    Patient ID: Estefanía Ray is a 72 y.o. female.    Chief Complaint: Follow-up (Forehead tenderness and swelling from fall x10 days, brought bottles, Eye exam req Dr. Funes, Mammo ordered// SW)    72 y.o. female who presents for check up. She is treated for diabetes, cad, hyperlipidemia, htn, gerd, oa.  a1c today is 7.0. reports that fell several months ago. Was seen by Giorgi. Did not lose consciousness. Did have lots of bruising. Today bruising has resolved but is complaining about an enlarged area on forehead. Feels like it is getting bigger. Mild palpation tenderness. Last colonoscopy was 2016 .  Follow up. Last mammogram was 12/21.       Office Visit on 12/01/2022   Component Date Value Ref Range Status    Hemoglobin A1C 12/01/2022 7.0  % Final   Office Visit on 09/27/2022   Component Date Value Ref Range Status    Hemoglobin A1C 09/27/2022 7.0  % Final   Telephone on 09/15/2022   Component Date Value Ref Range Status    Glucose 09/22/2022 132 (H)  65 - 99 mg/dL Final    BUN 09/22/2022 16  7 - 25 mg/dL Final    Creatinine 09/22/2022 0.91  0.60 - 1.00 mg/dL Final    eGFR 09/22/2022 67  > OR = 60 mL/min/1.73m2 Final    BUN/Creatinine Ratio 09/22/2022 NOT APPLICABLE  6 - 22 (calc) Final    Sodium 09/22/2022 137  135 - 146 mmol/L Final    Potassium 09/22/2022 5.0  3.5 - 5.3 mmol/L Final    Chloride 09/22/2022 100  98 - 110 mmol/L Final    CO2 09/22/2022 27  20 - 32 mmol/L Final    Calcium 09/22/2022 9.6  8.6 - 10.4 mg/dL Final    Total Protein 09/22/2022 6.8  6.1 - 8.1 g/dL Final    Albumin 09/22/2022 4.1  3.6 - 5.1 g/dL Final    Globulin, Total 09/22/2022 2.7  1.9 - 3.7 g/dL (calc) Final    Albumin/Globulin Ratio 09/22/2022 1.5  1.0 - 2.5 (calc) Final    Total Bilirubin 09/22/2022 0.4  0.2 - 1.2 mg/dL Final    Alkaline Phosphatase 09/22/2022 73  37 - 153 U/L Final    AST 09/22/2022 21  10 - 35 U/L Final    ALT 09/22/2022 17  6 - 29 U/L Final    Cholesterol 09/22/2022 147  <200 mg/dL Final    HDL  09/22/2022 39 (L)  > OR = 50 mg/dL Final    Triglycerides 09/22/2022 157 (H)  <150 mg/dL Final    LDL Cholesterol 09/22/2022 83  mg/dL (calc) Final    HDL/Cholesterol Ratio 09/22/2022 3.8  <5.0 (calc) Final    Non HDL Chol. (LDL+VLDL) 09/22/2022 108  <130 mg/dL (calc) Final   Office Visit on 06/23/2022   Component Date Value Ref Range Status    Hemoglobin A1C 06/23/2022 7.8  % Final       Past Medical History:   Diagnosis Date    Allergy     Diabetes mellitus, type 2     GERD (gastroesophageal reflux disease)     Hyperlipidemia     Hypertension      Social History     Socioeconomic History    Marital status:    Tobacco Use    Smoking status: Never    Smokeless tobacco: Never   Substance and Sexual Activity    Alcohol use: Never    Drug use: Never     Past Surgical History:   Procedure Laterality Date    HYSTERECTOMY      KNEE SURGERY Left     TONSILLECTOMY       History reviewed. No pertinent family history.    Review of patient's allergies indicates:   Allergen Reactions    Empagliflozin     Ezetimibe      Other reaction(s): Other (See Comments), Unknown  Feels terrible when taking medication       Linzess [linaclotide] Diarrhea    Sitagliptin phos-metformin      Other reaction(s): diarrhea       Current Outpatient Medications:     aspirin (ECOTRIN) 81 MG EC tablet, Aspir-81 mg tablet,delayed release  Take 1 tablet every day by oral route., Disp: , Rfl:     biotin 10,000 mcg TbDL, 1 tablet, Disp: , Rfl:     blood sugar diagnostic (FREESTYLE LITE STRIPS) Strp, as directed, Disp: 100 each, Rfl: 5    cetirizine (ZYRTEC) 10 MG tablet, Take 10 mg by mouth once daily., Disp: , Rfl:     coenzyme Q10 100 mg capsule, 1 capsule with a meal, Disp: , Rfl:     dapagliflozin (FARXIGA) 5 mg Tab tablet, Farxiga 5 mg tablet  TAKE 1 TABLET BY MOUTH EVERY DAY Strength: 5 mg, Disp: 30 tablet, Rfl: 2    flash glucose sensor (FREESTYLE KARINA 14 DAY SENSOR) Kit, 1 kit by Misc.(Non-Drug; Combo Route) route once daily., Disp: 1 kit,  Rfl: 11    krill-om-3-dha-epa-phospho-ast 026-41-07-50 mg Cap, , Disp: , Rfl:     lancets (FREESTYLE LANCETS) 28 gauge Misc, 1 lancet by Misc.(Non-Drug; Combo Route) route 2 (two) times a day., Disp: 200 each, Rfl: 3    lansoprazole (PREVACID) 15 MG capsule, 1 capsule, Disp: , Rfl:     metFORMIN (GLUCOPHAGE-XR) 500 MG ER 24hr tablet, metformin  mg tablet,extended release 24 hr  TAKE 2 TABLETS BY MOUTH TWICE DAILY Strength: 500 mg, Disp: 120 tablet, Rfl: 2    UNABLE TO FIND, multivit topical patch, Disp: , Rfl:     vitamin D (VITAMIN D3) 1000 units Tab, Take 5,000 Units by mouth., Disp: , Rfl:     cyclobenzaprine (FLEXERIL) 10 MG tablet, Take 1 tablet (10 mg total) by mouth 3 (three) times daily as needed for Muscle spasms., Disp: 30 tablet, Rfl: 0    dapagliflozin (FARXIGA) 10 mg tablet, Take 1 tablet (10 mg total) by mouth once daily., Disp: 90 tablet, Rfl: 1    dapagliflozin (FARXIGA) 10 mg tablet, Take 1 tablet (10 mg total) by mouth once daily., Disp: 90 tablet, Rfl: 1    dulaglutide (TRULICITY) 1.5 mg/0.5 mL pen injector, Inject 1.5 mg into the skin every 7 days., Disp: 12 pen, Rfl: 0    hydroCHLOROthiazide (HYDRODIURIL) 25 MG tablet, Take 1 tablet (25 mg total) by mouth once daily., Disp: 90 tablet, Rfl: 1    hydroCHLOROthiazide (HYDRODIURIL) 25 MG tablet, Take 1 tablet (25 mg total) by mouth once daily., Disp: 90 tablet, Rfl: 1    insulin glargine 100 units/mL SubQ pen, Inject 35 Units into the skin once daily., Disp: 3 each, Rfl: 2    insulin lispro (HUMALOG KWIKPEN INSULIN) 100 unit/mL pen, Inject 10 Units into the skin 3 (three) times daily., Disp: 9 mL, Rfl: 2    lisinopriL (PRINIVIL,ZESTRIL) 20 MG tablet, Take 1 tablet (20 mg total) by mouth once daily., Disp: 90 tablet, Rfl: 1    lisinopriL (PRINIVIL,ZESTRIL) 20 MG tablet, Take 1 tablet (20 mg total) by mouth once daily., Disp: 90 tablet, Rfl: 1    metoprolol succinate (TOPROL-XL) 50 MG 24 hr tablet, Take 1 tablet (50 mg total) by mouth once  "daily., Disp: 90 tablet, Rfl: 1    metoprolol succinate (TOPROL-XL) 50 MG 24 hr tablet, Take 1 tablet (50 mg total) by mouth once daily., Disp: 90 tablet, Rfl: 1    pen needle, diabetic (PEN NEEDLE) 31 gauge x 3/16" Ndle, 1 each by In Vitro route 4 (four) times daily. BD ULTRA FINE, Disp: 100 each, Rfl: 5    pitavastatin calcium (LIVALO) 4 mg Tab, Take 1 tablet by mouth once daily., Disp: 90 tablet, Rfl: 1    pitavastatin calcium (LIVALO) 4 mg Tab, Take 1 tablet by mouth once daily., Disp: 90 tablet, Rfl: 1    Review of Systems   Constitutional:  Negative for chills, fever and unexpected weight change.   HENT:  Negative for ear pain, rhinorrhea and sore throat.    Eyes:  Negative for pain and visual disturbance.   Respiratory:  Negative for cough and shortness of breath.    Cardiovascular:  Negative for chest pain, palpitations and leg swelling.   Gastrointestinal:  Negative for abdominal pain, diarrhea, nausea and vomiting.   Genitourinary:  Negative for difficulty urinating, hematuria and vaginal bleeding.   Musculoskeletal:  Negative for arthralgias.   Skin:  Negative for rash.   Neurological:  Negative for dizziness, weakness and headaches.   Psychiatric/Behavioral:  Negative for agitation and sleep disturbance. The patient is not nervous/anxious.         Objective:      Vitals:    12/01/22 1239   BP: 120/60   Pulse: 80   Weight: 83 kg (183 lb)   Height: 5' 3.5" (1.613 m)     Physical Exam  Vitals and nursing note reviewed.   Constitutional:       General: She is not in acute distress.     Appearance: Normal appearance. She is well-developed.   HENT:      Head: Normocephalic.      Right Ear: External ear normal.      Left Ear: External ear normal.   Eyes:      Conjunctiva/sclera: Conjunctivae normal.      Pupils: Pupils are equal, round, and reactive to light.   Neck:      Vascular: No JVD.   Cardiovascular:      Rate and Rhythm: Normal rate and regular rhythm.      Heart sounds: No murmur heard.  Pulmonary:     " " Effort: Pulmonary effort is normal.      Breath sounds: Normal breath sounds.   Abdominal:      General: Bowel sounds are normal.      Palpations: Abdomen is soft.   Musculoskeletal:         General: No deformity. Normal range of motion.      Cervical back: Normal range of motion and neck supple.   Lymphadenopathy:      Cervical: No cervical adenopathy.   Skin:     General: Skin is warm and dry.      Findings: No rash.             Comments: Soft moveable nodule to forehead. No palpation tenderness.  Pupils perrla  Dr. Merida in to see patient   Neurological:      Mental Status: She is alert and oriented to person, place, and time.      Gait: Gait normal.   Psychiatric:         Speech: Speech normal.         Behavior: Behavior normal.         Assessment:       1. Encounter for screening mammogram for malignant neoplasm of breast    2. Type 2 diabetes mellitus with other specified complication, unspecified whether long term insulin use    3. Hypertension, unspecified type    4. Hyperlipidemia, unspecified hyperlipidemia type    5. Gastroesophageal reflux disease, unspecified whether esophagitis present    6. Essential hypertension    7. Atherosclerosis of coronary artery, unspecified vessel or lesion type, unspecified whether angina present, unspecified whether native or transplanted heart    8. Injury of forehead, subsequent encounter           Plan:       Encounter for screening mammogram for malignant neoplasm of breast  -     Mammo Digital Screening Bilat w/ Jace; Future; Expected date: 12/15/2022    Type 2 diabetes mellitus with other specified complication, unspecified whether long term insulin use  Comments:  ok to take 1/2 actos  Orders:  -     pen needle, diabetic (PEN NEEDLE) 31 gauge x 3/16" Ndle; 1 each by In Vitro route 4 (four) times daily. BD ULTRA FINE  Dispense: 100 each; Refill: 5  -     Hemoglobin A1C, POCT    Hypertension, unspecified type  -     metoprolol succinate (TOPROL-XL) 50 MG 24 hr tablet; " Take 1 tablet (50 mg total) by mouth once daily.  Dispense: 90 tablet; Refill: 1  -     lisinopriL (PRINIVIL,ZESTRIL) 20 MG tablet; Take 1 tablet (20 mg total) by mouth once daily.  Dispense: 90 tablet; Refill: 1  -     hydroCHLOROthiazide (HYDRODIURIL) 25 MG tablet; Take 1 tablet (25 mg total) by mouth once daily.  Dispense: 90 tablet; Refill: 1    Hyperlipidemia, unspecified hyperlipidemia type  -     pitavastatin calcium (LIVALO) 4 mg Tab; Take 1 tablet by mouth once daily.  Dispense: 90 tablet; Refill: 1    Gastroesophageal reflux disease, unspecified whether esophagitis present    Essential hypertension    Atherosclerosis of coronary artery, unspecified vessel or lesion type, unspecified whether angina present, unspecified whether native or transplanted heart    Injury of forehead, subsequent encounter  Comments:  dr. farrell in to see patient. will see dermatology    Other orders  -     dulaglutide (TRULICITY) 1.5 mg/0.5 mL pen injector; Inject 1.5 mg into the skin every 7 days.  Dispense: 12 pen; Refill: 0  -     insulin lispro (HUMALOG KWIKPEN INSULIN) 100 unit/mL pen; Inject 10 Units into the skin 3 (three) times daily.  Dispense: 9 mL; Refill: 2  -     insulin glargine 100 units/mL SubQ pen; Inject 35 Units into the skin once daily.  Dispense: 3 each; Refill: 2  -     cyclobenzaprine (FLEXERIL) 10 MG tablet; Take 1 tablet (10 mg total) by mouth 3 (three) times daily as needed for Muscle spasms.  Dispense: 30 tablet; Refill: 0  -     dapagliflozin (FARXIGA) 10 mg tablet; Take 1 tablet (10 mg total) by mouth once daily.  Dispense: 90 tablet; Refill: 1      Follow up if symptoms worsen or fail to improve, for Diabetic Check-Up.        12/15/2022 Verna Shah

## 2022-12-12 ENCOUNTER — PATIENT MESSAGE (OUTPATIENT)
Dept: FAMILY MEDICINE | Facility: CLINIC | Age: 72
End: 2022-12-12

## 2022-12-13 ENCOUNTER — PATIENT MESSAGE (OUTPATIENT)
Dept: FAMILY MEDICINE | Facility: CLINIC | Age: 72
End: 2022-12-13

## 2022-12-14 ENCOUNTER — PATIENT MESSAGE (OUTPATIENT)
Dept: FAMILY MEDICINE | Facility: CLINIC | Age: 72
End: 2022-12-14

## 2022-12-14 DIAGNOSIS — E78.5 HYPERLIPIDEMIA, UNSPECIFIED HYPERLIPIDEMIA TYPE: ICD-10-CM

## 2022-12-14 DIAGNOSIS — I10 HYPERTENSION, UNSPECIFIED TYPE: ICD-10-CM

## 2022-12-14 RX ORDER — HYDROCHLOROTHIAZIDE 25 MG/1
25 TABLET ORAL DAILY
Qty: 90 TABLET | Refills: 1 | Status: SHIPPED | OUTPATIENT
Start: 2022-12-14

## 2022-12-14 RX ORDER — METOPROLOL SUCCINATE 50 MG/1
50 TABLET, EXTENDED RELEASE ORAL DAILY
Qty: 90 TABLET | Refills: 1 | Status: SHIPPED | OUTPATIENT
Start: 2022-12-14 | End: 2023-02-14

## 2022-12-14 RX ORDER — INSULIN GLARGINE 100 [IU]/ML
35 INJECTION, SOLUTION SUBCUTANEOUS DAILY
Qty: 3 EACH | Refills: 2 | Status: SHIPPED | OUTPATIENT
Start: 2022-12-14 | End: 2024-03-05

## 2022-12-14 RX ORDER — PEN NEEDLE, DIABETIC 30 GX3/16"
1 NEEDLE, DISPOSABLE MISCELLANEOUS 4 TIMES DAILY
Qty: 100 EACH | Refills: 5 | Status: SHIPPED | OUTPATIENT
Start: 2022-12-14

## 2022-12-14 RX ORDER — LISINOPRIL 20 MG/1
20 TABLET ORAL DAILY
Qty: 90 TABLET | Refills: 1 | Status: SHIPPED | OUTPATIENT
Start: 2022-12-14 | End: 2023-02-14

## 2022-12-14 RX ORDER — DAPAGLIFLOZIN 10 MG/1
10 TABLET, FILM COATED ORAL DAILY
Qty: 90 TABLET | Refills: 1 | Status: SHIPPED | OUTPATIENT
Start: 2022-12-14 | End: 2022-12-17 | Stop reason: SDUPTHER

## 2022-12-14 RX ORDER — PITAVASTATIN CALCIUM 4.18 MG/1
1 TABLET, FILM COATED ORAL DAILY
Qty: 90 TABLET | Refills: 1 | Status: SHIPPED | OUTPATIENT
Start: 2022-12-14

## 2022-12-14 RX ORDER — DAPAGLIFLOZIN 10 MG/1
10 TABLET, FILM COATED ORAL DAILY
Qty: 90 TABLET | Refills: 1 | Status: SHIPPED | OUTPATIENT
Start: 2022-12-14 | End: 2023-05-04 | Stop reason: SDUPTHER

## 2022-12-14 RX ORDER — INSULIN LISPRO 100 [IU]/ML
10 INJECTION, SOLUTION INTRAVENOUS; SUBCUTANEOUS 3 TIMES DAILY
Qty: 9 ML | Refills: 2 | Status: SHIPPED | OUTPATIENT
Start: 2022-12-14 | End: 2023-11-29

## 2022-12-14 RX ORDER — CYCLOBENZAPRINE HCL 10 MG
10 TABLET ORAL 3 TIMES DAILY PRN
Qty: 30 TABLET | Refills: 0 | Status: SHIPPED | OUTPATIENT
Start: 2022-12-14 | End: 2022-12-24

## 2022-12-14 RX ORDER — LISINOPRIL 20 MG/1
20 TABLET ORAL DAILY
Qty: 90 TABLET | Refills: 1 | Status: SHIPPED | OUTPATIENT
Start: 2022-12-14 | End: 2023-05-04 | Stop reason: SDUPTHER

## 2022-12-14 RX ORDER — PITAVASTATIN CALCIUM 4.18 MG/1
1 TABLET, FILM COATED ORAL DAILY
Qty: 90 TABLET | Refills: 1 | Status: SHIPPED | OUTPATIENT
Start: 2022-12-14 | End: 2023-02-14 | Stop reason: SDUPTHER

## 2022-12-14 RX ORDER — METOPROLOL SUCCINATE 50 MG/1
50 TABLET, EXTENDED RELEASE ORAL DAILY
Qty: 90 TABLET | Refills: 1 | Status: SHIPPED | OUTPATIENT
Start: 2022-12-14 | End: 2023-02-14 | Stop reason: SDUPTHER

## 2022-12-19 ENCOUNTER — TELEPHONE (OUTPATIENT)
Dept: FAMILY MEDICINE | Facility: CLINIC | Age: 72
End: 2022-12-19

## 2022-12-19 NOTE — TELEPHONE ENCOUNTER
----- Message from Hortencia Crabtree sent at 12/19/2022  2:51 PM CST -----  - [facility] is calling about farxiga. They have her with an allergy to inhibitors and needs to know if they can fill   [phone] option 2 then order #  [id number]

## 2022-12-26 ENCOUNTER — OFFICE VISIT (OUTPATIENT)
Dept: URGENT CARE | Facility: CLINIC | Age: 72
End: 2022-12-26
Payer: COMMERCIAL

## 2022-12-26 VITALS
BODY MASS INDEX: 31.01 KG/M2 | OXYGEN SATURATION: 97 % | RESPIRATION RATE: 17 BRPM | HEIGHT: 63 IN | DIASTOLIC BLOOD PRESSURE: 72 MMHG | WEIGHT: 175 LBS | HEART RATE: 83 BPM | SYSTOLIC BLOOD PRESSURE: 114 MMHG | TEMPERATURE: 99 F
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
## 2022-12-26 DIAGNOSIS — S42.211A FX HUMERAL NECK, RIGHT, CLOSED, INITIAL ENCOUNTER: Primary | ICD-10-CM

## 2022-12-26 DIAGNOSIS — M79.642 LEFT HAND PAIN: ICD-10-CM

## 2022-12-26 PROCEDURE — 3074F PR MOST RECENT SYSTOLIC BLOOD PRESSURE < 130 MM HG: ICD-10-PCS | Mod: S$GLB,,, | Performed by: NURSE PRACTITIONER

## 2022-12-26 PROCEDURE — 4010F ACE/ARB THERAPY RXD/TAKEN: CPT | Mod: S$GLB,,, | Performed by: NURSE PRACTITIONER

## 2022-12-26 PROCEDURE — 3288F FALL RISK ASSESSMENT DOCD: CPT | Mod: S$GLB,,, | Performed by: NURSE PRACTITIONER

## 2022-12-26 PROCEDURE — 1159F PR MEDICATION LIST DOCUMENTED IN MEDICAL RECORD: ICD-10-PCS | Mod: S$GLB,,, | Performed by: NURSE PRACTITIONER

## 2022-12-26 PROCEDURE — 1160F RVW MEDS BY RX/DR IN RCRD: CPT | Mod: S$GLB,,, | Performed by: NURSE PRACTITIONER

## 2022-12-26 PROCEDURE — 99214 PR OFFICE/OUTPT VISIT, EST, LEVL IV, 30-39 MIN: ICD-10-PCS | Mod: S$GLB,,, | Performed by: NURSE PRACTITIONER

## 2022-12-26 PROCEDURE — 1100F PTFALLS ASSESS-DOCD GE2>/YR: CPT | Mod: S$GLB,,, | Performed by: NURSE PRACTITIONER

## 2022-12-26 PROCEDURE — 1160F PR REVIEW ALL MEDS BY PRESCRIBER/CLIN PHARMACIST DOCUMENTED: ICD-10-PCS | Mod: S$GLB,,, | Performed by: NURSE PRACTITIONER

## 2022-12-26 PROCEDURE — 3051F HG A1C>EQUAL 7.0%<8.0%: CPT | Mod: S$GLB,,, | Performed by: NURSE PRACTITIONER

## 2022-12-26 PROCEDURE — 3061F NEG MICROALBUMINURIA REV: CPT | Mod: S$GLB,,, | Performed by: NURSE PRACTITIONER

## 2022-12-26 PROCEDURE — 3008F PR BODY MASS INDEX (BMI) DOCUMENTED: ICD-10-PCS | Mod: S$GLB,,, | Performed by: NURSE PRACTITIONER

## 2022-12-26 PROCEDURE — 1159F MED LIST DOCD IN RCRD: CPT | Mod: S$GLB,,, | Performed by: NURSE PRACTITIONER

## 2022-12-26 PROCEDURE — 3066F PR DOCUMENTATION OF TREATMENT FOR NEPHROPATHY: ICD-10-PCS | Mod: S$GLB,,, | Performed by: NURSE PRACTITIONER

## 2022-12-26 PROCEDURE — 3078F DIAST BP <80 MM HG: CPT | Mod: S$GLB,,, | Performed by: NURSE PRACTITIONER

## 2022-12-26 PROCEDURE — 99214 OFFICE O/P EST MOD 30 MIN: CPT | Mod: S$GLB,,, | Performed by: NURSE PRACTITIONER

## 2022-12-26 PROCEDURE — 3008F BODY MASS INDEX DOCD: CPT | Mod: S$GLB,,, | Performed by: NURSE PRACTITIONER

## 2022-12-26 PROCEDURE — 4010F PR ACE/ARB THEARPY RXD/TAKEN: ICD-10-PCS | Mod: S$GLB,,, | Performed by: NURSE PRACTITIONER

## 2022-12-26 PROCEDURE — 3066F NEPHROPATHY DOC TX: CPT | Mod: S$GLB,,, | Performed by: NURSE PRACTITIONER

## 2022-12-26 PROCEDURE — 3074F SYST BP LT 130 MM HG: CPT | Mod: S$GLB,,, | Performed by: NURSE PRACTITIONER

## 2022-12-26 PROCEDURE — 3051F PR MOST RECENT HEMOGLOBIN A1C LEVEL 7.0 - < 8.0%: ICD-10-PCS | Mod: S$GLB,,, | Performed by: NURSE PRACTITIONER

## 2022-12-26 PROCEDURE — 3061F PR NEG MICROALBUMINURIA RESULT DOCUMENTED/REVIEW: ICD-10-PCS | Mod: S$GLB,,, | Performed by: NURSE PRACTITIONER

## 2022-12-26 PROCEDURE — 3288F PR FALLS RISK ASSESSMENT DOCUMENTED: ICD-10-PCS | Mod: S$GLB,,, | Performed by: NURSE PRACTITIONER

## 2022-12-26 PROCEDURE — 3078F PR MOST RECENT DIASTOLIC BLOOD PRESSURE < 80 MM HG: ICD-10-PCS | Mod: S$GLB,,, | Performed by: NURSE PRACTITIONER

## 2022-12-26 PROCEDURE — 1100F PR PT FALLS ASSESS DOC 2+ FALLS/FALL W/INJURY/YR: ICD-10-PCS | Mod: S$GLB,,, | Performed by: NURSE PRACTITIONER

## 2022-12-26 RX ORDER — HYDROCODONE BITARTRATE AND ACETAMINOPHEN 5; 325 MG/1; MG/1
TABLET ORAL
COMMUNITY
End: 2022-12-26 | Stop reason: ALTCHOICE

## 2022-12-26 RX ORDER — TRAMADOL HYDROCHLORIDE 50 MG/1
50 TABLET ORAL EVERY 6 HOURS PRN
Qty: 28 TABLET | Refills: 0 | Status: SHIPPED | OUTPATIENT
Start: 2022-12-26 | End: 2023-01-02

## 2022-12-26 NOTE — PROGRESS NOTES
"Subjective:       Patient ID: Estefanía Ray is a 72 y.o. female.    Vitals:  height is 5' 3" (1.6 m) and weight is 79.4 kg (175 lb). Her oral temperature is 99.2 °F (37.3 °C). Her blood pressure is 114/72 and her pulse is 83. Her respiration is 17 and oxygen saturation is 97%.     Chief Complaint: Fall    Estefanía Ray presents to clinic with right shoulder and left hand pain after falling on yesterday.  In addition to this patient also reports pain noted to the right side of her face but she states that this is less severe overall.  Patient does not remember hearing a crack or pop when she fell because of how quickly the incident happened.  Patient is unable to raise the right shoulder completely and is also unable to fully extend the right arm.    Fall  The accident occurred 12 to 24 hours ago. The fall occurred while walking. She landed on Hard floor. The point of impact was the right shoulder, face and right wrist. The pain is present in the face, right upper arm, right shoulder, right wrist and right hand. The pain is at a severity of 10/10. She has tried ice for the symptoms. The treatment provided mild relief.     Constitution: Negative.   HENT: Negative.     Neck: neck negative.   Cardiovascular: Negative.    Eyes: Negative.    Respiratory: Negative.     Gastrointestinal: Negative.    Endocrine: negative.   Genitourinary: Negative.    Musculoskeletal:  Positive for pain, trauma and abnormal ROM of joint.   Skin: Negative.      Objective:      Physical Exam   Constitutional: She is oriented to person, place, and time. She appears well-developed. She is cooperative.  Non-toxic appearance. She does not appear ill. No distress.   HENT:   Head: Normocephalic and atraumatic.   Ears:   Right Ear: Hearing, tympanic membrane, external ear and ear canal normal.   Left Ear: Hearing, tympanic membrane, external ear and ear canal normal.   Nose: Nose normal. No mucosal edema, rhinorrhea or nasal deformity. No epistaxis. " Right sinus exhibits no maxillary sinus tenderness and no frontal sinus tenderness. Left sinus exhibits no maxillary sinus tenderness and no frontal sinus tenderness.   Mouth/Throat: Uvula is midline, oropharynx is clear and moist and mucous membranes are normal. No trismus in the jaw. Normal dentition. No uvula swelling. No posterior oropharyngeal erythema.   Eyes: Conjunctivae and lids are normal. Right eye exhibits no discharge. Left eye exhibits no discharge. No scleral icterus.   Neck: Trachea normal and phonation normal. Neck supple.   Cardiovascular: Normal rate, regular rhythm, normal heart sounds and normal pulses.   Pulmonary/Chest: Effort normal and breath sounds normal. No respiratory distress.   Abdominal: Normal appearance and bowel sounds are normal. She exhibits no distension and no mass. Soft. There is no abdominal tenderness.   Musculoskeletal:      Right shoulder: She exhibits decreased range of motion, tenderness, bony tenderness and deformity.      Left hand: She exhibits decreased range of motion, tenderness and swelling.   Neurological: She is alert and oriented to person, place, and time. She exhibits normal muscle tone. Coordination normal.   Skin: Skin is warm, dry, intact, not diaphoretic and not pale.   Psychiatric: Her speech is normal and behavior is normal. Judgment and thought content normal.   Nursing note and vitals reviewed.      Assessment:       1. Fx humeral neck, right, closed, initial encounter    2. Fall, initial encounter    3. Left hand pain          Plan:         Fx humeral neck, right, closed, initial encounter  -     Ambulatory referral/consult to Orthopedics  -     traMADoL (ULTRAM) 50 mg tablet; Take 1 tablet (50 mg total) by mouth every 6 (six) hours as needed for Pain.  Dispense: 28 tablet; Refill: 0    Fall, initial encounter  -     XR HAND COMPLETE 3 VIEW LEFT; Future; Expected date: 12/26/2022  -     XR SHOULDER COMPLETE 2 OR MORE VIEWS RIGHT; Future; Expected  date: 12/26/2022  -     Cancel: XR ELBOW COMPLETE 3 VIEW RIGHT; Future; Expected date: 12/26/2022  -     Ambulatory referral/consult to Orthopedics  -     traMADoL (ULTRAM) 50 mg tablet; Take 1 tablet (50 mg total) by mouth every 6 (six) hours as needed for Pain.  Dispense: 28 tablet; Refill: 0    Left hand pain  -     Ambulatory referral/consult to Orthopedics  -     traMADoL (ULTRAM) 50 mg tablet; Take 1 tablet (50 mg total) by mouth every 6 (six) hours as needed for Pain.  Dispense: 28 tablet; Refill: 0

## 2022-12-27 ENCOUNTER — TELEPHONE (OUTPATIENT)
Dept: FAMILY MEDICINE | Facility: CLINIC | Age: 72
End: 2022-12-27

## 2022-12-27 ENCOUNTER — TELEPHONE (OUTPATIENT)
Dept: ORTHOPEDICS | Facility: CLINIC | Age: 72
End: 2022-12-27
Payer: COMMERCIAL

## 2022-12-27 DIAGNOSIS — S42.301A CLOSED FRACTURE OF RIGHT UPPER EXTREMITY, INITIAL ENCOUNTER: Primary | ICD-10-CM

## 2022-12-27 NOTE — TELEPHONE ENCOUNTER
Spoke to pt. States she went to  Yesterday. She  broke her arm. Pt states she will call to day to get appt scheduled with ortho. Pt declines referral right now. States she will call back later today if she needs one.

## 2022-12-27 NOTE — TELEPHONE ENCOUNTER
----- Message from Miya Bynum sent at 12/27/2022  7:40 AM CST -----  VM 12/26/22 @ 9:04 AM :patient called and stated that she fell on [place] on her right arm if any questions please call [phone]

## 2022-12-27 NOTE — TELEPHONE ENCOUNTER
----- Message from Ashwini Ahuja sent at 12/27/2022  8:33 AM CST -----  Patient called to notify Verna of a Fx on rt arm and would like a referral to Dr. Goldman ([facility]) Ortho.   [phone]

## 2022-12-27 NOTE — TELEPHONE ENCOUNTER
----- Message from Adrian Florence MD sent at 12/27/2022 10:00 AM CST -----  Regarding: RE: np appoinment request  Ok to schedule   ----- Message -----  From: Jessica Treviño MA  Sent: 12/27/2022   9:24 AM CST  To: Adrian Florence MD  Subject: FW: np appoinment request                        Pt went to urgent care yesterday wants to be seen by you   Xray in chart  Please advise   ----- Message -----  From: Jessie Villanueva  Sent: 12/27/2022   8:09 AM CST  To: N[facility] Decker Staff  Subject: np appoinment request                            Name of Who is Calling:          What is the request in detail:np appoinment request pt went to urgent care on yesterday for a broken right arm           Can the clinic reply by [facility]: no           [facility] [phone] (home)

## 2023-02-07 ENCOUNTER — TELEPHONE (OUTPATIENT)
Dept: FAMILY MEDICINE | Facility: CLINIC | Age: 73
End: 2023-02-07

## 2023-02-07 DIAGNOSIS — E78.5 HYPERLIPIDEMIA, UNSPECIFIED HYPERLIPIDEMIA TYPE: ICD-10-CM

## 2023-02-07 DIAGNOSIS — Z79.899 ENCOUNTER FOR LONG-TERM (CURRENT) USE OF OTHER MEDICATIONS: Primary | ICD-10-CM

## 2023-02-07 DIAGNOSIS — I10 HYPERTENSION, UNSPECIFIED TYPE: ICD-10-CM

## 2023-02-07 DIAGNOSIS — E11.69 TYPE 2 DIABETES MELLITUS WITH OTHER SPECIFIED COMPLICATION, UNSPECIFIED WHETHER LONG TERM INSULIN USE: ICD-10-CM

## 2023-02-14 ENCOUNTER — TELEPHONE (OUTPATIENT)
Dept: FAMILY MEDICINE | Facility: CLINIC | Age: 73
End: 2023-02-14

## 2023-02-14 ENCOUNTER — OFFICE VISIT (OUTPATIENT)
Dept: FAMILY MEDICINE | Facility: CLINIC | Age: 73
End: 2023-02-14
Payer: COMMERCIAL

## 2023-02-14 VITALS
HEIGHT: 63 IN | BODY MASS INDEX: 31.3 KG/M2 | WEIGHT: 176.63 LBS | HEART RATE: 88 BPM | SYSTOLIC BLOOD PRESSURE: 122 MMHG | DIASTOLIC BLOOD PRESSURE: 72 MMHG | OXYGEN SATURATION: 99 %

## 2023-02-14 DIAGNOSIS — M62.838 MUSCLE SPASM: ICD-10-CM

## 2023-02-14 DIAGNOSIS — I25.10 ATHEROSCLEROSIS OF CORONARY ARTERY, UNSPECIFIED VESSEL OR LESION TYPE, UNSPECIFIED WHETHER ANGINA PRESENT, UNSPECIFIED WHETHER NATIVE OR TRANSPLANTED HEART: ICD-10-CM

## 2023-02-14 DIAGNOSIS — K21.9 GASTROESOPHAGEAL REFLUX DISEASE, UNSPECIFIED WHETHER ESOPHAGITIS PRESENT: Primary | ICD-10-CM

## 2023-02-14 DIAGNOSIS — I10 HYPERTENSION, UNSPECIFIED TYPE: ICD-10-CM

## 2023-02-14 DIAGNOSIS — Z79.899 ENCOUNTER FOR LONG-TERM (CURRENT) USE OF OTHER MEDICATIONS: ICD-10-CM

## 2023-02-14 DIAGNOSIS — S42.301A CLOSED FRACTURE OF RIGHT UPPER EXTREMITY, INITIAL ENCOUNTER: ICD-10-CM

## 2023-02-14 DIAGNOSIS — E11.69 TYPE 2 DIABETES MELLITUS WITH OTHER SPECIFIED COMPLICATION, UNSPECIFIED WHETHER LONG TERM INSULIN USE: ICD-10-CM

## 2023-02-14 DIAGNOSIS — E78.5 HYPERLIPIDEMIA, UNSPECIFIED HYPERLIPIDEMIA TYPE: ICD-10-CM

## 2023-02-14 DIAGNOSIS — M17.11 PRIMARY OSTEOARTHRITIS OF RIGHT KNEE: ICD-10-CM

## 2023-02-14 PROCEDURE — 3288F FALL RISK ASSESSMENT DOCD: CPT | Mod: CPTII,S$GLB,, | Performed by: NURSE PRACTITIONER

## 2023-02-14 PROCEDURE — 3074F SYST BP LT 130 MM HG: CPT | Mod: CPTII,S$GLB,, | Performed by: NURSE PRACTITIONER

## 2023-02-14 PROCEDURE — 1160F PR REVIEW ALL MEDS BY PRESCRIBER/CLIN PHARMACIST DOCUMENTED: ICD-10-PCS | Mod: CPTII,S$GLB,, | Performed by: NURSE PRACTITIONER

## 2023-02-14 PROCEDURE — 99214 OFFICE O/P EST MOD 30 MIN: CPT | Mod: S$GLB,,, | Performed by: NURSE PRACTITIONER

## 2023-02-14 PROCEDURE — 3078F DIAST BP <80 MM HG: CPT | Mod: CPTII,S$GLB,, | Performed by: NURSE PRACTITIONER

## 2023-02-14 PROCEDURE — 3074F PR MOST RECENT SYSTOLIC BLOOD PRESSURE < 130 MM HG: ICD-10-PCS | Mod: CPTII,S$GLB,, | Performed by: NURSE PRACTITIONER

## 2023-02-14 PROCEDURE — 99214 PR OFFICE/OUTPT VISIT, EST, LEVL IV, 30-39 MIN: ICD-10-PCS | Mod: S$GLB,,, | Performed by: NURSE PRACTITIONER

## 2023-02-14 PROCEDURE — 3078F PR MOST RECENT DIASTOLIC BLOOD PRESSURE < 80 MM HG: ICD-10-PCS | Mod: CPTII,S$GLB,, | Performed by: NURSE PRACTITIONER

## 2023-02-14 PROCEDURE — 1101F PR PT FALLS ASSESS DOC 0-1 FALLS W/OUT INJ PAST YR: ICD-10-PCS | Mod: CPTII,S$GLB,, | Performed by: NURSE PRACTITIONER

## 2023-02-14 PROCEDURE — 1101F PT FALLS ASSESS-DOCD LE1/YR: CPT | Mod: CPTII,S$GLB,, | Performed by: NURSE PRACTITIONER

## 2023-02-14 PROCEDURE — 4010F ACE/ARB THERAPY RXD/TAKEN: CPT | Mod: CPTII,S$GLB,, | Performed by: NURSE PRACTITIONER

## 2023-02-14 PROCEDURE — 1160F RVW MEDS BY RX/DR IN RCRD: CPT | Mod: CPTII,S$GLB,, | Performed by: NURSE PRACTITIONER

## 2023-02-14 PROCEDURE — 3008F PR BODY MASS INDEX (BMI) DOCUMENTED: ICD-10-PCS | Mod: CPTII,S$GLB,, | Performed by: NURSE PRACTITIONER

## 2023-02-14 PROCEDURE — 3008F BODY MASS INDEX DOCD: CPT | Mod: CPTII,S$GLB,, | Performed by: NURSE PRACTITIONER

## 2023-02-14 PROCEDURE — 1159F PR MEDICATION LIST DOCUMENTED IN MEDICAL RECORD: ICD-10-PCS | Mod: CPTII,S$GLB,, | Performed by: NURSE PRACTITIONER

## 2023-02-14 PROCEDURE — 1159F MED LIST DOCD IN RCRD: CPT | Mod: CPTII,S$GLB,, | Performed by: NURSE PRACTITIONER

## 2023-02-14 PROCEDURE — 3288F PR FALLS RISK ASSESSMENT DOCUMENTED: ICD-10-PCS | Mod: CPTII,S$GLB,, | Performed by: NURSE PRACTITIONER

## 2023-02-14 PROCEDURE — 4010F PR ACE/ARB THEARPY RXD/TAKEN: ICD-10-PCS | Mod: CPTII,S$GLB,, | Performed by: NURSE PRACTITIONER

## 2023-02-14 RX ORDER — PITAVASTATIN CALCIUM 4.18 MG/1
1 TABLET, FILM COATED ORAL DAILY
Qty: 90 TABLET | Refills: 1 | Status: SHIPPED | OUTPATIENT
Start: 2023-02-14 | End: 2023-05-04 | Stop reason: SDUPTHER

## 2023-02-14 RX ORDER — METOPROLOL SUCCINATE 50 MG/1
50 TABLET, EXTENDED RELEASE ORAL DAILY
Qty: 90 TABLET | Refills: 1 | Status: SHIPPED | OUTPATIENT
Start: 2023-02-14 | End: 2023-05-04 | Stop reason: SDUPTHER

## 2023-02-14 RX ORDER — CYCLOBENZAPRINE HCL 10 MG
10 TABLET ORAL 3 TIMES DAILY PRN
Qty: 270 TABLET | Refills: 1 | Status: SHIPPED | OUTPATIENT
Start: 2023-02-14 | End: 2023-05-04 | Stop reason: SDUPTHER

## 2023-02-14 NOTE — TELEPHONE ENCOUNTER
----- Message from Emma Carver sent at 2/14/2023  1:05 PM CST -----  Verna saw this patient today. She wanted to see her in 3 months. The patient is going out of town until October. She will call and set an appointment up. She just wanted to remind Verna. Pt's # [phone] GH

## 2023-02-14 NOTE — PROGRESS NOTES
SUBJECTIVE:    Patient ID: Estefanía Ray is a 72 y.o. female.    Chief Complaint: 3 month f/u (Bottles brought/ Med refill/BG)    72 y.o. female who presents for check up. She is treated for diabetes, cad, hyperlipidemia, htn, gerd, oa.  Last a1c was 6.8. taking meds as prescribed. Being followed by dr. Pickard. She is trying to eat better. She is in therapy for right humeral fracture following a fall . Has had significant improvement. Range of motion is almost back to normal. Plans to move to [place] for several months with  because of his job. Needs refills. Had labs with dr. Pickard.       Office Visit on 12/01/2022   Component Date Value Ref Range Status    Hemoglobin A1C, POC 12/01/2022 7.0  % Final   Office Visit on 09/27/2022   Component Date Value Ref Range Status    Hemoglobin A1C, POC 09/27/2022 7.0  % Final   Telephone on 09/15/2022   Component Date Value Ref Range Status    Glucose 09/22/2022 132 (H)  65 - 99 mg/dL Final    BUN 09/22/2022 16  7 - 25 mg/dL Final    Creatinine 09/22/2022 0.91  0.60 - 1.00 mg/dL Final    eGFR 09/22/2022 67  > OR = 60 mL/min/1.73m2 Final    BUN/Creatinine Ratio 09/22/2022 NOT APPLICABLE  6 - 22 (calc) Final    Sodium 09/22/2022 137  135 - 146 mmol/L Final    Potassium 09/22/2022 5.0  3.5 - 5.3 mmol/L Final    Chloride 09/22/2022 100  98 - 110 mmol/L Final    CO2 09/22/2022 27  20 - 32 mmol/L Final    Calcium 09/22/2022 9.6  8.6 - 10.4 mg/dL Final    Total Protein 09/22/2022 6.8  6.1 - 8.1 g/dL Final    Albumin 09/22/2022 4.1  3.6 - 5.1 g/dL Final    Globulin, Total 09/22/2022 2.7  1.9 - 3.7 g/dL (calc) Final    Albumin/Globulin Ratio 09/22/2022 1.5  1.0 - 2.5 (calc) Final    Total Bilirubin 09/22/2022 0.4  0.2 - 1.2 mg/dL Final    Alkaline Phosphatase 09/22/2022 73  37 - 153 U/L Final    AST 09/22/2022 21  10 - 35 U/L Final    ALT 09/22/2022 17  6 - 29 U/L Final    Cholesterol 09/22/2022 147  <200 mg/dL Final    HDL 09/22/2022 39 (L)  > OR = 50 mg/dL Final     Triglycerides 09/22/2022 157 (H)  <150 mg/dL Final    LDL Cholesterol 09/22/2022 83  mg/dL (calc) Final    HDL/Cholesterol Ratio 09/22/2022 3.8  <5.0 (calc) Final    Non HDL Chol. (LDL+VLDL) 09/22/2022 108  <130 mg/dL (calc) Final       Past Medical History:   Diagnosis Date    Allergy     Diabetes mellitus, type 2     GERD (gastroesophageal reflux disease)     Hyperlipidemia     Hypertension      Social History     Socioeconomic History    Marital status:    Tobacco Use    Smoking status: Never    Smokeless tobacco: Never   Substance and Sexual Activity    Alcohol use: Never    Drug use: Never     Past Surgical History:   Procedure Laterality Date    HYSTERECTOMY      KNEE SURGERY Left     TONSILLECTOMY       History reviewed. No pertinent family history.    Review of patient's allergies indicates:   Allergen Reactions    Empagliflozin     Ezetimibe      Other reaction(s): Other (See Comments), Unknown  Feels terrible when taking medication       Linzess [linaclotide] Diarrhea    Sitagliptin phos-metformin      Other reaction(s): diarrhea       Current Outpatient Medications:     aspirin (ECOTRIN) 81 MG EC tablet, Aspir-81 mg tablet,delayed release  Take 1 tablet every day by oral route., Disp: , Rfl:     cetirizine (ZYRTEC) 10 MG tablet, Take 10 mg by mouth once daily., Disp: , Rfl:     dapagliflozin (FARXIGA) 10 mg tablet, Take 1 tablet (10 mg total) by mouth once daily., Disp: 90 tablet, Rfl: 1    dulaglutide (TRULICITY) 1.5 mg/0.5 mL pen injector, Inject 1.5 mg into the skin every 7 days., Disp: 12 pen, Rfl: 0    flash glucose sensor (FREESTYLE KARINA 14 DAY SENSOR) Kit, 1 kit by Misc.(Non-Drug; Combo Route) route once daily., Disp: 1 kit, Rfl: 11    insulin glargine 100 units/mL SubQ pen, Inject 35 Units into the skin once daily., Disp: 3 each, Rfl: 2    lancets (FREESTYLE LANCETS) 28 gauge Misc, 1 lancet by Misc.(Non-Drug; Combo Route) route 2 (two) times a day., Disp: 200 each, Rfl: 3    lisinopriL  "(PRINIVIL,ZESTRIL) 20 MG tablet, Take 1 tablet (20 mg total) by mouth once daily., Disp: 90 tablet, Rfl: 1    metFORMIN (GLUCOPHAGE-XR) 500 MG ER 24hr tablet, metformin  mg tablet,extended release 24 hr  TAKE 2 TABLETS BY MOUTH TWICE DAILY Strength: 500 mg, Disp: 120 tablet, Rfl: 2    pen needle, diabetic (PEN NEEDLE) 31 gauge x 3/16" Ndle, 1 each by In Vitro route 4 (four) times daily. BD ULTRA FINE, Disp: 100 each, Rfl: 5    UNABLE TO FIND, medication name: Balance of nuture fruits, Disp: , Rfl:     UNABLE TO FIND, medication name: balance of nuture veggies, Disp: , Rfl:     vitamin D (VITAMIN D3) 1000 units Tab, Take 5,000 Units by mouth., Disp: , Rfl:     biotin 10,000 mcg TbDL, 1 tablet, Disp: , Rfl:     blood sugar diagnostic (FREESTYLE LITE STRIPS) Strp, as directed (Patient not taking: Reported on 12/26/2022), Disp: 100 each, Rfl: 5    coenzyme Q10 100 mg capsule, 1 capsule with a meal, Disp: , Rfl:     cyclobenzaprine (FLEXERIL) 10 MG tablet, Take 1 tablet (10 mg total) by mouth 3 (three) times daily as needed for Muscle spasms., Disp: 270 tablet, Rfl: 1    hydroCHLOROthiazide (HYDRODIURIL) 25 MG tablet, Take 1 tablet (25 mg total) by mouth once daily. (Patient not taking: Reported on 2/14/2023), Disp: 90 tablet, Rfl: 1    hydroCHLOROthiazide (HYDRODIURIL) 25 MG tablet, Take 1 tablet (25 mg total) by mouth once daily. (Patient not taking: Reported on 2/14/2023), Disp: 90 tablet, Rfl: 1    insulin lispro (HUMALOG KWIKPEN INSULIN) 100 unit/mL pen, Inject 10 Units into the skin 3 (three) times daily., Disp: 9 mL, Rfl: 2    krill-om-3-dha-epa-phospho-ast 787-69-70-50 mg Cap, , Disp: , Rfl:     lansoprazole (PREVACID) 15 MG capsule, 1 capsule, Disp: , Rfl:     metoprolol succinate (TOPROL-XL) 50 MG 24 hr tablet, Take 1 tablet (50 mg total) by mouth once daily., Disp: 90 tablet, Rfl: 1    pitavastatin calcium (LIVALO) 4 mg Tab, Take 1 tablet by mouth once daily. (Patient not taking: Reported on 12/26/2022), " "Disp: 90 tablet, Rfl: 1    pitavastatin calcium (LIVALO) 4 mg Tab, Take 1 tablet by mouth once daily., Disp: 90 tablet, Rfl: 1    UNABLE TO FIND, multivit topical patch, Disp: , Rfl:     Review of Systems   Constitutional:  Negative for chills, fever and unexpected weight change.   HENT:  Negative for ear pain, rhinorrhea and sore throat.    Eyes:  Negative for pain and visual disturbance.   Respiratory:  Negative for cough and shortness of breath.    Cardiovascular:  Negative for chest pain, palpitations and leg swelling.   Gastrointestinal:  Negative for abdominal pain, diarrhea, nausea and vomiting.   Genitourinary:  Negative for difficulty urinating, hematuria and vaginal bleeding.   Musculoskeletal:  Negative for arthralgias.   Skin:  Negative for rash.   Neurological:  Negative for dizziness, weakness and headaches.   Psychiatric/Behavioral:  Negative for agitation and sleep disturbance. The patient is not nervous/anxious.         Objective:      Vitals:    02/14/23 1200   BP: 122/72   Pulse: 88   SpO2: 99%   Weight: 80.1 kg (176 lb 9.6 oz)   Height: 5' 3" (1.6 m)     Physical Exam  Vitals and nursing note reviewed.   Constitutional:       General: She is not in acute distress.     Appearance: Normal appearance. She is well-developed. She is obese.   HENT:      Head: Normocephalic.      Right Ear: External ear normal.      Left Ear: External ear normal.   Eyes:      Conjunctiva/sclera: Conjunctivae normal.      Pupils: Pupils are equal, round, and reactive to light.   Neck:      Vascular: No JVD.   Cardiovascular:      Rate and Rhythm: Normal rate and regular rhythm.      Heart sounds: No murmur heard.  Pulmonary:      Effort: Pulmonary effort is normal.      Breath sounds: Normal breath sounds.   Abdominal:      General: Bowel sounds are normal.      Palpations: Abdomen is soft.   Musculoskeletal:         General: No deformity. Normal range of motion.      Right upper arm: No swelling, deformity or bony " tenderness.      Cervical back: Normal range of motion and neck supple.   Lymphadenopathy:      Cervical: No cervical adenopathy.   Skin:     General: Skin is warm and dry.      Findings: No rash.   Neurological:      Mental Status: She is alert and oriented to person, place, and time.      Gait: Gait normal.   Psychiatric:         Speech: Speech normal.         Behavior: Behavior normal.         Assessment:       1. Gastroesophageal reflux disease, unspecified whether esophagitis present    2. Hyperlipidemia, unspecified hyperlipidemia type    3. Hypertension, unspecified type    4. Atherosclerosis of coronary artery, unspecified vessel or lesion type, unspecified whether angina present, unspecified whether native or transplanted heart    5. Type 2 diabetes mellitus with other specified complication, unspecified whether long term insulin use    6. Primary osteoarthritis of right knee    7. Closed fracture of right upper extremity, initial encounter    8. Encounter for long-term (current) use of other medications    9. Muscle spasm           Plan:       Gastroesophageal reflux disease, unspecified whether esophagitis present    Hyperlipidemia, unspecified hyperlipidemia type  -     pitavastatin calcium (LIVALO) 4 mg Tab; Take 1 tablet by mouth once daily.  Dispense: 90 tablet; Refill: 1    Hypertension, unspecified type  -     metoprolol succinate (TOPROL-XL) 50 MG 24 hr tablet; Take 1 tablet (50 mg total) by mouth once daily.  Dispense: 90 tablet; Refill: 1    Atherosclerosis of coronary artery, unspecified vessel or lesion type, unspecified whether angina present, unspecified whether native or transplanted heart    Type 2 diabetes mellitus with other specified complication, unspecified whether long term insulin use    Primary osteoarthritis of right knee    Closed fracture of right upper extremity, initial encounter    Encounter for long-term (current) use of other medications    Muscle spasm    Other orders  -      cyclobenzaprine (FLEXERIL) 10 MG tablet; Take 1 tablet (10 mg total) by mouth 3 (three) times daily as needed for Muscle spasms.  Dispense: 270 tablet; Refill: 1      Follow up in about 3 months (around 5/14/2023), or if symptoms worsen or fail to improve, for medication management.        2/23/2023 Verna Shah

## 2023-03-08 ENCOUNTER — HOSPITAL ENCOUNTER (OUTPATIENT)
Dept: RADIOLOGY | Facility: HOSPITAL | Age: 73
Discharge: HOME | End: 2023-03-08
Attending: NURSE PRACTITIONER
Payer: COMMERCIAL

## 2023-03-08 DIAGNOSIS — Z12.31 ENCOUNTER FOR SCREENING MAMMOGRAM FOR MALIGNANT NEOPLASM OF BREAST: ICD-10-CM

## 2023-03-08 PROCEDURE — 77067 SCR MAMMO BI INCL CAD: CPT | Mod: TC,PO

## 2023-03-23 ENCOUNTER — TELEPHONE (OUTPATIENT)
Dept: FAMILY MEDICINE | Facility: CLINIC | Age: 73
End: 2023-03-23

## 2023-05-04 DIAGNOSIS — I10 HYPERTENSION, UNSPECIFIED TYPE: ICD-10-CM

## 2023-05-04 DIAGNOSIS — E78.5 HYPERLIPIDEMIA, UNSPECIFIED HYPERLIPIDEMIA TYPE: ICD-10-CM

## 2023-05-04 RX ORDER — PITAVASTATIN CALCIUM 4.18 MG/1
1 TABLET, FILM COATED ORAL DAILY
Qty: 90 TABLET | Refills: 1 | Status: SHIPPED | OUTPATIENT
Start: 2023-05-04

## 2023-05-04 RX ORDER — LISINOPRIL 20 MG/1
20 TABLET ORAL DAILY
Qty: 90 TABLET | Refills: 1 | Status: SHIPPED | OUTPATIENT
Start: 2023-05-04 | End: 2023-09-27 | Stop reason: SDUPTHER

## 2023-05-04 RX ORDER — DAPAGLIFLOZIN 10 MG/1
10 TABLET, FILM COATED ORAL DAILY
Qty: 90 TABLET | Refills: 1 | Status: SHIPPED | OUTPATIENT
Start: 2023-05-04 | End: 2023-11-29

## 2023-05-04 RX ORDER — METOPROLOL SUCCINATE 50 MG/1
50 TABLET, EXTENDED RELEASE ORAL DAILY
Qty: 90 TABLET | Refills: 1 | Status: SHIPPED | OUTPATIENT
Start: 2023-05-04 | End: 2024-03-05 | Stop reason: SDUPTHER

## 2023-05-04 RX ORDER — CYCLOBENZAPRINE HCL 10 MG
10 TABLET ORAL 3 TIMES DAILY PRN
Qty: 270 TABLET | Refills: 1 | Status: SHIPPED | OUTPATIENT
Start: 2023-05-04

## 2023-05-04 NOTE — TELEPHONE ENCOUNTER
Pt states she is in [place] and will not be back in town until the fall. Pt wants refills of all meds.

## 2023-05-04 NOTE — TELEPHONE ENCOUNTER
----- Message from Ashwini Ahuja sent at 5/4/2023  9:09 AM CDT -----  Refill on: Livalo  metoprolol succinate  Cyclobenzaprine  Dapagliflozin  lisinopriL    [facility] [facility] - [facility] AT PORTAL TO REGISTERED [facility] SITES    [phone]

## 2023-07-17 ENCOUNTER — TELEPHONE (OUTPATIENT)
Dept: FAMILY MEDICINE | Facility: CLINIC | Age: 73
End: 2023-07-17

## 2023-07-17 RX ORDER — FLASH GLUCOSE SENSOR
1 KIT MISCELLANEOUS DAILY
Qty: 1 KIT | Refills: 11 | OUTPATIENT
Start: 2023-07-17

## 2023-07-17 NOTE — TELEPHONE ENCOUNTER
----- Message from Ashwini Olvera MA sent at 7/17/2023  3:15 PM CDT -----  Regarding: refill  Needs refill on nora II freestyle sensors, PATIENT ALSO NEEDS TO CHANGE HER ADDRESS (TEMPORARY) FOR THEM TO MAIL HER REFILL  [facility].  [phone] patient ph

## 2023-07-17 NOTE — TELEPHONE ENCOUNTER
Spoke with pt. States she needs to call the pharmacy and she will call back tomorrow to let us know when she is ready for the rx to be sent. States they fill it automatically. Pt states she will call tomorrow and give the correct address.

## 2023-07-18 ENCOUNTER — TELEPHONE (OUTPATIENT)
Dept: FAMILY MEDICINE | Facility: CLINIC | Age: 73
End: 2023-07-18

## 2023-07-18 NOTE — TELEPHONE ENCOUNTER
----- Message from Hortencia Crabtree sent at 7/18/2023  3:14 PM CDT -----  - pt needs something besides lantus. Her insurance company will not pay for it anymore   [phone]

## 2023-07-19 NOTE — TELEPHONE ENCOUNTER
----- Message from Hortencia Crabtree sent at 7/19/2023  3:52 PM CDT -----  - 3:31-pt needs something else besides lantus. Her insurance no longer covers it   [phone]

## 2023-07-19 NOTE — TELEPHONE ENCOUNTER
Spoke with patient, states she has 2 weeks left of Lantus. Can we do PA or get covered alternative?

## 2023-07-20 RX ORDER — INSULIN DETEMIR 100 [IU]/ML
35 INJECTION, SOLUTION SUBCUTANEOUS NIGHTLY
Qty: 10.5 ML | Refills: 5 | Status: SHIPPED | OUTPATIENT
Start: 2023-07-20 | End: 2023-11-29 | Stop reason: SDUPTHER

## 2023-07-20 NOTE — TELEPHONE ENCOUNTER
We would have to switch to another long acting insulin such as levemir or basaglar. I would keep the units the same

## 2023-07-20 NOTE — TELEPHONE ENCOUNTER
Spoke with pt in regards to message sent. Verbalized per Dhruv that we could switch to another long acting insulin such as levemir or basaglar. I would keep the units the same. Pt acknowledged understanding. Pt is willing to switch to Levemir.

## 2023-07-20 NOTE — TELEPHONE ENCOUNTER
----- Message from Jacinta Torres sent at 7/20/2023  9:26 AM CDT -----  Novolog is also an alternative to Lantus. Thank you

## 2023-09-27 DIAGNOSIS — I10 HYPERTENSION, UNSPECIFIED TYPE: ICD-10-CM

## 2023-09-27 RX ORDER — LISINOPRIL 20 MG/1
20 TABLET ORAL DAILY
Qty: 90 TABLET | Refills: 1 | Status: SHIPPED | OUTPATIENT
Start: 2023-09-27

## 2023-09-27 NOTE — TELEPHONE ENCOUNTER
----- Message from Eliana Thibodeaux sent at 9/27/2023  3:54 PM CDT -----   347- Refill sent to [facility] for Lesenapril 10mg   [phone]

## 2023-11-10 ENCOUNTER — TELEPHONE (OUTPATIENT)
Dept: FAMILY MEDICINE | Facility: CLINIC | Age: 73
End: 2023-11-10

## 2023-11-10 NOTE — TELEPHONE ENCOUNTER
----- Message from Eliana Thibodeaux sent at 11/10/2023 11:00 AM CST -----  Pt wants to make an appointment and also would like to labs because she hasn't seen her since spring.  [phone]

## 2023-11-15 ENCOUNTER — TELEPHONE (OUTPATIENT)
Dept: FAMILY MEDICINE | Facility: CLINIC | Age: 73
End: 2023-11-15

## 2023-11-21 ENCOUNTER — TELEPHONE (OUTPATIENT)
Dept: FAMILY MEDICINE | Facility: CLINIC | Age: 73
End: 2023-11-21

## 2023-11-21 ENCOUNTER — OFFICE VISIT (OUTPATIENT)
Dept: URGENT CARE | Facility: CLINIC | Age: 73
End: 2023-11-21
Payer: COMMERCIAL

## 2023-11-21 VITALS
BODY MASS INDEX: 29.23 KG/M2 | DIASTOLIC BLOOD PRESSURE: 79 MMHG | RESPIRATION RATE: 20 BRPM | HEART RATE: 82 BPM | OXYGEN SATURATION: 96 % | SYSTOLIC BLOOD PRESSURE: 140 MMHG | WEIGHT: 165 LBS | HEIGHT: 63 IN | TEMPERATURE: 98 F

## 2023-11-21 DIAGNOSIS — J02.0 STREP PHARYNGITIS: ICD-10-CM

## 2023-11-21 DIAGNOSIS — J00 ACUTE RHINITIS: ICD-10-CM

## 2023-11-21 DIAGNOSIS — J02.9 SORE THROAT: Primary | ICD-10-CM

## 2023-11-21 LAB
CTP QC/QA: YES
S PYO RRNA THROAT QL PROBE: POSITIVE

## 2023-11-21 PROCEDURE — 99214 PR OFFICE/OUTPT VISIT, EST, LEVL IV, 30-39 MIN: ICD-10-PCS | Mod: S$GLB,,, | Performed by: STUDENT IN AN ORGANIZED HEALTH CARE EDUCATION/TRAINING PROGRAM

## 2023-11-21 PROCEDURE — 87880 POCT RAPID STREP A: ICD-10-PCS | Mod: QW,,, | Performed by: STUDENT IN AN ORGANIZED HEALTH CARE EDUCATION/TRAINING PROGRAM

## 2023-11-21 PROCEDURE — 87880 STREP A ASSAY W/OPTIC: CPT | Mod: QW,,, | Performed by: STUDENT IN AN ORGANIZED HEALTH CARE EDUCATION/TRAINING PROGRAM

## 2023-11-21 PROCEDURE — 99214 OFFICE O/P EST MOD 30 MIN: CPT | Mod: S$GLB,,, | Performed by: STUDENT IN AN ORGANIZED HEALTH CARE EDUCATION/TRAINING PROGRAM

## 2023-11-21 RX ORDER — AMOXICILLIN AND CLAVULANATE POTASSIUM 875; 125 MG/1; MG/1
1 TABLET, FILM COATED ORAL EVERY 12 HOURS
Qty: 20 TABLET | Refills: 0 | Status: SHIPPED | OUTPATIENT
Start: 2023-11-21 | End: 2023-12-01

## 2023-11-21 RX ORDER — CHLOPHEDIANOL HCL AND PYRILAMINE MALEATE 12.5; 12.5 MG/5ML; MG/5ML
5 SOLUTION ORAL
Qty: 118 ML | Refills: 0 | Status: SHIPPED | OUTPATIENT
Start: 2023-11-21

## 2023-11-21 RX ORDER — AZELASTINE 1 MG/ML
2 SPRAY, METERED NASAL 2 TIMES DAILY
Qty: 30 ML | Refills: 0 | Status: SHIPPED | OUTPATIENT
Start: 2023-11-21 | End: 2023-11-29

## 2023-11-21 NOTE — PATIENT INSTRUCTIONS
Take medications as prescribed.  Tylenol/Motrin per package instructions for any pain or fever.  Recommend warm salt water gargles every 2-3 hours while awake.  Recommend replacing toothbrush and washing linens in hot water 48 hours after starting antibiotics.

## 2023-11-21 NOTE — TELEPHONE ENCOUNTER
Spoke with patient and let her know we do not have any available appts today -she is going to urgent care and then will come in next week for f/u

## 2023-11-21 NOTE — PROGRESS NOTES
"Subjective:      Patient ID: Estefanía Ray is a 73 y.o. female.    Vitals:  height is 5' 3" (1.6 m) and weight is 74.8 kg (165 lb). Her temperature is 97.8 °F (36.6 °C). Her blood pressure is 140/79 (abnormal) and her pulse is 82. Her respiration is 20 and oxygen saturation is 96%.     Chief Complaint: Sore Throat    Patient is a 73-year-old female with a past medical history of GERD, hypertension, hyperlipidemia, coronary artery disease, status post CABG, type 2 diabetes, and osteoarthritis who presents to clinic for evaluation of sore throat and allergy related symptoms.  Patient reports she is vaccinated.  Patient reports no recent or known sick exposures.  Patient reports over-the-counter Claritin with only little relief to symptoms.  Patient states chronically deals with allergies.  Patient states sore throat over the past 3 days.    Sore Throat   This is a new problem. The current episode started in the past 7 days (x's 3 days). The problem has been gradually worsening. Associated symptoms include congestion, coughing (Reports very little), ear pain (Ear fullness), a hoarse voice, a plugged ear sensation and trouble swallowing (Painful swallowing). Pertinent negatives include no abdominal pain, diarrhea, drooling, ear discharge, headaches, shortness of breath or vomiting. Treatments tried: claritin.       Constitution: Negative. Negative for chills, sweating, fatigue and fever.   HENT:  Positive for ear pain (Ear fullness), congestion, postnasal drip, sore throat and trouble swallowing (Painful swallowing). Negative for ear discharge, tinnitus, hearing loss and drooling.    Neck: neck negative.   Cardiovascular: Negative.  Negative for chest pain and palpitations.   Eyes: Negative.    Respiratory:  Positive for cough (Reports very little). Negative for chest tightness, sputum production, shortness of breath and wheezing.    Gastrointestinal: Negative.  Negative for abdominal pain, nausea, vomiting and diarrhea. "   Endocrine: negative.   Musculoskeletal: Negative.  Negative for muscle ache.   Skin: Negative.  Negative for color change, pale and rash.   Allergic/Immunologic: Positive for seasonal allergies and sneezing.   Neurological: Negative.  Negative for dizziness, light-headedness, passing out, headaches, disorientation and altered mental status.   Hematologic/Lymphatic: Negative.    Psychiatric/Behavioral: Negative.  Negative for altered mental status, disorientation and confusion.       Objective:     Physical Exam   Constitutional: She is oriented to person, place, and time. She appears well-developed. She is cooperative.  Non-toxic appearance. She does not appear ill. No distress.   HENT:   Head: Normocephalic and atraumatic.   Ears:   Right Ear: Hearing, external ear and ear canal normal. A middle ear effusion is present.   Left Ear: Hearing, tympanic membrane, external ear and ear canal normal.   Nose: Congestion present. No mucosal edema, rhinorrhea or nasal deformity. No epistaxis. Right sinus exhibits no maxillary sinus tenderness and no frontal sinus tenderness. Left sinus exhibits no maxillary sinus tenderness and no frontal sinus tenderness.   Mouth/Throat: Uvula is midline and mucous membranes are normal. Mucous membranes are moist. No trismus in the jaw. Normal dentition. No uvula swelling. Posterior oropharyngeal erythema present. No oropharyngeal exudate. Oropharynx is clear.   Eyes: Conjunctivae and lids are normal. Pupils are equal, round, and reactive to light. Right eye exhibits no discharge. Left eye exhibits no discharge.   Neck: Trachea normal and phonation normal. Neck supple. No neck rigidity present.   Cardiovascular: Normal rate, regular rhythm and normal pulses.   Pulmonary/Chest: Effort normal and breath sounds normal. No respiratory distress. She has no wheezes. She has no rhonchi. She has no rales.   Abdominal: Normal appearance and bowel sounds are normal. She exhibits no distension.  Soft. There is no abdominal tenderness.   Musculoskeletal: Normal range of motion.         General: Normal range of motion.      Cervical back: She exhibits no tenderness.   Lymphadenopathy:     She has no cervical adenopathy.   Neurological: She is alert and oriented to person, place, and time. She exhibits normal muscle tone.   Skin: Skin is warm, dry, intact and not diaphoretic. Capillary refill takes 2 to 3 seconds.   Psychiatric: Her speech is normal and behavior is normal. Judgment and thought content normal.   Nursing note and vitals reviewed.      Assessment:     1. Sore throat    2. Strep pharyngitis    3. Acute rhinitis        Plan:       Sore throat  -     POCT rapid strep A    Strep pharyngitis    Acute rhinitis    Other orders  -     amoxicillin-clavulanate 875-125mg (AUGMENTIN) 875-125 mg per tablet; Take 1 tablet by mouth every 12 (twelve) hours. for 10 days  Dispense: 20 tablet; Refill: 0  -     azelastine (ASTELIN) 137 mcg (0.1 %) nasal spray; 2 sprays (274 mcg total) by Nasal route 2 (two) times daily.  Dispense: 30 mL; Refill: 0  -     pyrilamine-chlophedianoL (NINJACOF) 12.5-12.5 mg/5 mL Liqd; Take 5 mLs by mouth every 6 to 8 hours as needed (Cough).  Dispense: 118 mL; Refill: 0                Labs:  Rapid strep positive.  Take medications as prescribed.  Tylenol/Motrin per package instructions for any pain or fever.  Recommend warm salt water gargles every 2-3 hours while awake.  Recommend replacing toothbrush and washing linens in hot water 48 hours after starting antibiotics.  Follow-up with PCP in 1-2 days.  Follow-up ENT as needed.  Return to clinic as needed.  To ED for any new or acutely worsening symptoms.  Patient in agreement with plan of care.    DISCLAIMER: Please note that my documentation in this Electronic Healthcare Record was produced using speech recognition software and therefore may contain errors related to that software system.These could include grammar, punctuation and  spelling errors or the inclusion/exclusion of phrases that were not intended. Garbled syntax, mangled pronouns, and other bizarre constructions may be attributed to that software system.

## 2023-11-21 NOTE — TELEPHONE ENCOUNTER
----- Message from Jacinta Torres sent at 11/21/2023  9:52 AM CST -----  Pt needs to be seen asap. Sore throat and allergy flare up. Pt #[phone]

## 2023-11-22 DIAGNOSIS — Z78.0 MENOPAUSE: ICD-10-CM

## 2023-11-29 ENCOUNTER — OFFICE VISIT (OUTPATIENT)
Dept: FAMILY MEDICINE | Facility: CLINIC | Age: 73
End: 2023-11-29
Payer: COMMERCIAL

## 2023-11-29 VITALS
RESPIRATION RATE: 16 BRPM | DIASTOLIC BLOOD PRESSURE: 68 MMHG | BODY MASS INDEX: 29.7 KG/M2 | SYSTOLIC BLOOD PRESSURE: 120 MMHG | HEIGHT: 63 IN | WEIGHT: 167.63 LBS | OXYGEN SATURATION: 98 % | HEART RATE: 81 BPM

## 2023-11-29 DIAGNOSIS — E78.5 HYPERLIPIDEMIA, UNSPECIFIED HYPERLIPIDEMIA TYPE: ICD-10-CM

## 2023-11-29 DIAGNOSIS — M17.11 PRIMARY OSTEOARTHRITIS OF RIGHT KNEE: ICD-10-CM

## 2023-11-29 DIAGNOSIS — E11.69 TYPE 2 DIABETES MELLITUS WITH OTHER SPECIFIED COMPLICATION, UNSPECIFIED WHETHER LONG TERM INSULIN USE: ICD-10-CM

## 2023-11-29 DIAGNOSIS — Z23 ENCOUNTER FOR IMMUNIZATION: Primary | ICD-10-CM

## 2023-11-29 DIAGNOSIS — K21.9 GASTROESOPHAGEAL REFLUX DISEASE, UNSPECIFIED WHETHER ESOPHAGITIS PRESENT: ICD-10-CM

## 2023-11-29 DIAGNOSIS — I25.10 ATHEROSCLEROSIS OF CORONARY ARTERY, UNSPECIFIED VESSEL OR LESION TYPE, UNSPECIFIED WHETHER ANGINA PRESENT, UNSPECIFIED WHETHER NATIVE OR TRANSPLANTED HEART: ICD-10-CM

## 2023-11-29 DIAGNOSIS — I10 ESSENTIAL HYPERTENSION: ICD-10-CM

## 2023-11-29 LAB — HBA1C MFR BLD: 7.8 %

## 2023-11-29 PROCEDURE — 3051F HG A1C>EQUAL 7.0%<8.0%: CPT | Mod: CPTII,S$GLB,, | Performed by: NURSE PRACTITIONER

## 2023-11-29 PROCEDURE — 3051F PR MOST RECENT HEMOGLOBIN A1C LEVEL 7.0 - < 8.0%: ICD-10-PCS | Mod: CPTII,S$GLB,, | Performed by: NURSE PRACTITIONER

## 2023-11-29 PROCEDURE — 90471 FLU VACCINE - QUADRIVALENT - ADJUVANTED: ICD-10-PCS | Mod: S$GLB,,, | Performed by: NURSE PRACTITIONER

## 2023-11-29 PROCEDURE — 1100F PTFALLS ASSESS-DOCD GE2>/YR: CPT | Mod: CPTII,S$GLB,, | Performed by: NURSE PRACTITIONER

## 2023-11-29 PROCEDURE — 90694 FLU VACCINE - QUADRIVALENT - ADJUVANTED: ICD-10-PCS | Mod: S$GLB,,, | Performed by: NURSE PRACTITIONER

## 2023-11-29 PROCEDURE — 90471 IMMUNIZATION ADMIN: CPT | Mod: S$GLB,,, | Performed by: NURSE PRACTITIONER

## 2023-11-29 PROCEDURE — 1160F RVW MEDS BY RX/DR IN RCRD: CPT | Mod: CPTII,S$GLB,, | Performed by: NURSE PRACTITIONER

## 2023-11-29 PROCEDURE — 1159F PR MEDICATION LIST DOCUMENTED IN MEDICAL RECORD: ICD-10-PCS | Mod: CPTII,S$GLB,, | Performed by: NURSE PRACTITIONER

## 2023-11-29 PROCEDURE — 3074F PR MOST RECENT SYSTOLIC BLOOD PRESSURE < 130 MM HG: ICD-10-PCS | Mod: CPTII,S$GLB,, | Performed by: NURSE PRACTITIONER

## 2023-11-29 PROCEDURE — 4010F ACE/ARB THERAPY RXD/TAKEN: CPT | Mod: CPTII,S$GLB,, | Performed by: NURSE PRACTITIONER

## 2023-11-29 PROCEDURE — 4010F PR ACE/ARB THEARPY RXD/TAKEN: ICD-10-PCS | Mod: CPTII,S$GLB,, | Performed by: NURSE PRACTITIONER

## 2023-11-29 PROCEDURE — 1159F MED LIST DOCD IN RCRD: CPT | Mod: CPTII,S$GLB,, | Performed by: NURSE PRACTITIONER

## 2023-11-29 PROCEDURE — 3288F FALL RISK ASSESSMENT DOCD: CPT | Mod: CPTII,S$GLB,, | Performed by: NURSE PRACTITIONER

## 2023-11-29 PROCEDURE — 99214 PR OFFICE/OUTPT VISIT, EST, LEVL IV, 30-39 MIN: ICD-10-PCS | Mod: 25,S$GLB,, | Performed by: NURSE PRACTITIONER

## 2023-11-29 PROCEDURE — 90694 VACC AIIV4 NO PRSRV 0.5ML IM: CPT | Mod: S$GLB,,, | Performed by: NURSE PRACTITIONER

## 2023-11-29 PROCEDURE — 3060F POS MICROALBUMINURIA REV: CPT | Mod: CPTII,S$GLB,, | Performed by: NURSE PRACTITIONER

## 2023-11-29 PROCEDURE — 3078F DIAST BP <80 MM HG: CPT | Mod: CPTII,S$GLB,, | Performed by: NURSE PRACTITIONER

## 2023-11-29 PROCEDURE — 3066F NEPHROPATHY DOC TX: CPT | Mod: CPTII,S$GLB,, | Performed by: NURSE PRACTITIONER

## 2023-11-29 PROCEDURE — 3066F PR DOCUMENTATION OF TREATMENT FOR NEPHROPATHY: ICD-10-PCS | Mod: CPTII,S$GLB,, | Performed by: NURSE PRACTITIONER

## 2023-11-29 PROCEDURE — 3008F BODY MASS INDEX DOCD: CPT | Mod: CPTII,S$GLB,, | Performed by: NURSE PRACTITIONER

## 2023-11-29 PROCEDURE — 1160F PR REVIEW ALL MEDS BY PRESCRIBER/CLIN PHARMACIST DOCUMENTED: ICD-10-PCS | Mod: CPTII,S$GLB,, | Performed by: NURSE PRACTITIONER

## 2023-11-29 PROCEDURE — 3288F PR FALLS RISK ASSESSMENT DOCUMENTED: ICD-10-PCS | Mod: CPTII,S$GLB,, | Performed by: NURSE PRACTITIONER

## 2023-11-29 PROCEDURE — 3008F PR BODY MASS INDEX (BMI) DOCUMENTED: ICD-10-PCS | Mod: CPTII,S$GLB,, | Performed by: NURSE PRACTITIONER

## 2023-11-29 PROCEDURE — 3060F PR POS MICROALBUMINURIA RESULT DOCUMENTED/REVIEW: ICD-10-PCS | Mod: CPTII,S$GLB,, | Performed by: NURSE PRACTITIONER

## 2023-11-29 PROCEDURE — 83036 HEMOGLOBIN GLYCOSYLATED A1C: CPT | Mod: QW,,, | Performed by: NURSE PRACTITIONER

## 2023-11-29 PROCEDURE — 3074F SYST BP LT 130 MM HG: CPT | Mod: CPTII,S$GLB,, | Performed by: NURSE PRACTITIONER

## 2023-11-29 PROCEDURE — 83036 POCT HEMOGLOBIN A1C: ICD-10-PCS | Mod: QW,,, | Performed by: NURSE PRACTITIONER

## 2023-11-29 PROCEDURE — 1100F PR PT FALLS ASSESS DOC 2+ FALLS/FALL W/INJURY/YR: ICD-10-PCS | Mod: CPTII,S$GLB,, | Performed by: NURSE PRACTITIONER

## 2023-11-29 PROCEDURE — 3078F PR MOST RECENT DIASTOLIC BLOOD PRESSURE < 80 MM HG: ICD-10-PCS | Mod: CPTII,S$GLB,, | Performed by: NURSE PRACTITIONER

## 2023-11-29 PROCEDURE — 99214 OFFICE O/P EST MOD 30 MIN: CPT | Mod: 25,S$GLB,, | Performed by: NURSE PRACTITIONER

## 2023-11-29 NOTE — PROGRESS NOTES
SUBJECTIVE:    Patient ID: Estefanía Ray is a 73 y.o. female.    Chief Complaint: No chief complaint on file.    73 y.o. female who presents for check up. Recently moved back from [place]. She is treated for diabetes, cad, hyperlipidemia, htn, gerd, oa.  Last a1c was 7.8. taking meds as prescribed. Being followed by dr. Pickard. She is trying to eat better. Has labs ordered. Plans to have done this week. Recently seen and treated at urgent care for strep throat. Feeling better.         Office Visit on 11/29/2023   Component Date Value Ref Range Status    Hemoglobin A1C, POC 11/29/2023 7.8  % Final   Office Visit on 11/21/2023   Component Date Value Ref Range Status    Rapid Strep A Screen 11/21/2023 Positive (A)  Negative Final     Acceptable 11/21/2023 Yes   Final       Past Medical History:   Diagnosis Date    Allergy     Diabetes mellitus, type 2     GERD (gastroesophageal reflux disease)     Hyperlipidemia     Hypertension      Social History     Socioeconomic History    Marital status:    Tobacco Use    Smoking status: Never    Smokeless tobacco: Never   Substance and Sexual Activity    Alcohol use: Never    Drug use: Never     Social Determinants of Health     Financial Resource Strain: Medium Risk (11/27/2023)    Overall Financial Resource Strain (CARDIA)     Difficulty of Paying Living Expenses: Somewhat hard   Food Insecurity: No Food Insecurity (11/27/2023)    Hunger Vital Sign     Worried About Running Out of Food in the Last Year: Never true     Ran Out of Food in the Last Year: Never true   Transportation Needs: No Transportation Needs (11/27/2023)    PRAPARE - Transportation     Lack of Transportation (Medical): No     Lack of Transportation (Non-Medical): No   Physical Activity: Insufficiently Active (11/27/2023)    Exercise Vital Sign     Days of Exercise per Week: 2 days     Minutes of Exercise per Session: 20 min   Stress: No Stress Concern Present (11/27/2023)    [employer]  [place] [employer] - Occupational Stress Questionnaire     Feeling of Stress : Only a little   Social Connections: Unknown (11/27/2023)    Social Connection and Isolation Panel [NHANES]     Frequency of Communication with Friends and Family: More than three times a week     Frequency of Social Gatherings with Friends and Family: Once a week     Attends Club or Organization Meetings: More than 4 times per year     Marital Status:    Housing Stability: High Risk (11/27/2023)    Housing Stability Vital Sign     Unable to Pay for Housing in the Last Year: No     Number of Places Lived in the Last Year: 3     Unstable Housing in the Last Year: No     Past Surgical History:   Procedure Laterality Date    HYSTERECTOMY      KNEE SURGERY Left     TONSILLECTOMY       History reviewed. No pertinent family history.    Tests to Keep You Healthy    Mammogram: Met on 3/8/2023  Eye Exam: DUE  Colon Cancer Screening: Met on 9/7/2016  Last Blood Pressure <= 139/89 (11/29/2023): Yes  Last HbA1c < 8 (11/29/2023): Yes      Review of patient's allergies indicates:   Allergen Reactions    Empagliflozin Other (See Comments)    Semaglutide Hives    Sitagliptin phos-metformin Other (See Comments)     Other reaction(s): diarrhea    Ezetimibe Nausea And Vomiting     Other reaction(s): Other (See Comments), Unknown    Feels terrible when taking medication    Other reaction(s): Other (See Comments)   Feels terrible when taking medication    Other reaction(s): Other (See Comments), Unknown   Feels terrible when taking medication    Farxiga [dapagliflozin] Rash     Genital rash    Linaclotide Diarrhea and Nausea And Vomiting       Current Outpatient Medications:     aspirin (ECOTRIN) 81 MG EC tablet, Aspir-81 mg tablet,delayed release  Take 1 tablet every day by oral route., Disp: , Rfl:     blood sugar diagnostic (FREESTYLE LITE STRIPS) Strp, as directed, Disp: 100 each, Rfl: 5    cyclobenzaprine (FLEXERIL) 10 MG tablet, Take  "1 tablet (10 mg total) by mouth 3 (three) times daily as needed for Muscle spasms., Disp: 270 tablet, Rfl: 1    dulaglutide (TRULICITY) 1.5 mg/0.5 mL pen injector, Inject 1.5 mg into the skin every 7 days., Disp: 12 pen, Rfl: 0    lansoprazole (PREVACID) 15 MG capsule, 1 capsule, Disp: , Rfl:     lisinopriL (PRINIVIL,ZESTRIL) 20 MG tablet, Take 1 tablet (20 mg total) by mouth once daily., Disp: 90 tablet, Rfl: 1    metFORMIN (GLUCOPHAGE-XR) 500 MG ER 24hr tablet, metformin  mg tablet,extended release 24 hr  TAKE 2 TABLETS BY MOUTH TWICE DAILY Strength: 500 mg, Disp: 120 tablet, Rfl: 2    metoprolol succinate (TOPROL-XL) 50 MG 24 hr tablet, Take 1 tablet (50 mg total) by mouth once daily., Disp: 90 tablet, Rfl: 1    pen needle, diabetic (PEN NEEDLE) 31 gauge x 3/16" Ndle, 1 each by In Vitro route 4 (four) times daily. BD ULTRA FINE, Disp: 100 each, Rfl: 5    pitavastatin calcium (LIVALO) 4 mg Tab, Take 1 tablet by mouth once daily., Disp: 90 tablet, Rfl: 1    pyrilamine-chlophedianoL (NINJACOF) 12.5-12.5 mg/5 mL Liqd, Take 5 mLs by mouth every 6 to 8 hours as needed (Cough)., Disp: 118 mL, Rfl: 0    biotin 10,000 mcg TbDL, 1 tablet, Disp: , Rfl:     cetirizine (ZYRTEC) 10 MG tablet, Take 10 mg by mouth once daily., Disp: , Rfl:     coenzyme Q10 100 mg capsule, 1 capsule with a meal, Disp: , Rfl:     flash glucose sensor (FREESTYLE KARINA 14 DAY SENSOR) Kit, 1 kit by Misc.(Non-Drug; Combo Route) route once daily., Disp: 1 kit, Rfl: 11    hydroCHLOROthiazide (HYDRODIURIL) 25 MG tablet, Take 1 tablet (25 mg total) by mouth once daily. (Patient not taking: Reported on 11/29/2023), Disp: 90 tablet, Rfl: 1    hydroCHLOROthiazide (HYDRODIURIL) 25 MG tablet, Take 1 tablet (25 mg total) by mouth once daily. (Patient not taking: Reported on 11/29/2023), Disp: 90 tablet, Rfl: 1    insulin detemir U-100, Levemir, (LEVEMIR FLEXPEN) 100 unit/mL (3 mL) InPn pen, Inject 25 Units into the skin every evening., Disp: 15 each, Rfl: " "5    insulin glargine 100 units/mL SubQ pen, Inject 35 Units into the skin once daily., Disp: 3 each, Rfl: 2    krill-om-3-dha-epa-phospho-ast 242-25-36-50 mg Cap, , Disp: , Rfl:     lancets (FREESTYLE LANCETS) 28 gauge Misc, 1 lancet by Misc.(Non-Drug; Combo Route) route 2 (two) times a day., Disp: 200 each, Rfl: 3    pitavastatin calcium (LIVALO) 4 mg Tab, Take 1 tablet by mouth once daily., Disp: 90 tablet, Rfl: 1    UNABLE TO FIND, multivit topical patch, Disp: , Rfl:     UNABLE TO FIND, medication name: Balance of nuture fruits, Disp: , Rfl:     UNABLE TO FIND, medication name: balance of nuture veggies, Disp: , Rfl:     vitamin D (VITAMIN D3) 1000 units Tab, Take 5,000 Units by mouth., Disp: , Rfl:     Review of Systems   Constitutional:  Negative for chills, fever and unexpected weight change.   HENT:  Negative for ear pain, rhinorrhea and sore throat.    Eyes:  Negative for pain and visual disturbance.   Respiratory:  Negative for cough and shortness of breath.    Cardiovascular:  Negative for chest pain, palpitations and leg swelling.   Gastrointestinal:  Negative for abdominal pain, diarrhea, nausea and vomiting.   Genitourinary:  Negative for difficulty urinating, hematuria and vaginal bleeding.   Musculoskeletal:  Negative for arthralgias.   Skin:  Negative for rash.   Neurological:  Negative for dizziness, weakness and headaches.   Psychiatric/Behavioral:  Negative for agitation and sleep disturbance. The patient is not nervous/anxious.           Objective:      Vitals:    11/29/23 1051   BP: 120/68   Pulse: 81   Resp: 16   SpO2: 98%   Weight: 76 kg (167 lb 9.6 oz)   Height: 5' 3" (1.6 m)     Physical Exam  Vitals and nursing note reviewed.   Constitutional:       General: She is not in acute distress.     Appearance: Normal appearance. She is well-developed.   HENT:      Head: Normocephalic.      Right Ear: External ear normal.      Left Ear: External ear normal.   Eyes:      Conjunctiva/sclera: " Conjunctivae normal.      Pupils: Pupils are equal, round, and reactive to light.   Neck:      Vascular: No JVD.   Cardiovascular:      Rate and Rhythm: Normal rate and regular rhythm.      Heart sounds: No murmur heard.  Pulmonary:      Effort: Pulmonary effort is normal.      Breath sounds: Normal breath sounds.   Abdominal:      General: Bowel sounds are normal.      Palpations: Abdomen is soft.   Musculoskeletal:         General: No deformity. Normal range of motion.      Cervical back: Normal range of motion and neck supple.   Lymphadenopathy:      Cervical: No cervical adenopathy.   Skin:     General: Skin is warm and dry.      Findings: No rash.   Neurological:      Mental Status: She is alert and oriented to person, place, and time.      Gait: Gait normal.   Psychiatric:         Speech: Speech normal.         Behavior: Behavior normal.           Assessment:       1. Encounter for immunization    2. Type 2 diabetes mellitus with other specified complication, unspecified whether long term insulin use    3. Essential hypertension    4. Gastroesophageal reflux disease, unspecified whether esophagitis present    5. Hyperlipidemia, unspecified hyperlipidemia type    6. Atherosclerosis of coronary artery, unspecified vessel or lesion type, unspecified whether angina present, unspecified whether native or transplanted heart    7. Primary osteoarthritis of right knee         Plan:       Encounter for immunization  -     Influenza (FLUAD) - Quadrivalent (Adjuvanted) *Preferred* (65+) (PF)    Type 2 diabetes mellitus with other specified complication, unspecified whether long term insulin use  Comments:  discussed insulin  Orders:  -     Hemoglobin A1C, POCT    Essential hypertension  Comments:  bp is well controlled. followed by dr. navas    Gastroesophageal reflux disease, unspecified whether esophagitis present  Comments:  prevacid daily    Hyperlipidemia, unspecified hyperlipidemia  type  Comments:  livalo    Atherosclerosis of coronary artery, unspecified vessel or lesion type, unspecified whether angina present, unspecified whether native or transplanted heart    Primary osteoarthritis of right knee    Other orders  -     insulin detemir U-100, Levemir, (LEVEMIR FLEXPEN) 100 unit/mL (3 mL) InPn pen; Inject 25 Units into the skin every evening.  Dispense: 15 each; Refill: 5      Follow up in about 3 months (around 2/29/2024), or if symptoms worsen or fail to improve, for medication management, Diabetic Check-Up.        12/11/2023 Verna Shah

## 2023-11-30 LAB
ALBUMIN SERPL-MCNC: 4.5 G/DL (ref 3.6–5.1)
ALBUMIN/CREAT UR: 42 MCG/MG CREAT
ALBUMIN/GLOB SERPL: 1.7 (CALC) (ref 1–2.5)
ALP SERPL-CCNC: 88 U/L (ref 37–153)
ALT SERPL-CCNC: 15 U/L (ref 6–29)
APPEARANCE UR: CLEAR
AST SERPL-CCNC: 17 U/L (ref 10–35)
BACTERIA #/AREA URNS HPF: ABNORMAL /HPF
BACTERIA UR CULT: ABNORMAL
BASOPHILS # BLD AUTO: 57 CELLS/UL (ref 0–200)
BASOPHILS NFR BLD AUTO: 0.5 %
BILIRUB SERPL-MCNC: 0.4 MG/DL (ref 0.2–1.2)
BILIRUB UR QL STRIP: NEGATIVE
BUN SERPL-MCNC: 17 MG/DL (ref 7–25)
BUN/CREAT SERPL: ABNORMAL (CALC) (ref 6–22)
CALCIUM SERPL-MCNC: 10 MG/DL (ref 8.6–10.4)
CHLORIDE SERPL-SCNC: 98 MMOL/L (ref 98–110)
CHOLEST SERPL-MCNC: 165 MG/DL
CHOLEST/HDLC SERPL: 4.1 (CALC)
CO2 SERPL-SCNC: 30 MMOL/L (ref 20–32)
COLOR UR: YELLOW
CREAT SERPL-MCNC: 0.81 MG/DL (ref 0.6–1)
CREAT UR-MCNC: 43 MG/DL (ref 20–275)
EGFR: 77 ML/MIN/1.73M2
EOSINOPHIL # BLD AUTO: 316 CELLS/UL (ref 15–500)
EOSINOPHIL NFR BLD AUTO: 2.8 %
ERYTHROCYTE [DISTWIDTH] IN BLOOD BY AUTOMATED COUNT: 12.9 % (ref 11–15)
GLOBULIN SER CALC-MCNC: 2.7 G/DL (CALC) (ref 1.9–3.7)
GLUCOSE SERPL-MCNC: 130 MG/DL (ref 65–99)
GLUCOSE UR QL STRIP: ABNORMAL
HBA1C MFR BLD: 7.9 % OF TOTAL HGB
HCT VFR BLD AUTO: 45.4 % (ref 35–45)
HDLC SERPL-MCNC: 40 MG/DL
HGB BLD-MCNC: 14.8 G/DL (ref 11.7–15.5)
HGB UR QL STRIP: NEGATIVE
HYALINE CASTS #/AREA URNS LPF: ABNORMAL /LPF
KETONES UR QL STRIP: NEGATIVE
LDLC SERPL CALC-MCNC: 97 MG/DL (CALC)
LEUKOCYTE ESTERASE UR QL STRIP: NEGATIVE
LYMPHOCYTES # BLD AUTO: 3560 CELLS/UL (ref 850–3900)
LYMPHOCYTES NFR BLD AUTO: 31.5 %
MCH RBC QN AUTO: 28.1 PG (ref 27–33)
MCHC RBC AUTO-ENTMCNC: 32.6 G/DL (ref 32–36)
MCV RBC AUTO: 86.1 FL (ref 80–100)
MICROALBUMIN UR-MCNC: 1.8 MG/DL
MONOCYTES # BLD AUTO: 757 CELLS/UL (ref 200–950)
MONOCYTES NFR BLD AUTO: 6.7 %
NEUTROPHILS # BLD AUTO: 6611 CELLS/UL (ref 1500–7800)
NEUTROPHILS NFR BLD AUTO: 58.5 %
NITRITE UR QL STRIP: NEGATIVE
NONHDLC SERPL-MCNC: 125 MG/DL (CALC)
PH UR STRIP: 7 [PH] (ref 5–8)
PLATELET # BLD AUTO: 308 THOUSAND/UL (ref 140–400)
PMV BLD REES-ECKER: 10 FL (ref 7.5–12.5)
POTASSIUM SERPL-SCNC: 4.4 MMOL/L (ref 3.5–5.3)
PROT SERPL-MCNC: 7.2 G/DL (ref 6.1–8.1)
PROT UR QL STRIP: NEGATIVE
RBC # BLD AUTO: 5.27 MILLION/UL (ref 3.8–5.1)
RBC #/AREA URNS HPF: ABNORMAL /HPF
SERVICE CMNT-IMP: ABNORMAL
SODIUM SERPL-SCNC: 137 MMOL/L (ref 135–146)
SP GR UR STRIP: 1.02 (ref 1–1.03)
SQUAMOUS #/AREA URNS HPF: ABNORMAL /HPF
TRIGL SERPL-MCNC: 183 MG/DL
TSH SERPL-ACNC: 4.16 MIU/L (ref 0.4–4.5)
WBC # BLD AUTO: 11.3 THOUSAND/UL (ref 3.8–10.8)
WBC #/AREA URNS HPF: ABNORMAL /HPF

## 2023-12-01 ENCOUNTER — TELEPHONE (OUTPATIENT)
Dept: FAMILY MEDICINE | Facility: CLINIC | Age: 73
End: 2023-12-01

## 2023-12-10 RX ORDER — INSULIN DETEMIR 100 [IU]/ML
25 INJECTION, SOLUTION SUBCUTANEOUS NIGHTLY
Qty: 15 EACH | Refills: 5
Start: 2023-12-10

## 2023-12-11 ENCOUNTER — TELEPHONE (OUTPATIENT)
Dept: FAMILY MEDICINE | Facility: CLINIC | Age: 73
End: 2023-12-11

## 2023-12-11 NOTE — TELEPHONE ENCOUNTER
----- Message from Verna Shah NP sent at 12/11/2023 12:02 AM CST -----  Triglycerides are slightly elevated. Start low fat diet. White blood cell count elevated due to recent strep infection. Rest of labs are ok.

## 2024-01-11 ENCOUNTER — OFFICE VISIT (OUTPATIENT)
Dept: PULMONOLOGY | Facility: CLINIC | Age: 74
End: 2024-01-11
Payer: COMMERCIAL

## 2024-01-11 VITALS
OXYGEN SATURATION: 98 % | SYSTOLIC BLOOD PRESSURE: 120 MMHG | HEART RATE: 88 BPM | HEIGHT: 63 IN | WEIGHT: 168 LBS | BODY MASS INDEX: 29.77 KG/M2 | DIASTOLIC BLOOD PRESSURE: 80 MMHG

## 2024-01-11 DIAGNOSIS — G47.33 OSA (OBSTRUCTIVE SLEEP APNEA): Primary | ICD-10-CM

## 2024-01-11 PROCEDURE — 3008F BODY MASS INDEX DOCD: CPT | Mod: CPTII,S$GLB,, | Performed by: NURSE PRACTITIONER

## 2024-01-11 PROCEDURE — 3079F DIAST BP 80-89 MM HG: CPT | Mod: CPTII,S$GLB,, | Performed by: NURSE PRACTITIONER

## 2024-01-11 PROCEDURE — 3074F SYST BP LT 130 MM HG: CPT | Mod: CPTII,S$GLB,, | Performed by: NURSE PRACTITIONER

## 2024-01-11 PROCEDURE — 1126F AMNT PAIN NOTED NONE PRSNT: CPT | Mod: CPTII,S$GLB,, | Performed by: NURSE PRACTITIONER

## 2024-01-11 PROCEDURE — 99203 OFFICE O/P NEW LOW 30 MIN: CPT | Mod: S$GLB,,, | Performed by: NURSE PRACTITIONER

## 2024-01-11 PROCEDURE — 1159F MED LIST DOCD IN RCRD: CPT | Mod: CPTII,S$GLB,, | Performed by: NURSE PRACTITIONER

## 2024-01-11 RX ORDER — LORATADINE 10 MG/1
10 TABLET ORAL
COMMUNITY

## 2024-01-11 RX ORDER — TIRZEPATIDE 7.5 MG/.5ML
7.5 INJECTION, SOLUTION SUBCUTANEOUS
COMMUNITY
Start: 2023-12-13

## 2024-01-11 NOTE — PROGRESS NOTES
SUBJECTIVE:    Patient ID: Estefanía Ray is a 73 y.o. female.    Chief Complaint: Establish Care and Apnea    HPI  Patient here today to establish care for JOHN. She states she has been sleeping on her PAP machine.  She has been seeing her ENT for her JOHN but wants to establish with a provider closer to her home. Her  is currently in the ER, she is rushing to get back to him.  She states her current PAP machine works well, she just wants to change from a full face mask back to nasal pillow mask.  She feels great benefit from her PAP machine.  She does not know what DME company she has been using.    Past Medical History:   Diagnosis Date    Allergy     Diabetes mellitus, type 2     GERD (gastroesophageal reflux disease)     Hyperlipidemia     Hypertension      Past Surgical History:   Procedure Laterality Date    HYSTERECTOMY      KNEE SURGERY Left     TONSILLECTOMY       No family history on file.     Social History:   Marital Status:   Occupation: Data Unavailable  Alcohol History:  reports no history of alcohol use.  Tobacco History:  reports that she has never smoked. She has never used smokeless tobacco.  Drug History:  reports no history of drug use.    Review of patient's allergies indicates:   Allergen Reactions    Empagliflozin Other (See Comments)    Semaglutide Hives    Sitagliptin phos-metformin Other (See Comments)     Other reaction(s): diarrhea    Ezetimibe Nausea And Vomiting     Other reaction(s): Other (See Comments), Unknown    Feels terrible when taking medication    Other reaction(s): Other (See Comments)   Feels terrible when taking medication    Other reaction(s): Other (See Comments), Unknown   Feels terrible when taking medication    Farxiga [dapagliflozin] Rash     Genital rash    Linaclotide Diarrhea and Nausea And Vomiting       Current Outpatient Medications   Medication Sig Dispense Refill    aspirin (ECOTRIN) 81 MG EC tablet Aspir-81 mg tablet,delayed release   Take 1  "tablet every day by oral route.      blood sugar diagnostic (FREESTYLE LITE STRIPS) Strp as directed 100 each 5    cyclobenzaprine (FLEXERIL) 10 MG tablet Take 1 tablet (10 mg total) by mouth 3 (three) times daily as needed for Muscle spasms. 270 tablet 1    flash glucose sensor (FREESTYLE KARINA 14 DAY SENSOR) Kit 1 kit by Misc.(Non-Drug; Combo Route) route once daily. 1 kit 11    insulin detemir U-100, Levemir, (LEVEMIR FLEXPEN) 100 unit/mL (3 mL) InPn pen Inject 25 Units into the skin every evening. 15 each 5    insulin glargine 100 units/mL SubQ pen Inject 35 Units into the skin once daily. 3 each 2    krill-om-3-dha-epa-phospho-ast 800-92-31-50 mg Cap       lancets (FREESTYLE LANCETS) 28 gauge Misc 1 lancet by Misc.(Non-Drug; Combo Route) route 2 (two) times a day. 200 each 3    lisinopriL (PRINIVIL,ZESTRIL) 20 MG tablet Take 1 tablet (20 mg total) by mouth once daily. 90 tablet 1    loratadine (CLARITIN) 10 mg tablet Take 10 mg by mouth.      metFORMIN (GLUCOPHAGE-XR) 500 MG ER 24hr tablet metformin  mg tablet,extended release 24 hr   TAKE 2 TABLETS BY MOUTH TWICE DAILY  Strength: 500 mg 120 tablet 2    metoprolol succinate (TOPROL-XL) 50 MG 24 hr tablet Take 1 tablet (50 mg total) by mouth once daily. 90 tablet 1    MOUNJARO 7.5 mg/0.5 mL PnIj Inject 7.5 mg into the skin.      pen needle, diabetic (PEN NEEDLE) 31 gauge x 3/16" Ndle 1 each by In Vitro route 4 (four) times daily. BD ULTRA FINE 100 each 5    pitavastatin calcium (LIVALO) 4 mg Tab Take 1 tablet by mouth once daily. 90 tablet 1    UNABLE TO FIND multivit topical patch      UNABLE TO FIND medication name: Balance of nuture fruits      UNABLE TO FIND medication name: balance of nuture veggies      vitamin D (VITAMIN D3) 1000 units Tab Take 5,000 Units by mouth.      biotin 10,000 mcg TbDL 1 tablet      cetirizine (ZYRTEC) 10 MG tablet Take 10 mg by mouth once daily.      coenzyme Q10 100 mg capsule 1 capsule with a meal      dulaglutide " "(TRULICITY) 1.5 mg/0.5 mL pen injector Inject 1.5 mg into the skin every 7 days. (Patient not taking: Reported on 1/11/2024) 12 pen 0    hydroCHLOROthiazide (HYDRODIURIL) 25 MG tablet Take 1 tablet (25 mg total) by mouth once daily. (Patient not taking: Reported on 11/29/2023) 90 tablet 1    hydroCHLOROthiazide (HYDRODIURIL) 25 MG tablet Take 1 tablet (25 mg total) by mouth once daily. (Patient not taking: Reported on 11/29/2023) 90 tablet 1    lansoprazole (PREVACID) 15 MG capsule 1 capsule      pitavastatin calcium (LIVALO) 4 mg Tab Take 1 tablet by mouth once daily. 90 tablet 1    pyrilamine-chlophedianoL (NINJACOF) 12.5-12.5 mg/5 mL Liqd Take 5 mLs by mouth every 6 to 8 hours as needed (Cough). (Patient not taking: Reported on 1/11/2024) 118 mL 0     No current facility-administered medications for this visit.           Review of Systems  General: Feeling Well.  Eyes: Vision is good.  ENT:  No sinusitis or pharyngitis.   Heart:: No chest pain or palpitations.  Lungs: No cough, sputum, or wheezing.  GI: No Nausea, vomiting, constipation, diarrhea, or reflux.  : No dysuria, hesitancy, or nocturia.  Musculoskeletal: No joint pain or myalgias.  Skin: No lesions or rashes.  Neuro: No headaches or neuropathy.  Lymph: No edema or adenopathy.  Psych: No anxiety or depression.  Endo: No weight change.    OBJECTIVE:      /80 (BP Location: Right arm, Patient Position: Sitting, BP Method: Medium (Manual))   Pulse 88   Ht 5' 3" (1.6 m)   Wt 76.2 kg (168 lb)   SpO2 98%   BMI 29.76 kg/m²     Physical Exam  GENERAL: Older patient in no distress.  HEENT: Pupils equal and reactive. Extraocular movements intact. Nose intact.      Pharynx moist.  NECK: Supple.   HEART: Regular rate and rhythm. No murmur or gallop auscultated.  LUNGS: Clear to auscultation and percussion. Lung excursion symmetrical. No change in fremitus. No adventitial noises.  ABDOMEN: Bowel sounds present. Non-tender, no masses " palpated.  EXTREMITIES: Normal muscle tone and joint movement, no cyanosis or clubbing.   LYMPHATICS: No adenopathy palpated, no edema.  SKIN: Dry, intact, no lesions.   NEURO: Cranial nerves II-XII intact. Motor strength 5/5 bilaterally, upper and lower extremities.  PSYCH: Appropriate affect.    Assessment:       1. JOHN (obstructive sleep apnea)        [place] score is 7  Plan:       JOHN (obstructive sleep apnea)       Will order nasal pillows and chin strap once you call with DME company name   Follow up in about 1 year (around 1/11/2025).

## 2024-02-07 ENCOUNTER — TELEPHONE (OUTPATIENT)
Dept: FAMILY MEDICINE | Facility: CLINIC | Age: 74
End: 2024-02-07
Payer: COMMERCIAL

## 2024-02-07 ENCOUNTER — OFFICE VISIT (OUTPATIENT)
Dept: URGENT CARE | Facility: CLINIC | Age: 74
End: 2024-02-07
Payer: COMMERCIAL

## 2024-02-07 VITALS
OXYGEN SATURATION: 96 % | TEMPERATURE: 97 F | HEIGHT: 63 IN | HEART RATE: 76 BPM | DIASTOLIC BLOOD PRESSURE: 80 MMHG | RESPIRATION RATE: 16 BRPM | BODY MASS INDEX: 27.64 KG/M2 | SYSTOLIC BLOOD PRESSURE: 123 MMHG | WEIGHT: 156 LBS

## 2024-02-07 DIAGNOSIS — J06.9 UPPER RESPIRATORY TRACT INFECTION, UNSPECIFIED TYPE: ICD-10-CM

## 2024-02-07 DIAGNOSIS — J02.9 SORE THROAT: Primary | ICD-10-CM

## 2024-02-07 LAB
CTP QC/QA: YES
S PYO RRNA THROAT QL PROBE: NEGATIVE

## 2024-02-07 PROCEDURE — 87880 STREP A ASSAY W/OPTIC: CPT | Mod: QW,,, | Performed by: NURSE PRACTITIONER

## 2024-02-07 PROCEDURE — 99213 OFFICE O/P EST LOW 20 MIN: CPT | Mod: S$GLB,,, | Performed by: NURSE PRACTITIONER

## 2024-02-07 RX ORDER — AMOXICILLIN AND CLAVULANATE POTASSIUM 875; 125 MG/1; MG/1
1 TABLET, FILM COATED ORAL 2 TIMES DAILY
Qty: 14 TABLET | Refills: 0 | Status: SHIPPED | OUTPATIENT
Start: 2024-02-07 | End: 2024-02-14

## 2024-02-07 NOTE — PROGRESS NOTES
"Subjective:      Patient ID: Estefanía Ray is a 73 y.o. female.    Vitals:  height is 5' 3" (1.6 m) and weight is 70.8 kg (156 lb). Her temperature is 97.3 °F (36.3 °C). Her blood pressure is 123/80 and her pulse is 76. Her respiration is 16 and oxygen saturation is 96%.     Chief Complaint: Sore Throat    Sore throat and congestion began sunday    Sore Throat   This is a new problem. The current episode started in the past 7 days. The problem has been unchanged. There has been no fever. Associated symptoms include congestion. Treatments tried: tylenol.       HENT:  Positive for congestion and sore throat.       Objective:     Physical Exam    Assessment:     No diagnosis found.    Plan:       There are no diagnoses linked to this encounter.                "

## 2024-02-07 NOTE — TELEPHONE ENCOUNTER
----- Message from Hortencia Crabtree sent at 2/7/2024 10:18 AM CST -----  Pt keeps getting boils on her stomach and would like to be seen to try and figure out what is causing them   [phone]

## 2024-02-07 NOTE — PROGRESS NOTES
CHIEF COMPLAINT  Chief Complaint   Patient presents with    Sore Throat       HPI  Estefanía Naylor a 73 y.o. female who presents complaints of sore throat and sinus congestion for the past 5 days.  Patient denies fever, rash, chest pain, cough, shortness for breath, abdominal pain, nausea, vomiting, diarrhea.       CURRENT MEDICATIONS  Current Outpatient Medications on File Prior to Visit   Medication Sig Dispense Refill    aspirin (ECOTRIN) 81 MG EC tablet Aspir-81 mg tablet,delayed release   Take 1 tablet every day by oral route.      biotin 10,000 mcg TbDL 1 tablet      blood sugar diagnostic (FREESTYLE LITE STRIPS) Strp as directed 100 each 5    cetirizine (ZYRTEC) 10 MG tablet Take 10 mg by mouth once daily.      coenzyme Q10 100 mg capsule 1 capsule with a meal      cyclobenzaprine (FLEXERIL) 10 MG tablet Take 1 tablet (10 mg total) by mouth 3 (three) times daily as needed for Muscle spasms. 270 tablet 1    dulaglutide (TRULICITY) 1.5 mg/0.5 mL pen injector Inject 1.5 mg into the skin every 7 days. 12 pen 0    flash glucose sensor (FREESTYLE KARINA 14 DAY SENSOR) Kit 1 kit by Misc.(Non-Drug; Combo Route) route once daily. 1 kit 11    hydroCHLOROthiazide (HYDRODIURIL) 25 MG tablet Take 1 tablet (25 mg total) by mouth once daily. 90 tablet 1    hydroCHLOROthiazide (HYDRODIURIL) 25 MG tablet Take 1 tablet (25 mg total) by mouth once daily. 90 tablet 1    insulin detemir U-100, Levemir, (LEVEMIR FLEXPEN) 100 unit/mL (3 mL) InPn pen Inject 25 Units into the skin every evening. 15 each 5    insulin glargine 100 units/mL SubQ pen Inject 35 Units into the skin once daily. 3 each 2    krill-om-3-dha-epa-phospho-ast 911-26-40-50 mg Cap       lancets (FREESTYLE LANCETS) 28 gauge Misc 1 lancet by Misc.(Non-Drug; Combo Route) route 2 (two) times a day. 200 each 3    lansoprazole (PREVACID) 15 MG capsule 1 capsule      lisinopriL (PRINIVIL,ZESTRIL) 20 MG tablet Take 1 tablet (20 mg total) by mouth once daily. 90 tablet 1     "loratadine (CLARITIN) 10 mg tablet Take 10 mg by mouth.      metFORMIN (GLUCOPHAGE-XR) 500 MG ER 24hr tablet metformin  mg tablet,extended release 24 hr   TAKE 2 TABLETS BY MOUTH TWICE DAILY  Strength: 500 mg 120 tablet 2    metoprolol succinate (TOPROL-XL) 50 MG 24 hr tablet Take 1 tablet (50 mg total) by mouth once daily. 90 tablet 1    MOUNJARO 7.5 mg/0.5 mL PnIj Inject 7.5 mg into the skin.      pen needle, diabetic (PEN NEEDLE) 31 gauge x 3/16" Ndle 1 each by In Vitro route 4 (four) times daily. BD ULTRA FINE 100 each 5    pitavastatin calcium (LIVALO) 4 mg Tab Take 1 tablet by mouth once daily. 90 tablet 1    pitavastatin calcium (LIVALO) 4 mg Tab Take 1 tablet by mouth once daily. 90 tablet 1    pyrilamine-chlophedianoL (NINJACOF) 12.5-12.5 mg/5 mL Liqd Take 5 mLs by mouth every 6 to 8 hours as needed (Cough). 118 mL 0    UNABLE TO FIND medication name: Balance of nuture fruits      UNABLE TO FIND medication name: balance of nuture veggies      UNABLE TO FIND multivit topical patch      vitamin D (VITAMIN D3) 1000 units Tab Take 5,000 Units by mouth.       No current facility-administered medications on file prior to visit.       ALLERGIES  Review of patient's allergies indicates:   Allergen Reactions    Empagliflozin Other (See Comments)    Semaglutide Hives    Sitagliptin phos-metformin Other (See Comments)     Other reaction(s): diarrhea    Ezetimibe Nausea And Vomiting     Other reaction(s): Other (See Comments), Unknown    Feels terrible when taking medication    Other reaction(s): Other (See Comments)   Feels terrible when taking medication    Other reaction(s): Other (See Comments), Unknown   Feels terrible when taking medication    Farxiga [dapagliflozin] Rash     Genital rash    Linaclotide Diarrhea and Nausea And Vomiting       Immunization History   Administered Date(s) Administered    Influenza (FLUAD) - Quadrivalent - Adjuvanted - PF *Preferred* (65+) 11/29/2023    Influenza - High Dose - " PF (65 years and older) 09/09/2016    Influenza - Quadrivalent - High Dose - PF (65 years and older) 11/03/2022    Pneumococcal Conjugate - 13 Valent 03/10/2017    Pneumococcal Polysaccharide - 23 Valent 04/12/2018    Tdap 04/07/2015       PAST MEDICAL HISTORY  Past Medical History:   Diagnosis Date    Allergy     Diabetes mellitus, type 2     GERD (gastroesophageal reflux disease)     Hyperlipidemia     Hypertension     JOHN (obstructive sleep apnea)        SURGICAL HISTORY  Past Surgical History:   Procedure Laterality Date    HYSTERECTOMY      KNEE SURGERY Left     TONSILLECTOMY         SOCIAL HISTORY  Social History     Socioeconomic History    Marital status:    Tobacco Use    Smoking status: Never    Smokeless tobacco: Never   Substance and Sexual Activity    Alcohol use: Never    Drug use: Never     Social Determinants of Health     Financial Resource Strain: Medium Risk (11/27/2023)    Overall Financial Resource Strain (CARDIA)     Difficulty of Paying Living Expenses: Somewhat hard   Food Insecurity: No Food Insecurity (11/27/2023)    Hunger Vital Sign     Worried About Running Out of Food in the Last Year: Never true     Ran Out of Food in the Last Year: Never true   Transportation Needs: No Transportation Needs (11/27/2023)    PRAPARE - Transportation     Lack of Transportation (Medical): No     Lack of Transportation (Non-Medical): No   Physical Activity: Insufficiently Active (11/27/2023)    Exercise Vital Sign     Days of Exercise per Week: 2 days     Minutes of Exercise per Session: 20 min   Stress: No Stress Concern Present (11/27/2023)    [employer] [place] [employer] - Occupational Stress Questionnaire     Feeling of Stress : Only a little   Social Connections: Unknown (11/27/2023)    Social Connection and Isolation Panel [NHANES]     Frequency of Communication with Friends and Family: More than three times a week     Frequency of Social Gatherings with Friends and Family: Once a  week     Attends Club or Organization Meetings: More than 4 times per year     Marital Status:    Housing Stability: High Risk (11/27/2023)    Housing Stability Vital Sign     Unable to Pay for Housing in the Last Year: No     Number of Places Lived in the Last Year: 3     Unstable Housing in the Last Year: No       FAMILY HISTORY  History reviewed. No pertinent family history.    REVIEW OF SYSTEMS  Constitutional: No fever, chills, or weakness.  Eyes: No redness, pain, or discharge  HENT: No ear pain, no headache, + sinus congestion, + throat pain  Respiratory: No cough, wheezing or shortness of breath  Cardiovascular: No chest pain, palpitations or edema    All systems otherwise negative except as noted in the Review of Systems and History of Present Illness      PHYSICAL EXAM  Reviewed Triage Note  VITAL SIGNS:  123/80  Constitutional: Well developed, well nourished, Alert and oriented x3, No acute distress, non-toxic appearance.  HENT: Normocephalic, Atraumatic, Bilateral external ears normal, bilateral ear canals clear, external nose negative, oropharynx moist, No oral exudates or edema, +erythema noted bilaterally.  Eyes: PERRL, EOMI, Conjunctiva normal, No discharge.  Neck: Normal range of motion, no tenderness, supple, no carotid bruits  Respiratory: Normal breath sounds, no respiratory distress, no wheezing, no rhonchi, no rales  Cardiovascular:  HR 76, normal rhythm, no murmurs, no rubs, no gallops.        LABS  Pertinent labs reviewed. (see chart for details)  Strep negative        MEDICAL DECISION MAKING    Physical exam findings and lab results discussed with patient. No acute emergent medical condition identified at this time to warrant further testing. Will dispo home with instructions to follow up with PCP tomorrow.  Script for Augmentin sent to pharmacy of choice with instructions on usage.  Pt agrees with plan of care.     DISPOSITION  Patient discharged in stable condition       CLINICAL  IMPRESSION:  The primary encounter diagnosis was Sore throat. A diagnosis of Upper respiratory tract infection, unspecified type was also pertinent to this visit.    Patient advised to follow-up with your PCP within 3 days for BP re-check if Blood Pressure was >120/80 without history of hypertension.

## 2024-02-08 ENCOUNTER — OFFICE VISIT (OUTPATIENT)
Dept: FAMILY MEDICINE | Facility: CLINIC | Age: 74
End: 2024-02-08
Payer: COMMERCIAL

## 2024-02-08 VITALS
HEART RATE: 78 BPM | WEIGHT: 163 LBS | BODY MASS INDEX: 28.88 KG/M2 | OXYGEN SATURATION: 96 % | HEIGHT: 63 IN | DIASTOLIC BLOOD PRESSURE: 70 MMHG | SYSTOLIC BLOOD PRESSURE: 124 MMHG

## 2024-02-08 DIAGNOSIS — L73.9 FOLLICULITIS: ICD-10-CM

## 2024-02-08 DIAGNOSIS — E11.69 TYPE 2 DIABETES MELLITUS WITH OTHER SPECIFIED COMPLICATION, WITHOUT LONG-TERM CURRENT USE OF INSULIN: ICD-10-CM

## 2024-02-08 DIAGNOSIS — I10 ESSENTIAL HYPERTENSION: Primary | ICD-10-CM

## 2024-02-08 DIAGNOSIS — B37.9 CANDIDIASIS: ICD-10-CM

## 2024-02-08 PROCEDURE — 1101F PT FALLS ASSESS-DOCD LE1/YR: CPT | Mod: CPTII,S$GLB,, | Performed by: NURSE PRACTITIONER

## 2024-02-08 PROCEDURE — 3288F FALL RISK ASSESSMENT DOCD: CPT | Mod: CPTII,S$GLB,, | Performed by: NURSE PRACTITIONER

## 2024-02-08 PROCEDURE — 3074F SYST BP LT 130 MM HG: CPT | Mod: CPTII,S$GLB,, | Performed by: NURSE PRACTITIONER

## 2024-02-08 PROCEDURE — 3078F DIAST BP <80 MM HG: CPT | Mod: CPTII,S$GLB,, | Performed by: NURSE PRACTITIONER

## 2024-02-08 PROCEDURE — 3008F BODY MASS INDEX DOCD: CPT | Mod: CPTII,S$GLB,, | Performed by: NURSE PRACTITIONER

## 2024-02-08 PROCEDURE — 99214 OFFICE O/P EST MOD 30 MIN: CPT | Mod: S$GLB,,, | Performed by: NURSE PRACTITIONER

## 2024-02-08 PROCEDURE — 1160F RVW MEDS BY RX/DR IN RCRD: CPT | Mod: CPTII,S$GLB,, | Performed by: NURSE PRACTITIONER

## 2024-02-08 PROCEDURE — 1159F MED LIST DOCD IN RCRD: CPT | Mod: CPTII,S$GLB,, | Performed by: NURSE PRACTITIONER

## 2024-02-08 RX ORDER — ATORVASTATIN CALCIUM 40 MG/1
1 TABLET, FILM COATED ORAL NIGHTLY
COMMUNITY
Start: 2024-01-12 | End: 2025-01-11

## 2024-02-08 RX ORDER — CHOLESTYRAMINE
POWDER (GRAM) MISCELLANEOUS
COMMUNITY

## 2024-02-08 RX ORDER — NYSTATIN 100000 [USP'U]/G
POWDER TOPICAL 4 TIMES DAILY
Qty: 60 G | Refills: 3 | Status: SHIPPED | OUTPATIENT
Start: 2024-02-08

## 2024-02-08 RX ORDER — NYSTATIN 100000 U/G
OINTMENT TOPICAL 2 TIMES DAILY
Qty: 30 G | Refills: 3 | Status: SHIPPED | OUTPATIENT
Start: 2024-02-08

## 2024-02-08 RX ORDER — MUPIROCIN 20 MG/G
OINTMENT TOPICAL 3 TIMES DAILY
Qty: 22 G | Refills: 0 | Status: SHIPPED | OUTPATIENT
Start: 2024-02-08 | End: 2024-03-05 | Stop reason: SDUPTHER

## 2024-02-08 RX ORDER — MELOXICAM 15 MG/1
1 TABLET ORAL DAILY PRN
COMMUNITY
End: 2024-03-12

## 2024-02-08 RX ORDER — TIRZEPATIDE 10 MG/.5ML
INJECTION, SOLUTION SUBCUTANEOUS
COMMUNITY
Start: 2024-01-11 | End: 2024-03-12

## 2024-02-08 RX ORDER — NITROGLYCERIN 0.4 MG/1
0.4 TABLET SUBLINGUAL
COMMUNITY
Start: 2024-01-11 | End: 2025-01-10

## 2024-02-08 RX ORDER — KETOCONAZOLE 20 MG/ML
SHAMPOO, SUSPENSION TOPICAL
COMMUNITY

## 2024-02-08 RX ORDER — CEPHALEXIN 500 MG/1
500 CAPSULE ORAL EVERY 8 HOURS
Qty: 24 CAPSULE | Refills: 0 | Status: SHIPPED | OUTPATIENT
Start: 2024-02-08

## 2024-02-17 NOTE — PROGRESS NOTES
SUBJECTIVE:    Patient ID: Estefanía Ray is a 73 y.o. female.    Chief Complaint: Recurrent Skin Infections (No bottles//Pt here for boil on stomach. 4th time she's had it. Had it for 3-4 days. Not currently putting anything on it//has had eye exam within the last year//JL)    73 y.o. female who presents for urgent visit.  She is treated for diabetes, cad, hyperlipidemia, htn, gerd, oa.  Last a1c was 7.8. taking meds as prescribed. seen  at urgent care for strep throat yesterday which was negative. Feeling better today. Was not started on antibiotics. Presents today with complaints of boils to stomach. And redness in skin folds. Has been going on x several months. Seems to reoccur in same area. Does have some pustular drainage from site at times.         Office Visit on 02/07/2024   Component Date Value Ref Range Status    Rapid Strep A Screen 02/07/2024 Negative  Negative Final     Acceptable 02/07/2024 Yes   Final   Office Visit on 11/29/2023   Component Date Value Ref Range Status    Hemoglobin A1C, POC 11/29/2023 7.8  % Final   Office Visit on 11/21/2023   Component Date Value Ref Range Status    Rapid Strep A Screen 11/21/2023 Positive (A)  Negative Final     Acceptable 11/21/2023 Yes   Final       Past Medical History:   Diagnosis Date    Allergy     Diabetes mellitus, type 2     GERD (gastroesophageal reflux disease)     Hyperlipidemia     Hypertension     JOHN (obstructive sleep apnea)      Social History     Socioeconomic History    Marital status:    Tobacco Use    Smoking status: Never    Smokeless tobacco: Never   Substance and Sexual Activity    Alcohol use: Never    Drug use: Never     Social Determinants of Health     Financial Resource Strain: Medium Risk (11/27/2023)    Overall Financial Resource Strain (CARDIA)     Difficulty of Paying Living Expenses: Somewhat hard   Food Insecurity: No Food Insecurity (11/27/2023)    Hunger Vital Sign     Worried About Running  Out of Food in the Last Year: Never true     Ran Out of Food in the Last Year: Never true   Transportation Needs: No Transportation Needs (11/27/2023)    PRAPARE - Transportation     Lack of Transportation (Medical): No     Lack of Transportation (Non-Medical): No   Physical Activity: Insufficiently Active (11/27/2023)    Exercise Vital Sign     Days of Exercise per Week: 2 days     Minutes of Exercise per Session: 20 min   Stress: No Stress Concern Present (11/27/2023)    [employer] [place] [employer] - Occupational Stress Questionnaire     Feeling of Stress : Only a little   Social Connections: Unknown (11/27/2023)    Social Connection and Isolation Panel [NHANES]     Frequency of Communication with Friends and Family: More than three times a week     Frequency of Social Gatherings with Friends and Family: Once a week     Attends Club or Organization Meetings: More than 4 times per year     Marital Status:    Housing Stability: High Risk (11/27/2023)    Housing Stability Vital Sign     Unable to Pay for Housing in the Last Year: No     Number of Places Lived in the Last Year: 3     Unstable Housing in the Last Year: No     Past Surgical History:   Procedure Laterality Date    HYSTERECTOMY      KNEE SURGERY Left     TONSILLECTOMY       History reviewed. No pertinent family history.    Tests to Keep You Healthy    Mammogram: Met on 3/8/2023  Eye Exam: DUE  Colon Cancer Screening: Met on 9/7/2016  Last Blood Pressure <= 139/89 (2/8/2024): Yes  Last HbA1c < 8 (11/29/2023): Yes      Review of patient's allergies indicates:   Allergen Reactions    Empagliflozin Other (See Comments)    Semaglutide Hives    Sitagliptin phos-metformin Other (See Comments)     Other reaction(s): diarrhea    Ezetimibe Nausea And Vomiting     Other reaction(s): Other (See Comments), Unknown    Feels terrible when taking medication    Other reaction(s): Other (See Comments)   Feels terrible when taking medication    Other  reaction(s): Other (See Comments), Unknown   Feels terrible when taking medication    Farxiga [dapagliflozin] Rash     Genital rash    Linaclotide Diarrhea and Nausea And Vomiting       Current Outpatient Medications:     aspirin (ECOTRIN) 81 MG EC tablet, Aspir-81 mg tablet,delayed release  Take 1 tablet every day by oral route., Disp: , Rfl:     atorvastatin (LIPITOR) 40 MG tablet, Take 1 tablet by mouth every evening., Disp: , Rfl:     blood sugar diagnostic (FREESTYLE LITE STRIPS) Strp, as directed, Disp: 100 each, Rfl: 5    cholestyramine, bulk, Powd, , Disp: , Rfl:     cyclobenzaprine (FLEXERIL) 10 MG tablet, Take 1 tablet (10 mg total) by mouth 3 (three) times daily as needed for Muscle spasms., Disp: 270 tablet, Rfl: 1    flash glucose sensor (FREESTYLE KARINA 14 DAY SENSOR) Kit, 1 kit by Misc.(Non-Drug; Combo Route) route once daily., Disp: 1 kit, Rfl: 11    hydroCHLOROthiazide (HYDRODIURIL) 25 MG tablet, Take 1 tablet (25 mg total) by mouth once daily., Disp: 90 tablet, Rfl: 1    insulin detemir U-100, Levemir, (LEVEMIR FLEXPEN) 100 unit/mL (3 mL) InPn pen, Inject 25 Units into the skin every evening., Disp: 15 each, Rfl: 5    ketoconazole (NIZORAL) 2 % shampoo, APPLY TOPICALLY 3 TIMES EVERY WEEK AS DIRECTED, Disp: , Rfl:     krill-om-3-dha-epa-phospho-ast 383-35-99-50 mg Cap, , Disp: , Rfl:     lancets (FREESTYLE LANCETS) 28 gauge Misc, 1 lancet by Misc.(Non-Drug; Combo Route) route 2 (two) times a day., Disp: 200 each, Rfl: 3    lansoprazole (PREVACID) 15 MG capsule, 1 capsule, Disp: , Rfl:     lisinopriL (PRINIVIL,ZESTRIL) 20 MG tablet, Take 1 tablet (20 mg total) by mouth once daily., Disp: 90 tablet, Rfl: 1    loratadine (CLARITIN) 10 mg tablet, Take 10 mg by mouth., Disp: , Rfl:     metFORMIN (GLUCOPHAGE-XR) 500 MG ER 24hr tablet, metformin  mg tablet,extended release 24 hr  TAKE 2 TABLETS BY MOUTH TWICE DAILY Strength: 500 mg, Disp: 120 tablet, Rfl: 2    metoprolol succinate (TOPROL-XL) 50 MG  "24 hr tablet, Take 1 tablet (50 mg total) by mouth once daily., Disp: 90 tablet, Rfl: 1    MOUNJARO 10 mg/0.5 mL PnIj, SMARTSIG:10 Milligram(s) SUB-Q Once a Week, Disp: , Rfl:     nitroGLYCERIN (NITROSTAT) 0.4 MG SL tablet, Place 0.4 mg under the tongue., Disp: , Rfl:     pen needle, diabetic (PEN NEEDLE) 31 gauge x 3/16" Ndle, 1 each by In Vitro route 4 (four) times daily. BD ULTRA FINE, Disp: 100 each, Rfl: 5    UNABLE TO FIND, medication name: Balance of nuture fruits, Disp: , Rfl:     UNABLE TO FIND, medication name: balance of nuture veggies, Disp: , Rfl:     biotin 10,000 mcg TbDL, 1 tablet, Disp: , Rfl:     cephALEXin (KEFLEX) 500 MG capsule, Take 1 capsule (500 mg total) by mouth every 8 (eight) hours., Disp: 24 capsule, Rfl: 0    cetirizine (ZYRTEC) 10 MG tablet, Take 10 mg by mouth once daily., Disp: , Rfl:     coenzyme Q10 100 mg capsule, 1 capsule with a meal, Disp: , Rfl:     DOCOSAHEXAENOIC ACID ORAL, , Disp: , Rfl:     dulaglutide (TRULICITY) 1.5 mg/0.5 mL pen injector, Inject 1.5 mg into the skin every 7 days. (Patient not taking: Reported on 2/8/2024), Disp: 12 pen, Rfl: 0    hydroCHLOROthiazide (HYDRODIURIL) 25 MG tablet, Take 1 tablet (25 mg total) by mouth once daily., Disp: 90 tablet, Rfl: 1    insulin glargine 100 units/mL SubQ pen, Inject 35 Units into the skin once daily. (Patient not taking: Reported on 2/8/2024), Disp: 3 each, Rfl: 2    meloxicam (MOBIC) 15 MG tablet, Take 1 tablet by mouth daily as needed., Disp: , Rfl:     MOUNJARO 7.5 mg/0.5 mL PnIj, Inject 7.5 mg into the skin., Disp: , Rfl:     mupirocin (BACTROBAN) 2 % ointment, Apply topically 3 (three) times daily., Disp: 22 g, Rfl: 0    nystatin (MYCOSTATIN) ointment, Apply topically 2 (two) times daily., Disp: 30 g, Rfl: 3    nystatin (MYCOSTATIN) powder, Apply topically 4 (four) times daily., Disp: 60 g, Rfl: 3    pitavastatin calcium (LIVALO) 4 mg Tab, Take 1 tablet by mouth once daily. (Patient not taking: Reported on " "2/8/2024), Disp: 90 tablet, Rfl: 1    pitavastatin calcium (LIVALO) 4 mg Tab, Take 1 tablet by mouth once daily. (Patient not taking: Reported on 2/8/2024), Disp: 90 tablet, Rfl: 1    pyrilamine-chlophedianoL (NINJACOF) 12.5-12.5 mg/5 mL Liqd, Take 5 mLs by mouth every 6 to 8 hours as needed (Cough). (Patient not taking: Reported on 2/8/2024), Disp: 118 mL, Rfl: 0    UNABLE TO FIND, multivit topical patch, Disp: , Rfl:     vitamin D (VITAMIN D3) 1000 units Tab, Take 5,000 Units by mouth., Disp: , Rfl:     Review of Systems   Constitutional:  Negative for chills and fever.   HENT:  Negative for congestion, ear discharge, ear pain, sinus pressure and sore throat.    Respiratory:  Negative for cough and shortness of breath.    Cardiovascular:  Negative for chest pain and leg swelling.          Objective:      Vitals:    02/08/24 1300   BP: 124/70   Pulse: 78   SpO2: 96%   Weight: 73.9 kg (163 lb)   Height: 5' 3" (1.6 m)     Physical Exam  Vitals and nursing note reviewed.   Constitutional:       General: She is not in acute distress.     Appearance: Normal appearance. She is well-developed. She is not ill-appearing.   HENT:      Right Ear: External ear normal.      Left Ear: External ear normal.   Neck:      Vascular: No JVD.   Cardiovascular:      Rate and Rhythm: Normal rate and regular rhythm.      Heart sounds: No murmur heard.  Pulmonary:      Effort: Pulmonary effort is normal.      Breath sounds: Normal breath sounds.   Abdominal:      General: Bowel sounds are normal.      Palpations: Abdomen is soft.   Musculoskeletal:         General: No deformity. Normal range of motion.      Cervical back: Normal range of motion and neck supple.   Lymphadenopathy:      Cervical: No cervical adenopathy.   Skin:     General: Skin is warm and dry.      Findings: Rash present. Rash is pustular.             Comments: Erythema in skin folds   Neurological:      Mental Status: She is alert and oriented to person, place, and time. "      Gait: Gait normal.   Psychiatric:         Speech: Speech normal.         Behavior: Behavior normal.           Assessment:       1. Essential hypertension    2. Type 2 diabetes mellitus with other specified complication, without long-term current use of insulin    3. Candidiasis    4. Folliculitis         Plan:       Essential hypertension  Comments:  bp is well controlled    Type 2 diabetes mellitus with other specified complication, without long-term current use of insulin    Candidiasis  Comments:  nystatin. keep area clean and dry    Folliculitis  Comments:  keflex  bactroban    Other orders  -     nystatin (MYCOSTATIN) ointment; Apply topically 2 (two) times daily.  Dispense: 30 g; Refill: 3  -     nystatin (MYCOSTATIN) powder; Apply topically 4 (four) times daily.  Dispense: 60 g; Refill: 3  -     cephALEXin (KEFLEX) 500 MG capsule; Take 1 capsule (500 mg total) by mouth every 8 (eight) hours.  Dispense: 24 capsule; Refill: 0  -     mupirocin (BACTROBAN) 2 % ointment; Apply topically 3 (three) times daily.  Dispense: 22 g; Refill: 0      Follow up in about 3 months (around 5/8/2024), or if symptoms worsen or fail to improve, for medication management.        2/17/2024 Verna Shah

## 2024-02-22 ENCOUNTER — TELEPHONE (OUTPATIENT)
Dept: FAMILY MEDICINE | Facility: CLINIC | Age: 74
End: 2024-02-22
Payer: COMMERCIAL

## 2024-02-22 NOTE — TELEPHONE ENCOUNTER
----- Message from Stephanie Layne LPN sent at 2/22/2024 11:12 AM CST -----    ----- Message -----  From: Hortencia Crabtree  Sent: 2/22/2024  11:08 AM CST  To: Isaac Merida Staff    - 10:14- pt needs to know when her last tetanus shot was as she stuck herself with a helen nail yesterday   [phone]

## 2024-02-22 NOTE — TELEPHONE ENCOUNTER
----- Message from Lindy Duque MA sent at 2/22/2024 12:20 PM CST -----  Patient is returning your call    Pt: [phone]  -DN

## 2024-02-22 NOTE — TELEPHONE ENCOUNTER
Spoke to pt and she is going to urgent care to get tetanus shot and have someone look at it      KEVIN

## 2024-02-23 ENCOUNTER — TELEPHONE (OUTPATIENT)
Dept: FAMILY MEDICINE | Facility: CLINIC | Age: 74
End: 2024-02-23
Payer: COMMERCIAL

## 2024-02-23 NOTE — TELEPHONE ENCOUNTER
----- Message from Hortencia Crabtree sent at 2/23/2024 10:35 AM CST -----  Pt would like to take the antibiotics twice a day and not 3 times a day as she can not remember   [phone]

## 2024-02-26 ENCOUNTER — TELEPHONE (OUTPATIENT)
Dept: FAMILY MEDICINE | Facility: CLINIC | Age: 74
End: 2024-02-26
Payer: COMMERCIAL

## 2024-02-26 NOTE — TELEPHONE ENCOUNTER
Spoke to pt and let her know there Nystatin is for the yeast under her breast and in skin folds and the mupirocin is for the boils and nose. Pt verbalized understanding

## 2024-02-26 NOTE — TELEPHONE ENCOUNTER
----- Message from Lindy Duque MA sent at 2/26/2024  9:32 AM CST -----    ----- Message -----  From: Hortencia Crabtree  Sent: 2/26/2024   9:30 AM CST  To: Polly De Leon Staff    -9:08- Pt got 2 creams and is not sure which one to use where   [phone]

## 2024-03-05 ENCOUNTER — OFFICE VISIT (OUTPATIENT)
Dept: FAMILY MEDICINE | Facility: CLINIC | Age: 74
End: 2024-03-05
Payer: COMMERCIAL

## 2024-03-05 VITALS
HEIGHT: 63 IN | DIASTOLIC BLOOD PRESSURE: 64 MMHG | WEIGHT: 160 LBS | BODY MASS INDEX: 28.35 KG/M2 | HEART RATE: 81 BPM | SYSTOLIC BLOOD PRESSURE: 128 MMHG | OXYGEN SATURATION: 96 %

## 2024-03-05 DIAGNOSIS — G47.30 SLEEP APNEA, UNSPECIFIED TYPE: ICD-10-CM

## 2024-03-05 DIAGNOSIS — I10 HYPERTENSION, UNSPECIFIED TYPE: ICD-10-CM

## 2024-03-05 DIAGNOSIS — E78.5 HYPERLIPIDEMIA, UNSPECIFIED HYPERLIPIDEMIA TYPE: ICD-10-CM

## 2024-03-05 DIAGNOSIS — Z12.31 ENCOUNTER FOR SCREENING MAMMOGRAM FOR BREAST CANCER: ICD-10-CM

## 2024-03-05 DIAGNOSIS — I25.10 ATHEROSCLEROSIS OF NATIVE CORONARY ARTERY OF NATIVE HEART WITHOUT ANGINA PECTORIS: ICD-10-CM

## 2024-03-05 DIAGNOSIS — E11.69 TYPE 2 DIABETES MELLITUS WITH OTHER SPECIFIED COMPLICATION, WITHOUT LONG-TERM CURRENT USE OF INSULIN: Primary | ICD-10-CM

## 2024-03-05 DIAGNOSIS — K21.9 GASTROESOPHAGEAL REFLUX DISEASE, UNSPECIFIED WHETHER ESOPHAGITIS PRESENT: ICD-10-CM

## 2024-03-05 LAB — HBA1C MFR BLD: 7.1 %

## 2024-03-05 PROCEDURE — 83036 HEMOGLOBIN GLYCOSYLATED A1C: CPT | Mod: QW,,, | Performed by: NURSE PRACTITIONER

## 2024-03-05 PROCEDURE — 3008F BODY MASS INDEX DOCD: CPT | Mod: CPTII,S$GLB,, | Performed by: NURSE PRACTITIONER

## 2024-03-05 PROCEDURE — 3051F HG A1C>EQUAL 7.0%<8.0%: CPT | Mod: CPTII,S$GLB,, | Performed by: NURSE PRACTITIONER

## 2024-03-05 PROCEDURE — 1160F RVW MEDS BY RX/DR IN RCRD: CPT | Mod: CPTII,S$GLB,, | Performed by: NURSE PRACTITIONER

## 2024-03-05 PROCEDURE — 99214 OFFICE O/P EST MOD 30 MIN: CPT | Mod: S$GLB,,, | Performed by: NURSE PRACTITIONER

## 2024-03-05 PROCEDURE — 1159F MED LIST DOCD IN RCRD: CPT | Mod: CPTII,S$GLB,, | Performed by: NURSE PRACTITIONER

## 2024-03-05 PROCEDURE — 3074F SYST BP LT 130 MM HG: CPT | Mod: CPTII,S$GLB,, | Performed by: NURSE PRACTITIONER

## 2024-03-05 PROCEDURE — 1101F PT FALLS ASSESS-DOCD LE1/YR: CPT | Mod: CPTII,S$GLB,, | Performed by: NURSE PRACTITIONER

## 2024-03-05 PROCEDURE — 3078F DIAST BP <80 MM HG: CPT | Mod: CPTII,S$GLB,, | Performed by: NURSE PRACTITIONER

## 2024-03-05 PROCEDURE — 3288F FALL RISK ASSESSMENT DOCD: CPT | Mod: CPTII,S$GLB,, | Performed by: NURSE PRACTITIONER

## 2024-03-07 ENCOUNTER — TELEPHONE (OUTPATIENT)
Dept: FAMILY MEDICINE | Facility: CLINIC | Age: 74
End: 2024-03-07
Payer: COMMERCIAL

## 2024-03-07 NOTE — TELEPHONE ENCOUNTER
----- Message from Jacinta Torres sent at 3/7/2024  3:26 PM CST -----  She would like to know if she is able to stop taking mounjaro   [phone]

## 2024-03-08 NOTE — TELEPHONE ENCOUNTER
Spoke to pt and let her know per Hortencia and she stated she talked to Verna and is going to stop it because she does not like the way it makes her feel

## 2024-03-08 NOTE — TELEPHONE ENCOUNTER
Is she having an adverse reaction? If so, yes stop. If she just does not want to take it, I cannot advise her not to take a prescribed medication to control her glucose, but we also cannot make her take it. As long as she understands the effect of that will be a rise in her blood sugar.

## 2024-03-12 DIAGNOSIS — E11.69 TYPE 2 DIABETES MELLITUS WITH OTHER SPECIFIED COMPLICATION, WITHOUT LONG-TERM CURRENT USE OF INSULIN: Primary | ICD-10-CM

## 2024-03-12 RX ORDER — MUPIROCIN 20 MG/G
OINTMENT TOPICAL 3 TIMES DAILY
Qty: 22 G | Refills: 0 | Status: SHIPPED | OUTPATIENT
Start: 2024-03-12 | End: 2024-06-04 | Stop reason: SDUPTHER

## 2024-03-12 RX ORDER — METOPROLOL SUCCINATE 50 MG/1
50 TABLET, EXTENDED RELEASE ORAL DAILY
Qty: 90 TABLET | Refills: 1 | Status: SHIPPED | OUTPATIENT
Start: 2024-03-12

## 2024-03-12 NOTE — TELEPHONE ENCOUNTER
Spoke to pt and she stated she is no longer seeing Endocrine. When she talked to Verna about the yeast infection it was in the crease of her legs. Now it is on the inside by her labia. Pt stopped mounjaro because she did not like the way it made her feel. Wants to start trulicity

## 2024-03-12 NOTE — TELEPHONE ENCOUNTER
----- Message from Hortencia Crabtree sent at 3/12/2024 12:19 PM CDT -----  - 12:00- pt needs something for a yeast infection and wants to get back on [place] [phone]

## 2024-03-12 NOTE — TELEPHONE ENCOUNTER
She is seeing endocrine? Did she call about changing meds?   Also where is yeast ? On 2/8 given cream and powder with several refills?

## 2024-03-13 RX ORDER — DULAGLUTIDE 1.5 MG/.5ML
1.5 INJECTION, SOLUTION SUBCUTANEOUS
Qty: 12 PEN | Refills: 1 | Status: SHIPPED | OUTPATIENT
Start: 2024-03-13 | End: 2024-03-14

## 2024-03-13 RX ORDER — FLUCONAZOLE 100 MG/1
100 TABLET ORAL DAILY
Qty: 3 TABLET | Refills: 0 | Status: SHIPPED | OUTPATIENT
Start: 2024-03-13 | End: 2024-03-16

## 2024-03-13 RX ORDER — DULAGLUTIDE 1.5 MG/.5ML
1.5 INJECTION, SOLUTION SUBCUTANEOUS
Qty: 4 PEN | Refills: 11 | Status: SHIPPED | OUTPATIENT
Start: 2024-03-13 | End: 2024-03-13 | Stop reason: SDUPTHER

## 2024-03-13 NOTE — TELEPHONE ENCOUNTER
Spoke with pt and informed her of below. She voiced understanding and states she would like the trulicity sent to [facility] as they charge her less for the medication.    Rx order set up.

## 2024-03-14 DIAGNOSIS — E11.69 TYPE 2 DIABETES MELLITUS WITH OTHER SPECIFIED COMPLICATION, WITHOUT LONG-TERM CURRENT USE OF INSULIN: ICD-10-CM

## 2024-03-14 RX ORDER — DULAGLUTIDE 3 MG/.5ML
3 INJECTION, SOLUTION SUBCUTANEOUS
Qty: 4 PEN | Refills: 11 | Status: SHIPPED | OUTPATIENT
Start: 2024-03-14 | End: 2024-03-18 | Stop reason: SDUPTHER

## 2024-03-14 NOTE — TELEPHONE ENCOUNTER
----- Message from Stephanie Layne LPN sent at 3/14/2024  1:04 PM CDT -----    ----- Message -----  From: Hortencia Crabtree  Sent: 3/14/2024  12:57 PM CDT  To: Isaac Merida Staff    Vm- pt just saw that [facility] is filling her 1.5 trulicity and she needs the .5  [phone]

## 2024-03-14 NOTE — TELEPHONE ENCOUNTER
Spoke to pt and she stated she she is taking 3mg of Trulicity and she has that old prescription she started her self back on. She wants 3 mg trulicity

## 2024-03-18 DIAGNOSIS — E11.69 TYPE 2 DIABETES MELLITUS WITH OTHER SPECIFIED COMPLICATION, WITHOUT LONG-TERM CURRENT USE OF INSULIN: ICD-10-CM

## 2024-03-18 NOTE — TELEPHONE ENCOUNTER
----- Message from Emma Carver sent at 3/18/2024  4:52 PM CDT -----  VM 3:09 Please call about her Trulicity 3 mg. Pt's # [phone] GH

## 2024-03-18 NOTE — TELEPHONE ENCOUNTER
Spoke with pt who states her Trulicity was sent to [facility]. Would like sent into "[facility]. Rx order set up.

## 2024-03-19 RX ORDER — DULAGLUTIDE 3 MG/.5ML
3 INJECTION, SOLUTION SUBCUTANEOUS
Qty: 12 PEN | Refills: 1 | Status: SHIPPED | OUTPATIENT
Start: 2024-03-19 | End: 2024-09-03

## 2024-03-19 RX ORDER — DULAGLUTIDE 3 MG/.5ML
3 INJECTION, SOLUTION SUBCUTANEOUS
Qty: 4 PEN | Refills: 11 | Status: SHIPPED | OUTPATIENT
Start: 2024-03-19 | End: 2024-03-19

## 2024-03-24 NOTE — PROGRESS NOTES
SUBJECTIVE:    Patient ID: Estefanía Ray is a 73 y.o. female.    Chief Complaint: Follow-up (Bottle brought//Pt here for 3 mo follow up//discuss endocrinologist,  freestyle nora and mounjaro//will schedule an eye exam//JL)    73 y.o. female who presents for check up. Recently moved back from [facility]. She is treated for diabetes, cad, hyperlipidemia, htn, gerd, oa.  Last a1c was 7.1. taking meds as prescribed. Being followed by dr. Picakrd. She is trying to eat better. 15lb weight loss. Feels ok overall. Does have some occasional low blood sugars. Sleeps weith cpap. Followed by dr. Gomez.     Follow-up  Pertinent negatives include no abdominal pain, arthralgias, chest pain, chills, coughing, fever, headaches, nausea, rash, sore throat, vomiting or weakness.       Office Visit on 03/05/2024   Component Date Value Ref Range Status    Hemoglobin A1C, POC 03/05/2024 7.1  % Final   Office Visit on 02/07/2024   Component Date Value Ref Range Status    Rapid Strep A Screen 02/07/2024 Negative  Negative Final     Acceptable 02/07/2024 Yes   Final   Office Visit on 11/29/2023   Component Date Value Ref Range Status    Hemoglobin A1C, POC 11/29/2023 7.8  % Final   Office Visit on 11/21/2023   Component Date Value Ref Range Status    Rapid Strep A Screen 11/21/2023 Positive (A)  Negative Final     Acceptable 11/21/2023 Yes   Final       Past Medical History:   Diagnosis Date    Allergy     Diabetes mellitus, type 2     GERD (gastroesophageal reflux disease)     Hyperlipidemia     Hypertension     JOHN (obstructive sleep apnea)      Social History     Socioeconomic History    Marital status:    Tobacco Use    Smoking status: Never    Smokeless tobacco: Never   Substance and Sexual Activity    Alcohol use: Never    Drug use: Never     Social Determinants of Health     Financial Resource Strain: Medium Risk (11/27/2023)    Overall Financial Resource Strain (CARDIA)     Difficulty of Paying   new dose of Metformin 1000mg  Start by taking 1000mg in the morning and 500mg in the evening  After one week, take 1000mg in the morning and the evening  Continue weekly Vitamin D supplement  We will recheck labs in December including your A1C which is an average of your blood sugar over 3 months time  Continue to follow a diabetic diet-avoid refined carbohydrates like white breads, rices and pasta, cut out sweets, and no pop    Goal fasting blood sugar is less than 130      Patient Education     Diet: Diabetes  Food is an important tool that you can use to control diabetes and stay healthy. Eating well-balanced meals in the correct amounts will help you control your blood glucose levels and prevent low blood sugar reactions. It will also help you reduce the health risks of diabetes. There is no one specific diet that is right for everyone with diabetes. But there are general guidelines to follow. A registered dietitian (RD) will create a tailored diet approach that’s just right for you. He or she will also help you plan healthy meals and snacks. If you have any questions, call your dietitian for advice.     Guidelines for success  Talk with your healthcare provider before starting a diabetes diet or weight loss program. If you haven't talked with a dietitian yet, ask your provider for a referral. The following guidelines can help you succeed:  · Select foods from the 6 food groups below. Your dietitian will help you find food choices within each group. He or she will also show you serving sizes and how many servings you can have at each meal.  ? Grains, beans, and starchy vegetables  ? Vegetables  ? Fruit  ? Milk or yogurt  ? Meat, poultry, fish, or tofu  ? Healthy fats  · Check your blood sugar levels as directed by your provider. Take any medicine as prescribed by your provider.  · Learn to read food labels and pick the right portion sizes.  · Eat only the amount of food in your meal plan. Eat about the  Living Expenses: Somewhat hard   Food Insecurity: No Food Insecurity (11/27/2023)    Hunger Vital Sign     Worried About Running Out of Food in the Last Year: Never true     Ran Out of Food in the Last Year: Never true   Transportation Needs: No Transportation Needs (11/27/2023)    PRAPARE - Transportation     Lack of Transportation (Medical): No     Lack of Transportation (Non-Medical): No   Physical Activity: Insufficiently Active (11/27/2023)    Exercise Vital Sign     Days of Exercise per Week: 2 days     Minutes of Exercise per Session: 20 min   Stress: No Stress Concern Present (11/27/2023)    [employer] - Occupational Stress Questionnaire     Feeling of Stress : Only a little   Social Connections: Unknown (11/27/2023)    Social Connection and Isolation Panel [NHANES]     Frequency of Communication with Friends and Family: More than three times a week     Frequency of Social Gatherings with Friends and Family: Once a week     Attends Club or Organization Meetings: More than 4 times per year     Marital Status:    Housing Stability: High Risk (11/27/2023)    Housing Stability Vital Sign     Unable to Pay for Housing in the Last Year: No     Number of Places Lived in the Last Year: 3     Unstable Housing in the Last Year: No     Past Surgical History:   Procedure Laterality Date    HYSTERECTOMY      KNEE SURGERY Left     TONSILLECTOMY       History reviewed. No pertinent family history.    Tests to Keep You Healthy    Mammogram: ORDERED BUT NOT SCHEDULED  Eye Exam: DUE  Colon Cancer Screening: Met on 9/7/2016  Last Blood Pressure <= 139/89 (3/5/2024): Yes  Last HbA1c < 8 (03/05/2024): Yes      Review of patient's allergies indicates:   Allergen Reactions    Empagliflozin Other (See Comments)    Semaglutide Hives    Sitagliptin phos-metformin Other (See Comments)     Other reaction(s): diarrhea    Ezetimibe Nausea And Vomiting     Other reaction(s): Other (See Comments),  same amount of food at regular times each day. Don’t skip meals. Eat meals 4 to 5 hours apart, with snacks in between.  · Limit alcohol. It raises blood sugar levels. Drink water or calorie-free diet drinks that use safe sweeteners.  · Eat less fat to help lower your risk of heart disease. Use nonfat or low-fat dairy products and lean meats. Avoid fried foods. Use cooking oils that are unsaturated, such as olive, canola, or peanut oil.  · Talk with your dietitian about safe sugar substitutes.  · Avoid added salt. It can contribute to high blood pressure, which can cause heart disease. People with diabetes already have a risk of high blood pressure and heart disease.  · Stay at a healthy weight. If you need to lose weight, cut down on your portion sizes. But don’t skip meals. Exercise is an important part of any weight management program. Talk with your provider about an exercise program that’s right for you.  · For more information about the best diet plan for you, talk with a registered dietitian (RD). To find an RD in your area, contact:  ? Academy of Nutrition and Dietetics www.eatright.org  ? The American Diabetes Association [phone] www.diabetes.org  Date Last Reviewed: 8/1/2016 © 2000-2018 The "[employer] [address] [place] [address], [place] [address]. All rights reserved. This information is not intended as a substitute for professional medical care. Always follow your healthcare professional's instructions.             Patient Education     Low-Cholesterol Diet  Your body needs cholesterol to build new cells and create certain hormones. There are 2 kinds of cholesterol in your blood:     · HDL (“good”) cholesterol. This prevents fat deposits (plaque) from building up in your arteries. In this way it protects against heart disease and stroke.  · LDL (“bad”) cholesterol. This stays in your body and sticks to artery walls. Over time it may block blood flow to the heart and brain. This can cause a  Unknown    Feels terrible when taking medication    Other reaction(s): Other (See Comments)   Feels terrible when taking medication    Other reaction(s): Other (See Comments), Unknown   Feels terrible when taking medication    Farxiga [dapagliflozin] Rash     Genital rash    Linaclotide Diarrhea and Nausea And Vomiting       Current Outpatient Medications:     aspirin (ECOTRIN) 81 MG EC tablet, Aspir-81 mg tablet,delayed release  Take 1 tablet every day by oral route., Disp: , Rfl:     atorvastatin (LIPITOR) 40 MG tablet, Take 1 tablet by mouth every evening., Disp: , Rfl:     blood sugar diagnostic (FREESTYLE LITE STRIPS) Strp, as directed, Disp: 100 each, Rfl: 5    cephALEXin (KEFLEX) 500 MG capsule, Take 1 capsule (500 mg total) by mouth every 8 (eight) hours., Disp: 24 capsule, Rfl: 0    cyclobenzaprine (FLEXERIL) 10 MG tablet, Take 1 tablet (10 mg total) by mouth 3 (three) times daily as needed for Muscle spasms., Disp: 270 tablet, Rfl: 1    DOCOSAHEXAENOIC ACID ORAL, , Disp: , Rfl:     flash glucose sensor (FREESTYLE KARINA 14 DAY SENSOR) Kit, 1 kit by Misc.(Non-Drug; Combo Route) route once daily., Disp: 1 kit, Rfl: 11    insulin detemir U-100, Levemir, (LEVEMIR FLEXPEN) 100 unit/mL (3 mL) InPn pen, Inject 25 Units into the skin every evening. (Patient taking differently: Inject 15 Units into the skin every evening.), Disp: 15 each, Rfl: 5    krill-om-3-dha-epa-phospho-ast 290-85-86-50 mg Cap, , Disp: , Rfl:     lancets (FREESTYLE LANCETS) 28 gauge Misc, 1 lancet by Misc.(Non-Drug; Combo Route) route 2 (two) times a day., Disp: 200 each, Rfl: 3    lansoprazole (PREVACID) 15 MG capsule, 1 capsule, Disp: , Rfl:     lisinopriL (PRINIVIL,ZESTRIL) 20 MG tablet, Take 1 tablet (20 mg total) by mouth once daily., Disp: 90 tablet, Rfl: 1    loratadine (CLARITIN) 10 mg tablet, Take 10 mg by mouth., Disp: , Rfl:     metFORMIN (GLUCOPHAGE-XR) 500 MG ER 24hr tablet, metformin  mg tablet,extended release 24 hr  TAKE 2  heart attack or stroke.  The cholesterol in your blood comes from 2 sources: cholesterol in food that you eat and cholesterol that your liver makes. You should limit the amount of cholesterol in your diet. But the cholesterol that your body makes has the greatest disease risk. And your body makes more cholesterol when your diet is high in bad fats (saturated and trans fats). There are 2 kinds of fats you can eat:  · Good fats, or unsaturated fats (mono-unsaturated and poly-unsaturated). They raise the level of good cholesterol and lower the level of bad cholesterol. Good fats are found in vegetable oils such as olive, sunflower, corn, and soybean oils, and in nuts and seeds.  · Bad fats, or saturated fats (including foods high in cholesterol) and trans fats. These raise your risk of disease. They lower the good cholesterol and raise the level of bad cholesterol. Bad fats are found in animal products, including meat, whole-milk dairy products, and butter. Some plants are also high in bad fats (coconut and palm plants). Trans fats are found in hard (stick) margarines. They are also in many fast foods and commercially baked goods. Soft margarine sold in tubs has fewer trans fats and is safer to use.  High blood cholesterol is usually due to a diet high in saturated fat, along with not being physically active. In some cases, genetics plays a role in causing high cholesterol. The tips below will help you create healthy eating habits that will help lower your blood cholesterol level.  Create a diet high in good fats, low in bad fats (and low in cholesterol)  The following steps will help you create a diet high in good fats and low in bad fats:  · Talk with your doctor before starting a low cholesterol diet or weight loss program.  · Learn to read nutrition labels and select appropriate portion sizes.  · When cooking, use plant-based unsaturated vegetable oils (sunflower, corn, soybean, canola, peanut, and olive  "TABLETS BY MOUTH TWICE DAILY Strength: 500 mg, Disp: 120 tablet, Rfl: 2    nitroGLYCERIN (NITROSTAT) 0.4 MG SL tablet, Place 0.4 mg under the tongue., Disp: , Rfl:     nystatin (MYCOSTATIN) ointment, Apply topically 2 (two) times daily., Disp: 30 g, Rfl: 3    nystatin (MYCOSTATIN) powder, Apply topically 4 (four) times daily., Disp: 60 g, Rfl: 3    pen needle, diabetic (PEN NEEDLE) 31 gauge x 3/16" Ndle, 1 each by In Vitro route 4 (four) times daily. BD ULTRA FINE, Disp: 100 each, Rfl: 5    UNABLE TO FIND, medication name: Balance of nuture fruits, Disp: , Rfl:     UNABLE TO FIND, medication name: balance of nuture veggies, Disp: , Rfl:     vitamin D (VITAMIN D3) 1000 units Tab, Take 5,000 Units by mouth., Disp: , Rfl:     biotin 10,000 mcg TbDL, 1 tablet, Disp: , Rfl:     cetirizine (ZYRTEC) 10 MG tablet, Take 10 mg by mouth once daily., Disp: , Rfl:     cholestyramine, bulk, Powd, , Disp: , Rfl:     coenzyme Q10 100 mg capsule, 1 capsule with a meal, Disp: , Rfl:     dulaglutide (TRULICITY) 3 mg/0.5 mL pen injector, Inject 3 mg into the skin every 7 days., Disp: 12 pen , Rfl: 1    hydroCHLOROthiazide (HYDRODIURIL) 25 MG tablet, Take 1 tablet (25 mg total) by mouth once daily. (Patient not taking: Reported on 3/5/2024), Disp: 90 tablet, Rfl: 1    hydroCHLOROthiazide (HYDRODIURIL) 25 MG tablet, Take 1 tablet (25 mg total) by mouth once daily. (Patient not taking: Reported on 3/5/2024), Disp: 90 tablet, Rfl: 1    ketoconazole (NIZORAL) 2 % shampoo, APPLY TOPICALLY 3 TIMES EVERY WEEK AS DIRECTED, Disp: , Rfl:     metoprolol succinate (TOPROL-XL) 50 MG 24 hr tablet, Take 1 tablet (50 mg total) by mouth once daily., Disp: 90 tablet, Rfl: 1    mupirocin (BACTROBAN) 2 % ointment, Apply topically 3 (three) times daily., Disp: 22 g, Rfl: 0    pitavastatin calcium (LIVALO) 4 mg Tab, Take 1 tablet by mouth once daily. (Patient not taking: Reported on 2/8/2024), Disp: 90 tablet, Rfl: 1    pitavastatin calcium (LIVALO) 4 mg " oils).  · Avoid saturated fats found in animal products such as meat, dairy (whole-milk, cheese and ice cream), poultry skin, and egg yolks. Plants high in saturated oils include coconut oil, palm oil, and palm kernel oil.  · If you eat meat, choose smaller portions and lean cuts, such as round, lewis, sirloin, or loin. Eat more meatless meals.  · Replace meat with fish at least 2 times a week. Fish is an important source of the unsaturated fat called omega-3 fatty acids. This fat has potential to lower the risk of heart disease.  · Replace whole-milk dairy products with low-fat or nonfat products. Try soy products. Soy helps to reduce total cholesterol.  · Supplement your diet with protective fibers. Eat nuts, seeds, and whole grains rather than white rice and bread. These foods lower both cholesterol and triglyceride levels. (Triglycerides are another fat found in the blood.) Walnuts are one of the best sources of omega-3 fatty acids.  · Eat plenty of fresh fruits and vegetables daily.  · Avoid fast foods and commercial baked goods. Assume they contain saturated fat unless labeled otherwise.  Date Last Reviewed: 8/1/2016  © 4645-5630 [employer]. [address] [place] [address], [place] [address]. All rights reserved. This information is not intended as a substitute for professional medical care. Always follow your healthcare professional's instructions.            "Tab, Take 1 tablet by mouth once daily. (Patient not taking: Reported on 2/8/2024), Disp: 90 tablet, Rfl: 1    pyrilamine-chlophedianoL (NINJACOF) 12.5-12.5 mg/5 mL Liqd, Take 5 mLs by mouth every 6 to 8 hours as needed (Cough). (Patient not taking: Reported on 2/8/2024), Disp: 118 mL, Rfl: 0    UNABLE TO FIND, multivit topical patch, Disp: , Rfl:     Review of Systems   Constitutional:  Negative for chills, fever and unexpected weight change.   HENT:  Negative for ear pain, rhinorrhea and sore throat.    Eyes:  Negative for pain and visual disturbance.   Respiratory:  Negative for cough and shortness of breath.    Cardiovascular:  Negative for chest pain, palpitations and leg swelling.   Gastrointestinal:  Negative for abdominal pain, diarrhea, nausea and vomiting.   Genitourinary:  Negative for difficulty urinating, hematuria and vaginal bleeding.   Musculoskeletal:  Negative for arthralgias.   Skin:  Negative for rash.   Neurological:  Negative for dizziness, weakness and headaches.   Psychiatric/Behavioral:  Negative for agitation and sleep disturbance. The patient is not nervous/anxious.           Objective:      Vitals:    03/05/24 1400   BP: 128/64   Pulse: 81   SpO2: 96%   Weight: 72.6 kg (160 lb)   Height: 5' 3" (1.6 m)     Physical Exam  Vitals and nursing note reviewed.   Constitutional:       General: She is not in acute distress.     Appearance: Normal appearance. She is well-developed.   HENT:      Head: Normocephalic.      Right Ear: External ear normal.      Left Ear: External ear normal.   Eyes:      Conjunctiva/sclera: Conjunctivae normal.      Pupils: Pupils are equal, round, and reactive to light.   Neck:      Vascular: No JVD.   Cardiovascular:      Rate and Rhythm: Normal rate and regular rhythm.      Heart sounds: No murmur heard.  Pulmonary:      Effort: Pulmonary effort is normal.      Breath sounds: Normal breath sounds.   Abdominal:      General: Bowel sounds are normal.      Palpations: " Abdomen is soft.   Musculoskeletal:         General: No deformity. Normal range of motion.      Cervical back: Normal range of motion and neck supple.   Feet:      Right foot:      Protective Sensation: 5 sites tested.  5 sites sensed.      Left foot:      Protective Sensation: 5 sites tested.  5 sites sensed.   Lymphadenopathy:      Cervical: No cervical adenopathy.   Skin:     General: Skin is warm and dry.      Findings: No rash.   Neurological:      Mental Status: She is alert and oriented to person, place, and time.      Gait: Gait normal.   Psychiatric:         Speech: Speech normal.         Behavior: Behavior normal.           Assessment:       1. Type 2 diabetes mellitus with other specified complication, without long-term current use of insulin    2. Hypertension, unspecified type    3. Encounter for screening mammogram for breast cancer    4. Hyperlipidemia, unspecified hyperlipidemia type    5. Gastroesophageal reflux disease, unspecified whether esophagitis present    6. Atherosclerosis of native coronary artery of native heart without angina pectoris    7. Sleep apnea, unspecified type         Plan:       Type 2 diabetes mellitus with other specified complication, without long-term current use of insulin  Comments:  continue to monitor diet .  Orders:  -     POCT HEMOGLOBIN A1C  -     Foot Exam Performed    Hypertension, unspecified type  Comments:  bp controlled  Orders:  -     metoprolol succinate (TOPROL-XL) 50 MG 24 hr tablet; Take 1 tablet (50 mg total) by mouth once daily.  Dispense: 90 tablet; Refill: 1    Encounter for screening mammogram for breast cancer  -     Mammo Digital Screening Bilat; Future; Expected date: 03/24/2024    Hyperlipidemia, unspecified hyperlipidemia type  Comments:  lipitor    Gastroesophageal reflux disease, unspecified whether esophagitis present  Comments:  prevacid    Atherosclerosis of native coronary artery of native heart without angina pectoris  Comments:  followed  by [facility].    Sleep apnea, unspecified type  Comments:  uses cpap    Other orders  -     mupirocin (BACTROBAN) 2 % ointment; Apply topically 3 (three) times daily.  Dispense: 22 g; Refill: 0      Follow up in about 3 months (around 6/5/2024), or if symptoms worsen or fail to improve, for medication management.        3/24/2024 Verna Shah

## 2024-03-25 ENCOUNTER — TELEPHONE (OUTPATIENT)
Dept: FAMILY MEDICINE | Facility: CLINIC | Age: 74
End: 2024-03-25
Payer: COMMERCIAL

## 2024-03-25 DIAGNOSIS — E78.5 HYPERLIPIDEMIA, UNSPECIFIED HYPERLIPIDEMIA TYPE: ICD-10-CM

## 2024-03-25 DIAGNOSIS — E11.69 TYPE 2 DIABETES MELLITUS WITH OTHER SPECIFIED COMPLICATION, WITHOUT LONG-TERM CURRENT USE OF INSULIN: ICD-10-CM

## 2024-03-25 DIAGNOSIS — Z79.899 ENCOUNTER FOR LONG-TERM (CURRENT) USE OF OTHER MEDICATIONS: Primary | ICD-10-CM

## 2024-03-25 DIAGNOSIS — I10 HYPERTENSION, UNSPECIFIED TYPE: ICD-10-CM

## 2024-03-25 NOTE — TELEPHONE ENCOUNTER
----- Message from Narcisa Le LPN sent at 3/25/2024 10:59 AM CDT -----    ----- Message -----  From: Verna Shah NP  Sent: 3/24/2024   6:30 PM CDT  To: Verna Shah Staff    Eye exam in dec? If not needs referral placed

## 2024-03-25 NOTE — TELEPHONE ENCOUNTER
Spoke with pt who states she has an eye exam scheduled on 1/1/2024, she will ask them to fax over office notes. To have dexa and mammo to be done this week. She states she will have blood work done on April 1st. States  didn't order any blood work for her so whatever she needs done please order.

## 2024-03-26 ENCOUNTER — TELEPHONE (OUTPATIENT)
Dept: FAMILY MEDICINE | Facility: CLINIC | Age: 74
End: 2024-03-26
Payer: COMMERCIAL

## 2024-03-26 ENCOUNTER — OFFICE VISIT (OUTPATIENT)
Dept: URGENT CARE | Facility: CLINIC | Age: 74
End: 2024-03-26
Payer: COMMERCIAL

## 2024-03-26 VITALS
RESPIRATION RATE: 18 BRPM | DIASTOLIC BLOOD PRESSURE: 74 MMHG | BODY MASS INDEX: 29.23 KG/M2 | WEIGHT: 165 LBS | TEMPERATURE: 98 F | SYSTOLIC BLOOD PRESSURE: 178 MMHG | HEART RATE: 76 BPM | OXYGEN SATURATION: 98 % | HEIGHT: 63 IN

## 2024-03-26 DIAGNOSIS — M54.9 BACK PAIN, UNSPECIFIED BACK LOCATION, UNSPECIFIED BACK PAIN LATERALITY, UNSPECIFIED CHRONICITY: Primary | ICD-10-CM

## 2024-03-26 PROCEDURE — 99214 OFFICE O/P EST MOD 30 MIN: CPT | Mod: S$GLB,,, | Performed by: STUDENT IN AN ORGANIZED HEALTH CARE EDUCATION/TRAINING PROGRAM

## 2024-03-26 RX ORDER — METHOCARBAMOL 500 MG/1
500 TABLET, FILM COATED ORAL 4 TIMES DAILY PRN
Qty: 30 TABLET | Refills: 0 | Status: SHIPPED | OUTPATIENT
Start: 2024-03-26 | End: 2024-04-05

## 2024-03-26 RX ORDER — IBUPROFEN 600 MG/1
600 TABLET ORAL EVERY 6 HOURS PRN
Qty: 30 TABLET | Refills: 0 | Status: SHIPPED | OUTPATIENT
Start: 2024-03-26

## 2024-03-26 NOTE — TELEPHONE ENCOUNTER
----- Message from Hortencia Crabtree sent at 3/26/2024  3:58 PM CDT -----  Vm- 3:42-pt is having pain in the left side of her back. Would like to know what she can take.   [phone]

## 2024-03-26 NOTE — PROGRESS NOTES
"Subjective:      Patient ID: Estefanía Ray is a 73 y.o. female.    Vitals:  height is 5' 3" (1.6 m) and weight is 74.8 kg (165 lb). Her temperature is 97.7 °F (36.5 °C). Her blood pressure is 178/74 (abnormal) and her pulse is 76. Her respiration is 18 and oxygen saturation is 98%.     Chief Complaint: Dysuria    Ambulatory to room with complaint of right-sided mid back pain, states was pulling weeds in her garden Saturday, Sunday she began hurting in her legs as well as her back, had trouble difficulty sleeping Sunday night.    Dysuria   This is a new problem. The current episode started in the past 7 days. The problem occurs every urination. The problem has been gradually worsening. The quality of the pain is described as aching. She has tried acetaminophen for the symptoms.       Musculoskeletal:  Positive for pain and back pain.      Objective:     Physical Exam   Constitutional: She is oriented to person, place, and time. She appears well-developed. She is cooperative. No distress.   HENT:   Head: Normocephalic and atraumatic.   Nose: Nose normal.   Mouth/Throat: Oropharynx is clear and moist and mucous membranes are normal.   Eyes: Conjunctivae and lids are normal.   Neck: Trachea normal and phonation normal. Neck supple.   Cardiovascular: Normal rate, regular rhythm, normal heart sounds and normal pulses.   Pulmonary/Chest: Effort normal and breath sounds normal.   Abdominal: Normal appearance and bowel sounds are normal. She exhibits no mass. Soft.   Musculoskeletal:         General: Tenderness present. No deformity.      Comments: Tender to palpation right-sided midback.   Neurological: She is alert and oriented to person, place, and time. She has normal strength and normal reflexes. No sensory deficit.   Skin: Skin is warm, dry, intact and not diaphoretic.   Psychiatric: Her speech is normal and behavior is normal. Judgment and thought content normal.   Nursing note and vitals reviewed.      Assessment: "     1. Back pain, unspecified back location, unspecified back pain laterality, unspecified chronicity        Plan:       Back pain, unspecified back location, unspecified back pain laterality, unspecified chronicity  -     Cancel: POCT Urinalysis, Dipstick, Automated, W/O Scope    Other orders  -     methocarbamoL (ROBAXIN) 500 MG Tab; Take 1 tablet (500 mg total) by mouth 4 (four) times daily as needed.  Dispense: 30 tablet; Refill: 0  -     ibuprofen (ADVIL,MOTRIN) 600 MG tablet; Take 1 tablet (600 mg total) by mouth every 6 (six) hours as needed for Pain.  Dispense: 30 tablet; Refill: 0

## 2024-03-26 NOTE — TELEPHONE ENCOUNTER
----- Message from Emma Carver sent at 3/26/2024  3:12 PM CDT -----  The patient has been having  RT side pain since Sunday. It is where her kidneys and liver are located. It is very sensitive to touch. Please call pt's # [phone] GH

## 2024-03-26 NOTE — TELEPHONE ENCOUNTER
Spoke to pt and she stated she is having pain on right side, sensitive to touch. Pain 7-8. Has been taking tylenol every 3 hours for pain. Some sharp pains come and go. Let pt know we recommend ER or UC to be seen today pt verbalized understanding

## 2024-03-28 ENCOUNTER — OFFICE VISIT (OUTPATIENT)
Dept: FAMILY MEDICINE | Facility: CLINIC | Age: 74
End: 2024-03-28
Payer: COMMERCIAL

## 2024-03-28 ENCOUNTER — TELEPHONE (OUTPATIENT)
Dept: FAMILY MEDICINE | Facility: CLINIC | Age: 74
End: 2024-03-28

## 2024-03-28 VITALS
SYSTOLIC BLOOD PRESSURE: 154 MMHG | HEART RATE: 76 BPM | BODY MASS INDEX: 29.02 KG/M2 | TEMPERATURE: 98 F | DIASTOLIC BLOOD PRESSURE: 74 MMHG | OXYGEN SATURATION: 96 % | WEIGHT: 163.81 LBS | HEIGHT: 63 IN

## 2024-03-28 DIAGNOSIS — B02.9 HERPES ZOSTER WITHOUT COMPLICATION: Primary | ICD-10-CM

## 2024-03-28 PROCEDURE — 1101F PT FALLS ASSESS-DOCD LE1/YR: CPT | Mod: CPTII,S$GLB,,

## 2024-03-28 PROCEDURE — 3288F FALL RISK ASSESSMENT DOCD: CPT | Mod: CPTII,S$GLB,,

## 2024-03-28 PROCEDURE — 3008F BODY MASS INDEX DOCD: CPT | Mod: CPTII,S$GLB,,

## 2024-03-28 PROCEDURE — 99213 OFFICE O/P EST LOW 20 MIN: CPT | Mod: S$GLB,,,

## 2024-03-28 PROCEDURE — 3051F HG A1C>EQUAL 7.0%<8.0%: CPT | Mod: CPTII,S$GLB,,

## 2024-03-28 PROCEDURE — 3077F SYST BP >= 140 MM HG: CPT | Mod: CPTII,S$GLB,,

## 2024-03-28 PROCEDURE — 3078F DIAST BP <80 MM HG: CPT | Mod: CPTII,S$GLB,,

## 2024-03-28 PROCEDURE — 1159F MED LIST DOCD IN RCRD: CPT | Mod: CPTII,S$GLB,,

## 2024-03-28 RX ORDER — LIDOCAINE 50 MG/G
OINTMENT TOPICAL
Qty: 60 G | Refills: 1 | Status: SHIPPED | OUTPATIENT
Start: 2024-03-28 | End: 2024-05-19

## 2024-03-28 RX ORDER — GABAPENTIN 100 MG/1
100 CAPSULE ORAL EVERY 8 HOURS PRN
Qty: 90 CAPSULE | Refills: 1 | Status: SHIPPED | OUTPATIENT
Start: 2024-03-28 | End: 2024-05-19

## 2024-03-28 RX ORDER — VALACYCLOVIR HYDROCHLORIDE 1 G/1
1000 TABLET, FILM COATED ORAL 3 TIMES DAILY
Qty: 21 TABLET | Refills: 0 | Status: SHIPPED | OUTPATIENT
Start: 2024-03-28 | End: 2024-05-19

## 2024-03-28 NOTE — TELEPHONE ENCOUNTER
----- Message from Eliana Thibodeaux sent at 3/28/2024 10:56 AM CDT -----  Vm: 1021    Pt stated that she is going back to urgent care because she has broken out with something that looks like shingles.    [phone]

## 2024-04-10 NOTE — PROGRESS NOTES
"  SUBJECTIVE:    Patient ID: Estefanía Ray is a 73 y.o. female.    Chief Complaint: Rash (Possible shingles, was vaccinated years ago, noticed rash on middle of back has pictures appears to have blistering, non painful, has redness with raised bumps/ shooting pain from right side of abdomen/Temp 98.1/ takes pain medication for back pain/ Went to  for pain of back/ sensitive to touch of back//mp)    A few days ago started having back pain around the mid back, but only to the left of her spine. Felt like "a pulled muscle." Not unbearable. Tried otc ibuprofen. Today, she was taking a bath and noted that she felt "bumps" on her skin in the same area and they hurt to the touch, like "a stinging, burning." Had her  look and he told her it was a "rash that looks like shingles."     Rash  This is a new problem. The current episode started today. The problem is unchanged. The rash is characterized by burning, pain and redness. She was exposed to nothing. Past treatments include analgesics.       Office Visit on 03/05/2024   Component Date Value Ref Range Status    Hemoglobin A1C, POC 03/05/2024 7.1  % Final   Office Visit on 02/07/2024   Component Date Value Ref Range Status    Rapid Strep A Screen 02/07/2024 Negative  Negative Final     Acceptable 02/07/2024 Yes   Final   Office Visit on 11/29/2023   Component Date Value Ref Range Status    Hemoglobin A1C, POC 11/29/2023 7.8  % Final   Office Visit on 11/21/2023   Component Date Value Ref Range Status    Rapid Strep A Screen 11/21/2023 Positive (A)  Negative Final     Acceptable 11/21/2023 Yes   Final       Past Medical History:   Diagnosis Date    Allergy     Diabetes mellitus, type 2     GERD (gastroesophageal reflux disease)     Hyperlipidemia     Hypertension     JOHN (obstructive sleep apnea)      Social History     Socioeconomic History    Marital status:    Tobacco Use    Smoking status: Never    Smokeless " tobacco: Never   Substance and Sexual Activity    Alcohol use: Never    Drug use: Never     Social Determinants of Health     Financial Resource Strain: Medium Risk (11/27/2023)    Overall Financial Resource Strain (CARDIA)     Difficulty of Paying Living Expenses: Somewhat hard   Food Insecurity: No Food Insecurity (11/27/2023)    Hunger Vital Sign     Worried About Running Out of Food in the Last Year: Never true     Ran Out of Food in the Last Year: Never true   Transportation Needs: No Transportation Needs (11/27/2023)    PRAPARE - Transportation     Lack of Transportation (Medical): No     Lack of Transportation (Non-Medical): No   Physical Activity: Insufficiently Active (11/27/2023)    Exercise Vital Sign     Days of Exercise per Week: 2 days     Minutes of Exercise per Session: 20 min   Stress: No Stress Concern Present (11/27/2023)    [employer] [place] [employer] - Occupational Stress Questionnaire     Feeling of Stress : Only a little   Social Connections: Unknown (11/27/2023)    Social Connection and Isolation Panel [NHANES]     Frequency of Communication with Friends and Family: More than three times a week     Frequency of Social Gatherings with Friends and Family: Once a week     Attends Club or Organization Meetings: More than 4 times per year     Marital Status:    Housing Stability: High Risk (11/27/2023)    Housing Stability Vital Sign     Unable to Pay for Housing in the Last Year: No     Number of Places Lived in the Last Year: 3     Unstable Housing in the Last Year: No     Past Surgical History:   Procedure Laterality Date    HYSTERECTOMY      KNEE SURGERY Left     TONSILLECTOMY       History reviewed. No pertinent family history.    Review of patient's allergies indicates:   Allergen Reactions    Empagliflozin Other (See Comments)    Semaglutide Hives    Sitagliptin phos-metformin Other (See Comments)     Other reaction(s): diarrhea    Ezetimibe  Nausea And Vomiting     Other reaction(s): Other (See Comments), Unknown    Feels terrible when taking medication    Other reaction(s): Other (See Comments)   Feels terrible when taking medication    Other reaction(s): Other (See Comments), Unknown   Feels terrible when taking medication    Farxiga [dapagliflozin] Rash     Genital rash    Linaclotide Diarrhea and Nausea And Vomiting       Current Outpatient Medications:     aspirin (ECOTRIN) 81 MG EC tablet, Aspir-81 mg tablet,delayed release  Take 1 tablet every day by oral route., Disp: , Rfl:     atorvastatin (LIPITOR) 40 MG tablet, Take 1 tablet by mouth every evening., Disp: , Rfl:     blood sugar diagnostic (FREESTYLE LITE STRIPS) Strp, as directed, Disp: 100 each, Rfl: 5    cetirizine (ZYRTEC) 10 MG tablet, Take 10 mg by mouth once daily., Disp: , Rfl:     cyclobenzaprine (FLEXERIL) 10 MG tablet, Take 1 tablet (10 mg total) by mouth 3 (three) times daily as needed for Muscle spasms., Disp: 270 tablet, Rfl: 1    DOCOSAHEXAENOIC ACID ORAL, , Disp: , Rfl:     dulaglutide (TRULICITY) 3 mg/0.5 mL pen injector, Inject 3 mg into the skin every 7 days., Disp: 12 pen , Rfl: 1    flash glucose sensor (FREESTYLE KARINA 14 DAY SENSOR) Kit, 1 kit by Misc.(Non-Drug; Combo Route) route once daily., Disp: 1 kit, Rfl: 11    hydroCHLOROthiazide (HYDRODIURIL) 25 MG tablet, Take 1 tablet (25 mg total) by mouth once daily., Disp: 90 tablet, Rfl: 1    insulin detemir U-100, Levemir, (LEVEMIR FLEXPEN) 100 unit/mL (3 mL) InPn pen, Inject 25 Units into the skin every evening. (Patient taking differently: Inject 19 Units into the skin every evening.), Disp: 15 each, Rfl: 5    krill-om-3-dha-epa-phospho-ast 237-98-72-50 mg Cap, , Disp: , Rfl:     lansoprazole (PREVACID) 15 MG capsule, 1 capsule, Disp: , Rfl:     lisinopriL (PRINIVIL,ZESTRIL) 20 MG tablet, Take 1 tablet (20 mg total) by mouth once daily., Disp: 90 tablet, Rfl: 1    loratadine (CLARITIN) 10 mg tablet, Take  10 mg by mouth., Disp: , Rfl:     metFORMIN (GLUCOPHAGE-XR) 500 MG ER 24hr tablet, metformin  mg tablet,extended release 24 hr  TAKE 2 TABLETS BY MOUTH TWICE DAILY Strength: 500 mg, Disp: 120 tablet, Rfl: 2    metoprolol succinate (TOPROL-XL) 50 MG 24 hr tablet, Take 1 tablet (50 mg total) by mouth once daily., Disp: 90 tablet, Rfl: 1    mupirocin (BACTROBAN) 2 % ointment, Apply topically 3 (three) times daily., Disp: 22 g, Rfl: 0    nitroGLYCERIN (NITROSTAT) 0.4 MG SL tablet, Place 0.4 mg under the tongue., Disp: , Rfl:     nystatin (MYCOSTATIN) ointment, Apply topically 2 (two) times daily., Disp: 30 g, Rfl: 3    nystatin (MYCOSTATIN) powder, Apply topically 4 (four) times daily., Disp: 60 g, Rfl: 3    UNABLE TO FIND, medication name: Balance of nuture fruits, Disp: , Rfl:     UNABLE TO FIND, medication name: balance of nuture veggies, Disp: , Rfl:     vitamin D (VITAMIN D3) 1000 units Tab, Take 5,000 Units by mouth., Disp: , Rfl:     biotin 10,000 mcg TbDL, 1 tablet, Disp: , Rfl:     cephALEXin (KEFLEX) 500 MG capsule, Take 1 capsule (500 mg total) by mouth every 8 (eight) hours. (Patient not taking: Reported on 3/28/2024), Disp: 24 capsule, Rfl: 0    cholestyramine, bulk, Powd, , Disp: , Rfl:     coenzyme Q10 100 mg capsule, 1 capsule with a meal, Disp: , Rfl:     gabapentin (NEURONTIN) 100 MG capsule, Take 1 capsule (100 mg total) by mouth every 8 (eight) hours as needed (pain)., Disp: 90 capsule, Rfl: 1    hydroCHLOROthiazide (HYDRODIURIL) 25 MG tablet, Take 1 tablet (25 mg total) by mouth once daily., Disp: 90 tablet, Rfl: 1    ibuprofen (ADVIL,MOTRIN) 600 MG tablet, Take 1 tablet (600 mg total) by mouth every 6 (six) hours as needed for Pain. (Patient not taking: Reported on 3/28/2024), Disp: 30 tablet, Rfl: 0    ketoconazole (NIZORAL) 2 % shampoo, APPLY TOPICALLY 3 TIMES EVERY WEEK AS DIRECTED, Disp: , Rfl:     lancets (FREESTYLE LANCETS) 28 gauge Misc, 1 lancet by Misc.(Non-Drug;  "Combo Route) route 2 (two) times a day. (Patient not taking: Reported on 3/28/2024), Disp: 200 each, Rfl: 3    LIDOcaine (XYLOCAINE) 5 % Oint ointment, Apply topically as needed (can use on affected area up to every 4 hours as needed for pain)., Disp: 60 g, Rfl: 1    pen needle, diabetic (PEN NEEDLE) 31 gauge x 3/16" Ndle, 1 each by In Vitro route 4 (four) times daily. BD ULTRA FINE, Disp: 100 each, Rfl: 5    pitavastatin calcium (LIVALO) 4 mg Tab, Take 1 tablet by mouth once daily. (Patient not taking: Reported on 3/28/2024), Disp: 90 tablet, Rfl: 1    pitavastatin calcium (LIVALO) 4 mg Tab, Take 1 tablet by mouth once daily. (Patient not taking: Reported on 3/28/2024), Disp: 90 tablet, Rfl: 1    pyrilamine-chlophedianoL (NINJACOF) 12.5-12.5 mg/5 mL Liqd, Take 5 mLs by mouth every 6 to 8 hours as needed (Cough). (Patient not taking: Reported on 3/28/2024), Disp: 118 mL, Rfl: 0    UNABLE TO FIND, multivit topical patch, Disp: , Rfl:     valACYclovir (VALTREX) 1000 MG tablet, Take 1 tablet (1,000 mg total) by mouth 3 (three) times daily. for 7 days, Disp: 21 tablet, Rfl: 0    Review of Systems   Musculoskeletal:  Positive for back pain.   Integumentary:  Positive for rash.           Objective:      Vitals:    03/28/24 1212 03/28/24 1225   BP: (!) 142/70 (!) 154/74   Pulse: 76    Temp: 98.1 °F (36.7 °C)    SpO2: 96%    Weight: 74.3 kg (163 lb 12.8 oz)    Height: 5' 3" (1.6 m)      Physical Exam  Constitutional:       General: She is not in acute distress.     Appearance: Normal appearance.   HENT:      Nose: No rhinorrhea.   Eyes:      General: No scleral icterus.  Cardiovascular:      Rate and Rhythm: Normal rate and regular rhythm.      Pulses: Normal pulses.      Heart sounds: Normal heart sounds.   Pulmonary:      Effort: Pulmonary effort is normal.      Breath sounds: Normal breath sounds.   Skin:     General: Skin is warm and dry.      Capillary Refill: Capillary refill takes less than 2 seconds.      " Findings: Lesion and rash present.             Comments: Cluster of erythema and small blisters consistent with shingles   Neurological:      Mental Status: She is alert.      Cranial Nerves: No cranial nerve deficit.         Assessment:       1. Herpes zoster without complication         Plan:       Herpes zoster without complication  -     valACYclovir (VALTREX) 1000 MG tablet; Take 1 tablet (1,000 mg total) by mouth 3 (three) times daily. for 7 days  Dispense: 21 tablet; Refill: 0  -     gabapentin (NEURONTIN) 100 MG capsule; Take 1 capsule (100 mg total) by mouth every 8 (eight) hours as needed (pain).  Dispense: 90 capsule; Refill: 1  -     LIDOcaine (XYLOCAINE) 5 % Oint ointment; Apply topically as needed (can use on affected area up to every 4 hours as needed for pain).  Dispense: 60 g; Refill: 1      Follow up if symptoms worsen or fail to improve.        4/10/2024 Hortencia Weston

## 2024-04-28 LAB
LEFT EYE DM RETINOPATHY: NEGATIVE
RIGHT EYE DM RETINOPATHY: NEGATIVE

## 2024-04-30 ENCOUNTER — TELEPHONE (OUTPATIENT)
Dept: UROGYNECOLOGY | Facility: CLINIC | Age: 74
End: 2024-04-30
Payer: COMMERCIAL

## 2024-04-30 NOTE — TELEPHONE ENCOUNTER
Spoke to patient and she was asking about Dr Reyna informed that Dr Reyna is not seeing new patients , scheduled appointment with Dr Mckeon in September which nisha had asked for since she is having another procedure done in the summer.

## 2024-04-30 NOTE — TELEPHONE ENCOUNTER
----- Message from Mani Garcia sent at 4/30/2024  9:54 AM CDT -----  Pt is requesting a call back asap regarding scheduling a NP/Consult.    Pt can be reached at [phone].    Thanks

## 2024-05-07 ENCOUNTER — TELEPHONE (OUTPATIENT)
Dept: FAMILY MEDICINE | Facility: CLINIC | Age: 74
End: 2024-05-07
Payer: COMMERCIAL

## 2024-05-19 ENCOUNTER — OFFICE VISIT (OUTPATIENT)
Dept: URGENT CARE | Facility: CLINIC | Age: 74
End: 2024-05-19
Payer: COMMERCIAL

## 2024-05-19 VITALS
WEIGHT: 163 LBS | SYSTOLIC BLOOD PRESSURE: 137 MMHG | RESPIRATION RATE: 16 BRPM | OXYGEN SATURATION: 96 % | DIASTOLIC BLOOD PRESSURE: 71 MMHG | BODY MASS INDEX: 28.88 KG/M2 | HEIGHT: 63 IN | TEMPERATURE: 98 F | HEART RATE: 79 BPM

## 2024-05-19 DIAGNOSIS — R09.81 NASAL CONGESTION: Primary | ICD-10-CM

## 2024-05-19 DIAGNOSIS — M25.551 RIGHT HIP PAIN: ICD-10-CM

## 2024-05-19 DIAGNOSIS — J30.2 SEASONAL ALLERGIES: ICD-10-CM

## 2024-05-19 PROCEDURE — 99214 OFFICE O/P EST MOD 30 MIN: CPT | Mod: S$GLB,,,

## 2024-05-19 RX ORDER — LEVOCETIRIZINE DIHYDROCHLORIDE 5 MG/1
5 TABLET, FILM COATED ORAL NIGHTLY
Qty: 30 TABLET | Refills: 0 | Status: SHIPPED | OUTPATIENT
Start: 2024-05-19

## 2024-05-19 RX ORDER — IBUPROFEN 800 MG/1
800 TABLET ORAL EVERY 6 HOURS PRN
Qty: 30 TABLET | Refills: 0 | Status: SHIPPED | OUTPATIENT
Start: 2024-05-19

## 2024-05-19 NOTE — PROGRESS NOTES
"Subjective:      Patient ID: Estefanía Ray is a 73 y.o. female.    Vitals:  height is 5' 3" (1.6 m) and weight is 73.9 kg (163 lb). Her oral temperature is 98.1 °F (36.7 °C). Her blood pressure is 137/71 and her pulse is 79. Her respiration is 16 and oxygen saturation is 96%.     Chief Complaint: Sinus Problem and Back Pain    Patient presents to the clinic with complaint of sinus problem and back pain.     Symptoms have been ongoing for 3-4 weeks. Has been taking Claritin.     She also reports right hip pain that has been ongoing for a few weeks now.     Sinus Problem  This is a new problem. The current episode started more than 1 month ago. The problem is unchanged. There has been no fever. Her pain is at a severity of 0/10. She is experiencing no pain. Associated symptoms include congestion, coughing and sneezing. Treatments tried: claritin. The treatment provided no relief.   Hip Pain   The incident occurred more than 1 week ago. There was no injury mechanism. The pain is present in the right hip. The pain is moderate. Pertinent negatives include no numbness or tingling. The symptoms are aggravated by movement and weight bearing. She has tried acetaminophen for the symptoms. The treatment provided no relief.     HENT:  Positive for congestion.    Respiratory:  Positive for cough.    Allergic/Immunologic: Positive for sneezing.   Neurological:  Negative for numbness.      Objective:     Physical Exam    Assessment:     1. Nasal congestion    2. Seasonal allergies    3. Right hip pain        Plan:       Nasal congestion  -     levocetirizine (XYZAL) 5 MG tablet; Take 1 tablet (5 mg total) by mouth every evening.  Dispense: 30 tablet; Refill: 0    Seasonal allergies  -     levocetirizine (XYZAL) 5 MG tablet; Take 1 tablet (5 mg total) by mouth every evening.  Dispense: 30 tablet; Refill: 0    Right hip pain  -     Ambulatory referral/consult to Physical/Occupational Therapy    Other orders  -     ibuprofen " (ADVIL,MOTRIN) 800 MG tablet; Take 1 tablet (800 mg total) by mouth every 6 (six) hours as needed for Pain.  Dispense: 30 tablet; Refill: 0      - Take medications as prescribed.  - Assure adequate hydration.  - Heating pad at 15 minute intevals  - Icy hot or Voltaren gel as needed  - Follow-up with PCP in 1-2 days.  - Return to clinic as needed.  - To ED for any new or acutely worsening symptoms including but not limited to chest pain, palpitations, shortness of breath, or fever greater than 103° F.  Patient in agreement with plan of care.     - The diagnosis, treatment plan, instructions for follow-up and reevaluation as well as ED precautions were discussed and understanding was verbalized. All questions or concerns have been addressed.

## 2024-05-23 ENCOUNTER — TELEPHONE (OUTPATIENT)
Dept: FAMILY MEDICINE | Facility: CLINIC | Age: 74
End: 2024-05-23
Payer: COMMERCIAL

## 2024-05-23 NOTE — TELEPHONE ENCOUNTER
Spoke to pt and she stated her right hip and down her right leg having pain. NO redness or swelling. Pt stated started a month ago. Does not recall any heavy lifting or trauma. Taking tylenol and ibuprofen and it does not help. Pt is asking for MRI or some imaging

## 2024-05-23 NOTE — TELEPHONE ENCOUNTER
----- Message from Krysta Lowe sent at 5/23/2024  9:51 AM CDT -----  Pt has been having back and leg problems and might need an MRI. Problem has been present for a month. Pt states she is able to be seen today if available or tomorrow.   [phone]

## 2024-05-27 ENCOUNTER — PATIENT OUTREACH (OUTPATIENT)
Dept: ADMINISTRATIVE | Facility: HOSPITAL | Age: 74
End: 2024-05-27
Payer: COMMERCIAL

## 2024-05-27 ENCOUNTER — HOSPITAL ENCOUNTER (OUTPATIENT)
Dept: RADIOLOGY | Facility: HOSPITAL | Age: 74
Discharge: HOME | End: 2024-05-27
Attending: NURSE PRACTITIONER
Payer: COMMERCIAL

## 2024-05-27 ENCOUNTER — OFFICE VISIT (OUTPATIENT)
Dept: FAMILY MEDICINE | Facility: CLINIC | Age: 74
End: 2024-05-27
Payer: COMMERCIAL

## 2024-05-27 ENCOUNTER — PATIENT MESSAGE (OUTPATIENT)
Dept: FAMILY MEDICINE | Facility: CLINIC | Age: 74
End: 2024-05-27

## 2024-05-27 VITALS
HEIGHT: 63 IN | SYSTOLIC BLOOD PRESSURE: 134 MMHG | DIASTOLIC BLOOD PRESSURE: 78 MMHG | OXYGEN SATURATION: 96 % | HEART RATE: 75 BPM | WEIGHT: 160 LBS | BODY MASS INDEX: 28.35 KG/M2

## 2024-05-27 DIAGNOSIS — M54.9 DORSALGIA, UNSPECIFIED: Primary | ICD-10-CM

## 2024-05-27 DIAGNOSIS — M54.30 SCIATICA, UNSPECIFIED LATERALITY: ICD-10-CM

## 2024-05-27 DIAGNOSIS — E78.5 HYPERLIPIDEMIA, UNSPECIFIED HYPERLIPIDEMIA TYPE: ICD-10-CM

## 2024-05-27 DIAGNOSIS — I25.10 ATHEROSCLEROSIS OF NATIVE CORONARY ARTERY OF NATIVE HEART WITHOUT ANGINA PECTORIS: ICD-10-CM

## 2024-05-27 DIAGNOSIS — I10 ESSENTIAL HYPERTENSION: ICD-10-CM

## 2024-05-27 DIAGNOSIS — E11.69 TYPE 2 DIABETES MELLITUS WITH OTHER SPECIFIED COMPLICATION, WITHOUT LONG-TERM CURRENT USE OF INSULIN: Primary | ICD-10-CM

## 2024-05-27 LAB — HBA1C MFR BLD: 7.2 %

## 2024-05-27 PROCEDURE — 1101F PT FALLS ASSESS-DOCD LE1/YR: CPT | Mod: CPTII,S$GLB,, | Performed by: NURSE PRACTITIONER

## 2024-05-27 PROCEDURE — 3051F HG A1C>EQUAL 7.0%<8.0%: CPT | Mod: CPTII,S$GLB,, | Performed by: NURSE PRACTITIONER

## 2024-05-27 PROCEDURE — 83036 HEMOGLOBIN GLYCOSYLATED A1C: CPT | Mod: QW,,, | Performed by: NURSE PRACTITIONER

## 2024-05-27 PROCEDURE — 1159F MED LIST DOCD IN RCRD: CPT | Mod: CPTII,S$GLB,, | Performed by: NURSE PRACTITIONER

## 2024-05-27 PROCEDURE — 3288F FALL RISK ASSESSMENT DOCD: CPT | Mod: CPTII,S$GLB,, | Performed by: NURSE PRACTITIONER

## 2024-05-27 PROCEDURE — 1125F AMNT PAIN NOTED PAIN PRSNT: CPT | Mod: CPTII,S$GLB,, | Performed by: NURSE PRACTITIONER

## 2024-05-27 PROCEDURE — 99214 OFFICE O/P EST MOD 30 MIN: CPT | Mod: S$GLB,,, | Performed by: NURSE PRACTITIONER

## 2024-05-27 PROCEDURE — 3078F DIAST BP <80 MM HG: CPT | Mod: CPTII,S$GLB,, | Performed by: NURSE PRACTITIONER

## 2024-05-27 PROCEDURE — 72110 X-RAY EXAM L-2 SPINE 4/>VWS: CPT | Mod: TC,PO

## 2024-05-27 PROCEDURE — 2023F DILAT RTA XM W/O RTNOPTHY: CPT | Mod: CPTII,S$GLB,, | Performed by: NURSE PRACTITIONER

## 2024-05-27 PROCEDURE — 72110 X-RAY EXAM L-2 SPINE 4/>VWS: CPT | Mod: 26,,, | Performed by: RADIOLOGY

## 2024-05-27 PROCEDURE — 3075F SYST BP GE 130 - 139MM HG: CPT | Mod: CPTII,S$GLB,, | Performed by: NURSE PRACTITIONER

## 2024-05-27 PROCEDURE — 1160F RVW MEDS BY RX/DR IN RCRD: CPT | Mod: CPTII,S$GLB,, | Performed by: NURSE PRACTITIONER

## 2024-05-27 PROCEDURE — 3008F BODY MASS INDEX DOCD: CPT | Mod: CPTII,S$GLB,, | Performed by: NURSE PRACTITIONER

## 2024-05-27 RX ORDER — CYCLOBENZAPRINE HCL 10 MG
10 TABLET ORAL 3 TIMES DAILY PRN
Qty: 270 TABLET | Refills: 1 | Status: SHIPPED | OUTPATIENT
Start: 2024-05-27 | End: 2024-06-06 | Stop reason: SDUPTHER

## 2024-05-27 RX ORDER — HYDROCODONE BITARTRATE AND ACETAMINOPHEN 5; 325 MG/1; MG/1
1 TABLET ORAL EVERY 8 HOURS PRN
Qty: 20 TABLET | Refills: 0 | Status: SHIPPED | OUTPATIENT
Start: 2024-05-27

## 2024-05-27 NOTE — PROGRESS NOTES
SUBJECTIVE:    Patient ID: Estefanía Ray is a 73 y.o. female.    Chief Complaint: Hip Pain (No bottles//pt c/o right hip pain that radiates down the leg x 4 weeks. Pt also stated that she started having pain in both knees and both legs last night//Denies fall or injuries//Eye exam done about 4 weeks ago with DR. Ford//Decline mammo and dexa scan//ELLA )    73 year old female presents for urgent visit. She is treated for htn, dm2, oa and hyperlipidemia. Taking meds as prescribed. Lots of stressors.  has been in and out of hospital. Reports that has been experiencing leg pain. A few days ago started having back pain as well. Denies injury or trauma. Sometimes is finding it hard to get comfortable. Has not taken anything. History of back issues in the past    Rash  This is a new problem. The current episode started today. The problem is unchanged. The rash is characterized by burning, pain and redness. She was exposed to nothing. Pertinent negatives include no cough, diarrhea, fever, shortness of breath or vomiting. Past treatments include analgesics.       Office Visit on 05/27/2024   Component Date Value Ref Range Status    Hemoglobin A1C, POC 05/27/2024 7.2  % Final   Office Visit on 03/05/2024   Component Date Value Ref Range Status    Hemoglobin A1C, POC 03/05/2024 7.1  % Final   Office Visit on 02/07/2024   Component Date Value Ref Range Status    Rapid Strep A Screen 02/07/2024 Negative  Negative Final     Acceptable 02/07/2024 Yes   Final   Office Visit on 11/29/2023   Component Date Value Ref Range Status    Hemoglobin A1C, POC 11/29/2023 7.8  % Final       Past Medical History:   Diagnosis Date    Allergy     Diabetes mellitus, type 2     GERD (gastroesophageal reflux disease)     Hyperlipidemia     Hypertension     JOHN (obstructive sleep apnea)      Social History     Socioeconomic History    Marital status:    Tobacco Use    Smoking status: Never    Smokeless tobacco: Never    Substance and Sexual Activity    Alcohol use: Never    Drug use: Never     Social Determinants of Health     Financial Resource Strain: Patient Declined (4/9/2024)    Received from [facility]    Overall Financial Resource Strain (CARDIA)     Difficulty of Paying Living Expenses: Patient declined   Food Insecurity: Patient Declined (4/9/2024)    Received from [facility] [employer]    Hunger Vital Sign     Worried About Running Out of Food in the Last Year: Patient declined     Ran Out of Food in the Last Year: Patient declined   Transportation Needs: No Transportation Needs (4/9/2024)    Received from [facility]    PRAPARE - Transportation     Lack of Transportation (Medical): No     Lack of Transportation (Non-Medical): No   Physical Activity: Unknown (4/9/2024)    Received from [facility]    Exercise Vital Sign     Days of Exercise per Week: Patient declined   Stress: No Stress Concern Present (4/9/2024)    Received from [facility] [employer]    [employer] - Occupational Stress Questionnaire     Feeling of Stress : Only a little   Housing Stability: High Risk (11/27/2023)    Housing Stability Vital Sign     Unable to Pay for Housing in the Last Year: No     Number of Places Lived in the Last Year: 3     Unstable Housing in the Last Year: No     Past Surgical History:   Procedure Laterality Date    HYSTERECTOMY      KNEE SURGERY Left     TONSILLECTOMY       No family history on file.    Tests to Keep You Healthy    Mammogram: ORDERED BUT NOT SCHEDULED  Eye Exam: Met on 4/8/2024  Colon Cancer Screening: Met on 9/7/2016  Last Blood Pressure <= 139/89 (5/27/2024): Yes  Last HbA1c < 8 (05/27/2024): Yes      Review of patient's allergies indicates:   Allergen Reactions    Empagliflozin Other (See Comments)    Semaglutide Hives    Sitagliptin phos-metformin Other (See Comments)     Other reaction(s): diarrhea    Ezetimibe Nausea And Vomiting     Other reaction(s): Other (See Comments), Unknown    Feels  terrible when taking medication    Other reaction(s): Other (See Comments)   Feels terrible when taking medication    Other reaction(s): Other (See Comments), Unknown   Feels terrible when taking medication    Farxiga [dapagliflozin] Rash     Genital rash    Linaclotide Diarrhea and Nausea And Vomiting       Current Outpatient Medications:     aspirin (ECOTRIN) 81 MG EC tablet, Aspir-81 mg tablet,delayed release  Take 1 tablet every day by oral route., Disp: , Rfl:     atorvastatin (LIPITOR) 40 MG tablet, Take 1 tablet by mouth every evening., Disp: , Rfl:     biotin 10,000 mcg TbDL, 1 tablet, Disp: , Rfl:     blood sugar diagnostic (FREESTYLE LITE STRIPS) Strp, as directed, Disp: 100 each, Rfl: 5    cephALEXin (KEFLEX) 500 MG capsule, Take 1 capsule (500 mg total) by mouth every 8 (eight) hours., Disp: 24 capsule, Rfl: 0    cholestyramine, bulk, Powd, , Disp: , Rfl:     coenzyme Q10 100 mg capsule, 1 capsule with a meal, Disp: , Rfl:     cyclobenzaprine (FLEXERIL) 10 MG tablet, Take 1 tablet (10 mg total) by mouth 3 (three) times daily as needed for Muscle spasms., Disp: 270 tablet, Rfl: 1    DOCOSAHEXAENOIC ACID ORAL, , Disp: , Rfl:     dulaglutide (TRULICITY) 3 mg/0.5 mL pen injector, Inject 3 mg into the skin every 7 days., Disp: 12 pen , Rfl: 1    flash glucose sensor (FREESTYLE KARINA 14 DAY SENSOR) Kit, 1 kit by Misc.(Non-Drug; Combo Route) route once daily., Disp: 1 kit, Rfl: 11    hydroCHLOROthiazide (HYDRODIURIL) 25 MG tablet, Take 1 tablet (25 mg total) by mouth once daily., Disp: 90 tablet, Rfl: 1    hydroCHLOROthiazide (HYDRODIURIL) 25 MG tablet, Take 1 tablet (25 mg total) by mouth once daily., Disp: 90 tablet, Rfl: 1    HYDROcodone-acetaminophen (NORCO) 5-325 mg per tablet, Take 1 tablet by mouth every 8 (eight) hours as needed for Pain., Disp: 20 tablet, Rfl: 0    ibuprofen (ADVIL,MOTRIN) 600 MG tablet, Take 1 tablet (600 mg total) by mouth every 6 (six) hours as needed for Pain., Disp: 30 tablet,  "Rfl: 0    ibuprofen (ADVIL,MOTRIN) 800 MG tablet, Take 1 tablet (800 mg total) by mouth every 6 (six) hours as needed for Pain., Disp: 30 tablet, Rfl: 0    insulin detemir U-100, Levemir, (LEVEMIR FLEXPEN) 100 unit/mL (3 mL) InPn pen, Inject 25 Units into the skin every evening. (Patient taking differently: Inject 19 Units into the skin every evening.), Disp: 15 each, Rfl: 5    ketoconazole (NIZORAL) 2 % shampoo, APPLY TOPICALLY 3 TIMES EVERY WEEK AS DIRECTED, Disp: , Rfl:     krill-om-3-dha-epa-phospho-ast 181-78-38-50 mg Cap, , Disp: , Rfl:     lancets (FREESTYLE LANCETS) 28 gauge Misc, 1 lancet by Misc.(Non-Drug; Combo Route) route 2 (two) times a day., Disp: 200 each, Rfl: 3    lansoprazole (PREVACID) 15 MG capsule, 1 capsule, Disp: , Rfl:     levocetirizine (XYZAL) 5 MG tablet, Take 1 tablet (5 mg total) by mouth every evening., Disp: 30 tablet, Rfl: 0    lisinopriL (PRINIVIL,ZESTRIL) 20 MG tablet, Take 1 tablet (20 mg total) by mouth once daily., Disp: 90 tablet, Rfl: 1    metFORMIN (GLUCOPHAGE-XR) 500 MG ER 24hr tablet, metformin  mg tablet,extended release 24 hr  TAKE 2 TABLETS BY MOUTH TWICE DAILY Strength: 500 mg, Disp: 120 tablet, Rfl: 2    metoprolol succinate (TOPROL-XL) 50 MG 24 hr tablet, Take 1 tablet (50 mg total) by mouth once daily., Disp: 90 tablet, Rfl: 1    mupirocin (BACTROBAN) 2 % ointment, Apply topically 3 (three) times daily., Disp: 22 g, Rfl: 0    nitroGLYCERIN (NITROSTAT) 0.4 MG SL tablet, Place 0.4 mg under the tongue., Disp: , Rfl:     nystatin (MYCOSTATIN) ointment, Apply topically 2 (two) times daily., Disp: 30 g, Rfl: 3    nystatin (MYCOSTATIN) powder, Apply topically 4 (four) times daily., Disp: 60 g, Rfl: 3    pen needle, diabetic (PEN NEEDLE) 31 gauge x 3/16" Ndle, 1 each by In Vitro route 4 (four) times daily. BD ULTRA FINE, Disp: 100 each, Rfl: 5    pitavastatin calcium (LIVALO) 4 mg Tab, Take 1 tablet by mouth once daily., Disp: 90 tablet, Rfl: 1    pitavastatin calcium " "(LIVALO) 4 mg Tab, Take 1 tablet by mouth once daily., Disp: 90 tablet, Rfl: 1    pyrilamine-chlophedianoL (NINJACOF) 12.5-12.5 mg/5 mL Liqd, Take 5 mLs by mouth every 6 to 8 hours as needed (Cough)., Disp: 118 mL, Rfl: 0    UNABLE TO FIND, multivit topical patch, Disp: , Rfl:     UNABLE TO FIND, medication name: Balance of nuture fruits, Disp: , Rfl:     UNABLE TO FIND, medication name: balance of nuture veggies, Disp: , Rfl:     vitamin D (VITAMIN D3) 1000 units Tab, Take 5,000 Units by mouth., Disp: , Rfl:     Review of Systems   Constitutional:  Negative for activity change, chills and fever.   Respiratory:  Negative for cough and shortness of breath.    Cardiovascular:  Negative for chest pain.   Gastrointestinal:  Negative for abdominal pain, diarrhea, nausea and vomiting.   Genitourinary:  Negative for difficulty urinating and flank pain.   Musculoskeletal:  Positive for back pain. Negative for gait problem.   Neurological:  Negative for dizziness and weakness.          Objective:      Vitals:    05/27/24 0853 05/27/24 0900   BP: (!) 148/78 134/78   Pulse: 75    SpO2: 96%    Weight: 72.6 kg (160 lb)    Height: 5' 3" (1.6 m)      Physical Exam  Vitals and nursing note reviewed.   Constitutional:       General: She is not in acute distress.     Appearance: Normal appearance. She is well-developed. She is not ill-appearing.   Cardiovascular:      Rate and Rhythm: Normal rate and regular rhythm.      Heart sounds: No murmur heard.     No friction rub. No gallop.   Pulmonary:      Breath sounds: Normal breath sounds. No wheezing or rales.   Musculoskeletal:      Lumbar back: No spasms, tenderness or bony tenderness. Decreased range of motion. Positive right straight leg raise test and positive left straight leg raise test.      Comments: Negative bilat SLR   Skin:     Findings: No rash.   Neurological:      Mental Status: She is alert and oriented to person, place, and time.      Sensory: No sensory deficit.      " Gait: Gait normal.           Assessment:       1. Type 2 diabetes mellitus with other specified complication, without long-term current use of insulin    2. Sciatica, unspecified laterality    3. Atherosclerosis of native coronary artery of native heart without angina pectoris    4. Essential hypertension    5. Hyperlipidemia, unspecified hyperlipidemia type         Plan:       Type 2 diabetes mellitus with other specified complication, without long-term current use of insulin  Comments:  7.2  Orders:  -     Hemoglobin A1C, POCT    Sciatica, unspecified laterality  Comments:  xray  Orders:  -     X-Ray Lumbar Spine 5 View; Future; Expected date: 05/27/2024  -     HYDROcodone-acetaminophen (NORCO) 5-325 mg per tablet; Take 1 tablet by mouth every 8 (eight) hours as needed for Pain.  Dispense: 20 tablet; Refill: 0    Atherosclerosis of native coronary artery of native heart without angina pectoris  Comments:  followed by cardio    Essential hypertension  Comments:  bp is controlled    Hyperlipidemia, unspecified hyperlipidemia type  Comments:  lipitor    Other orders  -     cyclobenzaprine (FLEXERIL) 10 MG tablet; Take 1 tablet (10 mg total) by mouth 3 (three) times daily as needed for Muscle spasms.  Dispense: 270 tablet; Refill: 1      No follow-ups on file.        5/31/2024 Verna Shah

## 2024-05-27 NOTE — PROGRESS NOTES
Population Health Chart Review & Patient Outreach Details      Additional [facility] Health Notes:               Updates Requested / Reviewed:      Updated Care Coordination Note, Care Everywhere, , External Sources: DIS, and Immunizations Reconciliation Completed or Queried: [facility] Topics Overdue:      VB Score: 3     Osteoporosis Screening  Eye Exam  Mammogram    Shingles/Zoster Vaccine  RSV Vaccine                  Health Maintenance Topic(s) Outreach Outcomes & Actions Taken:    Breast Cancer Screening - Outreach Outcomes & Actions Taken  : Reminder pt portal message sent.    Osteoporosis Screening - Outreach Outcomes & Actions Taken  : Reminder pt portal message sent.    Eye Exam - Outreach Outcomes & Actions Taken  : External Records Requested & Care Team Updated if Applicable

## 2024-05-27 NOTE — LETTER
AUTHORIZATION FOR RELEASE OF   CONFIDENTIAL INFORMATION    Dear Dr. Miranda,    We are seeing Estefanía Ray, date of birth 1950, in the clinic at [facility]. Verna Shah NP is the patient's PCP. Estefanía Ray has an outstanding lab/procedure at the time we reviewed her chart. In order to help keep her health information updated, she has authorized us to request the following medical record(s):                                                 ( X )  EYE EXAM ( Most Recent )              Please fax records to [facility], Powell, Jodi, NP, [phone]     If you have any questions, please contact     aJy BROWN  Clinical Care Coordinator    [facility]  [phone] (Phone)  [phone] (Fax)    Patient Name: Estefanía Ray  : 1950  Patient Phone #: [phone]                     Estefanía Ray  MRN: [id number]  : 1950  Age: 73 y.o.  Sex: female         Patient/Legal Guardian Signature  This signature was collected at 2024    self       _______________________________   Printed Name/Relationship to Patient      Consent for Examination and Treatment: I hereby authorize the providers and employees of [facility] ([facility]) to provide medical treatment/services which includes, but is not limited to, performing and administering tests and diagnostic procedures that are deemed necessary, including, but not limited to, imaging examinations, blood tests and other laboratory procedures as may be required by the hospital, clinic, or may be ordered by my physician(s) or persons working under the general and/or special instructions of my physician(s).      I understand and agree that this consent covers all authorized persons, including but not limited to physicians, residents, nurse practitioners, physicians' assistants, specialists, consultants, student nurses, and independently contracted physicians, who are called upon by the  physician in charge, to carry out the diagnostic procedures and medical or surgical treatment.     I hereby authorize [facility] to retain or dispose of any specimens or tissue, should there be such remaining from any test or procedure.     I hereby authorize and give consent for [facility] providers and employees to take photographs, images or videotapes of such diagnostic, surgical or treatment procedures of Patient as may be required by [facility] or as may be ordered by a physician. I further acknowledge and agree that [facility] may use cameras or other devices for patient monitoring.     I am aware that the practice of medicine is not an exact science, and I acknowledge that no guarantees have been made to me as to the outcome of any tests, procedures or treatment.     Authorization for Release of Information: I understand that my insurance company and/or their agents may need information necessary to make determinations about payment/reimbursement. I hereby provide authorization to release to all insurance companies, their successors, assignees, other parties with whom they may have contracted, or others acting on their behalf, that are involved with payment for any hospital and/or clinic charges incurred by the patient, any information that they request and deem necessary for payment/reimbursement, and/or quality review.  I further authorize the release of my health information to physicians or other health care practitioners on staff who are involved in my health care now and in the future, and to other health care providers, entities, or institutions for the purpose of my continued care and treatment, including referrals.     REGISTRATION AUTHORIZATION  Form No. 74028 (Rev. 7/13/2022)       Medicare Patient's Certification and Authorization to Release Information and Payment Request:  I certify that the information given by me in applying for payment under Title XVIII of the Social Security Act is correct. I authorize  any macias of medical or other information about me to release to the [facility], or its intermediaries or carriers, any information needed for this or a related Medicare claim. I request that payment of authorized benefits be made on my behalf.     Assignment of Insurance Benefits:   I hereby authorize any and all insurance companies, health plans, defined   benefit plans, health insurers or any entity that is or may be responsible for payment of my medical expenses to pay all hospital and medical benefits now due, and to become due and payable to me under any hospital benefits, sick benefits, injury benefits or any other benefit for services rendered to me, including Major Medical Benefits, direct to [facility] and all independently contracted physicians. I assign any and all rights that I may have against any and all insurance companies, health plans, defined benefit plans, health insurers or any entity that is or may be responsible for payment of my medical expenses, including, but not limited to any right to appeal a denial of a claim, any right to bring any action, lawsuit, administrative proceeding, or other cause of action on my behalf. I specifically assign my right to pursue litigation against any and all insurance companies, health plans, defined benefit plans, health insurers or any entity that is or may be responsible for payment of my medical expenses based upon a refusal to pay charges.            E. Valuables: It is understood and agreed that [facility] is not liable for the damage to or loss of any money, jewelry,   documents, dentures, eye glasses, hearing aids, prosthetics, or other property of value.     F. Computer Equipment: I understand and agree that should I choose to use computer equipment owned by [facility] or if I choose to access the Internet via [facility]s network, I do so at my own risk. [facility] is not responsible for any damage to my computer equipment or to any damages of  any type that might arise from my loss of equipment or data.     G. Acceptance of Financial Responsibility:  I agree that in consideration of the services and   supplies that have been   or will be furnished to the patient, I am hereby obligated to pay all charges made for or on the account of the patient according to the standard rates (in effect at the time the services and supplies are delivered) established by [facility], including its Patient Financial Assistance Policy to the extent it is applicable. I understand that I am responsible for all charges, or portions thereof, not covered by insurance or other sources. Patient refunds will be distributed only after balances at all [facility] facilities are paid.     H. Communication Authorization:  I hereby authorize [facility] and its representatives, along with any billing service   or  who may work on their behalf, to contact me on   my cell phone and/or home phone using pre- recorded messages, artificial voice messages, automatic telephone dialing devices or other computer assisted technology, or by electronic      mail, text messaging, or by any other form of electronic communication. This includes, but is not limited to, appointment reminders, yearly physical exam reminders, preventive care reminders, patient campaigns, welcome calls, and calls about account balances on my account or any account on which I am listed as a guarantor. I understand I have the right to opt out of these communications at any time.      Relationship  Between  Facility and  Provider:      I understand that some, but not all, providers furnishing services to the patient are not employees or agents of [facility]. The patient is under the care and supervision of his/her attending physician, and it is the responsibility of the facility and its nursing staff to carry out the instructions of such physicians. It is the responsibility of the patient's physician/designee to obtain the  patient's informed consent, when required, for medical or surgical treatment, special diagnostic or therapeutic procedures, or hospital services rendered for the patient under the special instructions of the physician/designee.     REGISTRATION AUTHORIZATION  Form No. 81099 (Rev. 7/13/2022)      Notice of Privacy Practices: I acknowledge I have received a copy of [facility]'s Notice of Privacy Practices.     Facility  Directory: I have discussed with the organization my desire to be either included or excluded  in the facility directory in the event of my being an inpatient at an [facility] facility. I understand that if my choice is to opt-out of being identified in the facility directory that the facility will not provide any information about me such as my condition (e.g. fair, stable, etc.) or my location in the facility (e.g., room number, department).     Immunizations: [facility] shares immunization information with state sponsored health departments to help you and your doctor keep track of your immunization records. By signing, you consent to have this information shared with the health department in your state:                                [place] - LINKS ([place] Immunization Network for Kids Statewide)                                [place] - MIIX ([place] Immunization Information eXchange)                                [place] - ImmPRINT (Immunization Patient Registry with Integrated Technology)     TERM: This authorization is valid for this and subsequent care/treatment I receive at [facility] and will remain valid unless/until revoked in writing by me.     [facility]: As used in this document, [facility] means all [facility] owned and managed facilities, including, but not limited to, all health centers, surgery centers, clinics, urgent care centers, and hospitals.         [facility] complies with applicable Federal civil rights laws and does not discriminate on the basis of  race, color, national origin, age, disability, or sex.  ATENCIÓN: si habla español, tiene a mast disposición servicios gratuitos de asistencia lingüística. Llame al [phone].  CHÚ Ý: N?u b?n nói Ti?ng Vi?t, có các d?ch v? h? tr? ngôn ng? mi?n phí dành cho b?n. G?i s? [phone].        REGISTRATION AUTHORIZATION  Form No. 98242 (Rev. 7/13/2022)

## 2024-05-29 ENCOUNTER — TELEPHONE (OUTPATIENT)
Dept: FAMILY MEDICINE | Facility: CLINIC | Age: 74
End: 2024-05-29
Payer: COMMERCIAL

## 2024-05-29 ENCOUNTER — PATIENT OUTREACH (OUTPATIENT)
Dept: ADMINISTRATIVE | Facility: HOSPITAL | Age: 74
End: 2024-05-29
Payer: COMMERCIAL

## 2024-05-29 NOTE — TELEPHONE ENCOUNTER
----- Message from Hortencia Crabtree sent at 5/29/2024  3:40 PM CDT -----  Vn- 3:34-pt needs to talk to nurse about her mri. They are concerned about her collapsed stent. She talked to taj at dr. Gomez and she said it should not be a problem but they want more info   [phone]

## 2024-05-29 NOTE — TELEPHONE ENCOUNTER
Spoke to pt and she is going to wait on getting the MRI because she has a collapsed stent and has to reach out to the doctor who put the stent in a see what it is made of.

## 2024-05-29 NOTE — LETTER
AUTHORIZATION FOR RELEASE OF   CONFIDENTIAL INFORMATION    Dear Dr. Miranda,    We are seeing Estefanía Ray, date of birth 1950, in the clinic at [facility]. Verna Shha NP is the patient's PCP. Estefanía Ray has an outstanding lab/procedure at the time we reviewed her chart. In order to help keep her health information updated, she has authorized us to request the following medical record(s):                                               ( X )  EYE EXAM ( Most Recent )                Please fax records to [facility], Powell, Jodi, NP, [phone]     If you have any questions, please contact     Jay BROWN  Clinical Care Coordinator    [facility]  [phone] (Phone)  [phone] (Fax)       Patient Name: Estefanía Ray  : 1950  Patient Phone #: [phone]                   Estefanía Ray  MRN: [id number]  : 1950  Age: 73 y.o.  Sex: female         Patient/Legal Guardian Signature  This signature was collected at 2024    self       _______________________________   Printed Name/Relationship to Patient      Consent for Examination and Treatment: I hereby authorize the providers and employees of [facility] ([facility]) to provide medical treatment/services which includes, but is not limited to, performing and administering tests and diagnostic procedures that are deemed necessary, including, but not limited to, imaging examinations, blood tests and other laboratory procedures as may be required by the hospital, clinic, or may be ordered by my physician(s) or persons working under the general and/or special instructions of my physician(s).      I understand and agree that this consent covers all authorized persons, including but not limited to physicians, residents, nurse practitioners, physicians' assistants, specialists, consultants, student nurses, and independently contracted physicians, who are called upon by the  physician in charge, to carry out the diagnostic procedures and medical or surgical treatment.     I hereby authorize [facility] to retain or dispose of any specimens or tissue, should there be such remaining from any test or procedure.     I hereby authorize and give consent for [facility] providers and employees to take photographs, images or videotapes of such diagnostic, surgical or treatment procedures of Patient as may be required by [facility] or as may be ordered by a physician. I further acknowledge and agree that [facility] may use cameras or other devices for patient monitoring.     I am aware that the practice of medicine is not an exact science, and I acknowledge that no guarantees have been made to me as to the outcome of any tests, procedures or treatment.     Authorization for Release of Information: I understand that my insurance company and/or their agents may need information necessary to make determinations about payment/reimbursement. I hereby provide authorization to release to all insurance companies, their successors, assignees, other parties with whom they may have contracted, or others acting on their behalf, that are involved with payment for any hospital and/or clinic charges incurred by the patient, any information that they request and deem necessary for payment/reimbursement, and/or quality review.  I further authorize the release of my health information to physicians or other health care practitioners on staff who are involved in my health care now and in the future, and to other health care providers, entities, or institutions for the purpose of my continued care and treatment, including referrals.     REGISTRATION AUTHORIZATION  Form No. 63603 (Rev. 7/13/2022)       Medicare Patient's Certification and Authorization to Release Information and Payment Request:  I certify that the information given by me in applying for payment under Title XVIII of the Social Security Act is correct. I authorize  any macias of medical or other information about me to release to the [facility], or its intermediaries or carriers, any information needed for this or a related Medicare claim. I request that payment of authorized benefits be made on my behalf.     Assignment of Insurance Benefits:   I hereby authorize any and all insurance companies, health plans, defined   benefit plans, health insurers or any entity that is or may be responsible for payment of my medical expenses to pay all hospital and medical benefits now due, and to become due and payable to me under any hospital benefits, sick benefits, injury benefits or any other benefit for services rendered to me, including Major Medical Benefits, direct to [facility] and all independently contracted physicians. I assign any and all rights that I may have against any and all insurance companies, health plans, defined benefit plans, health insurers or any entity that is or may be responsible for payment of my medical expenses, including, but not limited to any right to appeal a denial of a claim, any right to bring any action, lawsuit, administrative proceeding, or other cause of action on my behalf. I specifically assign my right to pursue litigation against any and all insurance companies, health plans, defined benefit plans, health insurers or any entity that is or may be responsible for payment of my medical expenses based upon a refusal to pay charges.            E. Valuables: It is understood and agreed that [facility] is not liable for the damage to or loss of any money, jewelry,   documents, dentures, eye glasses, hearing aids, prosthetics, or other property of value.     F. Computer Equipment: I understand and agree that should I choose to use computer equipment owned by [facility] or if I choose to access the Internet via [facility]s network, I do so at my own risk. [facility] is not responsible for any damage to my computer equipment or to any damages of  any type that might arise from my loss of equipment or data.     G. Acceptance of Financial Responsibility:  I agree that in consideration of the services and   supplies that have been   or will be furnished to the patient, I am hereby obligated to pay all charges made for or on the account of the patient according to the standard rates (in effect at the time the services and supplies are delivered) established by [facility], including its Patient Financial Assistance Policy to the extent it is applicable. I understand that I am responsible for all charges, or portions thereof, not covered by insurance or other sources. Patient refunds will be distributed only after balances at all [facility] facilities are paid.     H. Communication Authorization:  I hereby authorize [facility] and its representatives, along with any billing service   or  who may work on their behalf, to contact me on   my cell phone and/or home phone using pre- recorded messages, artificial voice messages, automatic telephone dialing devices or other computer assisted technology, or by electronic      mail, text messaging, or by any other form of electronic communication. This includes, but is not limited to, appointment reminders, yearly physical exam reminders, preventive care reminders, patient campaigns, welcome calls, and calls about account balances on my account or any account on which I am listed as a guarantor. I understand I have the right to opt out of these communications at any time.      Relationship  Between  Facility and  Provider:      I understand that some, but not all, providers furnishing services to the patient are not employees or agents of [facility]. The patient is under the care and supervision of his/her attending physician, and it is the responsibility of the facility and its nursing staff to carry out the instructions of such physicians. It is the responsibility of the patient's physician/designee to obtain the  patient's informed consent, when required, for medical or surgical treatment, special diagnostic or therapeutic procedures, or hospital services rendered for the patient under the special instructions of the physician/designee.     REGISTRATION AUTHORIZATION  Form No. 48387 (Rev. 7/13/2022)      Notice of Privacy Practices: I acknowledge I have received a copy of [facility]'s Notice of Privacy Practices.     Facility  Directory: I have discussed with the organization my desire to be either included or excluded  in the facility directory in the event of my being an inpatient at an [facility] facility. I understand that if my choice is to opt-out of being identified in the facility directory that the facility will not provide any information about me such as my condition (e.g. fair, stable, etc.) or my location in the facility (e.g., room number, department).     Immunizations: [facility] shares immunization information with state sponsored health departments to help you and your doctor keep track of your immunization records. By signing, you consent to have this information shared with the health department in your state:                                [place] - LINKS ([place] Immunization Network for Kids Statewide)                                [place] - MIIX ([place] Immunization Information eXchange)                                [place] - ImmPRINT (Immunization Patient Registry with Integrated Technology)     TERM: This authorization is valid for this and subsequent care/treatment I receive at [facility] and will remain valid unless/until revoked in writing by me.     [facility]: As used in this document, [facility] means all [facility] owned and managed facilities, including, but not limited to, all health centers, surgery centers, clinics, urgent care centers, and hospitals.         [facility] complies with applicable Federal civil rights laws and does not discriminate on the basis of  race, color, national origin, age, disability, or sex.  ATENCIÓN: si habla español, tiene a mast disposición servicios gratuitos de asistencia lingüística. Llame al [phone].  CHÚ Ý: N?u b?n nói Ti?ng Vi?t, có các d?ch v? h? tr? ngôn ng? mi?n phí dành cho b?n. G?i s? [phone].        REGISTRATION AUTHORIZATION  Form No. 45374 (Rev. 7/13/2022)

## 2024-05-29 NOTE — PROGRESS NOTES
Population Health Chart Review & Patient Outreach Details      Additional [facility] Notes:               Updates Requested / Reviewed:      Updated Care Coordination Note and Immunizations Reconciliation Completed or Queried: [place]         Health Maintenance Topics Overdue:      VBHM Score: 3     Osteoporosis Screening  Eye Exam  Mammogram    Shingles/Zoster Vaccine  RSV Vaccine                  Health Maintenance Topic(s) Outreach Outcomes & Actions Taken:    Breast Cancer Screening - Outreach Outcomes & Actions Taken  : Pt Will Schedule with External Provider / Order Routed & Care Team Updated if Applicable    Eye Exam - Outreach Outcomes & Actions Taken  : External Records Requested & Care Team Updated if Applicable    Osteoporosis Screening - Outreach Outcomes & Actions Taken  : Patient Will Schedule with External Provider / Order Routed & Care Team Updated if Applicable

## 2024-05-31 ENCOUNTER — PATIENT OUTREACH (OUTPATIENT)
Dept: ADMINISTRATIVE | Facility: HOSPITAL | Age: 74
End: 2024-05-31
Payer: COMMERCIAL

## 2024-05-31 NOTE — PROGRESS NOTES
Population Health Chart Review & Patient Outreach Details      Additional [facility] Notes:               Updates Requested / Reviewed:      Updated Care Coordination Note, , and Care Team Updated         Health Maintenance Topics Overdue:      VBHM Score: 3     Osteoporosis Screening  Eye Exam  Mammogram    Shingles/Zoster Vaccine  RSV Vaccine                  Health Maintenance Topic(s) Outreach Outcomes & Actions Taken:    Eye Exam - Outreach Outcomes & Actions Taken  : Diabetic Eye External Records Uploaded, Care Team & History Updated if Applicable

## 2024-06-03 NOTE — TELEPHONE ENCOUNTER
Patient states the gabapentin is her husbands and she is taking the Flexeril which is hers.   Patient states she was at an appointment today with Jovany Smith, physical therapist and dry needling was preformed today and will be hold off on the MRI for now.

## 2024-06-03 NOTE — TELEPHONE ENCOUNTER
----- Message from Hortencia Crabtree sent at 6/3/2024  9:35 AM CDT -----  Vm- 8:47-pt is having breakouts on her stomach again and needs to know the cream and directions she took last time     [phone]

## 2024-06-03 NOTE — TELEPHONE ENCOUNTER
Sciatica, unspecified laterality  Comments:  xray  Orders:  -     X-Ray Lumbar Spine 5 View; Future; Expected date: 05/27/2024  -     HYDROcodone-acetaminophen (NORCO) 5-325 mg per tablet; Take 1 tablet by mouth every 8 (eight) hours as needed for Pain.  Dispense: 20 tablet; Refill: 0     Atherosclerosis of native coronary artery of native heart without angina pectoris  Comments:  followed by cardio     Essential hypertension  Comments:  bp is controlled     Hyperlipidemia, unspecified hyperlipidemia type  Comments:  lipitor     Other orders  -     cyclobenzaprine (FLEXERIL) 10 MG tablet; Take 1 tablet (10 mg total) by mouth 3 (three) times daily as needed for Muscle spasms.  Dispense: 270 tablet; Refill: 1    I am confused? So she is taking husbands meds? Please clarify

## 2024-06-04 RX ORDER — MUPIROCIN 20 MG/G
OINTMENT TOPICAL 3 TIMES DAILY
Qty: 22 G | Refills: 0 | Status: SHIPPED | OUTPATIENT
Start: 2024-06-04

## 2024-06-04 NOTE — TELEPHONE ENCOUNTER
Spoke to  pt and she will try to take a picture. Does not have bactroban needs refill. Pt stated samething as last time

## 2024-06-04 NOTE — TELEPHONE ENCOUNTER
Does she want to send pic of abdomen?   Clean stomach with antibacterial soap and water. Does she have bactroban to apply? [employer] bid to sites.

## 2024-06-06 ENCOUNTER — OFFICE VISIT (OUTPATIENT)
Dept: FAMILY MEDICINE | Facility: CLINIC | Age: 74
End: 2024-06-06
Payer: COMMERCIAL

## 2024-06-06 VITALS
OXYGEN SATURATION: 98 % | BODY MASS INDEX: 27.14 KG/M2 | DIASTOLIC BLOOD PRESSURE: 82 MMHG | HEART RATE: 80 BPM | HEIGHT: 64 IN | WEIGHT: 159 LBS | SYSTOLIC BLOOD PRESSURE: 136 MMHG

## 2024-06-06 DIAGNOSIS — I10 ESSENTIAL HYPERTENSION: ICD-10-CM

## 2024-06-06 DIAGNOSIS — E78.2 MIXED HYPERLIPIDEMIA: ICD-10-CM

## 2024-06-06 DIAGNOSIS — Z95.1 S/P CABG (CORONARY ARTERY BYPASS GRAFT): ICD-10-CM

## 2024-06-06 DIAGNOSIS — M54.9 DORSALGIA, UNSPECIFIED: ICD-10-CM

## 2024-06-06 DIAGNOSIS — K21.00 GASTROESOPHAGEAL REFLUX DISEASE WITH ESOPHAGITIS WITHOUT HEMORRHAGE: Primary | ICD-10-CM

## 2024-06-06 DIAGNOSIS — E11.69 TYPE 2 DIABETES MELLITUS WITH OTHER SPECIFIED COMPLICATION, WITHOUT LONG-TERM CURRENT USE OF INSULIN: ICD-10-CM

## 2024-06-06 PROCEDURE — 3288F FALL RISK ASSESSMENT DOCD: CPT | Mod: CPTII,S$GLB,, | Performed by: NURSE PRACTITIONER

## 2024-06-06 PROCEDURE — 3075F SYST BP GE 130 - 139MM HG: CPT | Mod: CPTII,S$GLB,, | Performed by: NURSE PRACTITIONER

## 2024-06-06 PROCEDURE — 1101F PT FALLS ASSESS-DOCD LE1/YR: CPT | Mod: CPTII,S$GLB,, | Performed by: NURSE PRACTITIONER

## 2024-06-06 PROCEDURE — 1159F MED LIST DOCD IN RCRD: CPT | Mod: CPTII,S$GLB,, | Performed by: NURSE PRACTITIONER

## 2024-06-06 PROCEDURE — 3051F HG A1C>EQUAL 7.0%<8.0%: CPT | Mod: CPTII,S$GLB,, | Performed by: NURSE PRACTITIONER

## 2024-06-06 PROCEDURE — 3008F BODY MASS INDEX DOCD: CPT | Mod: CPTII,S$GLB,, | Performed by: NURSE PRACTITIONER

## 2024-06-06 PROCEDURE — 1125F AMNT PAIN NOTED PAIN PRSNT: CPT | Mod: CPTII,S$GLB,, | Performed by: NURSE PRACTITIONER

## 2024-06-06 PROCEDURE — 99214 OFFICE O/P EST MOD 30 MIN: CPT | Mod: S$GLB,,, | Performed by: NURSE PRACTITIONER

## 2024-06-06 PROCEDURE — 2023F DILAT RTA XM W/O RTNOPTHY: CPT | Mod: CPTII,S$GLB,, | Performed by: NURSE PRACTITIONER

## 2024-06-06 PROCEDURE — 3079F DIAST BP 80-89 MM HG: CPT | Mod: CPTII,S$GLB,, | Performed by: NURSE PRACTITIONER

## 2024-06-06 PROCEDURE — 1160F RVW MEDS BY RX/DR IN RCRD: CPT | Mod: CPTII,S$GLB,, | Performed by: NURSE PRACTITIONER

## 2024-06-06 RX ORDER — GABAPENTIN 100 MG/1
100 CAPSULE ORAL 2 TIMES DAILY
Qty: 60 CAPSULE | Refills: 11 | Status: SHIPPED | OUTPATIENT
Start: 2024-06-06 | End: 2025-06-06

## 2024-06-06 RX ORDER — CYCLOBENZAPRINE HCL 10 MG
10 TABLET ORAL 3 TIMES DAILY PRN
Qty: 270 TABLET | Refills: 1 | Status: SHIPPED | OUTPATIENT
Start: 2024-06-06

## 2024-06-26 DIAGNOSIS — E11.69 TYPE 2 DIABETES MELLITUS WITH OTHER SPECIFIED COMPLICATION, WITHOUT LONG-TERM CURRENT USE OF INSULIN: ICD-10-CM

## 2024-06-26 NOTE — TELEPHONE ENCOUNTER
----- Message from Emma Carver sent at 6/26/2024  4:25 PM CDT -----   3:49  Refill Trulicity [facility] pt's # [phone]. Her [facility] said they could not fill her Trulicity anymore.  [facility]

## 2024-06-27 ENCOUNTER — TELEPHONE (OUTPATIENT)
Dept: FAMILY MEDICINE | Facility: CLINIC | Age: 74
End: 2024-06-27
Payer: COMMERCIAL

## 2024-06-27 RX ORDER — DULAGLUTIDE 3 MG/.5ML
3 INJECTION, SOLUTION SUBCUTANEOUS
Qty: 12 PEN | Refills: 1 | Status: SHIPPED | OUTPATIENT
Start: 2024-06-27 | End: 2024-12-12

## 2024-06-27 NOTE — TELEPHONE ENCOUNTER
"Spoke with patient, she just wanted to know if the Trulicity was sent to the pharmacy, I let her know it was.   She also wants to know about a supplement.  Wants to know what Verna thinks about her taking Laura - for "regularity" and Xyzal   "

## 2024-06-27 NOTE — PROGRESS NOTES
SUBJECTIVE:    Patient ID: Estefanía Ray is a 74 y.o. female.    Chief Complaint: Follow-up (No bottles // refills needed // aware of mammo and dexa due, orders already in place // -ERL)    73 year old female presents for check up. She is treated for htn, dm2, oa and hyperlipidemia. Taking meds as prescribed. Lots of stressors.  has been in and out of hospital. Has been having significant ongoing leg and back pain. Had pt evaluation . Plans to start. Pain is worse at night. Would like to discuss. Pain does radiate.  Denies injury or trauma. Sometimes is finding it hard to get comfortable. Took  gabapentin with some improvement    Follow-up  Pertinent negatives include no abdominal pain, arthralgias, chest pain, chills, coughing, fever, headaches, nausea, rash, sore throat, vomiting or weakness.   Rash  This is a new problem. The current episode started today. The problem is unchanged. The rash is characterized by burning, pain and redness. She was exposed to nothing. Pertinent negatives include no cough, diarrhea, eye pain, fever, rhinorrhea, shortness of breath, sore throat or vomiting. Past treatments include analgesics.       Patient Outreach on 05/31/2024   Component Date Value Ref Range Status    Left Eye DM Retinopathy 04/08/2024 Negative   Final    Right Eye DM Retinopathy 04/08/2024 Negative   Final   Office Visit on 05/27/2024   Component Date Value Ref Range Status    Hemoglobin A1C, POC 05/27/2024 7.2  % Final   Office Visit on 03/05/2024   Component Date Value Ref Range Status    Hemoglobin A1C, POC 03/05/2024 7.1  % Final   Office Visit on 02/07/2024   Component Date Value Ref Range Status    Rapid Strep A Screen 02/07/2024 Negative  Negative Final     Acceptable 02/07/2024 Yes   Final       Past Medical History:   Diagnosis Date    Allergy     Diabetes mellitus, type 2     GERD (gastroesophageal reflux disease)     Hyperlipidemia     Hypertension     JOHN (obstructive sleep  apnea)      Social History     Socioeconomic History    Marital status:    Tobacco Use    Smoking status: Never    Smokeless tobacco: Never   Substance and Sexual Activity    Alcohol use: Never    Drug use: Never     Social Determinants of Health     Financial Resource Strain: Patient Declined (4/9/2024)    Received from [facility]    Overall Financial Resource Strain (CARDIA)     Difficulty of Paying Living Expenses: Patient declined   Food Insecurity: Patient Declined (4/9/2024)    Received from [facility]    Hunger Vital Sign     Worried About Running Out of Food in the Last Year: Patient declined     Ran Out of Food in the Last Year: Patient declined   Transportation Needs: No Transportation Needs (4/9/2024)    Received from [facility]    PRAPARE - Transportation     Lack of Transportation (Medical): No     Lack of Transportation (Non-Medical): No   Physical Activity: Unknown (4/9/2024)    Received from [facility]    Exercise Vital Sign     Days of Exercise per Week: Patient declined   Stress: No Stress Concern Present (4/9/2024)    Received from [facility]    [employer] [place] [employer] - Occupational Stress Questionnaire     Feeling of Stress : Only a little   Housing Stability: High Risk (11/27/2023)    Housing Stability Vital Sign     Unable to Pay for Housing in the Last Year: No     Number of Places Lived in the Last Year: 3     Unstable Housing in the Last Year: No     Past Surgical History:   Procedure Laterality Date    HYSTERECTOMY      KNEE SURGERY Left     TONSILLECTOMY       No family history on file.    Tests to Keep You Healthy    Mammogram: ORDERED BUT NOT SCHEDULED  Eye Exam: Met on 4/8/2024  Colon Cancer Screening: Met on 9/7/2016  Last Blood Pressure <= 139/89 (6/6/2024): Yes  Last HbA1c < 8 (05/27/2024): Yes      Review of patient's allergies indicates:   Allergen Reactions    Empagliflozin Other (See Comments)    Semaglutide Hives    Sitagliptin phos-metformin Other  (See Comments)     Other reaction(s): diarrhea    Ezetimibe Nausea And Vomiting     Other reaction(s): Other (See Comments), Unknown    Feels terrible when taking medication    Other reaction(s): Other (See Comments)   Feels terrible when taking medication    Other reaction(s): Other (See Comments), Unknown   Feels terrible when taking medication    Farxiga [dapagliflozin] Rash     Genital rash    Linaclotide Diarrhea and Nausea And Vomiting       Current Outpatient Medications:     aspirin (ECOTRIN) 81 MG EC tablet, Aspir-81 mg tablet,delayed release  Take 1 tablet every day by oral route., Disp: , Rfl:     atorvastatin (LIPITOR) 40 MG tablet, Take 1 tablet by mouth every evening., Disp: , Rfl:     biotin 10,000 mcg TbDL, 1 tablet, Disp: , Rfl:     blood sugar diagnostic (FREESTYLE LITE STRIPS) Strp, as directed, Disp: 100 each, Rfl: 5    cholestyramine, bulk, Powd, , Disp: , Rfl:     dulaglutide (TRULICITY) 3 mg/0.5 mL pen injector, Inject 3 mg into the skin every 7 days., Disp: 12 pen , Rfl: 1    flash glucose sensor (FREESTYLE KARINA 14 DAY SENSOR) Kit, 1 kit by Misc.(Non-Drug; Combo Route) route once daily., Disp: 1 kit, Rfl: 11    insulin detemir U-100, Levemir, (LEVEMIR FLEXPEN) 100 unit/mL (3 mL) InPn pen, Inject 25 Units into the skin every evening. (Patient taking differently: Inject 19 Units into the skin every evening.), Disp: 15 each, Rfl: 5    krill-om-3-dha-epa-phospho-ast 114-78-99-50 mg Cap, , Disp: , Rfl:     lancets (FREESTYLE LANCETS) 28 gauge Misc, 1 lancet by Misc.(Non-Drug; Combo Route) route 2 (two) times a day., Disp: 200 each, Rfl: 3    lansoprazole (PREVACID) 15 MG capsule, 1 capsule, Disp: , Rfl:     levocetirizine (XYZAL) 5 MG tablet, Take 1 tablet (5 mg total) by mouth every evening., Disp: 30 tablet, Rfl: 0    lisinopriL (PRINIVIL,ZESTRIL) 20 MG tablet, Take 1 tablet (20 mg total) by mouth once daily., Disp: 90 tablet, Rfl: 1    metFORMIN (GLUCOPHAGE-XR) 500 MG ER 24hr tablet, metformin  " mg tablet,extended release 24 hr  TAKE 2 TABLETS BY MOUTH TWICE DAILY Strength: 500 mg, Disp: 120 tablet, Rfl: 2    metoprolol succinate (TOPROL-XL) 50 MG 24 hr tablet, Take 1 tablet (50 mg total) by mouth once daily., Disp: 90 tablet, Rfl: 1    mupirocin (BACTROBAN) 2 % ointment, Apply topically 3 (three) times daily., Disp: 22 g, Rfl: 0    nitroGLYCERIN (NITROSTAT) 0.4 MG SL tablet, Place 0.4 mg under the tongue., Disp: , Rfl:     nystatin (MYCOSTATIN) powder, Apply topically 4 (four) times daily., Disp: 60 g, Rfl: 3    pen needle, diabetic (PEN NEEDLE) 31 gauge x 3/16" Ndle, 1 each by In Vitro route 4 (four) times daily. BD ULTRA FINE, Disp: 100 each, Rfl: 5    vitamin D (VITAMIN D3) 1000 units Tab, Take 5,000 Units by mouth., Disp: , Rfl:     cyclobenzaprine (FLEXERIL) 10 MG tablet, Take 1 tablet (10 mg total) by mouth 3 (three) times daily as needed for Muscle spasms., Disp: 270 tablet, Rfl: 1    gabapentin (NEURONTIN) 100 MG capsule, Take 1 capsule (100 mg total) by mouth 2 (two) times daily., Disp: 60 capsule, Rfl: 11    pyrilamine-chlophedianoL (NINJACOF) 12.5-12.5 mg/5 mL Liqd, Take 5 mLs by mouth every 6 to 8 hours as needed (Cough). (Patient not taking: Reported on 6/6/2024), Disp: 118 mL, Rfl: 0    Review of Systems   Constitutional:  Negative for chills, fever and unexpected weight change.   HENT:  Negative for ear pain, rhinorrhea and sore throat.    Eyes:  Negative for pain and visual disturbance.   Respiratory:  Negative for cough and shortness of breath.    Cardiovascular:  Negative for chest pain, palpitations and leg swelling.   Gastrointestinal:  Negative for abdominal pain, diarrhea, nausea and vomiting.   Genitourinary:  Negative for difficulty urinating, hematuria and vaginal bleeding.   Musculoskeletal:  Positive for back pain. Negative for arthralgias.   Skin:  Negative for rash.   Neurological:  Negative for dizziness, weakness and headaches.   Psychiatric/Behavioral:  Negative for " "agitation and sleep disturbance. The patient is not nervous/anxious.           Objective:      Vitals:    06/06/24 1030   BP: 136/82   Pulse: 80   SpO2: 98%   Weight: 72.1 kg (159 lb)   Height: 5' 3.5" (1.613 m)     Physical Exam  Vitals and nursing note reviewed.   Constitutional:       General: She is not in acute distress.     Appearance: Normal appearance. She is well-developed.   HENT:      Head: Normocephalic.      Right Ear: External ear normal.      Left Ear: External ear normal.   Eyes:      Conjunctiva/sclera: Conjunctivae normal.      Pupils: Pupils are equal, round, and reactive to light.   Neck:      Vascular: No JVD.   Cardiovascular:      Rate and Rhythm: Normal rate and regular rhythm.      Heart sounds: No murmur heard.  Pulmonary:      Effort: Pulmonary effort is normal.      Breath sounds: Normal breath sounds.   Abdominal:      General: Bowel sounds are normal.      Palpations: Abdomen is soft.   Musculoskeletal:         General: No deformity.      Cervical back: Normal range of motion and neck supple.      Lumbar back: Spasms present. Decreased range of motion. Negative right straight leg raise test and negative left straight leg raise test.   Feet:      Right foot:      Protective Sensation: 5 sites tested.  5 sites sensed.      Left foot:      Protective Sensation: 5 sites tested.  5 sites sensed.   Lymphadenopathy:      Cervical: No cervical adenopathy.   Skin:     General: Skin is warm and dry.      Findings: No rash.   Neurological:      Mental Status: She is alert and oriented to person, place, and time.      Gait: Gait normal.   Psychiatric:         Speech: Speech normal.         Behavior: Behavior normal.           Assessment:       1. Gastroesophageal reflux disease with esophagitis without hemorrhage    2. Type 2 diabetes mellitus with other specified complication, without long-term current use of insulin    3. Essential hypertension    4. S/P CABG (coronary artery bypass graft)    5. " Mixed hyperlipidemia    6. Dorsalgia, unspecified         Plan:       Gastroesophageal reflux disease with esophagitis without hemorrhage  Comments:  prevacid    Type 2 diabetes mellitus with other specified complication, without long-term current use of insulin  Comments:  7.2    Essential hypertension  Comments:  bp controlled    S/P CABG (coronary artery bypass graft)    Mixed hyperlipidemia  Comments:  lipitor    Dorsalgia, unspecified  Comments:  started pt. gabapentin    Other orders  -     cyclobenzaprine (FLEXERIL) 10 MG tablet; Take 1 tablet (10 mg total) by mouth 3 (three) times daily as needed for Muscle spasms.  Dispense: 270 tablet; Refill: 1  -     gabapentin (NEURONTIN) 100 MG capsule; Take 1 capsule (100 mg total) by mouth 2 (two) times daily.  Dispense: 60 capsule; Refill: 11      Follow up in about 3 months (around 9/6/2024), or if symptoms worsen or fail to improve, for medication management, Diabetic Check-Up.        6/27/2024 Veran Shah

## 2024-06-27 NOTE — TELEPHONE ENCOUNTER
----- Message from Hortencia Crabtree sent at 6/27/2024 12:02 PM CDT -----  -11:12- pt is needs refill on trulicity and needs to talk to someone about a supplement   [facility]   [phone]

## 2024-08-05 ENCOUNTER — TELEPHONE (OUTPATIENT)
Dept: FAMILY MEDICINE | Facility: CLINIC | Age: 74
End: 2024-08-05
Payer: COMMERCIAL

## 2024-08-05 RX ORDER — TIRZEPATIDE 5 MG/.5ML
5 INJECTION, SOLUTION SUBCUTANEOUS
Qty: 4 PEN | Refills: 5 | Status: SHIPPED | OUTPATIENT
Start: 2024-08-05

## 2024-08-12 ENCOUNTER — TELEPHONE (OUTPATIENT)
Dept: FAMILY MEDICINE | Facility: CLINIC | Age: 74
End: 2024-08-12
Payer: COMMERCIAL

## 2024-08-12 NOTE — TELEPHONE ENCOUNTER
----- Message from Hortencia Crabtree sent at 8/12/2024  3:34 PM CDT -----  Vm-3:00- pt is calling about her ash it is the wrong kind   [phone]  
LMOR for pt to call back   
no

## 2024-08-14 DIAGNOSIS — E11.69 TYPE 2 DIABETES MELLITUS WITH OTHER SPECIFIED COMPLICATION, WITHOUT LONG-TERM CURRENT USE OF INSULIN: Primary | ICD-10-CM

## 2024-08-14 NOTE — TELEPHONE ENCOUNTER
Spoke to pt and she stated she has been taking the 10 mg moujaro because she had some left over , needs refills. Also taking amoxil for her dental procedure wants to know if will help with her staph break out on her stomach

## 2024-08-14 NOTE — TELEPHONE ENCOUNTER
----- Message from Hortencia Crabtree sent at 8/14/2024 10:38 AM CDT -----  Pt is calling back about the dose of her monjero. The wrong one was called in. She would also like a coupon. She also has the staph break out on her stomach. She is on amoxil for an oral surgery right now   [facility] [place]   [phone]

## 2024-08-26 ENCOUNTER — TELEPHONE (OUTPATIENT)
Dept: FAMILY MEDICINE | Facility: CLINIC | Age: 74
End: 2024-08-26
Payer: COMMERCIAL

## 2024-08-26 NOTE — TELEPHONE ENCOUNTER
----- Message from Hortencia Crabtree sent at 8/26/2024  9:40 AM CDT -----  - 9:33- pt needs her labs submitted again   [phone]

## 2024-08-26 NOTE — TELEPHONE ENCOUNTER
Spoke with patient and informed that labs are still current at [facility]. Patient will be coming this week to our office to have them drawn.

## 2024-08-30 ENCOUNTER — TELEPHONE (OUTPATIENT)
Dept: FAMILY MEDICINE | Facility: CLINIC | Age: 74
End: 2024-08-30
Payer: COMMERCIAL

## 2024-08-30 NOTE — TELEPHONE ENCOUNTER
Spoke with pt in regards to pre-visit labs. Verbalized that we have placed lab order for you to have done before your upcoming appointment on 09/12/2024. Verbalized that the lab order are placed through [facility], so they can come into the office anytime, no appointment necessary. Just make sure that you are fasting for you labs. Pt acknowledge understanding.

## 2024-09-09 ENCOUNTER — PATIENT MESSAGE (OUTPATIENT)
Dept: UROGYNECOLOGY | Facility: CLINIC | Age: 74
End: 2024-09-09
Payer: COMMERCIAL

## 2024-09-12 ENCOUNTER — OFFICE VISIT (OUTPATIENT)
Dept: FAMILY MEDICINE | Facility: CLINIC | Age: 74
End: 2024-09-12
Payer: COMMERCIAL

## 2024-09-12 VITALS
SYSTOLIC BLOOD PRESSURE: 138 MMHG | DIASTOLIC BLOOD PRESSURE: 82 MMHG | OXYGEN SATURATION: 98 % | HEART RATE: 78 BPM | BODY MASS INDEX: 26.18 KG/M2 | WEIGHT: 153.38 LBS | HEIGHT: 64 IN

## 2024-09-12 DIAGNOSIS — I77.89 OTHER SPECIFIED DISORDERS OF ARTERIES AND ARTERIOLES: ICD-10-CM

## 2024-09-12 DIAGNOSIS — I25.119 ATHEROSCLEROSIS OF NATIVE CORONARY ARTERY OF NATIVE HEART WITH ANGINA PECTORIS: ICD-10-CM

## 2024-09-12 DIAGNOSIS — M62.838 MUSCLE SPASM: ICD-10-CM

## 2024-09-12 DIAGNOSIS — E11.69 TYPE 2 DIABETES MELLITUS WITH OTHER SPECIFIED COMPLICATION, WITHOUT LONG-TERM CURRENT USE OF INSULIN: Primary | ICD-10-CM

## 2024-09-12 DIAGNOSIS — E78.2 MIXED HYPERLIPIDEMIA: ICD-10-CM

## 2024-09-12 DIAGNOSIS — K21.00 GASTROESOPHAGEAL REFLUX DISEASE WITH ESOPHAGITIS WITHOUT HEMORRHAGE: ICD-10-CM

## 2024-09-12 DIAGNOSIS — I10 HYPERTENSION, UNSPECIFIED TYPE: ICD-10-CM

## 2024-09-12 LAB — HBA1C MFR BLD: 6.8 %

## 2024-09-12 PROCEDURE — 83036 HEMOGLOBIN GLYCOSYLATED A1C: CPT | Mod: QW,,, | Performed by: NURSE PRACTITIONER

## 2024-09-12 PROCEDURE — 1160F RVW MEDS BY RX/DR IN RCRD: CPT | Mod: CPTII,S$GLB,, | Performed by: NURSE PRACTITIONER

## 2024-09-12 PROCEDURE — 1126F AMNT PAIN NOTED NONE PRSNT: CPT | Mod: CPTII,S$GLB,, | Performed by: NURSE PRACTITIONER

## 2024-09-12 PROCEDURE — 3075F SYST BP GE 130 - 139MM HG: CPT | Mod: CPTII,S$GLB,, | Performed by: NURSE PRACTITIONER

## 2024-09-12 PROCEDURE — 1101F PT FALLS ASSESS-DOCD LE1/YR: CPT | Mod: CPTII,S$GLB,, | Performed by: NURSE PRACTITIONER

## 2024-09-12 PROCEDURE — 2023F DILAT RTA XM W/O RTNOPTHY: CPT | Mod: CPTII,S$GLB,, | Performed by: NURSE PRACTITIONER

## 2024-09-12 PROCEDURE — 99214 OFFICE O/P EST MOD 30 MIN: CPT | Mod: S$GLB,,, | Performed by: NURSE PRACTITIONER

## 2024-09-12 PROCEDURE — 3044F HG A1C LEVEL LT 7.0%: CPT | Mod: CPTII,S$GLB,, | Performed by: NURSE PRACTITIONER

## 2024-09-12 PROCEDURE — 1159F MED LIST DOCD IN RCRD: CPT | Mod: CPTII,S$GLB,, | Performed by: NURSE PRACTITIONER

## 2024-09-12 PROCEDURE — 3288F FALL RISK ASSESSMENT DOCD: CPT | Mod: CPTII,S$GLB,, | Performed by: NURSE PRACTITIONER

## 2024-09-12 PROCEDURE — 3008F BODY MASS INDEX DOCD: CPT | Mod: CPTII,S$GLB,, | Performed by: NURSE PRACTITIONER

## 2024-09-12 PROCEDURE — 3079F DIAST BP 80-89 MM HG: CPT | Mod: CPTII,S$GLB,, | Performed by: NURSE PRACTITIONER

## 2024-09-12 RX ORDER — ESTRADIOL 0.1 MG/G
CREAM VAGINAL
COMMUNITY
Start: 2024-08-23

## 2024-09-12 RX ORDER — NYSTATIN 100000 [USP'U]/G
POWDER TOPICAL 4 TIMES DAILY
Qty: 60 G | Refills: 3 | Status: SHIPPED | OUTPATIENT
Start: 2024-09-12

## 2024-09-12 RX ORDER — ATORVASTATIN CALCIUM 40 MG/1
40 TABLET, FILM COATED ORAL NIGHTLY
Qty: 90 TABLET | Refills: 3 | Status: SHIPPED | OUTPATIENT
Start: 2024-09-12 | End: 2025-09-12

## 2024-09-12 RX ORDER — METOPROLOL SUCCINATE 50 MG/1
50 TABLET, EXTENDED RELEASE ORAL DAILY
Qty: 90 TABLET | Refills: 1 | Status: SHIPPED | OUTPATIENT
Start: 2024-09-12

## 2024-09-12 RX ORDER — MUPIROCIN 20 MG/G
OINTMENT TOPICAL 3 TIMES DAILY
Qty: 22 G | Refills: 0 | Status: SHIPPED | OUTPATIENT
Start: 2024-09-12

## 2024-09-12 RX ORDER — INSULIN DETEMIR 100 [IU]/ML
10 INJECTION, SOLUTION SUBCUTANEOUS NIGHTLY
Start: 2024-09-12

## 2024-09-12 RX ORDER — CYCLOBENZAPRINE HCL 10 MG
10 TABLET ORAL 3 TIMES DAILY PRN
Qty: 270 TABLET | Refills: 1 | Status: SHIPPED | OUTPATIENT
Start: 2024-09-12

## 2024-09-12 RX ORDER — GABAPENTIN 100 MG/1
100 CAPSULE ORAL 2 TIMES DAILY
Qty: 60 CAPSULE | Refills: 11 | Status: CANCELLED | OUTPATIENT
Start: 2024-09-12 | End: 2025-09-12

## 2024-09-29 PROBLEM — I77.89 OTHER SPECIFIED DISORDERS OF ARTERIES AND ARTERIOLES: Status: ACTIVE | Noted: 2024-09-29

## 2024-09-29 NOTE — PROGRESS NOTES
SUBJECTIVE:    Patient ID: Estefanía Ray is a 74 y.o. female.    Chief Complaint: Follow-up (Bottles brought//Pt is here for a 3 month follow up and med refills//Mammo and Dexa orders already placed pt has not had a chance to get them done//KE)    74 year old female presents for check up. She is treated for htn, dm2, oa and hyperlipidemia. Taking meds as prescribed. Lots of stressors.  has been in and out of hospital. Hag1c is 6.8mg/dl. overdue for labs. Wants to resume mounjaro    Follow-up  Pertinent negatives include no abdominal pain, arthralgias, chest pain, chills, coughing, fever, headaches, nausea, rash, sore throat, vomiting or weakness.   Rash  This is a new problem. The current episode started today. The problem is unchanged. The rash is characterized by burning, pain and redness. She was exposed to nothing. Pertinent negatives include no cough, diarrhea, eye pain, fever, rhinorrhea, shortness of breath, sore throat or vomiting. Past treatments include analgesics.       Office Visit on 09/12/2024   Component Date Value Ref Range Status    Hemoglobin A1C, POC 09/12/2024 6.8  % Final   Patient Outreach on 05/31/2024   Component Date Value Ref Range Status    Left Eye DM Retinopathy 04/08/2024 Negative   Final    Right Eye DM Retinopathy 04/08/2024 Negative   Final   Office Visit on 05/27/2024   Component Date Value Ref Range Status    Hemoglobin A1C, POC 05/27/2024 7.2  % Final       Past Medical History:   Diagnosis Date    Allergy     Diabetes mellitus, type 2     GERD (gastroesophageal reflux disease)     Hyperlipidemia     Hypertension     JOHN (obstructive sleep apnea)      Social History     Socioeconomic History    Marital status:    Tobacco Use    Smoking status: Never    Smokeless tobacco: Never   Substance and Sexual Activity    Alcohol use: Never    Drug use: Never     Social Drivers of Health     Financial Resource Strain: Low Risk  (7/28/2024)    Received from [facility]     Overall Financial Resource Strain (CARDIA)     Difficulty of Paying Living Expenses: Not hard at all   Food Insecurity: No Food Insecurity (7/28/2024)    Received from [facility]    Hunger Vital Sign     Worried About Running Out of Food in the Last Year: Never true     Ran Out of Food in the Last Year: Never true   Transportation Needs: No Transportation Needs (7/28/2024)    Received from [facility]    PRAPARE - Transportation     Lack of Transportation (Medical): No     Lack of Transportation (Non-Medical): No   Physical Activity: Insufficiently Active (7/28/2024)    Received from [facility]    Exercise Vital Sign     Days of Exercise per Week: 2 days     Minutes of Exercise per Session: 50 min   Stress: Stress Concern Present (7/28/2024)    Received from [facility]    [employer] [place] [employer] - Occupational Stress Questionnaire     Feeling of Stress : To some extent   Housing Stability: High Risk (11/27/2023)    Housing Stability Vital Sign     Unable to Pay for Housing in the Last Year: No     Number of Places Lived in the Last Year: 3     Unstable Housing in the Last Year: No     Past Surgical History:   Procedure Laterality Date    HYSTERECTOMY      KNEE SURGERY Left     TONSILLECTOMY       No family history on file.    Tests to Keep You Healthy    Mammogram: ORDERED BUT NOT SCHEDULED  Eye Exam: Met on 4/8/2024  Colon Cancer Screening: Met on 9/7/2016  Last Blood Pressure <= 139/89 (9/12/2024): Yes  Last HbA1c < 8 (09/12/2024): Yes      Review of patient's allergies indicates:   Allergen Reactions    Empagliflozin Other (See Comments)    Semaglutide Hives    Sitagliptin phos-metformin Other (See Comments)     Other reaction(s): diarrhea    Ezetimibe Nausea And Vomiting     Other reaction(s): Other (See Comments), Unknown    Feels terrible when taking medication    Other reaction(s): Other (See Comments)   Feels terrible when taking medication    Other reaction(s): Other (See Comments),  Unknown   Feels terrible when taking medication    Farxiga [dapagliflozin] Rash     Genital rash    Linaclotide Diarrhea and Nausea And Vomiting       Current Outpatient Medications:     aspirin (ECOTRIN) 81 MG EC tablet, Aspir-81 mg tablet,delayed release  Take 1 tablet every day by oral route., Disp: , Rfl:     biotin 10,000 mcg TbDL, 1 tablet, Disp: , Rfl:     cholestyramine, bulk, Powd, , Disp: , Rfl:     estradioL (ESTRACE) 0.01 % (0.1 mg/gram) vaginal cream, Place vaginally., Disp: , Rfl:     krill-om-3-dha-epa-phospho-ast 299-93-02-50 mg Cap, , Disp: , Rfl:     lansoprazole (PREVACID) 15 MG capsule, 1 capsule, Disp: , Rfl:     levocetirizine (XYZAL) 5 MG tablet, Take 1 tablet (5 mg total) by mouth every evening., Disp: 30 tablet, Rfl: 0    lisinopriL (PRINIVIL,ZESTRIL) 20 MG tablet, Take 1 tablet (20 mg total) by mouth once daily., Disp: 90 tablet, Rfl: 1    nitroGLYCERIN (NITROSTAT) 0.4 MG SL tablet, Place 0.4 mg under the tongue., Disp: , Rfl:     vitamin D (VITAMIN D3) 1000 units Tab, Take 5,000 Units by mouth., Disp: , Rfl:     atorvastatin (LIPITOR) 40 MG tablet, Take 1 tablet (40 mg total) by mouth every evening., Disp: 90 tablet, Rfl: 3    blood sugar diagnostic (FREESTYLE LITE STRIPS) Strp, as directed (Patient not taking: Reported on 9/12/2024), Disp: 100 each, Rfl: 5    cyclobenzaprine (FLEXERIL) 10 MG tablet, Take 1 tablet (10 mg total) by mouth 3 (three) times daily as needed for Muscle spasms., Disp: 270 tablet, Rfl: 1    flash glucose sensor (FREESTYLE KARINA 14 DAY SENSOR) Kit, 1 kit by Misc.(Non-Drug; Combo Route) route once daily. (Patient not taking: Reported on 9/12/2024), Disp: 1 kit, Rfl: 11    insulin detemir U-100, Levemir, (LEVEMIR FLEXPEN) 100 unit/mL (3 mL) InPn pen, Inject 10 Units into the skin every evening., Disp: , Rfl:     lancets (FREESTYLE LANCETS) 28 gauge Misc, 1 lancet by Misc.(Non-Drug; Combo Route) route 2 (two) times a day. (Patient not taking: Reported on 9/12/2024),  "Disp: 200 each, Rfl: 3    metoprolol succinate (TOPROL-XL) 50 MG 24 hr tablet, Take 1 tablet (50 mg total) by mouth once daily., Disp: 90 tablet, Rfl: 1    mupirocin (BACTROBAN) 2 % ointment, Apply topically 3 (three) times daily., Disp: 22 g, Rfl: 0    nystatin (MYCOSTATIN) powder, Apply topically 4 (four) times daily., Disp: 60 g, Rfl: 3    tirzepatide 10 mg/0.5 mL PnIj, Inject 10 mg into the skin every 7 days., Disp: 4 Pen, Rfl: 3    Review of Systems   Constitutional:  Negative for chills, fever and unexpected weight change.   HENT:  Negative for ear pain, rhinorrhea and sore throat.    Eyes:  Negative for pain and visual disturbance.   Respiratory:  Negative for cough and shortness of breath.    Cardiovascular:  Negative for chest pain, palpitations and leg swelling.   Gastrointestinal:  Negative for abdominal pain, diarrhea, nausea and vomiting.   Genitourinary:  Negative for difficulty urinating, hematuria and vaginal bleeding.   Musculoskeletal:  Negative for arthralgias.   Skin:  Negative for rash.   Neurological:  Negative for dizziness, weakness and headaches.   Psychiatric/Behavioral:  Negative for agitation and sleep disturbance. The patient is not nervous/anxious.           Objective:      Vitals:    09/12/24 1026   BP: 138/82   Pulse: 78   SpO2: 98%   Weight: 69.6 kg (153 lb 6.4 oz)   Height: 5' 3.5" (1.613 m)     Physical Exam  Vitals and nursing note reviewed.   Constitutional:       General: She is not in acute distress.     Appearance: Normal appearance. She is well-developed. She is not ill-appearing.   HENT:      Head: Normocephalic.      Right Ear: External ear normal.      Left Ear: External ear normal.   Eyes:      Conjunctiva/sclera: Conjunctivae normal.      Pupils: Pupils are equal, round, and reactive to light.   Neck:      Vascular: No JVD.   Cardiovascular:      Rate and Rhythm: Normal rate and regular rhythm.      Heart sounds: No murmur heard.  Pulmonary:      Effort: Pulmonary effort " is normal.      Breath sounds: Normal breath sounds.   Abdominal:      General: Bowel sounds are normal.      Palpations: Abdomen is soft.   Musculoskeletal:         General: No deformity. Normal range of motion.      Cervical back: Normal range of motion and neck supple.   Lymphadenopathy:      Cervical: No cervical adenopathy.   Skin:     General: Skin is warm and dry.      Findings: No rash.   Neurological:      Mental Status: She is alert and oriented to person, place, and time.      Gait: Gait normal.   Psychiatric:         Speech: Speech normal.         Behavior: Behavior normal.           Assessment:       1. Type 2 diabetes mellitus with other specified complication, without long-term current use of insulin    2. Hypertension, unspecified type    3. Other specified disorders of arteries and arterioles    4. Atherosclerosis of native coronary artery of native heart with angina pectoris    5. Mixed hyperlipidemia    6. Gastroesophageal reflux disease with esophagitis without hemorrhage    7. Muscle spasm         Plan:       Type 2 diabetes mellitus with other specified complication, without long-term current use of insulin  Comments:  6.8  Orders:  -     POCT HEMOGLOBIN A1C  -     tirzepatide 10 mg/0.5 mL PnIj; Inject 10 mg into the skin every 7 days.  Dispense: 4 Pen; Refill: 3    Hypertension, unspecified type  Comments:  bp controlled  Orders:  -     metoprolol succinate (TOPROL-XL) 50 MG 24 hr tablet; Take 1 tablet (50 mg total) by mouth once daily.  Dispense: 90 tablet; Refill: 1    Other specified disorders of arteries and arterioles    Atherosclerosis of native coronary artery of native heart with angina pectoris    Mixed hyperlipidemia  Comments:  lipitor    Gastroesophageal reflux disease with esophagitis without hemorrhage  Comments:  prevacid    Muscle spasm  Comments:  flexeril    Other orders  -     cyclobenzaprine (FLEXERIL) 10 MG tablet; Take 1 tablet (10 mg total) by mouth 3 (three) times daily  as needed for Muscle spasms.  Dispense: 270 tablet; Refill: 1  -     mupirocin (BACTROBAN) 2 % ointment; Apply topically 3 (three) times daily.  Dispense: 22 g; Refill: 0  -     nystatin (MYCOSTATIN) powder; Apply topically 4 (four) times daily.  Dispense: 60 g; Refill: 3  -     insulin detemir U-100, Levemir, (LEVEMIR FLEXPEN) 100 unit/mL (3 mL) InPn pen; Inject 10 Units into the skin every evening.  -     atorvastatin (LIPITOR) 40 MG tablet; Take 1 tablet (40 mg total) by mouth every evening.  Dispense: 90 tablet; Refill: 3      Follow up in about 3 months (around 12/12/2024) for medication management.        9/29/2024 Verna Shah

## 2024-10-14 ENCOUNTER — TELEPHONE (OUTPATIENT)
Dept: FAMILY MEDICINE | Facility: CLINIC | Age: 74
End: 2024-10-14
Payer: COMMERCIAL

## 2024-10-14 RX ORDER — NYSTATIN 100000 [USP'U]/G
POWDER TOPICAL 3 TIMES DAILY
Qty: 180 G | Refills: 3 | Status: SHIPPED | OUTPATIENT
Start: 2024-10-14

## 2024-10-14 NOTE — TELEPHONE ENCOUNTER
----- Message from Hortencia sent at 10/14/2024 12:01 PM CDT -----  - 11:57-pt needs refill on powder but it needs to be written differently as it is not enough   [phone]

## 2024-10-16 ENCOUNTER — TELEPHONE (OUTPATIENT)
Dept: FAMILY MEDICINE | Facility: CLINIC | Age: 74
End: 2024-10-16
Payer: COMMERCIAL

## 2024-10-16 NOTE — TELEPHONE ENCOUNTER
JATINDER from patient - needs a call about her Nystatin. I spoke with her and let her know it was sent on 10/14

## 2024-11-04 ENCOUNTER — TELEPHONE (OUTPATIENT)
Dept: FAMILY MEDICINE | Facility: CLINIC | Age: 74
End: 2024-11-04
Payer: COMMERCIAL

## 2024-11-04 NOTE — TELEPHONE ENCOUNTER
Spoke to pt and she stated she had dairrhea about 10 episodes and vomited 3 times. No fever and blood pressure was high at 153/53. Pt stated her BP is back to normal this morning and NO more diarrhea or vomiting.  Wants to know what Verna thinks     Pt stated she expected the on call nurse to call her back because she was very sick and was disappointed when it didn't happen.

## 2024-11-04 NOTE — TELEPHONE ENCOUNTER
----- Message from Krysta sent at 11/4/2024 12:34 PM CST -----  Returning a phone call   [phone]

## 2024-11-04 NOTE — TELEPHONE ENCOUNTER
----- Message from Hortencia sent at 11/4/2024  7:00 AM CST -----  - 11/1-6:08 pm- pt has had diarrhea like water 6 times. Has had to take 6 showers and all the imodium she can have.   [phone]

## 2024-11-04 NOTE — TELEPHONE ENCOUNTER
----- Message from Krysta sent at 11/4/2024  2:30 PM CST -----  Returning a phone call   [phone]

## 2024-11-04 NOTE — TELEPHONE ENCOUNTER
We do not have a nurse on call. However sounds like gi bug. If symptoms improved bland diet. Push fluids.

## 2024-11-15 ENCOUNTER — TELEPHONE (OUTPATIENT)
Dept: FAMILY MEDICINE | Facility: CLINIC | Age: 74
End: 2024-11-15
Payer: COMMERCIAL

## 2024-11-25 ENCOUNTER — CLINICAL SUPPORT (OUTPATIENT)
Dept: FAMILY MEDICINE | Facility: CLINIC | Age: 74
End: 2024-11-25
Payer: COMMERCIAL

## 2024-11-25 DIAGNOSIS — Z23 NEED FOR INFLUENZA VACCINATION: Primary | ICD-10-CM

## 2024-11-27 DIAGNOSIS — Z78.0 MENOPAUSE: ICD-10-CM

## 2024-12-02 ENCOUNTER — PATIENT MESSAGE (OUTPATIENT)
Dept: ADMINISTRATIVE | Facility: HOSPITAL | Age: 74
End: 2024-12-02
Payer: COMMERCIAL

## 2024-12-05 ENCOUNTER — HOSPITAL ENCOUNTER (OUTPATIENT)
Dept: RADIOLOGY | Facility: HOSPITAL | Age: 74
Discharge: HOME | End: 2024-12-05
Attending: NURSE PRACTITIONER
Payer: COMMERCIAL

## 2024-12-05 ENCOUNTER — TELEPHONE (OUTPATIENT)
Dept: FAMILY MEDICINE | Facility: CLINIC | Age: 74
End: 2024-12-05
Payer: COMMERCIAL

## 2024-12-05 VITALS — HEIGHT: 63 IN | BODY MASS INDEX: 27.11 KG/M2 | WEIGHT: 153 LBS

## 2024-12-05 DIAGNOSIS — Z12.31 ENCOUNTER FOR SCREENING MAMMOGRAM FOR BREAST CANCER: ICD-10-CM

## 2024-12-05 DIAGNOSIS — Z78.0 MENOPAUSE: ICD-10-CM

## 2024-12-05 PROCEDURE — 77067 SCR MAMMO BI INCL CAD: CPT | Mod: 26,,, | Performed by: RADIOLOGY

## 2024-12-05 PROCEDURE — 77063 BREAST TOMOSYNTHESIS BI: CPT | Mod: TC,PO

## 2024-12-05 PROCEDURE — 77080 DXA BONE DENSITY AXIAL: CPT | Mod: TC,PO

## 2024-12-05 PROCEDURE — 77080 DXA BONE DENSITY AXIAL: CPT | Mod: 26,,, | Performed by: RADIOLOGY

## 2024-12-05 PROCEDURE — 77063 BREAST TOMOSYNTHESIS BI: CPT | Mod: 26,,, | Performed by: RADIOLOGY

## 2024-12-11 ENCOUNTER — OFFICE VISIT (OUTPATIENT)
Dept: UROGYNECOLOGY | Facility: CLINIC | Age: 74
End: 2024-12-11
Payer: COMMERCIAL

## 2024-12-11 DIAGNOSIS — N99.3 PELVIC RELAXATION DUE TO VAGINAL VAULT PROLAPSE, POSTHYSTERECTOMY: Primary | ICD-10-CM

## 2024-12-11 DIAGNOSIS — N81.10 PROLAPSE OF ANTERIOR VAGINAL WALL: ICD-10-CM

## 2024-12-11 DIAGNOSIS — R33.9 INCOMPLETE BLADDER EMPTYING: ICD-10-CM

## 2024-12-11 DIAGNOSIS — N39.46 MIXED URGE AND STRESS INCONTINENCE: ICD-10-CM

## 2024-12-11 PROCEDURE — 99999 PR PBB SHADOW E&M-EST. PATIENT-LVL IV: CPT | Mod: PBBFAC,,, | Performed by: OBSTETRICS & GYNECOLOGY

## 2024-12-11 RX ORDER — ESTRADIOL 0.1 MG/G
1 CREAM VAGINAL
Qty: 42.5 G | Refills: 4 | Status: SHIPPED | OUTPATIENT
Start: 2024-12-11

## 2024-12-11 NOTE — PROGRESS NOTES
Subjective:       Patient ID: Estefanía Ray is a 74 y.o. female.    Chief Complaint:  Vaginal Prolapse    History of Present Illness  HPI 74 y.o.  female    has a past medical history of Allergy, Diabetes mellitus, type 2, GERD (gastroesophageal reflux disease), Hyperlipidemia, Hypertension, and JOHN (obstructive sleep apnea).  Referred by KIERA Shah for evaluation of pelvic organ prolapse.     [facility]    Question 2024 11:23 AM CST - Filed by Patient   General Urogynecology: Are you experiencing the following?    Dysuria (painful urination) No   Nocturia:  waking up at night to empty your bladder Yes   If you answered yes to the previous question, how many times does this happen per night? 1-2   Enuresis (urine loss during sleep) Yes   Dribbling urine after you urinate Yes   Hematuria (urine appears red) No   Type of stream Interrupted   Urinary frequency: How often a day are you going to the bathroom per day? Less than 10   Urinary Tract Infections: How many Urinary Tract Infections have you had in the past year? I have not had a UTI in the past year   If you have had a UTI in the past year, what treatments have you had so far? I have not had a UTI in the past year   Urinary Incontinence (General): Are you experiencing the following?    Past consultation for incontinence: Have you ever seen someone for the evaluation of incontinence? Yes   If you answered yes to the previous question, please select all the therapies you have tried. Kegals   Please note the effectiveness of the therapies. Ineffective   Need to wear protection to keep clothes dry Yes   If you answered yes to the previous question, please jami the protection you use. Panty liner    Poise   If you wear protection, how much wetness is typically on each pad? Light   If you wear protection, how often do you have to change per day, if applicable? 3-4   Stress Symptoms: Are you experiencing the following?    Leakage of urine with cough,  "laugh and/or sneeze Yes   If you answered yes to the previous question, what is the frequency in days, weeks and/or months? Weekly   Leakage of urine with sex No   Leakage of urine with bending/ lifting No   Leakage of urine with briskly walking or jogging No   If you lose urine for any other reason not previously mentioned, please note it below, if applicable.    Urge Symptoms: Are you experiencing the following?    Urgency ("got to go" feeling) Yes   Urge: How frequently do you feel an urge to urinate (feeling like you "gotta go" to the bathroom and can't wait) Several times a day   Do you experience a leakage of urine when you have a feeling of urgency? Yes   Leakage of urine when unaware Yes   Past use of anticholinergics (medications used to treat overactive bladder) No   If you answered yes to the previous question, please jami the anticholinergics you have used:    Have you ever used Mirbetriq (aka Mirabegron)? No   Prolapse Symptoms: Are you experiencing any of the following?    Falling out/ Bulging/ Heaviness in the vagina Yes   Vaginal/ Abdominal Pain/ Pressure No   Need to strain/ Push to void Yes   Need to wait on the toilet before you void Yes   Unusual position to urinate (using your hands to push back the vaginal bulge) No   Sensation of incomplete emptying Yes   Past use of pessary device No   If you answered yes to the previous question, please list the devices you have used below.    Bowel Symptoms: Are you experiencing any of the following?    Constipation Yes   Diarrhea Yes   Hematochezia (bloody stool) No   Incomplete evacuation of stool Yes   Involuntary loss of formed stool No   Fecal smearing/urgency No   Involuntary loss of gas Yes   Vaginal Symptoms: Are you experiencing any of the following?    Abnormal vaginal bleeding No   Vaginal dryness No   Sexually active No   Dyspareunia (painful intercourse) No   Estrogen use No       GYN & OB History  No LMP recorded. Patient is postmenopausal. "   Date of Last Pap: No result found    OB History    Para Term  AB Living   3 3 3         SAB IAB Ectopic Multiple Live Births                  # Outcome Date GA Lbr Nghia/2nd Weight Sex Type Anes PTL Lv   3 Term            2 Term            1 Term                Past Medical History:   Diagnosis Date    Allergy     Diabetes mellitus, type 2     GERD (gastroesophageal reflux disease)     Hyperlipidemia     Hypertension     JOHN (obstructive sleep apnea)      Past Surgical History:   Procedure Laterality Date    HYSTERECTOMY      KNEE SURGERY Left     TONSILLECTOMY         Review of Systems  Review of Systems   Constitutional: Negative.  Negative for activity change, appetite change, chills, diaphoresis, fatigue, fever and unexpected weight change.   HENT: Negative.     Eyes: Negative.    Respiratory: Negative.  Negative for cough.    Cardiovascular: Negative.    Gastrointestinal:  Negative for abdominal distention, abdominal pain, anal bleeding, blood in stool, constipation, diarrhea, nausea, rectal pain and vomiting.   Endocrine: Negative.    Genitourinary:  Positive for difficulty urinating. Negative for decreased urine volume, dyspareunia, dysuria, enuresis, flank pain, frequency, genital sores, hematuria, menstrual problem, pelvic pain, urgency, vaginal bleeding, vaginal discharge and vaginal pain.        Prolapse  + vaginal bulge   +vaginal pressure    Musculoskeletal:  Negative for back pain.   Skin:  Negative for color change, pallor, rash and wound.   Allergic/Immunologic: Negative for environmental allergies, food allergies and immunocompromised state.   Hematological:  Negative for adenopathy. Does not bruise/bleed easily.   Psychiatric/Behavioral:  Negative for agitation, behavioral problems, confusion and sleep disturbance.            Objective:     Physical Exam   Constitutional: She is oriented to person, place, and time. She appears well-developed.   HENT:   Head: Normocephalic and  atraumatic.   Eyes: Conjunctivae and EOM are normal.   Cardiovascular: Normal rate, regular rhythm, S1 normal, S2 normal, normal heart sounds and intact distal pulses.   Pulmonary/Chest: Effort normal and breath sounds normal. She exhibits no tenderness.   Abdominal: Soft. Bowel sounds are normal. She exhibits no distension and no mass. There is no splenomegaly or hepatomegaly. There is no abdominal tenderness. There is no rigidity, no rebound and no guarding. Hernia confirmed negative in the right inguinal area and confirmed negative in the left inguinal area.   Genitourinary: Pelvic exam was performed with patient supine. Rectum normal, vagina normal, skenes normal and bartholins normal. Right labia normal and left labia normal. Urethra exhibits hypermobility. Urethra exhibits no urethral caruncle, no urethral diverticulum and no urethral mass. Right bartholin is not enlarged and not tender. Left bartholin is not enlarged and not tender. Rectal exam shows resting tone normal and active tone normal. Rectal exam shows no external hemorrhoid, no fissure, no tenderness, anal tone normal and no dovetailing. Guaiac negative stool. There is a rectocele, a cystocele, unspecified prolapse of vaginal walls and atrophy in the vagina. No foreign body, tenderness, bleeding, fistula, mesh exposure or lavator tenderness in the vagina. Right adnexum displays no tenderness. Left adnexum displays no tenderness. Uterus is absent.   PVR: 130 ML  Empty cough stress test: Negative.  Kegel: 2/5    POP-Q  Aa: 2 Ba: 5 C: 5   GH: 6 PB: 4 TVL: 9   Ap: -1 Bp: -1 D:                      Musculoskeletal:         General: Normal range of motion.      Cervical back: Normal range of motion and neck supple.   Neurological: She is alert and oriented to person, place, and time. She has normal strength, normal reflexes and intact cranial nerves (2-12). Cranial nerves II through XII intact. No cranial nerve deficit.   Skin: Skin is warm and dry.    Psychiatric: She has a normal mood and affect. Her speech is normal and behavior is normal. Judgment normal.          Assessment:        1. 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 Mixed urge and stress incontinence    3. Incomplete bladder emptying    4. Prolapse of anterior vaginal wall         Plan:    1. Prolapse: Stage 3 apical prolapse, Stage 3 anterior and stage 2 posterior vaginal wall prolapse   --Pessary benefit discussed with patient, will schedule for pessary fitting   --Daily pelvic floor exercises as assigned, Pelvic floor PT   --Non surgical options discussed with patient    --Surgical options discussed such as :  with apical suspension: SSF, USLS and SCP with mesh augmentation.  The possible need for an anterior or posterior colporrhaphy was discussed.  We discussed pro's and con's of a native tissue repair vs an augmented repair with mesh. Risks/ benefits/ alternatives/ succuss and failure rates were reviewed with the various surgical treatment options. Information packets were given detailing surgical options.  She was also given web site information for: www.augs.org and www.[employer].org and http://www.fda.gov/MedicalDevices/UroGynSurgicalMesh/default.htm to review the details of the surgical options discussed and the use of mesh in surgery. She will review the information given and we will discuss further at the follow up visit. I would recommend a a SCP as her  is sick and she does most of the heavy lifting in the house. Will follow up in March.     2.  Incomplete bladder emptying  --Renal sonogram   --Bladder diary for 3-7 days, write the number of times you go to the rest room and associated symptoms of urgency or leakage.   --Empty bladder every 3 hours.  Empty well: wait a minute, lean forward on toilet.    --Avoid dietary irritants (see sheet).  Keep diary x 3-5 days to determine your irritants.  --KEGELS: do 10 in AM and 10 in PM, holding each x  10 seconds.  When you feel urge to go, STOP, KEGEL, and when urge has passed, then go to bathroom.  Start pelvic floor PT.       3. Vaginal atrophy (dryness):  Use 1 gram of estrogen cream in vagina nightly x 2 weeks, then twice a week thereafter.      Thank you for requesting consultation of your patient.  I look forward to participating in her care.    Chris Mckeon DO  Female Pelvic Medicine and Reconstructive Surgery  [facility] [place], [place]

## 2024-12-11 NOTE — PATIENT INSTRUCTIONS
1. Prolapse: Stage 3 apical prolapse, Stage 3 anterior and stage 2 posterior vaginal wall prolapse   --Pessary benefit discussed with patient, will schedule for pessary fitting   --Daily pelvic floor exercises as assigned, Pelvic floor PT   --Non surgical options discussed with patient    --Surgical options discussed such as :  with apical suspension: SSF, USLS and SCP with mesh augmentation.  The possible need for an anterior or posterior colporrhaphy was discussed.  We discussed pro's and con's of a native tissue repair vs an augmented repair with mesh. Risks/ benefits/ alternatives/ succuss and failure rates were reviewed with the various surgical treatment options. Information packets were given detailing surgical options.  She was also given web site information for: www.augs.org and www.[employer].org and http://www.fda.gov/MedicalDevices/UroGynSurgicalMesh/default.htm to review the details of the surgical options discussed and the use of mesh in surgery. She will review the information given and we will discuss further at the follow up visit. I would recommend a a SCP as her  is sick and she does most of the heavy lifting in the house. Will follow up in March.     2.  Incomplete bladder emptying  --Renal sonogram   --Bladder diary for 3-7 days, write the number of times you go to the rest room and associated symptoms of urgency or leakage.   --Empty bladder every 3 hours.  Empty well: wait a minute, lean forward on toilet.    --Avoid dietary irritants (see sheet).  Keep diary x 3-5 days to determine your irritants.  --KEGELS: do 10 in AM and 10 in PM, holding each x 10 seconds.  When you feel urge to go, STOP, KEGEL, and when urge has passed, then go to bathroom.  Start pelvic floor PT.       3. Vaginal atrophy (dryness):  Use 1 gram of estrogen cream in vagina nightly x 2 weeks, then twice a week thereafter.

## 2024-12-12 ENCOUNTER — OFFICE VISIT (OUTPATIENT)
Dept: FAMILY MEDICINE | Facility: CLINIC | Age: 74
End: 2024-12-12
Payer: COMMERCIAL

## 2024-12-12 VITALS
WEIGHT: 151.81 LBS | BODY MASS INDEX: 26.9 KG/M2 | SYSTOLIC BLOOD PRESSURE: 122 MMHG | HEART RATE: 74 BPM | OXYGEN SATURATION: 98 % | HEIGHT: 63 IN | DIASTOLIC BLOOD PRESSURE: 72 MMHG

## 2024-12-12 DIAGNOSIS — K21.00 GASTROESOPHAGEAL REFLUX DISEASE WITH ESOPHAGITIS WITHOUT HEMORRHAGE: ICD-10-CM

## 2024-12-12 DIAGNOSIS — M54.2 NECK PAIN: ICD-10-CM

## 2024-12-12 DIAGNOSIS — E11.69 TYPE 2 DIABETES MELLITUS WITH OTHER SPECIFIED COMPLICATION, WITHOUT LONG-TERM CURRENT USE OF INSULIN: Primary | ICD-10-CM

## 2024-12-12 DIAGNOSIS — M62.838 MUSCLE SPASM: ICD-10-CM

## 2024-12-12 DIAGNOSIS — Z78.0 MENOPAUSE: ICD-10-CM

## 2024-12-12 DIAGNOSIS — I10 ESSENTIAL HYPERTENSION: ICD-10-CM

## 2024-12-12 DIAGNOSIS — M54.9 DORSALGIA, UNSPECIFIED: ICD-10-CM

## 2024-12-12 DIAGNOSIS — E78.2 MIXED HYPERLIPIDEMIA: ICD-10-CM

## 2024-12-12 DIAGNOSIS — I25.119 ATHEROSCLEROSIS OF NATIVE CORONARY ARTERY OF NATIVE HEART WITH ANGINA PECTORIS: ICD-10-CM

## 2024-12-12 LAB — HBA1C MFR BLD: 6.7 %

## 2024-12-12 PROCEDURE — 3288F FALL RISK ASSESSMENT DOCD: CPT | Mod: CPTII,S$GLB,, | Performed by: NURSE PRACTITIONER

## 2024-12-12 PROCEDURE — 1126F AMNT PAIN NOTED NONE PRSNT: CPT | Mod: CPTII,S$GLB,, | Performed by: NURSE PRACTITIONER

## 2024-12-12 PROCEDURE — 3074F SYST BP LT 130 MM HG: CPT | Mod: CPTII,S$GLB,, | Performed by: NURSE PRACTITIONER

## 2024-12-12 PROCEDURE — 2023F DILAT RTA XM W/O RTNOPTHY: CPT | Mod: CPTII,S$GLB,, | Performed by: NURSE PRACTITIONER

## 2024-12-12 PROCEDURE — 3078F DIAST BP <80 MM HG: CPT | Mod: CPTII,S$GLB,, | Performed by: NURSE PRACTITIONER

## 2024-12-12 PROCEDURE — 3061F NEG MICROALBUMINURIA REV: CPT | Mod: CPTII,S$GLB,, | Performed by: NURSE PRACTITIONER

## 2024-12-12 PROCEDURE — 3044F HG A1C LEVEL LT 7.0%: CPT | Mod: CPTII,S$GLB,, | Performed by: NURSE PRACTITIONER

## 2024-12-12 PROCEDURE — 1160F RVW MEDS BY RX/DR IN RCRD: CPT | Mod: CPTII,S$GLB,, | Performed by: NURSE PRACTITIONER

## 2024-12-12 PROCEDURE — 1159F MED LIST DOCD IN RCRD: CPT | Mod: CPTII,S$GLB,, | Performed by: NURSE PRACTITIONER

## 2024-12-12 PROCEDURE — 83036 HEMOGLOBIN GLYCOSYLATED A1C: CPT | Mod: QW,,, | Performed by: NURSE PRACTITIONER

## 2024-12-12 PROCEDURE — 99214 OFFICE O/P EST MOD 30 MIN: CPT | Mod: S$GLB,,, | Performed by: NURSE PRACTITIONER

## 2024-12-12 PROCEDURE — 3008F BODY MASS INDEX DOCD: CPT | Mod: CPTII,S$GLB,, | Performed by: NURSE PRACTITIONER

## 2024-12-12 PROCEDURE — 1101F PT FALLS ASSESS-DOCD LE1/YR: CPT | Mod: CPTII,S$GLB,, | Performed by: NURSE PRACTITIONER

## 2024-12-12 PROCEDURE — 3066F NEPHROPATHY DOC TX: CPT | Mod: CPTII,S$GLB,, | Performed by: NURSE PRACTITIONER

## 2024-12-12 RX ORDER — BLOOD-GLUCOSE SENSOR
EACH MISCELLANEOUS
COMMUNITY
Start: 2024-11-14

## 2024-12-16 ENCOUNTER — PATIENT MESSAGE (OUTPATIENT)
Dept: FAMILY MEDICINE | Facility: CLINIC | Age: 74
End: 2024-12-16
Payer: COMMERCIAL

## 2024-12-18 NOTE — PROGRESS NOTES
SUBJECTIVE:    Patient ID: Estefanía Ray is a 74 y.o. female.    Chief Complaint: Follow-up (No bottles//Pt is here for a 3 month follow up//KE)    History of Present Illness    CHIEF COMPLAINT:  74 year old female presents today for check up  HEADACHES:  She experiences daily headaches that she believes originate from her neck. Her symptoms improve with positional changes, including switching from recliner to club chair and alternating leg positions. The headaches also improve after waking up and being mobile for about 15 minutes. She takes a muscle relaxer nightly and magnesium glycinate for management.    WEIGHT MANAGEMENT:  She has experienced weight loss since June and currently wears size 8 pants. She continues Mounjaro 15 units and Metformin.    PHYSICAL THERAPY:  She has not resumed therapy for her lower back. She has a scheduled appointment for pelvic floor exercises following referral from Dr. amaro  SOCIAL HISTORY:  She lives with her family, including her children.      ROS:  General: -fever, -chills, -fatigue, -weight gain, +weight loss  Eyes: -vision changes, -redness, -discharge  ENT: -ear pain, -nasal congestion, -sore throat  Cardiovascular: -chest pain, -palpitations, -lower extremity edema  Respiratory: -cough, -shortness of breath  Gastrointestinal: -abdominal pain, -nausea, -vomiting, -diarrhea, -constipation, -blood in stool  Genitourinary: -dysuria, -hematuria, -frequency  Musculoskeletal: -joint pain, -muscle pain  Skin: -rash, -lesion  Neurological: +headache, -dizziness, -numbness, -tingling  Psychiatric: -anxiety, -depression, -sleep difficulty  Head: +head pain         Office Visit on 12/12/2024   Component Date Value Ref Range Status    Hemoglobin A1C, POC 12/12/2024 6.7  % Final   Office Visit on 09/12/2024   Component Date Value Ref Range Status    Hemoglobin A1C, POC 09/12/2024 6.8  % Final       Past Medical History:   Diagnosis Date    Allergy     Diabetes mellitus, type 2      GERD (gastroesophageal reflux disease)     Hyperlipidemia     Hypertension     JOHN (obstructive sleep apnea)      Social History     Socioeconomic History    Marital status:    Tobacco Use    Smoking status: Never    Smokeless tobacco: Never   Substance and Sexual Activity    Alcohol use: Never    Drug use: Never     Social Drivers of Health     Financial Resource Strain: Low Risk  (7/28/2024)    Received from [facility]    Overall Financial Resource Strain (CARDIA)     Difficulty of Paying Living Expenses: Not hard at all   Food Insecurity: No Food Insecurity (7/28/2024)    Received from [facility]    Hunger Vital Sign     Worried About Running Out of Food in the Last Year: Never true     Ran Out of Food in the Last Year: Never true   Transportation Needs: No Transportation Needs (7/28/2024)    Received from [facility]    PRAPARE - Transportation     Lack of Transportation (Medical): No     Lack of Transportation (Non-Medical): No   Physical Activity: Insufficiently Active (7/28/2024)    Received from [facility]    Exercise Vital Sign     Days of Exercise per Week: 2 days     Minutes of Exercise per Session: 50 min   Stress: Stress Concern Present (7/28/2024)    Received from [facility]    [employer] - Occupational Stress Questionnaire     Feeling of Stress : To some extent   Housing Stability: High Risk (11/27/2023)    Housing Stability Vital Sign     Unable to Pay for Housing in the Last Year: No     Number of Places Lived in the Last Year: 3     Unstable Housing in the Last Year: No     Past Surgical History:   Procedure Laterality Date    HYSTERECTOMY      KNEE SURGERY Left     TONSILLECTOMY       No family history on file.    All of your core healthy metrics are met.      Review of patient's allergies indicates:   Allergen Reactions    Empagliflozin Other (See Comments)    Semaglutide Hives    Sitagliptin phos-metformin Other (See Comments)     Other reaction(s):  diarrhea    Ezetimibe Nausea And Vomiting     Other reaction(s): Other (See Comments), Unknown    Feels terrible when taking medication    Other reaction(s): Other (See Comments)   Feels terrible when taking medication    Other reaction(s): Other (See Comments), Unknown   Feels terrible when taking medication    Farxiga [dapagliflozin] Rash     Genital rash    Linaclotide Diarrhea and Nausea And Vomiting       Current Outpatient Medications:     FREESTYLE KARINA 3 SENSOR Kina, USE 1 SENSOR EVERY 2 WEEKS  TO MONITOR GLUCOSE DAILY, Disp: , Rfl:     aspirin (ECOTRIN) 81 MG EC tablet, Aspir-81 mg tablet,delayed release  Take 1 tablet every day by oral route., Disp: , Rfl:     atorvastatin (LIPITOR) 40 MG tablet, Take 1 tablet (40 mg total) by mouth every evening., Disp: 90 tablet, Rfl: 3    biotin 10,000 mcg TbDL, 1 tablet (Patient not taking: Reported on 12/12/2024), Disp: , Rfl:     blood sugar diagnostic (FREESTYLE LITE STRIPS) Strp, as directed, Disp: 100 each, Rfl: 5    cholestyramine, bulk, Powd, , Disp: , Rfl:     cyclobenzaprine (FLEXERIL) 10 MG tablet, Take 1 tablet (10 mg total) by mouth 3 (three) times daily as needed for Muscle spasms., Disp: 270 tablet, Rfl: 1    estradioL (ESTRACE) 0.01 % (0.1 mg/gram) vaginal cream, Place 1 g vaginally 3 (three) times a week., Disp: 42.5 g, Rfl: 4    flash glucose sensor (FREESTYLE KARINA 14 DAY SENSOR) Kit, 1 kit by Misc.(Non-Drug; Combo Route) route once daily., Disp: 1 kit, Rfl: 11    insulin detemir U-100, Levemir, (LEVEMIR FLEXPEN) 100 unit/mL (3 mL) InPn pen, Inject 10 Units into the skin every evening., Disp: , Rfl:     krill-om-3-dha-epa-phospho-ast 967-09-37-50 mg Cap, , Disp: , Rfl:     lancets (FREESTYLE LANCETS) 28 gauge Misc, 1 lancet by Misc.(Non-Drug; Combo Route) route 2 (two) times a day. (Patient not taking: Reported on 12/12/2024), Disp: 200 each, Rfl: 3    lansoprazole (PREVACID) 15 MG capsule, 1 capsule, Disp: , Rfl:     lisinopriL (PRINIVIL,ZESTRIL) 20  "MG tablet, Take 1 tablet (20 mg total) by mouth once daily., Disp: 90 tablet, Rfl: 1    metoprolol succinate (TOPROL-XL) 50 MG 24 hr tablet, Take 1 tablet (50 mg total) by mouth once daily., Disp: 90 tablet, Rfl: 1    mupirocin (BACTROBAN) 2 % ointment, Apply topically 3 (three) times daily., Disp: 22 g, Rfl: 0    nitroGLYCERIN (NITROSTAT) 0.4 MG SL tablet, Place 0.4 mg under the tongue., Disp: , Rfl:     nystatin (MYCOSTATIN) powder, Apply topically 3 (three) times daily., Disp: 180 g, Rfl: 3    tirzepatide 10 mg/0.5 mL PnIj, Inject 10 mg into the skin every 7 days., Disp: 4 Pen, Rfl: 3    vitamin D (VITAMIN D3) 1000 units Tab, Take 5,000 Units by mouth., Disp: , Rfl:     Objective:      Vitals:    12/12/24 1320   BP: 122/72   Pulse: 74   SpO2: 98%   Weight: 68.9 kg (151 lb 12.8 oz)   Height: 5' 3" (1.6 m)     Physical Exam    General: No acute distress. Well-developed. Well-nourished.  HENT: Normocephalic. Atraumatic. Nares patent. Moist oral mucosa.  Ears: Bilateral TMs clear. Bilateral EACs clear.  Cardiovascular: Regular rate. Regular rhythm. No murmurs. No rubs. No gallops. Normal S1, S2.  Respiratory: Normal respiratory effort. Clear to auscultation bilaterally. No rales. No rhonchi. No wheezing.  Abdomen: Soft. Non-tender. Non-distended. Normoactive bowel sounds.  Musculoskeletal: No  obvious deformity.  Extremities: No lower extremity edema. Decreased range of motion of lumbar spine. Trap muscles tender with palpation  Neurological: Alert & oriented x3. No slurred speech. Normal gait.  Psychiatric: Normal mood. Normal affect. Good insight. Good judgment.  Skin: Warm. Dry. No rash.       Assessment:       Assessment & Plan    - Assessed patient's A1C, noting improvement to 6.4  - Evaluated recurring headaches, likely muscular in nature and related to neck positioning during sleep  - Considered physical therapy for neck and back issues in conjunction with existing pelvic floor therapy  - Monitored ongoing " weight loss on Mounjaro, planning for potential dose adjustment or frequency change once target weight is reached  - Reviewed 's diabetes management, including recent initiation of Mounjaro and potential need for dose adjustment    TYPE 2 DIABETES MELLITUS:  - Provided information on the effectiveness of Mounjaro for appetite control and weight loss compared to other medications.  - Continued Mounjaro 15 units and Metformin 1 tablet (current regimen).  - Follow up as needed for potential Mounjaro dose adjustments once target weight is reached.    HEADACHE AND CERVICALGIA:  - Explained proper neck alignment during sleep to reduce muscle tension and headaches.  - Discussed potential benefits of magnesium supplementation for muscle relaxation.  - Estefanía to try sleeping without a pillow to maintain proper neck alignment.  - Estefanía can consider using Epsom salt for muscle recovery and neck pain relief.  - Estefanía to integrate neck exercises into physical therapy routine.  - Continued magnesium glycinate at current dose.    LOW BACK PAIN AND PELVIC MUSCLE ISSUES:  - Referred to physical therapy for neck and back issues in addition to ongoing pelvic floor therapy.    FOLLOW-UP AND COMMUNICATION:  - Contact the office in 3 weeks to report 's progress on Mounjaro.  - Communicate any medication refill needs through the patient portal.       Plan:       Type 2 diabetes mellitus with other specified complication, without long-term current use of insulin  Comments:  hga1c 6.7  Orders:  -     POCT HEMOGLOBIN A1C    Dorsalgia, unspecified  Comments:  refer to pt  Orders:  -     Ambulatory Referral/Consult to Physical Therapy; Future; Expected date: 12/19/2024    Neck pain  Comments:  refer to pt  Orders:  -     Ambulatory Referral/Consult to Physical Therapy; Future; Expected date: 12/19/2024    Essential hypertension  Comments:  bp well controlled    Mixed hyperlipidemia  Comments:  lipitor    Atherosclerosis of native  coronary artery of native heart with angina pectoris  Comments:  followed by cardio    Muscle spasm  Comments:  flexeril    Gastroesophageal reflux disease with esophagitis without hemorrhage  Comments:  prevacid    Menopause  Comments:  continue pelvic floor therapy      Follow up in about 3 months (around 3/12/2025), or if symptoms worsen or fail to improve, for medication management, Diabetic Check-Up.        This note was generated with the assistance of ambient listening technology. Verbal consent was obtained by the patient and accompanying visitor(s) for the recording of patient appointment to facilitate this note. I attest to having reviewed and edited the generated note for accuracy, though some syntax or spelling errors may persist. Please contact the author of this note for any clarification.      12/18/2024 Verna Shah

## 2024-12-19 ENCOUNTER — TELEPHONE (OUTPATIENT)
Dept: FAMILY MEDICINE | Facility: CLINIC | Age: 74
End: 2024-12-19
Payer: COMMERCIAL

## 2024-12-19 DIAGNOSIS — R19.7 DIARRHEA, UNSPECIFIED TYPE: ICD-10-CM

## 2024-12-19 DIAGNOSIS — R11.0 NAUSEA: Primary | ICD-10-CM

## 2024-12-19 RX ORDER — ONDANSETRON 4 MG/1
4 TABLET, ORALLY DISINTEGRATING ORAL EVERY 8 HOURS PRN
Qty: 30 TABLET | Refills: 0 | Status: SHIPPED | OUTPATIENT
Start: 2024-12-19

## 2024-12-19 RX ORDER — DIPHENOXYLATE HYDROCHLORIDE AND ATROPINE SULFATE 2.5; .025 MG/1; MG/1
1 TABLET ORAL 4 TIMES DAILY PRN
Qty: 20 TABLET | Refills: 0 | Status: SHIPPED | OUTPATIENT
Start: 2024-12-19 | End: 2024-12-29

## 2024-12-19 NOTE — TELEPHONE ENCOUNTER
Spoke to pt and she is having 10 - 12 episodes last two days. Let her know per Verna Dennison, stop imodium and stop mounjaro for now. Pt verbalized understanding

## 2024-12-19 NOTE — TELEPHONE ENCOUNTER
Spoke to pt and she stated she thinks she may have a GI bug, Has been vomiting and having diarrhea last 2 days. Has taken imodium. Pt stated she wants to know if she can something else because she still has diarrhea and feeling nauseous. NO FEVER

## 2024-12-19 NOTE — TELEPHONE ENCOUNTER
----- Message from Hortencia sent at 12/19/2024  9:45 AM CST -----  - 9:10- pt is calling to talk to nurse   [phone]

## 2024-12-30 ENCOUNTER — PATIENT MESSAGE (OUTPATIENT)
Dept: FAMILY MEDICINE | Facility: CLINIC | Age: 74
End: 2024-12-30
Payer: COMMERCIAL

## 2024-12-30 DIAGNOSIS — R41.3 MEMORY CHANGES: Primary | ICD-10-CM

## 2024-12-31 ENCOUNTER — HOSPITAL ENCOUNTER (OUTPATIENT)
Dept: RADIOLOGY | Facility: HOSPITAL | Age: 74
Discharge: HOME | End: 2024-12-31
Attending: OBSTETRICS & GYNECOLOGY
Payer: COMMERCIAL

## 2024-12-31 ENCOUNTER — TELEPHONE (OUTPATIENT)
Dept: FAMILY MEDICINE | Facility: CLINIC | Age: 74
End: 2024-12-31
Payer: COMMERCIAL

## 2024-12-31 DIAGNOSIS — R33.9 INCOMPLETE BLADDER EMPTYING: ICD-10-CM

## 2024-12-31 PROCEDURE — 76770 US EXAM ABDO BACK WALL COMP: CPT | Mod: TC

## 2024-12-31 PROCEDURE — 76770 US EXAM ABDO BACK WALL COMP: CPT | Mod: 26,,, | Performed by: RADIOLOGY

## 2024-12-31 NOTE — TELEPHONE ENCOUNTER
----- Message from Krysta sent at 12/31/2024  8:46 AM CST -----  Needing an office note sent to Dr. Colunga's office   Office fax# [phone]  Office#[phone]

## 2025-01-05 NOTE — PLAN OF CARE
"[facility]   Pelvic Health Physical Therapy Initial Evaluation      Name: Estefanía Ray  [facility] Number: [id number]    Therapy Diagnosis:   Encounter Diagnoses   Name Primary?    Mixed urge and stress incontinence     Pelvic floor dysfunction Yes    Pelvic floor weakness      Physician: Chris Mckeon,*    Referring Provider Orders: PT Eval and Treat  Medical Diagnosis from Referral: Mixed urge and stress incontinence [N39.46]   Evaluation Date: 2025  Authorization Period Expiration: 2024  Plan of Care Expiration: 2025  Visit # / Visits authorized: 1/pending  FOTO: 1 (2025)    Precautions: CABG ()    Time In: 1:10 pm   Time Out: 2:12 pm   Total Appointment Time (timed & untimed codes): 62 minutes    SUBJECTIVE     Date of onset: 3 years  History of current condition: Estefanía reports urinary leakage for 3 years. States she feels like when she sits down she feels like she is sitting on her bladder. States she had a surgery to support her bladder, but her doctor told her one side is still supported and one side is not. States her urinary leakage is getting worse. She expects to have surgery in the future but her doctor wanted her to try out physical therapy first. States she has a cyst on her R kidney which was discovered last week that she thinks is important to note.     OB/GYN HISTORY - , vaginal delivery, and episiotomy  Hysterectomy? Yes  Oophorectomy? No  Using vaginal estrogen cream: Yes  Comments: bladder support surgery 15+ years ago     BLADDER HISTORY  Frequency of urination:   Day: every 2-3 hours           Night: 1x/night   Difficulty initiating urine stream: Yes; has to strain sometimes   Do you use the bathroom "just in case"? Yes  Urine stream: weak and sometimes strong stream  Complete emptying: No; 2 providers told her she is not emptying her bladder   Post-void dribbling: No  Urinary Urgency: Yes Able to delay the urge for at least 5 " minute(s).  Bladder leakage: Yes  Activities that cause leakage: sneezing, repetitive coughing, occasionally with full bladder  Frequency of incidents: every day  Amount leaked (urine): few drops and teaspoon(s)  Form of protection: pad  Number of pads required in 24 hours: 2 during the day, 1 during the night     BOWEL HISTORY  Frequency of bowel movements: 1-2 times per week   Difficulty initiating BM: Sometimes   Quality/shape of BM: Yates Stool Chart Type 1-4  Incomplete emptying? No  Pain with BM: No   History of hemorrhoids/anal fissures?  No  Bleeding with BM: No   Bowel Urgency: No.  Able to delay the urge for at least 30 minute(s).  Fiber supplements, probiotics or laxative Use? Yes when she knows she is going to be home   Colon leakage: notes fecal smearing the other day (this is the first time this has happened)    SEXUAL/PELVIC PAIN HISTORY  Sexually active? No   Pain with vaginal exams, intercourse or tampon use? No  Pain with wearing certain clothes, sitting on certain surfaces? No  Tailbone injury? No   Feeling of pelvic heaviness? Yes    Pain: none reported     Imaging: none pertinent to the pelvic floor    Prior Therapy: no prior pelvic floor therapy  Social History: lives with their spouse  Current exercise: chair exercises, bed exercises- would like to do more exercise  Occupation: retired; busy with her  who has CHF, pacemaker, etc so she is on her feet all day   Prior Level of Function: no pelvic floor dysfunction  Current Level of Function: pelvic floor symptoms (urinary and bowel dysfunction) that limit tolerance for ADLs and functional mobility    Types of fluid intake: Water: 2 16 oz/day, Soda, and Iced tea  Diet: standard  Habitus: well developed, well nourished  Abuse/Neglect: Pt denies a history of physical or emotional abuse at this visit.        Patients goals: strengthen her pelvic floor      Medical History: Estefanía  has a past medical history of Allergy, Diabetes mellitus,  type 2, GERD (gastroesophageal reflux disease), Hyperlipidemia, Hypertension, and JOHN (obstructive sleep apnea).     Surgical History: Estefanía Ray  has a past surgical history that includes Tonsillectomy; Knee surgery (Left); and Hysterectomy.    Medications: Estefanía has a current medication list which includes the following prescription(s): aspirin, atorvastatin, biotin, blood sugar diagnostic, cholestyramine (bulk), cyclobenzaprine, estradiol, freestyle nora 14 day sensor, freestyle nora 3 sensor, levemir flexpen, krill-om-3-dha-epa-phospho-ast, lancets, lansoprazole, lisinopril, metoprolol succinate, mupirocin, nitroglycerin, nystatin, ondansetron, tirzepatide, and vitamin d.    Allergies:   Review of patient's allergies indicates:   Allergen Reactions    Empagliflozin Other (See Comments)    Semaglutide Hives    Sitagliptin phos-metformin Other (See Comments)     Other reaction(s): diarrhea    Ezetimibe Nausea And Vomiting     Other reaction(s): Other (See Comments), Unknown    Feels terrible when taking medication    Other reaction(s): Other (See Comments)   Feels terrible when taking medication    Other reaction(s): Other (See Comments), Unknown   Feels terrible when taking medication    Farxiga [dapagliflozin] Rash     Genital rash    Linaclotide Diarrhea and Nausea And Vomiting        Constitutional Symptoms Review: The patient denies having any constitutional symptoms.     Flags Screening  Red Flags:The patient was screened for red flags and none were identified.      OBJECTIVE     See Electronic Medical Record under MEDIA for written consent provided 1/6/2025  Chaperone: declined    ORTHO SCREEN  Posture in sitting: slouched   Posture in standing: forward and rounded shoulders   Pelvic alignment: no sign of deviations noted in supine     Hip Strength 1/6/2025   R hip flexion 4/5   R hip external rotation 4-/5   R hip internal rotation 3+/5   L hip flexion 4/5/5   L hip external rotation 3+/5   L hip  internal rotation 3+/5     Hip Range of Motion 1/6/2025   R internal rotation WNL   L internal rotation WNL     ABDOMINAL WALL ASSESSMENT  Palpation: boggy  Diastasis Recti: absent, 0-finger width, present with supine curl-up task  Motor Control: able to demonstrate appropriate transverse abdominis contraction on command  Pelvic Girdle Stability: Pt demonstrates severely impaired ability to stabilize pelvis with Active Straight Leg Raise Test. Able to improve moderately with verbal/manual cues for transverse abdominus activation.     BREATHING MECHANICS ASSESSMENT   Thorax Assessment During Quiet Respiration: Decreased excursion bilaterally of lateral ribs  and Excessive excursion of abdominal wall   Thorax Assessment During Deep Respiration: paradoxical (abdominal wall sucks in during inhalation)    VAGINAL PELVIC FLOOR EXAM    EXTERNAL ASSESSMENT  Introitus: gaping  Skin condition: redness noted  Scarring: none noted   Sensation: WNL   Pain: none  Voluntary contraction: visible lift  Voluntary relaxation: visible drop  Bearing down: bulge  Perineal descent: absent      INTERNAL ASSESSMENT  Pain: none   Sensation: able to sense generalized pressure, unable to identify location   Vaginal vault: roomy   Muscle Bulk: atrophy   Muscle Power: 2/5  Muscle Endurance: 3 sec  # Reps To Fatigue: 4      Quality of contraction: slow rise and decreased hold   Specificity: patient contracts: gluts   Coordination: WNL and tends to hold breath during PFM contration   Bearing down: bulge  Prolapse check: Grade 1 cystocele  Does Pelvic Floor drop and relax with a diaphragmatic breath? yes    Limitation/Restriction for FOTO Pelvic Floor Surveys    Therapist reviewed FOTO scores for Estefanía Ray on 1/6/2025  FOTO documents entered into [employer] - see Media section.    Limitation Score:        TREATMENT     Total Treatment time (time-based codes) separate from the evaluation: 20 minutes     Neuromuscular Re-education to develop  Coordination, Control, Proprioception, and Sense for 8 minutes including:   [x] 2x10 TrA brace + pelvic floor muscle squeeze + supine glut bridge with 3 sec hold  [x] 2x10 TrA brace + side-lying clams (R&L) with focus on pelvic girdle stability    Therapeutic Activity Patient participated in dynamic functional therapeutic activities to improve functional performance for 12 minutes. Including: Education as described below.   [x] Education on pelvic floor anatomy, function, and assessment  [x] Education on normal void intervals and fluid intake  [x] Education on relationship between TrA, hips, and PFM  [x] Education on double-voiding  [x] Education on conservative management of constipation (water, exercise)  [x] Education on bowel movement optimization - toilet stool, breath coordination, pelvic floor relaxation, abdominal wall bracing  [x] HEP building/HEP review    PATIENT EDUCATION AND HOME EXERCISES     Education provided: general anatomy/physiology of urinary & bowel system, benefits of treatment, and alternative methods of treatment were discussed with the patient. Additionally, Estefanía was provided education on pt prognosis, PT plan of care, relationship between TrA & PFM, relationship between hips & PFM, etiology of stress urinary incontinence, the knack for management of stress urinary incontinence, conservative management of constipation (water, fiber, exercise), and proper body mechanics for bowel movement    Written Home Exercises provided: yes  Exercises were reviewed, and Estefanía was able to demonstrate them prior to the end of the session. Estefanía demonstrated good understanding of the education provided. See EMR under 'Patient Instructions' for exercises and education provided during session.    ASSESSMENT     Estefanía is a 74 y.o. female referred to outpatient physical therapy with a medical diagnosis of Mixed urge and stress incontinence [N39.46]  and additional complaints of pelvic heaviness, difficulty  initiating urine stream, incomplete bladder and bowel emptying, difficulty initiating bowel movements.. Symptoms impair the patient's ability to perform ADLs and functional mobility, as well as remain continent. Pt presents with poor trunk stability, decreased pelvic muscle strength, decreased endurance of the pelvic muscles, decreased phasic ability of the pelvic muscles, poor coordination of pelvic floor muscles during ADL's leading to urinary or fecal leakage, dysfunctional voiding, and dysfunctional defecation. These deficits are contributing to the pt's inability to travel long distances, participate in vigorous exercises, and engage in social activities.   Symptoms and findings appear consistent with pelvic floor dysfunction, specifically decreased pelvic floor strength and endurance.   Pt will benefit from pelvic floor muscle training, bladder habit retraining, constipation management, core/hip strengthening, patient education, manual therapy interventions, and functional retraining    Patient prognosis is good  Estefanía will benefit from skilled outpatient physical therapy to address the deficits stated above and in the chart below, provide patient/family education, and to maximize the patient's level of independence.     Plan of care discussed with patient: yes  Patient's spiritual, cultural and educational needs considered, and the patient is agreeable to the plan of care and goals as stated below:     Anticipated Barriers for therapy: none     Medical Necessity is demonstrated by the following -  History  Co-morbidities and personal factors that may impact the plan of care [] LOW: no personal factors / co-morbidities  [] MODERATE: 1-2 personal factors / co-morbidities  [x] HIGH: 3+ personal factors / co-morbidities    Moderate / High Support Documentation:   Co-morbidities affecting plan of care: Allergy, Diabetes mellitus, type 2, GERD (gastroesophageal reflux disease), Hyperlipidemia, Hypertension, and JOHN  "(obstructive sleep apnea).     Personal Factors:   no deficits     Examination  Body Structures and Functions, activity limitations and participation restrictions that may impact the plan of care [x] LOW: addressing 1-2 elements  [] MODERATE: 3+ elements  [] HIGH: 4+ elements (please support below)    Moderate / High Support Documentation: none     Clinical Presentation [x] LOW: stable  [] MODERATE: Evolving  [] HIGH: Unstable     Decision Making/ Complexity Score: low       Goals:  Short Term Goals: 6 weeks   Pt to perform "the knack" prior to coughing, laughing or sneezing to decrease risk of incontinence.  Pt to be able to delay the urge to urinate at least 10 minutes with a strong urge to urinate in order to make it to the bathroom without leaking.  Pt to report a decrease in pelvic pressure and fullness to no more than 50% of the time to demonstrate improving pelvic support of pelvic structures.  Pt to report >50% improvement in urine leakage  Pt to report >50% improvement in constipation and ease of bowel movements to reduce impact on urinary urgency  Pt to verbalize double-void technique for improved bladder emptying   Pt to be independent with introductory home exercise program     Long Term Goals: 12 weeks  Pt to report a decrease in pad usage to 0 pads a day to demonstrate improving pelvic floor muscle controls as evidenced by decreased episodes of incontinence needed to improve confidence in social situations.   Pt to be able to delay the urge to urinate at least 15-20 minutes with a strong urge to urinate in order to make it to the bathroom without leaking.  Pt to demonstrate bilateral hip strength of at least 4+/5 for improved pelvic girdle support with load transfer  Pt to demonstrate pelvic floor strength of at least 3/5 for improved continence and pelvic girdle support   Pt to report >80% improvement in urine leakage  Pt to report >80% improvement in constipation and ease of bowel movements to reduce " impact on urinary urgency  Pt to score no more than 26% impairment on the Urinary Problem FOTO survey to demonstrate reduced impairment due to pelvic floor dysfunction   Pt to be independent with advanced home exercise program        PLAN     Plan of Care certification: 1/6/2025 to 4/6/2025    Outpatient Physical Therapy - 1-2 times per week for 12 weeks to include the following interventions: Therapeutic Exercise, Manual Therapy, Therapeutic Activity, Neuromuscular Re-education, Electrical Stimulation Unattended, Self-Care, Patient Education, Patient/Family Education, and Self Care/Home Management.       aCrri Leonard, PT, DPT

## 2025-01-06 ENCOUNTER — CLINICAL SUPPORT (OUTPATIENT)
Dept: REHABILITATION | Facility: HOSPITAL | Age: 75
End: 2025-01-06
Attending: OBSTETRICS & GYNECOLOGY
Payer: COMMERCIAL

## 2025-01-06 DIAGNOSIS — N81.89 PELVIC FLOOR WEAKNESS: ICD-10-CM

## 2025-01-06 DIAGNOSIS — N39.46 MIXED URGE AND STRESS INCONTINENCE: ICD-10-CM

## 2025-01-06 DIAGNOSIS — M62.89 PELVIC FLOOR DYSFUNCTION: Primary | ICD-10-CM

## 2025-01-06 PROCEDURE — 97530 THERAPEUTIC ACTIVITIES: CPT | Mod: PO

## 2025-01-06 PROCEDURE — 97161 PT EVAL LOW COMPLEX 20 MIN: CPT | Mod: PO

## 2025-01-06 PROCEDURE — 97112 NEUROMUSCULAR REEDUCATION: CPT | Mod: PO

## 2025-01-06 NOTE — PATIENT INSTRUCTIONS
Home Exercise Program: 01/06/2025    DOUBLE VOIDING - Double voiding is a technique that may assist the bladder to empty more effectively when urine is left in the bladder. It involves passing urine more than once each time that you go to the toilet. This makes sure that the bladder is completely empty.    Here are 3 strategies you can try to fully empty your bladder.  You do not have to do all of these things every single time. Find which ones work best for you.     Check to make sure your pelvic floor is relaxed   Do a body scan - make sure your legs, buttocks, and abdominals are relaxed.  Take a couple deep, slow breaths to encourage your pelvic floor muscles to DROP (ie. Try Diaphragmatic Breathing).  Gently apply pressure over your bladder.  Change your pelvic position: lean forward, rock your pelvis forward and backward, stand up then sit back down.     Wait at least 30 seconds to 1 minute to see if a second urine stream begins.     Do not stop your urine stream or push/strain!    ABOUT CONSTIPATION    Constipation is defined as the difficult passage and infrequent (fewer than three) bowel movements per week. Constipation is another possible cause of bladder control problems.  When the lower GI tract and/or rectum is full of stool, it may disturb the bladder and cause incontinence or the sensation of urgency and frequency. Because constipation may be caused by medications you are taking for other conditions, changes in bowel habits should always be reported to your physician. If you have a history of constipation or have recently become constipated discuss this with your physician.    Constipation may be the result of several different factors including:   Limited fluid intake  Laxative abuse  Imbalances in the diet (too much sugar and animal fat)  Medications, particularly pain medicines, antidepressants, iron supplements and tranquilizers  Neurological diseases such as Parkinsons, stroke, multiple sclerosis  and spinal cord injuries    Most people in Western society need more bulk in their diet in the form of high fibers, fiber additives or other bulking agents sold at drug stores. You should discuss your fiber needs with your physician, pharmacist or nutritionist. Typical dietary recommendations for fiber are between 25-35 grams per day.  When adding fiber to your diet it is important to remember to drink plenty of fluids.     Fiber is a type of carbohydrate that your body cannot digest, but it is very important for creating good bowel movements. Soluble fiber dissolves into a gel that coats stool and helps it move through the gut easier. Insoluble fiber adds bulk to the stool and appears to help food pass more quickly through the stomach and intestines. Both types of fiber are important for bowel health. Below are some examples of fiber sources you can add to your diet -          Bridging, 2-3 sets of 10  - Inhale to prepare, relax the belly and pelvic floor  - As you exhale, gently engage the transverse abdominis by drawing the belly button up and in towards the spine  - Squeeze your pelvic floor  - Holding the belly button and pelvic floor in, lift the hips (only lift as high as it is comfortable) and lower  - Inhale again to rest  *Don't hold your breath      Side-lying clam, 2-3 sets of 10  - Holding the hips level, lift the top leg a few inches.   *Hold the hips stacked on top of each other, not rolling back with movement  *Don't hold your breath  *Roll the top hip more forward than you think

## 2025-01-13 NOTE — PROGRESS NOTES
[facility]   Physical Therapy Treatment Note      Name: Estefanía Ray  [facility] Number: [id number]    Therapy Diagnosis:   Encounter Diagnoses   Name Primary?    Pelvic floor dysfunction Yes    Pelvic floor weakness      Physician: Chris Mckeon,*    Visit Date: 1/16/2025    Referring Provider Orders: PT Eval and Treat  Medical Diagnosis from Referral: Mixed urge and stress incontinence [N39.46]   Evaluation Date: 1/6/2025  Authorization Period Expiration: 12/31/2024  Plan of Care Expiration: 4/6/2025  Visit # / Visits authorized: 1/20  FOTO: 1 (1/6/2025)     Precautions: CABG (2012)    Time In: 1:05 pm   Time Out: 1:50 pm   Total Billable Time: 45 minutes    Precautions: CABG (2012)    Subjective     Pt reports: she has been doing her exercises and has noticed her muscles get pretty tired when she does the clams, so she has to take a break. States she has tried double-voiding and it has been going well. Reports she sometimes has the urge to void but then no urine comes out when she sits on the toilet. States she has not had a bowel movement in a few days and plans to take a laxative tomorrow afternoon.     She was compliant with home exercise program.  Response to previous treatment: no adverse effects    Functional change: ongoing     Pain: none reported today     Constitutional Symptoms Review: The patient denies having any constitutional symptoms.       Objective      Objective Measures updated at progress report unless specified.     Treatment   Estefanía received the treatments listed below:    Informed verbal consent to intravaginal treatment obtained today. - intravaginal treatment not performed today     Neuromuscular Re-education to develop Coordination, Control, Posture, Proprioception, and Balance for 25 minutes including:   [x] 2x10 TrA brace + pelvic floor muscle squeeze + supine glut bridge   [x] 2x10 TrA brace + side-lying clams with focus on pelvic girdle stability   [x]  "2x10 TrA brace + quadruped hip extension slides   [x] 2x10 TrA brace + pelvic floor muscle squeeze + squat to chair   [x] 2x10 TrA brace + standing toe taps on 6" step     Therapeutic Activity Patient participated in dynamic functional therapeutic activities to improve functional performance for 20 minutes. Including: Education as described below.  [x] 2x10 side-lying hip abduction (R&L)  [x] Education on basic constipation management (increase water, fiber, exercise)   [x] Re-instruction on proper bowel mechanics   [x] Instruction on urge suppression techniques  [x] Education on relationship between TrA and pelvic floor muscles   [x] Instruction on the Knack for reduction of stress urinary incontinence  [x] HEP building/HEP review  [x] Discussed progression of plan of care with patient    Education provided:   Basic constipation management, proper bowel mechanics, the Knack, relationship between TrA and pelvic floor muscles   Discussed progression of plan of care with patient; educated pt in activity modification; reviewed HEP with pt. Pt demonstrated and verbalized understanding of all instruction and was provided with a handout of HEP (see Patient Instructions).    Written Home Exercises Provided: yes.  Exercises were reviewed and Estefanía was able to demonstrate them prior to the end of the session.  Estefanía demonstrated good  understanding of the education provided.     See EMR under Patient Instructions for exercises provided 1/16/2025.      Assessment     Pt tolerated session well today, with good understanding of education provided. Pt required verbal cuing initially to coordinate TrA brace with pelvic floor muscle squeeze, however pt able to perform independently by the end of session. Pt reports of pelvic floor muscle fatigue towards the end of the session is consistent with dosage necessary for strength gains. Continue to progress core, hip, and pelvic floor strengthening in upcoming sessions. Pt educated on the " "knack, urge suppression techniques, and basic constipation management to help mitigate stress/urge urinary incontinence and constipation.  Pt will continue to benefit from skilled outpatient physical therapy to address the deficits listed in the problem list box on initial evaluation, provide pt/family education and to maximize pt's level of independence in the home and community environment.     Estefanía Is progressing well towards her goals.   Pt prognosis is Good.     Pt will continue to benefit from skilled outpatient physical therapy to address the deficits listed in the problem list box on initial evaluation, provide pt/family education and to maximize pt's level of independence in the home and community environment.     Pt's spiritual, cultural and educational needs considered and pt agreeable to plan of care and goals.     Anticipated barriers to physical therapy: none    Goals:  Short Term Goals: 6 weeks   Pt to perform "the knack" prior to coughing, laughing or sneezing to decrease risk of incontinence.  Pt to be able to delay the urge to urinate at least 10 minutes with a strong urge to urinate in order to make it to the bathroom without leaking.  Pt to report a decrease in pelvic pressure and fullness to no more than 50% of the time to demonstrate improving pelvic support of pelvic structures.  Pt to report >50% improvement in urine leakage  Pt to report >50% improvement in constipation and ease of bowel movements to reduce impact on urinary urgency  Pt to verbalize double-void technique for improved bladder emptying   Pt to be independent with introductory home exercise program - MET     Long Term Goals: 12 weeks  Pt to report a decrease in pad usage to 0 pads a day to demonstrate improving pelvic floor muscle controls as evidenced by decreased episodes of incontinence needed to improve confidence in social situations.   Pt to be able to delay the urge to urinate at least 15-20 minutes with a strong urge to " urinate in order to make it to the bathroom without leaking.  Pt to demonstrate bilateral hip strength of at least 4+/5 for improved pelvic girdle support with load transfer  Pt to demonstrate pelvic floor strength of at least 3/5 for improved continence and pelvic girdle support   Pt to report >80% improvement in urine leakage  Pt to report >80% improvement in constipation and ease of bowel movements to reduce impact on urinary urgency  Pt to score no more than 26% impairment on the Urinary Problem FOTO survey to demonstrate reduced impairment due to pelvic floor dysfunction   Pt to be independent with advanced home exercise program      Plan     Continue with established Plan of Care, working toward established PT goals.    Carri Leonard, PT, DPT

## 2025-01-16 ENCOUNTER — CLINICAL SUPPORT (OUTPATIENT)
Dept: REHABILITATION | Facility: HOSPITAL | Age: 75
End: 2025-01-16
Payer: COMMERCIAL

## 2025-01-16 DIAGNOSIS — M62.89 PELVIC FLOOR DYSFUNCTION: Primary | ICD-10-CM

## 2025-01-16 DIAGNOSIS — N81.89 PELVIC FLOOR WEAKNESS: ICD-10-CM

## 2025-01-16 PROCEDURE — 97112 NEUROMUSCULAR REEDUCATION: CPT | Mod: PO

## 2025-01-16 PROCEDURE — 97530 THERAPEUTIC ACTIVITIES: CPT | Mod: PO

## 2025-01-16 NOTE — PATIENT INSTRUCTIONS
Reducing incontinence with the Knack  The Knack is a strong and well-timed contraction of the pelvic floor muscles performed immediately before and during an increase in downward pressure on the pelvic floor.     Bladder leaks can be controlled using this exercise, which helps to close the urine tube more effectively. When the pelvic floor muscles are working as they should, they contract automatically before and during an increase in pressure from within the abdomen, such as during a cough or sneeze. When the muscles are not working appropriately, this action is not automatic, but it can improve with practice.    The Knack can be used with events or activities that increase downward pressure upon your pelvic floor such as:    Coughing  Sneezing  Lifting  Blowing your nose  Rising into standing from sitting  Stepping down heavily    When you feel like you're about to cough/sneeze/lift...  Lift and squeeze the muscles in and around all three pelvic openings (urethra, vagina, and anus) immediately before  Maintain this pelvic floor muscle contraction as cough/sneeze/lift  Afterwards, relax your pelvic floor muscles back to normal resting level    If the pelvic floor is not very strong or has been working hard all day (lots of lifting, excessive coughing/sneezing while sick), then you may still experience some leakage. Just do your best, and it should improve as the pelvic floor gets stronger.    Simply remember to lift and squeeze with every cough, lift or sneeze!            Side-lying hip abduction, 2-3 sets of 10  - In side-lying, stack the hips on top of each other and lift the top leg a few inches. Hold the top hip in place, not rolling back with movement  *Don't hold your breath    Squat 2-3 sets of 10     Start with feet shoulder width apart (you can holding a Kettlebell at your chest level if you would like) Next, squat down. Squeeze your pelvic floor when you stand.     Standing Toe Taps 2-3 sets of 10      Brace your lower belly (make abs)   Tap the stool/step with one leg, then switch

## 2025-01-29 DIAGNOSIS — E11.69 TYPE 2 DIABETES MELLITUS WITH OTHER SPECIFIED COMPLICATION, WITHOUT LONG-TERM CURRENT USE OF INSULIN: ICD-10-CM

## 2025-01-29 DIAGNOSIS — E11.69 TYPE 2 DIABETES MELLITUS WITH OTHER SPECIFIED COMPLICATION, WITHOUT LONG-TERM CURRENT USE OF INSULIN: Primary | ICD-10-CM

## 2025-01-29 RX ORDER — BLOOD-GLUCOSE SENSOR
1 EACH MISCELLANEOUS CONTINUOUS
Qty: 3 EACH | Refills: 5 | Status: SHIPPED | OUTPATIENT
Start: 2025-01-29 | End: 2025-01-29 | Stop reason: SDUPTHER

## 2025-01-29 NOTE — TELEPHONE ENCOUNTER
----- Message from Hortencia sent at 1/29/2025  2:24 PM CST -----  Vm- 1:58- pt needs refill on nora 3 sensors   [phone]

## 2025-01-30 ENCOUNTER — TELEPHONE (OUTPATIENT)
Dept: FAMILY MEDICINE | Facility: CLINIC | Age: 75
End: 2025-01-30
Payer: COMMERCIAL

## 2025-01-30 DIAGNOSIS — R09.81 CONGESTION OF NASAL SINUS: Primary | ICD-10-CM

## 2025-01-30 RX ORDER — BLOOD-GLUCOSE SENSOR
1 EACH MISCELLANEOUS CONTINUOUS
Qty: 3 EACH | Refills: 5 | Status: SHIPPED | OUTPATIENT
Start: 2025-01-30

## 2025-01-30 RX ORDER — LORATADINE AND PSEUDOEPHEDRINE SULFATE 5; 120 MG/1; MG/1
1 TABLET, EXTENDED RELEASE ORAL 2 TIMES DAILY
Qty: 20 TABLET | Refills: 0 | Status: SHIPPED | OUTPATIENT
Start: 2025-01-30 | End: 2025-02-09

## 2025-01-30 NOTE — TELEPHONE ENCOUNTER
----- Message from Hortencia sent at 1/30/2025 11:50 AM CST -----  -10:21- pt sinuses are really giving her trouble. She has been taking claritin d and only has 1 pill left. Would like to know if she can still take it   [phone]

## 2025-01-30 NOTE — TELEPHONE ENCOUNTER
----- Message from Eliana sent at 1/30/2025  1:27 PM CST -----  Vm:[id number]    Pt returning a call.    [phone]

## 2025-01-30 NOTE — TELEPHONE ENCOUNTER
Spoke with patient, states she has allergies very bad. The left side of her nose is blocked up, when she has mucus it is very clear. States she was given Claritin D by another provider but only has 1 pill left, wants to know if a new prescription can be written.

## 2025-02-03 ENCOUNTER — TELEPHONE (OUTPATIENT)
Dept: FAMILY MEDICINE | Facility: CLINIC | Age: 75
End: 2025-02-03
Payer: COMMERCIAL

## 2025-02-03 NOTE — TELEPHONE ENCOUNTER
Spoke to pt and she stated she thought her allergies we acting up and she has been taking Claritin but now she has a dry cough and her nose is still stopped up. Would like to know what else she can do

## 2025-02-03 NOTE — TELEPHONE ENCOUNTER
----- Message from Hortencia sent at 2/3/2025  3:24 PM CST -----  Vm- 3:10-pt is still having problems with her sinuses . She is taking the Claritin and tessalon pearls.  [phone]

## 2025-02-04 NOTE — TELEPHONE ENCOUNTER
----- Message from Hortencia sent at 2/4/2025  1:55 PM CST -----  - 1:52- pt is calling [facility]   [phone]

## 2025-02-17 NOTE — PROGRESS NOTES
"[facility]   Physical Therapy Treatment Note      Name: Estefanía Ray  [facility] Number: [id number]    Therapy Diagnosis:   Encounter Diagnoses   Name Primary?    Pelvic floor dysfunction Yes    Pelvic floor weakness        Physician: Chris Mckeon,*    Visit Date: 2/20/2025    Referring Provider Orders: PT Eval and Treat  Medical Diagnosis from Referral: Mixed urge and stress incontinence [N39.46]   Evaluation Date: 1/6/2025  Authorization Period Expiration: 12/31/2024  Plan of Care Expiration: 4/6/2025  Visit # / Visits authorized: 2/20  FOTO: 1 (1/6/2025)     Precautions: CABG (2012)    Time In: 1:00 pm   Time Out: 1:45 pm   Total Billable Time: 45 minutes      Subjective     Pt reports: hat she has been doing her exercises and has not felt much of a difference in her pelvic floor, but she can tell she is "doing some good." Reports she has not had any urinary leakage with coughing or sneezing since her last session. States sometimes when she is sitting down she does not feel like she needs to void but then she stands up and feels like she has to void but cannot make it to the bathroom. States she often feels like she needs to void but then cannot initiate once she is in the bathroom. Reports she is going to her urogyn on Thursday for a bladder test. States she has been taking Benefiber and it has been very helpful for bowel movements.     She was compliant with home exercise program.  Response to previous treatment: no adverse effects    Functional change: improving bowel movements     Pain: none reported today     Constitutional Symptoms Review: The patient denies having any constitutional symptoms.       Objective      Objective Measures updated at progress report unless specified.     Treatment   Estefanía received the treatments listed below:    Informed verbal consent to intravaginal treatment obtained today. - intravaginal treatment not performed today     Neuromuscular Re-education " to develop Coordination, Control, Posture, Proprioception, and Balance for 18 minutes including:   [x] 2x10 TrA brace + quadruped hip extension slides   [x] 2x10 TrA brace + pelvic floor muscle squeeze + supine glut bridge + adduction ball squeeze  [x] 2x10 TrA brace + dead bug   [x] 2x10 side-lying clams (R&L) with focus on pelvic girdle stability with blue theraband     Therapeutic Activity Patient participated in dynamic functional therapeutic activities to improve functional performance for 27 minutes. Including: Education as described below.  [x] 2x10 standing hip abduction (R&L) with blue theraband around thighs   [x] Review of  urge suppression techniques- relaxation, quick clicks, heel raises   [x] Eduation on etiology and PT management for pelvic organ prolapse  [x] Education on use of pessary for pelvic organ prolapse  [x] Instruction on blow before you go when transferring from sit to stand- pt able to perform independently after initial instruction   [x] HEP building/HEP review  [x] Discussed progression of plan of care with patient    Education provided:   Basic constipation management, proper bowel mechanics, the Knack, relationship between TrA and pelvic floor muscles   Discussed progression of plan of care with patient; educated pt in activity modification; reviewed HEP with pt. Pt demonstrated and verbalized understanding of all instruction and was provided with a handout of HEP (see Patient Instructions).    Written Home Exercises Provided: yes.  Exercises were reviewed and Estefanía was able to demonstrate them prior to the end of the session.  Estefanía demonstrated good  understanding of the education provided.     See EMR under Patient Instructions for exercises provided 1/23/2025      Assessment     Pt tolerated session well today, with good understanding of education provided. Pt able to tolerate progression of exercises with moderate difficulty. Pt required verbal cuing throughout session to engage  "transverse abdominis with exercises today. We reviewed urge suppression techniques in order to help reduce risk of incontinence with urinary urgency. Pt also educated to "blow before you go" when transferring from sit to stand. Continue to progress core, hip, and pelvic floor strengthening in upcoming sessions. Etiology and PT management of prolapse, as well as use of a pessary for pelvic organ prolapse, discussed at length with pt this session. Pt will continue to benefit from skilled outpatient physical therapy to address the deficits listed in the problem list box on initial evaluation, provide pt/family education and to maximize pt's level of independence in the home and community environment.     Estefanía Is progressing well towards her goals.   Pt prognosis is Good.     Pt will continue to benefit from skilled outpatient physical therapy to address the deficits listed in the problem list box on initial evaluation, provide pt/family education and to maximize pt's level of independence in the home and community environment.     Pt's spiritual, cultural and educational needs considered and pt agreeable to plan of care and goals.     Anticipated barriers to physical therapy: none    Goals:  Short Term Goals: 6 weeks   Pt to perform "the knack" prior to coughing, laughing or sneezing to decrease risk of incontinence. - MET  Pt to be able to delay the urge to urinate at least 10 minutes with a strong urge to urinate in order to make it to the bathroom without leaking.  Pt to report a decrease in pelvic pressure and fullness to no more than 50% of the time to demonstrate improving pelvic support of pelvic structures.  Pt to report >50% improvement in urine leakage  Pt to report >50% improvement in constipation and ease of bowel movements to reduce impact on urinary urgency  Pt to verbalize double-void technique for improved bladder emptying   Pt to be independent with introductory home exercise program - MET     Long " Term Goals: 12 weeks  Pt to report a decrease in pad usage to 0 pads a day to demonstrate improving pelvic floor muscle controls as evidenced by decreased episodes of incontinence needed to improve confidence in social situations.   Pt to be able to delay the urge to urinate at least 15-20 minutes with a strong urge to urinate in order to make it to the bathroom without leaking.  Pt to demonstrate bilateral hip strength of at least 4+/5 for improved pelvic girdle support with load transfer  Pt to demonstrate pelvic floor strength of at least 3/5 for improved continence and pelvic girdle support   Pt to report >80% improvement in urine leakage  Pt to report >80% improvement in constipation and ease of bowel movements to reduce impact on urinary urgency  Pt to score no more than 26% impairment on the Urinary Problem FOTO survey to demonstrate reduced impairment due to pelvic floor dysfunction   Pt to be independent with advanced home exercise program      Plan     Continue with established Plan of Care, working toward established PT goals.    Carri Leonard, PT, DPT

## 2025-02-19 ENCOUNTER — PATIENT MESSAGE (OUTPATIENT)
Dept: UROGYNECOLOGY | Facility: CLINIC | Age: 75
End: 2025-02-19
Payer: COMMERCIAL

## 2025-02-19 DIAGNOSIS — N28.89 OTHER SPECIFIED DISORDERS OF KIDNEY AND URETER: Primary | ICD-10-CM

## 2025-02-20 ENCOUNTER — CLINICAL SUPPORT (OUTPATIENT)
Dept: REHABILITATION | Facility: HOSPITAL | Age: 75
End: 2025-02-20
Payer: COMMERCIAL

## 2025-02-20 DIAGNOSIS — M62.89 PELVIC FLOOR DYSFUNCTION: Primary | ICD-10-CM

## 2025-02-20 DIAGNOSIS — N81.89 PELVIC FLOOR WEAKNESS: ICD-10-CM

## 2025-02-20 PROCEDURE — 97530 THERAPEUTIC ACTIVITIES: CPT | Mod: PO

## 2025-02-20 PROCEDURE — 97112 NEUROMUSCULAR REEDUCATION: CPT | Mod: PO

## 2025-02-20 NOTE — PATIENT INSTRUCTIONS
Blow Before You Go   When you are preparing to stand up from a sitting position, take a deep breath in to prepare for the upcoming exhale   Exhale, squeeze your pelvic floor up and in (kegel), and stand up   Pretend like you are blowing out candles   Exhaling helps trigger increased pelvic floor muscle and abdominal activation while also easing some of the intra-abdominal pressure built up from inhaling

## 2025-02-20 NOTE — TELEPHONE ENCOUNTER
Spoke with patient, incontinence is worse. She is working with PT. Urine cytology and CT scan, suds.

## 2025-02-22 NOTE — PROGRESS NOTES
[facility]   Physical Therapy Treatment Note      Name: Estefanía Ray  [facility] Number: [id number]    Therapy Diagnosis:   Encounter Diagnoses   Name Primary?    Pelvic floor dysfunction Yes    Pelvic floor weakness      Physician: Chris Mckeon,*    Visit Date: 2/26/2025    Referring Provider Orders: PT Eval and Treat  Medical Diagnosis from Referral: Mixed urge and stress incontinence [N39.46]   Evaluation Date: 1/6/2025  Authorization Period Expiration: 12/31/2024  Plan of Care Expiration: 4/6/2025  Visit # / Visits authorized: 3/10  FOTO: 2/3 (1/6/2025, 2/26/2025)     Precautions: CABG (2012)    Time In: 2:30 pm   Time Out: 3:15 pm   Total Billable Time: 45 minutes      Subjective     Pt reports: she has a urogram tomorrow and has some questions regarding the procedure but cannot find the email she received with the info. States she did some exercises this morning and gardening yesterday so she is feeling a little sore. States she was able to utilize urge suppression techniques and was able to make it to the toilet without leakage! States bowel movements have been going really well because of the Benefiber she has been taking. She has been working on her exercises for therapy but forgot to work on blow before you go.     She was compliant with home exercise program.  Response to previous treatment: no adverse effects    Functional change: improved bowel movements, improving urinary urge incontinence      Pain: none reported today     Constitutional Symptoms Review: The patient denies having any constitutional symptoms.       Objective          Treatment   Estefanía received the treatments listed below:    Informed verbal consent to intravaginal treatment obtained today. - intravaginal treatment not performed today     Neuromuscular Re-education to develop Coordination, Control, Posture, Proprioception, and Balance for 32  minutes including:   [x] 2x10 TrA brace + pelvic floor muscle  squeeze 5 sec hold+ supine glut bridge + adduction ball squeeze  [x] 2x10 side-lying clams (R&L) with focus on pelvic girdle stability with blue theraband   [x] 2x10 side-lying reverse clams (R&L) with focus on pelvic girdle stability with blue theraband   [x] 2x10 TrA brace + pelvic floor muscle squeeze + sit to stand     Therapeutic Activity Patient participated in dynamic functional therapeutic activities to improve functional performance for 13 minutes. Including: Education as described below.  [x] FOTO given- see objective   [x] Re-instruction on blow before you go when transferring from sit to stand- pt able to perform independently after initial instruction   [x] Education on anatomy and physiology of pelvic floor  [x] Education on urogram   [x] HEP building/HEP review  [x] Discussed progression of plan of care with patient    Education provided:   Basic constipation management, proper bowel mechanics, the Knack, relationship between TrA and pelvic floor muscles   Discussed progression of plan of care with patient; educated pt in activity modification; reviewed HEP with pt. Pt demonstrated and verbalized understanding of all instruction and was provided with a handout of HEP (see Patient Instructions).    Written Home Exercises Provided: yes.  Exercises were reviewed and Estefanía was able to demonstrate them prior to the end of the session.  Estefanía demonstrated good  understanding of the education provided.     See EMR under Patient Instructions for exercises provided 2/26/2025      Assessment     Pt tolerated session well today, with good understanding of education provided. Pt with moderate difficulty performing new exercises today. Pt required occasional verbal cuing to coordinate breath with new exercises this session, however able to perform independently by the end of the session. Pt re-instructed on blow before you go to reduce urinary incontinence when transferring from sit to stand. Pt also educated on  "urograms, as she is having a urogram tomorrow. Pt wishes to follow up for future appointments after imaging. Pt will continue to benefit from skilled outpatient physical therapy to address the deficits listed in the problem list box on initial evaluation, provide pt/family education and to maximize pt's level of independence in the home and community environment.     Estefanía Is progressing well towards her goals.   Pt prognosis is Good.     Pt will continue to benefit from skilled outpatient physical therapy to address the deficits listed in the problem list box on initial evaluation, provide pt/family education and to maximize pt's level of independence in the home and community environment.     Pt's spiritual, cultural and educational needs considered and pt agreeable to plan of care and goals.     Anticipated barriers to physical therapy: none    Goals:  Short Term Goals: 6 weeks   Pt to perform "the knack" prior to coughing, laughing or sneezing to decrease risk of incontinence. - MET  Pt to be able to delay the urge to urinate at least 10 minutes with a strong urge to urinate in order to make it to the bathroom without leaking.  Pt to report a decrease in pelvic pressure and fullness to no more than 50% of the time to demonstrate improving pelvic support of pelvic structures.  Pt to report >50% improvement in urine leakage  Pt to report >50% improvement in constipation and ease of bowel movements to reduce impact on urinary urgency - MET  Pt to verbalize double-void technique for improved bladder emptying  - MET  Pt to be independent with introductory home exercise program - MET     Long Term Goals: 12 weeks  Pt to report a decrease in pad usage to 0 pads a day to demonstrate improving pelvic floor muscle controls as evidenced by decreased episodes of incontinence needed to improve confidence in social situations.   Pt to be able to delay the urge to urinate at least 15-20 minutes with a strong urge to urinate in " order to make it to the bathroom without leaking.  Pt to demonstrate bilateral hip strength of at least 4+/5 for improved pelvic girdle support with load transfer  Pt to demonstrate pelvic floor strength of at least 3/5 for improved continence and pelvic girdle support   Pt to report >80% improvement in urine leakage  Pt to report >80% improvement in constipation and ease of bowel movements to reduce impact on urinary urgency  Pt to score no more than 26% impairment on the Urinary Problem FOTO survey to demonstrate reduced impairment due to pelvic floor dysfunction   Pt to be independent with advanced home exercise program      Plan     Continue with established Plan of Care, working toward established PT goals.    Carri Leonard, PT, DPT

## 2025-02-26 ENCOUNTER — CLINICAL SUPPORT (OUTPATIENT)
Dept: REHABILITATION | Facility: HOSPITAL | Age: 75
End: 2025-02-26
Payer: COMMERCIAL

## 2025-02-26 DIAGNOSIS — M62.89 PELVIC FLOOR DYSFUNCTION: Primary | ICD-10-CM

## 2025-02-26 DIAGNOSIS — N81.89 PELVIC FLOOR WEAKNESS: ICD-10-CM

## 2025-02-26 PROCEDURE — 97530 THERAPEUTIC ACTIVITIES: CPT | Mod: PO

## 2025-02-26 PROCEDURE — 97112 NEUROMUSCULAR REEDUCATION: CPT | Mod: PO

## 2025-02-26 NOTE — PATIENT INSTRUCTIONS
Sit to stand + kegel  - Inhale to prepare, relax the belly and pelvic floor  - As you exhale, gently squeeze the pelvic floor  - Hold the pelvic floor in as you lift out of the chair (use arms if needed)  *Don't hold your breath        Side-lying reverse clam, 1 set to fatigue (~20-30 reps)  - Hold the knee still as you lift and lower the ankle,   *Hold the hips stacked on top of each other, not rolling back with movement  *Don't hold your breath     +

## 2025-02-27 ENCOUNTER — HOSPITAL ENCOUNTER (OUTPATIENT)
Dept: RADIOLOGY | Facility: HOSPITAL | Age: 75
Discharge: HOME | End: 2025-02-27
Attending: OBSTETRICS & GYNECOLOGY
Payer: COMMERCIAL

## 2025-02-27 DIAGNOSIS — N28.89 OTHER SPECIFIED DISORDERS OF KIDNEY AND URETER: ICD-10-CM

## 2025-02-27 LAB
CREAT SERPL-MCNC: 0.9 MG/DL (ref 0.5–1.4)
SAMPLE: NORMAL

## 2025-02-27 PROCEDURE — 74178 CT ABD&PLV WO CNTR FLWD CNTR: CPT | Mod: 26,,, | Performed by: RADIOLOGY

## 2025-02-27 PROCEDURE — 25500020 PHARM REV CODE 255: Mod: PO | Performed by: OBSTETRICS & GYNECOLOGY

## 2025-02-27 PROCEDURE — 82565 ASSAY OF CREATININE: CPT | Mod: PO

## 2025-02-27 RX ADMIN — IOHEXOL 100 ML: 350 INJECTION, SOLUTION INTRAVENOUS at 11:02

## 2025-03-05 DIAGNOSIS — R11.0 NAUSEA: ICD-10-CM

## 2025-03-05 RX ORDER — ONDANSETRON 4 MG/1
4 TABLET, ORALLY DISINTEGRATING ORAL EVERY 8 HOURS PRN
Qty: 30 TABLET | Refills: 0 | Status: SHIPPED | OUTPATIENT
Start: 2025-03-05

## 2025-03-05 NOTE — TELEPHONE ENCOUNTER
Spoke to pt and she stated started last night diarrhea and vomiting. Felt better this morning and tried to eat and is throwing up. Pt thinks she has GI bug. Is out of Zofran and wants a refill

## 2025-03-05 NOTE — TELEPHONE ENCOUNTER
----- Message from Hortencia sent at 3/5/2025  9:58 AM CST -----  Pt is vomiting and would like something called in [facility] yenny Elba  [phone]

## 2025-03-12 ENCOUNTER — OFFICE VISIT (OUTPATIENT)
Dept: UROGYNECOLOGY | Facility: CLINIC | Age: 75
End: 2025-03-12
Payer: COMMERCIAL

## 2025-03-12 DIAGNOSIS — N39.46 MIXED URGE AND STRESS INCONTINENCE: ICD-10-CM

## 2025-03-12 DIAGNOSIS — N99.3 PELVIC RELAXATION DUE TO VAGINAL VAULT PROLAPSE, POSTHYSTERECTOMY: Primary | ICD-10-CM

## 2025-03-12 DIAGNOSIS — N81.10 PROLAPSE OF ANTERIOR VAGINAL WALL: ICD-10-CM

## 2025-03-12 DIAGNOSIS — R33.9 INCOMPLETE BLADDER EMPTYING: ICD-10-CM

## 2025-03-12 DIAGNOSIS — N95.2 ATROPHIC VAGINITIS: ICD-10-CM

## 2025-03-12 PROCEDURE — 3288F FALL RISK ASSESSMENT DOCD: CPT | Mod: CPTII,S$GLB,, | Performed by: OBSTETRICS & GYNECOLOGY

## 2025-03-12 PROCEDURE — 1159F MED LIST DOCD IN RCRD: CPT | Mod: CPTII,S$GLB,, | Performed by: OBSTETRICS & GYNECOLOGY

## 2025-03-12 PROCEDURE — 1101F PT FALLS ASSESS-DOCD LE1/YR: CPT | Mod: CPTII,S$GLB,, | Performed by: OBSTETRICS & GYNECOLOGY

## 2025-03-12 PROCEDURE — 99999 PR PBB SHADOW E&M-EST. PATIENT-LVL V: CPT | Mod: PBBFAC,,, | Performed by: OBSTETRICS & GYNECOLOGY

## 2025-03-12 PROCEDURE — 99214 OFFICE O/P EST MOD 30 MIN: CPT | Mod: S$GLB,,, | Performed by: OBSTETRICS & GYNECOLOGY

## 2025-03-12 RX ORDER — MIRABEGRON 50 MG/1
50 TABLET, FILM COATED, EXTENDED RELEASE ORAL DAILY
Qty: 30 TABLET | Refills: 11 | Status: SHIPPED | OUTPATIENT
Start: 2025-03-12 | End: 2026-03-12

## 2025-03-12 NOTE — PROGRESS NOTES
Subjective:       Patient ID: Estefanía Ray is a 74 y.o. female.    Chief Complaint:  Follow-up    History of Present Illness  Follow-up  Pertinent negatives include no abdominal pain, chills, coughing, diaphoresis, fatigue, fever, nausea, rash or vomiting.    74 y.o.  female    has a past medical history of Allergy, Diabetes mellitus, type 2, GERD (gastroesophageal reflux disease), Hyperlipidemia, Hypertension, and JOHN (obstructive sleep apnea).  Referred by KIERA Shah for evaluation of pelvic organ prolapse.     2025  Patient presents for follow-up wants discussed surgical options      [facility]    Question 2024 11:23 AM CST - Filed by Patient   General Urogynecology: Are you experiencing the following?    Dysuria (painful urination) No   Nocturia:  waking up at night to empty your bladder Yes   If you answered yes to the previous question, how many times does this happen per night? 1-2   Enuresis (urine loss during sleep) Yes   Dribbling urine after you urinate Yes   Hematuria (urine appears red) No   Type of stream Interrupted   Urinary frequency: How often a day are you going to the bathroom per day? Less than 10   Urinary Tract Infections: How many Urinary Tract Infections have you had in the past year? I have not had a UTI in the past year   If you have had a UTI in the past year, what treatments have you had so far? I have not had a UTI in the past year   Urinary Incontinence (General): Are you experiencing the following?    Past consultation for incontinence: Have you ever seen someone for the evaluation of incontinence? Yes   If you answered yes to the previous question, please select all the therapies you have tried. Kegals   Please note the effectiveness of the therapies. Ineffective   Need to wear protection to keep clothes dry Yes   If you answered yes to the previous question, please jami the protection you use. Panty liner    Poise   If you wear protection, how much wetness is  "typically on each pad? Light   If you wear protection, how often do you have to change per day, if applicable? 3-4   Stress Symptoms: Are you experiencing the following?    Leakage of urine with cough, laugh and/or sneeze Yes   If you answered yes to the previous question, what is the frequency in days, weeks and/or months? Weekly   Leakage of urine with sex No   Leakage of urine with bending/ lifting No   Leakage of urine with briskly walking or jogging No   If you lose urine for any other reason not previously mentioned, please note it below, if applicable.    Urge Symptoms: Are you experiencing the following?    Urgency ("got to go" feeling) Yes   Urge: How frequently do you feel an urge to urinate (feeling like you "gotta go" to the bathroom and can't wait) Several times a day   Do you experience a leakage of urine when you have a feeling of urgency? Yes   Leakage of urine when unaware Yes   Past use of anticholinergics (medications used to treat overactive bladder) No   If you answered yes to the previous question, please jami the anticholinergics you have used:    Have you ever used Mirbetriq (aka Mirabegron)? No   Prolapse Symptoms: Are you experiencing any of the following?    Falling out/ Bulging/ Heaviness in the vagina Yes   Vaginal/ Abdominal Pain/ Pressure No   Need to strain/ Push to void Yes   Need to wait on the toilet before you void Yes   Unusual position to urinate (using your hands to push back the vaginal bulge) No   Sensation of incomplete emptying Yes   Past use of pessary device No   If you answered yes to the previous question, please list the devices you have used below.    Bowel Symptoms: Are you experiencing any of the following?    Constipation Yes   Diarrhea Yes   Hematochezia (bloody stool) No   Incomplete evacuation of stool Yes   Involuntary loss of formed stool No   Fecal smearing/urgency No   Involuntary loss of gas Yes   Vaginal Symptoms: Are you experiencing any of the following? "    Abnormal vaginal bleeding No   Vaginal dryness No   Sexually active No   Dyspareunia (painful intercourse) No   Estrogen use No       GYN & OB History  No LMP recorded. Patient is postmenopausal.   Date of Last Pap: No result found    OB History    Para Term  AB Living   3 3 3      SAB IAB Ectopic Multiple Live Births             # Outcome Date GA Lbr Nghia/2nd Weight Sex Type Anes PTL Lv   3 Term            2 Term            1 Term                Past Medical History:   Diagnosis Date    Allergy     Diabetes mellitus, type 2     GERD (gastroesophageal reflux disease)     Hyperlipidemia     Hypertension     JOHN (obstructive sleep apnea)      Past Surgical History:   Procedure Laterality Date    HYSTERECTOMY      KNEE SURGERY Left     TONSILLECTOMY         Review of Systems  Review of Systems   Constitutional: Negative.  Negative for activity change, appetite change, chills, diaphoresis, fatigue, fever and unexpected weight change.   HENT: Negative.     Eyes: Negative.    Respiratory: Negative.  Negative for cough.    Cardiovascular: Negative.    Gastrointestinal:  Negative for abdominal distention, abdominal pain, anal bleeding, blood in stool, constipation, diarrhea, nausea, rectal pain and vomiting.   Endocrine: Negative.    Genitourinary:  Positive for difficulty urinating. Negative for decreased urine volume, dyspareunia, dysuria, enuresis, flank pain, frequency, genital sores, hematuria, menstrual problem, pelvic pain, urgency, vaginal bleeding, vaginal discharge and vaginal pain.        Prolapse  + vaginal bulge   +vaginal pressure    Musculoskeletal:  Negative for back pain.   Skin:  Negative for color change, pallor, rash and wound.   Allergic/Immunologic: Negative for environmental allergies, food allergies and immunocompromised state.   Hematological:  Negative for adenopathy. Does not bruise/bleed easily.   Psychiatric/Behavioral:  Negative for agitation, behavioral problems, confusion and  sleep disturbance.            Objective:     Physical Exam   Constitutional: She is oriented to person, place, and time. She appears well-developed.   HENT:   Head: Normocephalic and atraumatic.   Eyes: Conjunctivae and EOM are normal.   Cardiovascular: Normal rate, regular rhythm, S1 normal, S2 normal, normal heart sounds and intact distal pulses.   Pulmonary/Chest: Effort normal and breath sounds normal. She exhibits no tenderness.   Abdominal: Soft. Bowel sounds are normal. She exhibits no distension and no mass. There is no splenomegaly or hepatomegaly. There is no abdominal tenderness. There is no rigidity, no rebound and no guarding. Hernia confirmed negative in the right inguinal area and confirmed negative in the left inguinal area.   Genitourinary: Pelvic exam was performed with patient supine. Rectum normal, vagina normal, skenes normal and bartholins normal. Right labia normal and left labia normal. Urethra exhibits hypermobility. Urethra exhibits no urethral caruncle, no urethral diverticulum and no urethral mass. Right bartholin is not enlarged and not tender. Left bartholin is not enlarged and not tender. Rectal exam shows resting tone normal and active tone normal. Rectal exam shows no external hemorrhoid, no fissure, no tenderness, anal tone normal and no dovetailing. Guaiac negative stool. There is a rectocele, a cystocele, unspecified prolapse of vaginal walls and atrophy in the vagina. No foreign body, tenderness, bleeding, fistula, mesh exposure or lavator tenderness in the vagina. Right adnexum displays no tenderness. Left adnexum displays no tenderness. Uterus is absent.   PVR: 130 ML  Empty cough stress test: Negative.  Kegel: 2/5    POP-Q  Aa: 3 Ba: 5 C: 5   GH: 6 PB: 4 TVL: 9   Ap: -1 Bp: -1 D:                      Musculoskeletal:         General: Normal range of motion.      Cervical back: Normal range of motion and neck supple.   Neurological: She is alert and oriented to person, place,  and time. She has normal strength, normal reflexes and intact cranial nerves (2-12). Cranial nerves II through XII intact. No cranial nerve deficit.   Skin: Skin is warm and dry.   Psychiatric: She has a normal mood and affect. Her speech is normal and behavior is normal. Judgment normal.         CT scan 02/27/2025   Impression:     Right renal cortical cysts.     Findings compatible with cystocele/pelvic floor relaxation.     Prominent arteriosclerosis involving the aorta and abdominal and pelvic branches.  See above comments.        Assessment:        1. 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 Atrophic vaginitis    3. Prolapse of anterior vaginal wall    4. Incomplete bladder emptying    5. Mixed urge and stress incontinence           Plan:    1. Prolapse: Stage 3 apical prolapse, Stage 3 anterior and stage 2 posterior vaginal wall prolapse   --Pessary benefit discussed with patient, will schedule for pessary fitting she declined   --Daily pelvic floor exercises as assigned, Pelvic floor PT   --Non surgical options discussed with patient    --Surgical options discussed such as :  with apical suspension: SSF, USLS and SCP with mesh augmentation.  The possible need for an anterior or posterior colporrhaphy was discussed.  We discussed pro's and con's of a native tissue repair vs an augmented repair with mesh. Risks/ benefits/ alternatives/ succuss and failure rates were reviewed with the various surgical treatment options. Information packets were given detailing surgical options.  She was also given web site information for: www.augs.org and www.[employer].org and http://www.fda.gov/MedicalDevices/UroGynSurgicalMesh/default.htm to review the details of the surgical options discussed and the use of mesh in surgery. She will review the information given and we will discuss further at the follow up visit. I would recommend a a SCP as her  is sick and she does most of the heavy  lifting in the house.  I did review with her the need to be able to take 6 to 8 weeks off from any heavy lifting postoperatively.  We reviewed the CT findings :  No obvious significant hydro.  She does have mildly elevated postvoid residuals from previous exams and at this point would recommend manual reduction since she continues to decline  pessary placement. Patient will think about it and let us know when she would like to proceed.     2.  Mixed urinary incontinence, urge > stress:   --Bladder diary for 3-7 days, write the number of times you go to the rest room and associated symptoms of urgency or leakage.   --Empty bladder every 3 hours.  Empty well: wait a minute, lean forward on toilet.    --Avoid dietary irritants (see sheet).  Keep diary x 3-5 days to determine your irritants.  --KEGELS: do 10 in AM and 10 in PM, holding each x 10 seconds.  When you feel urge to go, STOP, KEGEL, and when urge has passed, then go to bathroom.  Start pelvic floor PT.     --URGE:  Start Myrbetriq 50 mg daily.  SE profile reviewed.    Takes 2-4 weeks to see if will have effect.  For dry mouth: get sour, sugar free lozenge or gum.    --STRESS:  Non surgical options: Pessary vs. Impressa vs Rivive - which represent urethral and bladder support devices; Surgical options including 1.  mid urethral sling; retropubic, transobturator vs single incision 2. Fascial slings 3. Francois procedure 4. Periurethral bulking     3  Incomplete bladder emptying  --Renal sonogram results reviewed with the patient  --Bladder diary for 3-7 days, write the number of times you go to the rest room and associated symptoms of urgency or leakage.   --Empty bladder every 3 hours.  Empty well: wait a minute, lean forward on toilet.    --Avoid dietary irritants (see sheet).  Keep diary x 3-5 days to determine your irritants.  --KEGELS: do 10 in AM and 10 in PM, holding each x 10 seconds.  When you feel urge to go, STOP, KEGEL, and when urge has passed, then  go to bathroom.  Continue  pelvic floor PT.       3. Vaginal atrophy (dryness):  Use 1 gram of estrogen cream in vagina nightly x 2 weeks, then twice a week thereafter.      Thank you for requesting consultation of your patient.  I look forward to participating in her care.    Chris Mckeon DO  Female Pelvic Medicine and Reconstructive Surgery  [facility] [place], [place]

## 2025-03-13 ENCOUNTER — PATIENT MESSAGE (OUTPATIENT)
Dept: UROGYNECOLOGY | Facility: CLINIC | Age: 75
End: 2025-03-13
Payer: COMMERCIAL

## 2025-03-14 ENCOUNTER — TELEPHONE (OUTPATIENT)
Dept: UROGYNECOLOGY | Facility: CLINIC | Age: 75
End: 2025-03-14
Payer: COMMERCIAL

## 2025-03-14 NOTE — TELEPHONE ENCOUNTER
Called to scheduled CMG with Savanna. States that she does not want to do the surgery. Wants to try to do the pessary will call the office back to  schedule an appointment once she gets information on the pessary.

## 2025-03-17 DIAGNOSIS — E11.69 TYPE 2 DIABETES MELLITUS WITH OTHER SPECIFIED COMPLICATION, WITHOUT LONG-TERM CURRENT USE OF INSULIN: ICD-10-CM

## 2025-03-17 NOTE — TELEPHONE ENCOUNTER
----- Message from Emma sent at 3/17/2025  9:36 AM CDT -----  Refill Mounjaro  10 mg [facility] pt's # [phone] GH

## 2025-03-20 ENCOUNTER — OFFICE VISIT (OUTPATIENT)
Dept: FAMILY MEDICINE | Facility: CLINIC | Age: 75
End: 2025-03-20
Payer: COMMERCIAL

## 2025-03-20 VITALS
DIASTOLIC BLOOD PRESSURE: 70 MMHG | WEIGHT: 153.63 LBS | OXYGEN SATURATION: 98 % | HEART RATE: 76 BPM | HEIGHT: 63 IN | BODY MASS INDEX: 27.22 KG/M2 | SYSTOLIC BLOOD PRESSURE: 132 MMHG

## 2025-03-20 DIAGNOSIS — E78.2 MIXED HYPERLIPIDEMIA: ICD-10-CM

## 2025-03-20 DIAGNOSIS — Z78.0 MENOPAUSE: ICD-10-CM

## 2025-03-20 DIAGNOSIS — K21.00 GASTROESOPHAGEAL REFLUX DISEASE WITH ESOPHAGITIS WITHOUT HEMORRHAGE: ICD-10-CM

## 2025-03-20 DIAGNOSIS — E11.69 TYPE 2 DIABETES MELLITUS WITH OTHER SPECIFIED COMPLICATION, WITHOUT LONG-TERM CURRENT USE OF INSULIN: Primary | ICD-10-CM

## 2025-03-20 DIAGNOSIS — N32.81 OAB (OVERACTIVE BLADDER): ICD-10-CM

## 2025-03-20 DIAGNOSIS — G47.30 SLEEP APNEA, UNSPECIFIED TYPE: ICD-10-CM

## 2025-03-20 DIAGNOSIS — I10 HYPERTENSION, UNSPECIFIED TYPE: ICD-10-CM

## 2025-03-20 PROCEDURE — 3078F DIAST BP <80 MM HG: CPT | Mod: CPTII,S$GLB,, | Performed by: NURSE PRACTITIONER

## 2025-03-20 PROCEDURE — 3008F BODY MASS INDEX DOCD: CPT | Mod: CPTII,S$GLB,, | Performed by: NURSE PRACTITIONER

## 2025-03-20 PROCEDURE — 99214 OFFICE O/P EST MOD 30 MIN: CPT | Mod: S$GLB,,, | Performed by: NURSE PRACTITIONER

## 2025-03-20 PROCEDURE — 3288F FALL RISK ASSESSMENT DOCD: CPT | Mod: CPTII,S$GLB,, | Performed by: NURSE PRACTITIONER

## 2025-03-20 PROCEDURE — 1160F RVW MEDS BY RX/DR IN RCRD: CPT | Mod: CPTII,S$GLB,, | Performed by: NURSE PRACTITIONER

## 2025-03-20 PROCEDURE — 3075F SYST BP GE 130 - 139MM HG: CPT | Mod: CPTII,S$GLB,, | Performed by: NURSE PRACTITIONER

## 2025-03-20 PROCEDURE — 4010F ACE/ARB THERAPY RXD/TAKEN: CPT | Mod: CPTII,S$GLB,, | Performed by: NURSE PRACTITIONER

## 2025-03-20 PROCEDURE — 1101F PT FALLS ASSESS-DOCD LE1/YR: CPT | Mod: CPTII,S$GLB,, | Performed by: NURSE PRACTITIONER

## 2025-03-20 PROCEDURE — 1159F MED LIST DOCD IN RCRD: CPT | Mod: CPTII,S$GLB,, | Performed by: NURSE PRACTITIONER

## 2025-03-20 PROCEDURE — 1126F AMNT PAIN NOTED NONE PRSNT: CPT | Mod: CPTII,S$GLB,, | Performed by: NURSE PRACTITIONER

## 2025-03-20 RX ORDER — LEVOCETIRIZINE DIHYDROCHLORIDE 5 MG/1
5 TABLET, FILM COATED ORAL
COMMUNITY
Start: 2024-05-19

## 2025-03-20 RX ORDER — METFORMIN HYDROCHLORIDE 500 MG/1
500 TABLET, EXTENDED RELEASE ORAL 2 TIMES DAILY WITH MEALS
COMMUNITY

## 2025-03-25 ENCOUNTER — OFFICE VISIT (OUTPATIENT)
Dept: PODIATRY | Facility: CLINIC | Age: 75
End: 2025-03-25
Payer: COMMERCIAL

## 2025-03-25 VITALS — BODY MASS INDEX: 26.71 KG/M2 | WEIGHT: 150.81 LBS

## 2025-03-25 DIAGNOSIS — M20.41 HAMMER TOES OF BOTH FEET: Primary | ICD-10-CM

## 2025-03-25 DIAGNOSIS — M20.42 HAMMER TOES OF BOTH FEET: Primary | ICD-10-CM

## 2025-03-25 PROCEDURE — 3288F FALL RISK ASSESSMENT DOCD: CPT | Mod: CPTII,S$GLB,, | Performed by: PODIATRIST

## 2025-03-25 PROCEDURE — 1101F PT FALLS ASSESS-DOCD LE1/YR: CPT | Mod: CPTII,S$GLB,, | Performed by: PODIATRIST

## 2025-03-25 PROCEDURE — 3008F BODY MASS INDEX DOCD: CPT | Mod: CPTII,S$GLB,, | Performed by: PODIATRIST

## 2025-03-25 PROCEDURE — 1159F MED LIST DOCD IN RCRD: CPT | Mod: CPTII,S$GLB,, | Performed by: PODIATRIST

## 2025-03-25 PROCEDURE — 99999 PR PBB SHADOW E&M-EST. PATIENT-LVL III: CPT | Mod: PBBFAC,,, | Performed by: PODIATRIST

## 2025-03-25 PROCEDURE — 99203 OFFICE O/P NEW LOW 30 MIN: CPT | Mod: S$GLB,,, | Performed by: PODIATRIST

## 2025-03-25 PROCEDURE — 1126F AMNT PAIN NOTED NONE PRSNT: CPT | Mod: CPTII,S$GLB,, | Performed by: PODIATRIST

## 2025-03-25 PROCEDURE — 1160F RVW MEDS BY RX/DR IN RCRD: CPT | Mod: CPTII,S$GLB,, | Performed by: PODIATRIST

## 2025-03-25 NOTE — PROGRESS NOTES
[address] [facility] [address]  [place] [address]  Phone: [phone]   [phone]    Patient's PCP:Verna Shah NP  Referring Provider: Aaareferral Self    Subjective:      Chief Complaint:: Hammer Toe    Hammer Toe  Pertinent negatives include no abdominal pain, arthralgias, chest pain, chills, coughing, fatigue, fever, headaches, joint swelling, myalgias, nausea, neck pain, numbness, rash or weakness.     Estefanía Ray is a 74 y.o. female who presents today with a complaint of possible hammer toes, BL. The current episode started 2-3 months.  The symptoms include red bumps, stiffness, tightness. Probable cause of complaint unknown.  The symptoms are aggravated by unknown. The problem has worsened. Treatment to date have included none which provided no relief. PT also complains with red spots on her toes.    Systemic Doctor: Verna Shah NP  Date Last Seen: 3/20/2025  Blood Sugar: 122  Hemoglobin A1c: 7.0    Vitals:    03/25/25 1357   Weight: 68.4 kg (150 lb 12.7 oz)   PainSc: 0-No pain      Shoe Size: 8.5    Past Surgical History:   Procedure Laterality Date    HYSTERECTOMY      KNEE SURGERY Left     TONSILLECTOMY       Past Medical History:   Diagnosis Date    Allergy     Diabetes mellitus, type 2     GERD (gastroesophageal reflux disease)     Hyperlipidemia     Hypertension     JOHN (obstructive sleep apnea)      No family history on file.     Social History:   Marital Status:   Alcohol History:  reports no history of alcohol use.  Tobacco History:  reports that she has never smoked. She has never used smokeless tobacco.  Drug History:  reports no history of drug use.    Review of patient's allergies indicates:   Allergen Reactions    Empagliflozin Other (See Comments)    Semaglutide Hives    Sitagliptin phos-metformin Other (See Comments)     Other reaction(s): diarrhea    Ezetimibe Nausea And Vomiting     Other reaction(s): Other (See Comments), Unknown    Feels terrible when taking  medication    Other reaction(s): Other (See Comments)   Feels terrible when taking medication    Other reaction(s): Other (See Comments), Unknown   Feels terrible when taking medication    Farxiga [dapagliflozin] Rash     Genital rash    Linaclotide Diarrhea and Nausea And Vomiting       Current Medications[1]    Review of Systems   Constitutional:  Negative for chills, fatigue, fever and unexpected weight change.   HENT:  Negative for hearing loss and trouble swallowing.    Eyes:  Negative for photophobia and visual disturbance.   Respiratory:  Negative for cough, shortness of breath and wheezing.    Cardiovascular:  Negative for chest pain, palpitations and leg swelling.   Gastrointestinal:  Negative for abdominal pain and nausea.   Genitourinary:  Negative for dysuria and frequency.   Musculoskeletal:  Negative for arthralgias, back pain, gait problem, joint swelling, myalgias and neck pain.   Skin:  Negative for rash and wound.   Neurological:  Negative for tremors, seizures, weakness, numbness and headaches.   Hematological:  Does not bruise/bleed easily.   Psychiatric/Behavioral:  Negative for hallucinations.          Objective:        Physical Exam:   Foot Exam    General  General Appearance: appears stated age and healthy   Orientation: alert and oriented to person, place, and time   Affect: appropriate   Gait: unimpaired       Right Foot/Ankle     Inspection and Palpation  Ecchymosis: none  Tenderness: none   Swelling: none   Arch: normal  Hammertoes: second toe, third toe, fourth toe and fifth toe  Skin Exam: dry skin;   Neurovascular  Dorsalis pedis: 2+  Posterior tibial: 1+  Capillary Refill: 3+  Varicose veins: not present  Saphenous nerve sensation: normal  Tibial nerve sensation: normal  Superficial peroneal nerve sensation: normal  Deep peroneal nerve sensation: normal  Sural nerve sensation: normal    Edema  Type of edema: non-pitting    Muscle Strength  Ankle dorsiflexion: 5  Ankle plantar flexion:  5  Ankle inversion: 5  Ankle eversion: 5  Great toe extension: 5  Great toe flexion: 5    Range of Motion    Normal right ankle ROM    Tests  Anterior drawer: negative   Talar tilt: negative   PT Tinel's sign: negative    Paresthesia: negative    Left Foot/Ankle      Inspection and Palpation  Ecchymosis: none  Tenderness: none   Swelling: none   Arch: normal  Hammertoes: second toe, third toe, fourth toe and fifth toe  Skin Exam: dry skin;   Neurovascular  Dorsalis pedis: 2+  Posterior tibial: 1+  Capillary refill: 3+  Varicose veins: not present  Saphenous nerve sensation: normal  Tibial nerve sensation: normal  Superficial peroneal nerve sensation: normal  Deep peroneal nerve sensation: normal  Sural nerve sensation: normal    Edema  Type of edema: non-pitting    Muscle Strength  Ankle dorsiflexion: 5  Ankle plantar flexion: 5  Ankle inversion: 5  Ankle eversion: 5  Great toe extension: 5  Great toe flexion: 5    Range of Motion    Normal left ankle ROM    Tests  Anterior drawer: negative   Talar tilt: negative   PT Tinel's sign: negative  Paresthesia: negative      Physical Exam  Cardiovascular:      Pulses:           Dorsalis pedis pulses are 2+ on the right side and 2+ on the left side.        Posterior tibial pulses are 1+ on the right side and 1+ on the left side.   Feet:      Right foot:      Skin integrity: Dry skin present.      Left foot:      Skin integrity: Dry skin present.               Right Ankle/Foot Exam     Range of Motion   The patient has normal right ankle ROM.    Left Ankle/Foot Exam     Range of Motion   The patient has normal left ankle ROM.       Muscle Strength   Right Lower Extremity   Ankle Dorsiflexion:  5   Plantar flexion:  5/5  Left Lower Extremity   Ankle Dorsiflexion:  5   Plantar flexion:  5/5     Vascular Exam     Right Pulses  Dorsalis Pedis:      2+  Posterior Tibial:      1+        Left Pulses  Dorsalis Pedis:      2+  Posterior Tibial:      1+           Imaging: none             Assessment:       1. Hammer toes of both feet      Plan:   Hammer toes of both feet      Follow up if symptoms worsen or fail to improve.    Procedures        I discussed hammertoe deformity and conservative treatment of deep, wider shoes, padding of the sore areas, OTC NSAID, skin softners, palliative care.  I discussed surgical procedures of fusion of the PIPJ of the toes.  Also discussed with her possibility of flexor tenotomies for the flexible deformity.  I also discussed discoloration she is having around the joints is due to excess pressure from the hammer deformity.        Counseling:     I provided patient education verbally regarding:   Patient diagnosis, treatment options, as well as alternatives, risks, and benefits.     This note was created using Dragon voice recognition software that occasionally misinterpreted phrases or words.                    [1]   Current Outpatient Medications   Medication Sig Dispense Refill    aspirin (ECOTRIN) 81 MG EC tablet Aspir-81 mg tablet,delayed release   Take 1 tablet every day by oral route.      atorvastatin (LIPITOR) 40 MG tablet Take 1 tablet (40 mg total) by mouth every evening. 90 tablet 3    biotin 10,000 mcg TbDL  (Patient not taking: Reported on 3/20/2025)      cyclobenzaprine (FLEXERIL) 10 MG tablet Take 1 tablet (10 mg total) by mouth 3 (three) times daily as needed for Muscle spasms. 270 tablet 1    estradioL (ESTRACE) 0.01 % (0.1 mg/gram) vaginal cream Place 1 g vaginally 3 (three) times a week. (Patient not taking: Reported on 3/20/2025) 42.5 g 4    flash glucose sensor (FREESTYLE KARINA 14 DAY SENSOR) Kit 1 kit by Misc.(Non-Drug; Combo Route) route once daily. (Patient not taking: Reported on 3/20/2025) 1 kit 11    insulin detemir U-100, Levemir, (LEVEMIR FLEXPEN) 100 unit/mL (3 mL) InPn pen Inject 10 Units into the skin every evening.      lansoprazole (PREVACID) 15 MG capsule 1 capsule      levocetirizine (XYZAL) 5 MG tablet Take 5 mg by mouth as  needed.      lisinopriL (PRINIVIL,ZESTRIL) 20 MG tablet Take 1 tablet (20 mg total) by mouth once daily. 90 tablet 1    metFORMIN (GLUCOPHAGE-XR) 500 MG ER 24hr tablet Take 500 mg by mouth 2 (two) times daily with meals. 2 tablets bid      metoprolol succinate (TOPROL-XL) 50 MG 24 hr tablet Take 1 tablet (50 mg total) by mouth once daily. 90 tablet 1    mirabegron (MYRBETRIQ) 50 mg Tb24 Take 1 tablet (50 mg total) by mouth once daily. 30 tablet 11    nitroGLYCERIN (NITROSTAT) 0.4 MG SL tablet Place 0.4 mg under the tongue.      nystatin (MYCOSTATIN) powder Apply topically 3 (three) times daily. 180 g 3    ondansetron (ZOFRAN-ODT) 4 MG TbDL Take 1 tablet (4 mg total) by mouth every 8 (eight) hours as needed. (Patient not taking: Reported on 3/20/2025) 30 tablet 0    tirzepatide 10 mg/0.5 mL PnIj Inject 10 mg into the skin every 7 days. 4 Pen 3    vitamin D (VITAMIN D3) 1000 units Tab Take 5,000 Units by mouth. (Patient not taking: Reported on 3/20/2025)       No current facility-administered medications for this visit.

## 2025-03-25 NOTE — PATIENT INSTRUCTIONS
What Are Mallet, Hammer, and Claw Toes?  Mallet, hammer, and claw toes are most often caused by wearing shoes that are too short or heels that are too high. This jams the toes against the front of the shoe and causes one or more joints to bend. Rarely, disease can cause the joints in the toes to bend. Mallet, hammer, and claw toes are among the most common toe problems. They occur most often in the longest of the four smaller toes.      Inside your toes  There are 3 bones in each of your 4 smaller toes. Where 2 bones connect is called a joint. Normally the toes lie flat. But pressure on the toes or the front of the foot can cause one or more joints to bend. This curls the toe. Toes that stay curled are called mallet toes, hammer toes, or claw toes, depending on which joints are bent.    Symptoms  You may feel pain in the toe or in the ball of your foot. A corn (a hard growth of skin on the top of the toe) may form where the toe rubs against the top of the shoe. Or a callus (a hard growth of skin on the bottom of the foot) may form under the tip of the toe or on the ball of the foot. Corns and calluses can also be painful.    Date Last Reviewed: 10/18/2015  © 7189-9315 [employer] [address] [place] [address], [place] [address]. All rights reserved. This information is not intended as a substitute for professional medical care. Always follow your healthcare professional's instructions.

## 2025-03-30 RX ORDER — LISINOPRIL 20 MG/1
20 TABLET ORAL DAILY
Qty: 90 TABLET | Refills: 1 | Status: SHIPPED | OUTPATIENT
Start: 2025-03-30

## 2025-03-30 RX ORDER — METOPROLOL SUCCINATE 50 MG/1
50 TABLET, EXTENDED RELEASE ORAL DAILY
Qty: 90 TABLET | Refills: 1 | Status: SHIPPED | OUTPATIENT
Start: 2025-03-30

## 2025-03-31 NOTE — PROGRESS NOTES
SUBJECTIVE:    Patient ID: Estefanía Ray is a 74 y.o. female.    Chief Complaint: Follow-up (Bottles brought//Pt is here for a 3 month follow up//Pt will schedule Eye Exam and let us know the date so we can be on the lookout for it//KE)      History of Present Illness    CHIEF COMPLAINT:  74 year old female presents today for diabetes follow up.    DIABETES:  Her A1c is 7.0. She reports non-adherence to recommended diet and continues to consume regular sodas. She denies taking metformin but continues Levemir 15 units and Mounjaro as prescribed.    FOOT CONCERNS:  She reports toe problems with small blisters and sensation of tightness in her feet. Current footwear, including boots with ample room, has not alleviated these problems. She expresses need for podiatry consultation.    GENITOURINARY:  She declined GYN surgery due to concerns about plaque in aorta and apprehension regarding intubation and positioning. She continues to use vaginal cream. New prescription for urinary symptoms has been effective with improved urination and normal voiding pattern.    NEUROLOGICAL:  MRI showed few tiny white specks related to blood pressure. Blood work was normal.    MEDICATIONS AND SUPPLEMENTS:  She takes B complex supplement for low B levels as recommended by Dr. Colunga, reporting no noticeable difference after one month but plans to continue. Additional supplements include Matt Red 2 daily, Nature Made Advance Multi for her, and morning Claritin. She mentions taking an additional supplement but cannot recall its name.      ROS:  General: -fever, -chills, -fatigue, -weight gain, -weight loss  Eyes: -vision changes, -redness, -discharge  ENT: -ear pain, -nasal congestion, -sore throat  Cardiovascular: -chest pain, -palpitations, -lower extremity edema  Respiratory: -cough, -shortness of breath  Gastrointestinal: -abdominal pain, -nausea, -vomiting, -diarrhea, -constipation, -blood in stool  Genitourinary: -dysuria,  -hematuria, -frequency, +difficulty urinating  Musculoskeletal: -joint pain, -muscle pain, +bone deformity  Skin: -rash, -lesion  Neurological: -headache, -dizziness, -numbness, -tingling  Psychiatric: -anxiety, -depression, -sleep difficulty  Allergic: +seasonal allergies              Hospital Outpatient Visit on 02/27/2025   Component Date Value Ref Range Status    POC Creatinine 02/27/2025 0.9  0.5 - 1.4 mg/dL Final    Sample 02/27/2025 VENOUS   Final   Office Visit on 12/12/2024   Component Date Value Ref Range Status    Hemoglobin A1C, POC 12/12/2024 6.7  % Final       Past Medical History:   Diagnosis Date    Allergy     Diabetes mellitus, type 2     GERD (gastroesophageal reflux disease)     Hyperlipidemia     Hypertension     JOHN (obstructive sleep apnea)      Past Surgical History:   Procedure Laterality Date    HYSTERECTOMY      KNEE SURGERY Left     TONSILLECTOMY       No family history on file.    All of your core healthy metrics are met.      The 10-year CVD risk score (EDITH'Agostino, et al., 2008) is: 23.9%    Values used to calculate the score:      Age: 74 years      Sex: Female      Diabetic: Yes      Tobacco smoker: No      Systolic Blood Pressure: 132 mmHg      Is BP treated: Yes      HDL Cholesterol: 40 mg/dL      Total Cholesterol: 145 mg/dL     Marital Status:   Alcohol History:  reports no history of alcohol use.  Tobacco History:  reports that she has never smoked. She has never used smokeless tobacco.  Drug History:  reports no history of drug use.    Health Maintenance Topics with due status: Not Due       Topic Last Completion Date    TETANUS VACCINE 04/07/2015    Colorectal Cancer Screening 09/07/2016    Diabetes Urine Screening 11/21/2024    Lipid Panel 11/21/2024    Mammogram 12/05/2024    DEXA Scan 12/05/2024    Hemoglobin A1c 12/12/2024    High Dose Statin 03/20/2025    Aspirin/Antiplatelet Therapy 03/20/2025    Foot Exam 03/30/2025     Immunization History   Administered Date(s)  "Administered    Influenza (FLUAD) - Quadrivalent - Adjuvanted - PF *Preferred* (65+) 11/29/2023    Influenza - Quadrivalent - High Dose - PF (65 years and older) 11/03/2022    Influenza - Trivalent - Fluad - Adjuvanted - PF (65 years and older 11/25/2024    Influenza - Trivalent - Fluzone High Dose - PF (65 years and older) 09/09/2016    Pneumococcal Conjugate - 13 Valent 03/10/2017    Pneumococcal Polysaccharide - 23 Valent 04/12/2018    Tdap 04/07/2015       Review of patient's allergies indicates:   Allergen Reactions    Empagliflozin Other (See Comments)    Semaglutide Hives    Sitagliptin phos-metformin Other (See Comments)     Other reaction(s): diarrhea    Ezetimibe Nausea And Vomiting     Other reaction(s): Other (See Comments), Unknown    Feels terrible when taking medication    Other reaction(s): Other (See Comments)   Feels terrible when taking medication    Other reaction(s): Other (See Comments), Unknown   Feels terrible when taking medication    Farxiga [dapagliflozin] Rash     Genital rash    Linaclotide Diarrhea and Nausea And Vomiting     Current Medications[1]        Objective:      Vitals:    03/20/25 1311   BP: 132/70   Pulse: 76   SpO2: 98%   Weight: 69.7 kg (153 lb 9.6 oz)   Height: 5' 3" (1.6 m)       Physical Exam  Vitals and nursing note reviewed.   Constitutional:       General: She is not in acute distress.     Appearance: Normal appearance. She is well-developed.   HENT:      Head: Normocephalic.      Right Ear: External ear normal.      Left Ear: External ear normal.   Eyes:      Pupils: Pupils are equal, round, and reactive to light.   Cardiovascular:      Rate and Rhythm: Normal rate and regular rhythm.      Pulses:           Dorsalis pedis pulses are 2+ on the right side.        Posterior tibial pulses are 2+ on the right side.      Heart sounds: Normal heart sounds.   Pulmonary:      Effort: Pulmonary effort is normal.      Breath sounds: Normal breath sounds.   Abdominal:      " General: Bowel sounds are normal.      Palpations: Abdomen is soft.   Musculoskeletal:         General: Normal range of motion.      Cervical back: Normal range of motion and neck supple.      Right foot: Normal range of motion. Deformity present.      Left foot: Normal range of motion. Deformity present.        Feet:    Feet:      Right foot:      Protective Sensation: 5 sites tested.  5 sites sensed.      Skin integrity: No skin breakdown or erythema.      Left foot:      Protective Sensation: 5 sites tested.  5 sites sensed.      Skin integrity: No skin breakdown, erythema or callus.      Comments: Hammer toes  Lymphadenopathy:      Cervical: No cervical adenopathy.   Skin:     General: Skin is warm and dry.   Neurological:      Mental Status: She is alert and oriented to person, place, and time.   Psychiatric:         Behavior: Behavior normal.            Assessment:       1. Type 2 diabetes mellitus with other specified complication, without long-term current use of insulin    2. Hypertension, unspecified type    3. Hypertension, unspecified type    4. Mixed hyperlipidemia    5. Menopause    6. Sleep apnea, unspecified type    7. Gastroesophageal reflux disease with esophagitis without hemorrhage    8. OAB (overactive bladder)           Assessment & Plan    - Assessed A1c of 7.0, within guideline target of 7 or less but higher than previous levels. Explained this to patient.  - Evaluated decision to forgo surgery with Dr. Smith in favor of alternative treatment (pessary) for prolapse, taking into account age and preferences.  - Reviewed neurologist (Dr. Colunga) findings, noting age-appropriate brain changes on MRI. Discussed these changes as normal with patient.  - Assessed foot health, noting good blood flow but concerns about toe positioning and blisters.    TYPE 2 DIABETES MELLITUS WITH OTHER SPECIFIED COMPLICATION, WITHOUT LONG-TERM CURRENT USE OF INSULIN:  - Monitored the patient's A1c, which is 7.0,  and current blood glucose level at 153 (midday after eating).  - While the A1c is higher than previous levels, it's still within the guideline of 7 or less.  - However, blood sugar control has worsened due to poor diet adherence.  - Acknowledged the patient's efforts to refocus on diet control and advised getting back on track with diet management, including avoiding candy purchases during Easter.    ATHEROSCLEROSIS OF AORTA:  - Monitored presence of plaque in the aorta, indicating atherosclerosis.  - Noted it's not a large aneurysm.  - Decided against calcium score test as it won't change current management.  - Recommend maintenance through exercise, cholesterol control, and preventing further progression.    CEREBROVASCULAR DISEASE:  - Reviewed MRI of the brain showing few tiny white specks, indicating blood pressure-related changes.  - Neurologist Dr. Colunga evaluated and found brain changes consistent with a 74-year-old woman.  - Acknowledged age-related brain changes and confirmed no significant issues were missed.    ESSENTIAL HYPERTENSION:  - Monitored blood pressure, with white specks on brain MRI attributed to this condition.  - Continued current blood pressure medication.    URINARY FREQUENCY:  - Evaluated patient's report of improvement in urination with new medication.  - Continued Myrbetriq for bladder control and Estradiol vaginal cream.    VITAMIN B DEFICIENCY:  - Monitored B complex levels, which were on the low side according to Dr. Colunga.  - Estefanía reported no noticeable difference after taking B complex supplement.  - Continued supplement as recommended by Dr. Colunga.    DIABETES MANAGEMENT:  - Increased Levemir from 15 units to 18 units temporarily for a few weeks to help get blood sugar back on track.  - Continued Mounjaro for diabetes management.    GENERAL HEALTH MAINTENANCE:  - Explained calcium supplementation for bone health, recommending 600 mg 2 times daily for a total of 1,200 mg,  as body cannot absorb more than 600 mg at a time.  - Estefanía to continue current exercise regimen.  - Continued supplements: Matt Red (2 daily), Nature Made Advance Multi for her, and Claritin in the morning.    FOLLOW-UP:  - Referred to podiatrist (Sujit) for evaluation of toe issues and shoe recommendations.        Plan:       1. Type 2 diabetes mellitus with other specified complication, without long-term current use of insulin  -     POCT HEMOGLOBIN A1C  -      DIABETES FOOT EXAM    2. Hypertension, unspecified type  Comments:  bp controlled  Orders:  -     metoprolol succinate (TOPROL-XL) 50 MG 24 hr tablet; Take 1 tablet (50 mg total) by mouth once daily.  Dispense: 90 tablet; Refill: 1  -     lisinopriL (PRINIVIL,ZESTRIL) 20 MG tablet; Take 1 tablet (20 mg total) by mouth once daily.  Dispense: 90 tablet; Refill: 1    3. Hypertension, unspecified type  -     metoprolol succinate (TOPROL-XL) 50 MG 24 hr tablet; Take 1 tablet (50 mg total) by mouth once daily.  Dispense: 90 tablet; Refill: 1  -     lisinopriL (PRINIVIL,ZESTRIL) 20 MG tablet; Take 1 tablet (20 mg total) by mouth once daily.  Dispense: 90 tablet; Refill: 1    4. Mixed hyperlipidemia  Comments:  lipitor 40    5. Menopause  Comments:  estradiol    6. Sleep apnea, unspecified type  Comments:  cpap    7. Gastroesophageal reflux disease with esophagitis without hemorrhage  Comments:  prevacid    8. OAB (overactive bladder)  Comments:  myrbetriq      Follow up in about 3 months (around 6/20/2025), or if symptoms worsen or fail to improve, for medication management, Diabetic Check-Up.          Counseled on age and gender appropriate medical preventative services, including cancer screenings, immunizations, overall nutritional health, need for a consistent exercise regimen and an overall push towards maintaining a vigorous and active lifestyle.      This note was generated with the assistance of ambient listening technology. Verbal consent was obtained  by the patient and accompanying visitor(s) for the recording of patient appointment to facilitate this note. I attest to having reviewed and edited the generated note for accuracy, though some syntax or spelling errors may persist. Please contact the author of this note for any clarification.       3/30/2025 Verna Shah NP    Answers submitted by the patient for this visit:  Diabetes Questionnaire (Submitted on 3/17/2025)  Chief Complaint: Diabetes problem  Diabetes type: type 2  MedicAlert ID: No  Disease duration: 25 Years  fatigue: Yes  foot paresthesias: Yes  visual change: Yes  confusion: Yes  speech difficulty: Yes  nervous/anxious: Yes  headaches: Yes  blackouts: No  hospitalization: No  nocturnal hypoglycemia: No  required assistance: Yes  required glucagon: No  CVA: No  heart disease: Yes  nephropathy: No  peripheral neuropathy: Yes  PVD: Yes  retinopathy: No  CAD risks: dyslipidemia, family history, hypertension, diabetes mellitus  Current treatments: insulin injections, oral agent (monotherapy)  Treatment compliance: most of the time  Dose schedule: pre-breakfast  Given by: patient  Injection sites: abdominal wall  Home blood tests: 3-4 x per day  Monitoring compliance: good  Weight trend: stable  Current diet: generally healthy  Meal planning: none  Dietitian visit: No  Eye exam current: No  Sees podiatrist: No         [1]   Current Outpatient Medications:     aspirin (ECOTRIN) 81 MG EC tablet, Aspir-81 mg tablet,delayed release  Take 1 tablet every day by oral route., Disp: , Rfl:     atorvastatin (LIPITOR) 40 MG tablet, Take 1 tablet (40 mg total) by mouth every evening., Disp: 90 tablet, Rfl: 3    cyclobenzaprine (FLEXERIL) 10 MG tablet, Take 1 tablet (10 mg total) by mouth 3 (three) times daily as needed for Muscle spasms., Disp: 270 tablet, Rfl: 1    insulin detemir U-100, Levemir, (LEVEMIR FLEXPEN) 100 unit/mL (3 mL) InPn pen, Inject 10 Units into the skin every evening., Disp: , Rfl:      lansoprazole (PREVACID) 15 MG capsule, 1 capsule, Disp: , Rfl:     levocetirizine (XYZAL) 5 MG tablet, Take 5 mg by mouth as needed., Disp: , Rfl:     metFORMIN (GLUCOPHAGE-XR) 500 MG ER 24hr tablet, Take 500 mg by mouth 2 (two) times daily with meals. 2 tablets bid, Disp: , Rfl:     mirabegron (MYRBETRIQ) 50 mg Tb24, Take 1 tablet (50 mg total) by mouth once daily., Disp: 30 tablet, Rfl: 11    nystatin (MYCOSTATIN) powder, Apply topically 3 (three) times daily., Disp: 180 g, Rfl: 3    tirzepatide 10 mg/0.5 mL PnIj, Inject 10 mg into the skin every 7 days., Disp: 4 Pen, Rfl: 3    biotin 10,000 mcg TbDL, , Disp: , Rfl:     estradioL (ESTRACE) 0.01 % (0.1 mg/gram) vaginal cream, Place 1 g vaginally 3 (three) times a week. (Patient not taking: Reported on 3/20/2025), Disp: 42.5 g, Rfl: 4    flash glucose sensor (FREESTYLE KARINA 14 DAY SENSOR) Kit, 1 kit by Misc.(Non-Drug; Combo Route) route once daily. (Patient not taking: Reported on 3/20/2025), Disp: 1 kit, Rfl: 11    lisinopriL (PRINIVIL,ZESTRIL) 20 MG tablet, Take 1 tablet (20 mg total) by mouth once daily., Disp: 90 tablet, Rfl: 1    metoprolol succinate (TOPROL-XL) 50 MG 24 hr tablet, Take 1 tablet (50 mg total) by mouth once daily., Disp: 90 tablet, Rfl: 1    nitroGLYCERIN (NITROSTAT) 0.4 MG SL tablet, Place 0.4 mg under the tongue., Disp: , Rfl:     ondansetron (ZOFRAN-ODT) 4 MG TbDL, Take 1 tablet (4 mg total) by mouth every 8 (eight) hours as needed. (Patient not taking: Reported on 3/20/2025), Disp: 30 tablet, Rfl: 0    vitamin D (VITAMIN D3) 1000 units Tab, Take 5,000 Units by mouth. (Patient not taking: Reported on 3/20/2025), Disp: , Rfl:

## 2025-04-09 ENCOUNTER — OFFICE VISIT (OUTPATIENT)
Dept: UROGYNECOLOGY | Facility: CLINIC | Age: 75
End: 2025-04-09
Payer: COMMERCIAL

## 2025-04-09 DIAGNOSIS — N81.9 VAGINAL VAULT PROLAPSE: ICD-10-CM

## 2025-04-09 DIAGNOSIS — N95.2 ATROPHIC VAGINITIS: ICD-10-CM

## 2025-04-09 DIAGNOSIS — R35.0 URINARY FREQUENCY: Primary | ICD-10-CM

## 2025-04-09 DIAGNOSIS — R39.14 FEELING OF INCOMPLETE BLADDER EMPTYING: ICD-10-CM

## 2025-04-09 DIAGNOSIS — N81.11 CYSTOCELE, MIDLINE: ICD-10-CM

## 2025-04-09 DIAGNOSIS — N39.46 MIXED INCONTINENCE URGE AND STRESS: ICD-10-CM

## 2025-04-09 PROCEDURE — 3288F FALL RISK ASSESSMENT DOCD: CPT | Mod: CPTII,S$GLB,, | Performed by: NURSE PRACTITIONER

## 2025-04-09 PROCEDURE — 1126F AMNT PAIN NOTED NONE PRSNT: CPT | Mod: CPTII,S$GLB,, | Performed by: NURSE PRACTITIONER

## 2025-04-09 PROCEDURE — 1159F MED LIST DOCD IN RCRD: CPT | Mod: CPTII,S$GLB,, | Performed by: NURSE PRACTITIONER

## 2025-04-09 PROCEDURE — 99214 OFFICE O/P EST MOD 30 MIN: CPT | Mod: 25,S$GLB,, | Performed by: NURSE PRACTITIONER

## 2025-04-09 PROCEDURE — 1160F RVW MEDS BY RX/DR IN RCRD: CPT | Mod: CPTII,S$GLB,, | Performed by: NURSE PRACTITIONER

## 2025-04-09 PROCEDURE — 57160 INSERT PESSARY/OTHER DEVICE: CPT | Mod: S$GLB,,, | Performed by: NURSE PRACTITIONER

## 2025-04-09 PROCEDURE — 99999 PR PBB SHADOW E&M-EST. PATIENT-LVL III: CPT | Mod: PBBFAC,,, | Performed by: NURSE PRACTITIONER

## 2025-04-09 PROCEDURE — 1101F PT FALLS ASSESS-DOCD LE1/YR: CPT | Mod: CPTII,S$GLB,, | Performed by: NURSE PRACTITIONER

## 2025-04-09 PROCEDURE — 87077 CULTURE AEROBIC IDENTIFY: CPT | Performed by: NURSE PRACTITIONER

## 2025-04-09 PROCEDURE — 4010F ACE/ARB THERAPY RXD/TAKEN: CPT | Mod: CPTII,S$GLB,, | Performed by: NURSE PRACTITIONER

## 2025-04-09 NOTE — PROGRESS NOTES
Subjective:       Patient ID: Estefanía Ray is a 74 y.o. female.    Chief Complaint: Pessary Check    HPI  Estefanía Ray is a 74 y.o. female.  Who is new to me, presents today for pessary fitting.  She was last seen in our office on 03/12/2025 with Dr. Dillard.  At that particular visit she was interested in pursuing surgical correction and was scheduled in June for surgery.  However, she recently had a CT which showed prominent arteriosclerosis involving the aorta and abdominal and pelvic branches.  This made the patient very nervous about undergoing any sort of surgical correction.  She would like to pursue a pessary at this time.  She has been reading the pessary information and would like to try a ring pessary if this is appropriate.  NP did review types and sizes of pessaries with the patient and we will certainly attempt a ring pessary 1st.  She denies any other acute complaints/concerns at this time is ready to proceed with the pessary.    Review of Systems   Constitutional:  Negative for activity change, fever and unexpected weight change.   HENT:  Negative for hearing loss.    Eyes:  Negative for visual disturbance.   Respiratory:  Negative for shortness of breath and wheezing.    Cardiovascular:  Negative for chest pain, palpitations and leg swelling.   Gastrointestinal:  Negative for abdominal pain, constipation and diarrhea.   Genitourinary:  Positive for frequency and urgency. Negative for dyspareunia, dysuria, vaginal bleeding and vaginal discharge.        Vaginal bulge   Musculoskeletal:  Negative for gait problem and neck pain.   Skin:  Negative for rash and wound.   Allergic/Immunologic: Negative for immunocompromised state.   Neurological:  Negative for tremors, speech difficulty and weakness.   Hematological:  Does not bruise/bleed easily.   Psychiatric/Behavioral:  Negative for agitation and confusion.        Objective:      Physical Exam  Vitals reviewed. Exam conducted with a chaperone present.    Constitutional:       General: She is not in acute distress.     Appearance: She is well-developed.   HENT:      Head: Normocephalic and atraumatic.   Neck:      Thyroid: No thyromegaly.   Pulmonary:      Effort: Pulmonary effort is normal. No respiratory distress.   Abdominal:      Palpations: Abdomen is soft.      Tenderness: There is no abdominal tenderness.      Hernia: No hernia is present.   Musculoskeletal:         General: Normal range of motion.      Cervical back: Normal range of motion.   Skin:     General: Skin is warm and dry.      Findings: No rash.   Neurological:      Mental Status: She is alert and oriented to person, place, and time.   Psychiatric:         Mood and Affect: Mood normal.         Behavior: Behavior normal.         Thought Content: Thought content normal.       Pelvic Exam:  V: No lesions. No palpable nodes.   Va:  No discharge or bleeding.  Tissue is mildly atrophic  Meatus:No caruncle or stenosis  Urethra: Non tender. No suburethral masses.  Cx/Cuff: Normal   Uterus:  Surgically absent  Ad: No mass or tenderness.  Levators :Symmetrical. Normal tone. Non tender.  BL: Non tender  RV: No hemorrhoids.      POP-Q  Aa: 3 Ba: 5 C: 5   GH: 6 PB: 4 TVL: 9   Ap: -1 Bp: -1 D:          Assessment:       1. Urinary frequency    2. Mixed incontinence urge and stress    3. Feeling of incomplete bladder emptying    4. Vaginal vault prolapse    5. Cystocele, midline    6. Atrophic vaginitis          Procedure note-  After betadine irrigation of the vagina, #4 ring pessary was inserted into the vagina.  Patient was able to sit comfortably and do various exercises in the room and retain the pessary.  NP reviewed removal and replacement of the pessary with patient.  Patient verbalized understanding.  Pt ambulated in the room and denies any discomfort.  NP reminded pt that the first 24-48 hours are the most likely time for the pessary to fall out and to monitor the toilet before flushing.  Pt  verbalized understanding.      Plan:       Urinary frequency UA today showed positive nitrites.  We will send her urine for culture as noted below  -     Urine Culture High Risk    Mixed incontinence urge and stress monitor at this time    Feeling of incomplete bladder emptying monitor at this time    Vaginal vault prolapse trial of ring pessary    Cystocele, midline as noted above    Atrophic vaginitis continue Estrace cream    RTC one-month

## 2025-04-11 ENCOUNTER — RESULTS FOLLOW-UP (OUTPATIENT)
Dept: UROGYNECOLOGY | Facility: CLINIC | Age: 75
End: 2025-04-11

## 2025-04-11 LAB — BACTERIA UR CULT: ABNORMAL

## 2025-04-11 RX ORDER — CEPHALEXIN 500 MG/1
500 CAPSULE ORAL EVERY 8 HOURS
Qty: 15 CAPSULE | Refills: 0 | Status: SHIPPED | OUTPATIENT
Start: 2025-04-11 | End: 2025-04-14

## 2025-04-14 ENCOUNTER — NURSE TRIAGE (OUTPATIENT)
Dept: ADMINISTRATIVE | Facility: CLINIC | Age: 75
End: 2025-04-14
Payer: COMMERCIAL

## 2025-04-14 NOTE — TELEPHONE ENCOUNTER
Spoke with pt who reports that she had pessary placed on Wednesday. States after picking up small pack of drinks pessary began to fall out. Pt also reports that she was prescribed an antibiotic, but it is causing nausea. She states that she started antibiotic Saturday, but has not taken 2nd dose. Of medication. Dispo is to be seen in office today or tomorrow. No appointment available. Will send message to office. Also advised can be seen in ED/UC. She verbalized understanding.    Reason for Disposition   Patient wants to be seen    Additional Information   Negative: Sounds like a life-threatening emergency to the triager   Negative: Bright red, wide-spread, sunburn-like rash   Negative: SEVERE headache (e.g., excruciating) and after spinal (epidural) anesthesia   Negative: Vomiting and persists > 4 hours   Negative: Vomiting and abdomen looks much more swollen than usual   Negative: Drinking very little and dehydration suspected (e.g., no urine > 12 hours, very dry mouth, very lightheaded)   Negative: Patient sounds very sick or weak to the triager   Negative: Sounds like a serious complication to the triager   Negative: Fever > 100.4 F (38.0 C)   Negative: Caller has URGENT question and triager unable to answer question   Negative: SEVERE post-op pain (e.g., excruciating, pain scale 8-10) that is not controlled with pain medications   Negative: MILD - MODERATE headache (e.g., pain scale 1-7)  and after spinal (epidural) anesthesia   Negative: Fever present > 3 days (72 hours)    Protocols used: Post-Op Symptoms and [id number]

## 2025-04-14 NOTE — TELEPHONE ENCOUNTER
Spoke to patient had tried to put pessary in . But came back out, scheduled for nurse visit on thusrday.

## 2025-04-17 ENCOUNTER — CLINICAL SUPPORT (OUTPATIENT)
Dept: UROGYNECOLOGY | Facility: CLINIC | Age: 75
End: 2025-04-17
Payer: COMMERCIAL

## 2025-04-17 DIAGNOSIS — Z46.89 PESSARY MAINTENANCE: Primary | ICD-10-CM

## 2025-04-17 PROCEDURE — 99499 UNLISTED E&M SERVICE: CPT | Mod: S$GLB,,, | Performed by: OBSTETRICS & GYNECOLOGY

## 2025-04-17 NOTE — PROGRESS NOTES
Patient came in today stating the pessary had fallen out.  She tried to reinserted but was unable to do so.  Vaginal tissue appeared in good health so the #4 ring pessary was replaced.  The patient did various exercises in the room and the pessary came out on her in the office room.  A #5 ring pessary was then placed.  The patient again did various exercises in the room and the pessary stayed in place.  NP did review with patient that most likely time for a pessary to come out as the 1st 24-48 hours after placement and/or with straining with a bowel movement.  Patient verbalized understanding she will let us know if she has any problems with this new pessary

## 2025-04-21 ENCOUNTER — TELEPHONE (OUTPATIENT)
Dept: UROGYNECOLOGY | Facility: CLINIC | Age: 75
End: 2025-04-21
Payer: COMMERCIAL

## 2025-04-21 NOTE — TELEPHONE ENCOUNTER
----- Message from Betsy sent at 4/21/2025  2:42 PM CDT -----  Regarding: appt request  Name of Who is Calling: Estefanía What is the request in detail: Patient is requesting a call back to schedule and appointment because she lost her pessary. She also want to schedule with Mike for her annual check up. Can the clinic reply by [facility]: Yes What Number to Call Back if not in [facility]:  [phone]

## 2025-04-21 NOTE — TELEPHONE ENCOUNTER
Pessary causing pain took the #5 out was looking for the #4 but does not have it comeing in on wednesday

## 2025-04-22 ENCOUNTER — TELEPHONE (OUTPATIENT)
Dept: FAMILY MEDICINE | Facility: CLINIC | Age: 75
End: 2025-04-22
Payer: COMMERCIAL

## 2025-04-22 NOTE — TELEPHONE ENCOUNTER
----- Message from Hortencia sent at 4/22/2025  1:00 PM CDT -----  Vm- 12:48-pt has been having indigestion and heart burn. Would like to know if she can take 2 prilosec  [phone]

## 2025-04-22 NOTE — TELEPHONE ENCOUNTER
Spoke to pt and she stated for the past 7 to 8 days she has been having severe indigestion. Starts at her chest and goes down to her belly button. Has tried several over the counter medications and no worked. She tried prilosec and she got a little relief. Wants to know if she can take it twice daily. No other symptoms. Pain from middle of chest down to belly button. Said the pain today is a 5/10

## 2025-04-23 ENCOUNTER — OFFICE VISIT (OUTPATIENT)
Dept: UROGYNECOLOGY | Facility: CLINIC | Age: 75
End: 2025-04-23
Payer: COMMERCIAL

## 2025-04-23 DIAGNOSIS — N81.9 VAGINAL VAULT PROLAPSE: ICD-10-CM

## 2025-04-23 DIAGNOSIS — N95.2 ATROPHIC VAGINITIS: ICD-10-CM

## 2025-04-23 DIAGNOSIS — Z46.89 PESSARY MAINTENANCE: ICD-10-CM

## 2025-04-23 DIAGNOSIS — R35.0 URINARY FREQUENCY: Primary | ICD-10-CM

## 2025-04-23 DIAGNOSIS — R39.14 FEELING OF INCOMPLETE BLADDER EMPTYING: ICD-10-CM

## 2025-04-23 DIAGNOSIS — N81.11 CYSTOCELE, MIDLINE: ICD-10-CM

## 2025-04-23 DIAGNOSIS — N39.46 MIXED INCONTINENCE URGE AND STRESS: ICD-10-CM

## 2025-04-23 PROCEDURE — 57160 INSERT PESSARY/OTHER DEVICE: CPT | Mod: S$GLB,,, | Performed by: NURSE PRACTITIONER

## 2025-04-23 PROCEDURE — 99999 PR PBB SHADOW E&M-EST. PATIENT-LVL III: CPT | Mod: PBBFAC,,, | Performed by: NURSE PRACTITIONER

## 2025-04-23 PROCEDURE — 99499 UNLISTED E&M SERVICE: CPT | Mod: S$GLB,,, | Performed by: NURSE PRACTITIONER

## 2025-04-24 NOTE — PROGRESS NOTES
Subjective:       Patient ID: Estefanía Ray is a 74 y.o. female.    Chief Complaint: Pessary Check    HPI  Estefanía Ray is a 74 y.o. female.  Who presents today for pessary concerns.  She was seen in our office on 04/09/2025 and wanted to try a pessary.  At that visit a #4 ring pessary was placed.  On 04/17/2025 she came in because the #4 ring pessary had come out she had tried to replace it but could not get the pessary to stay.  At that time we changed to a #5 ring pessary.  Patient felt that this stayed in place and was working fairly well for her when she left the office.  She called the office on 04/21/2025 stating that she had removed the #5 ring pessary because it was very uncomfortable for her.  She is trying to find her #4 ring pessary but was unable to find it.  She would like to try this size again.  NP did review with the patient that there is a likelihood that the smaller pessary will come out as it was coming out prior to this appointment.  If the 5 was uncomfortable for her we may need to consider trying a different type of pessary.  Patient would like to continue with the #4 pessary at this time but will let us know if she continues to have issues.    Review of Systems   Constitutional:  Negative for activity change, fever and unexpected weight change.   HENT:  Negative for hearing loss.    Eyes:  Negative for visual disturbance.   Respiratory:  Negative for shortness of breath and wheezing.    Cardiovascular:  Negative for chest pain, palpitations and leg swelling.   Gastrointestinal:  Negative for abdominal pain, constipation and diarrhea.   Genitourinary:  Positive for frequency. Negative for dyspareunia, dysuria, urgency, vaginal bleeding and vaginal discharge.   Musculoskeletal:  Negative for gait problem and neck pain.   Skin:  Negative for rash and wound.   Allergic/Immunologic: Negative for immunocompromised state.   Neurological:  Negative for tremors, speech difficulty and weakness.    Hematological:  Does not bruise/bleed easily.   Psychiatric/Behavioral:  Negative for agitation and confusion.        Objective:      Physical Exam  Vitals reviewed. Exam conducted with a chaperone present.   Constitutional:       General: She is not in acute distress.     Appearance: She is well-developed.   HENT:      Head: Normocephalic and atraumatic.   Neck:      Thyroid: No thyromegaly.   Pulmonary:      Effort: Pulmonary effort is normal. No respiratory distress.   Abdominal:      Palpations: Abdomen is soft.      Tenderness: There is no abdominal tenderness.      Hernia: No hernia is present.   Musculoskeletal:         General: Normal range of motion.      Cervical back: Normal range of motion.   Skin:     General: Skin is warm and dry.      Findings: No rash.   Neurological:      Mental Status: She is alert and oriented to person, place, and time.   Psychiatric:         Mood and Affect: Mood normal.         Behavior: Behavior normal.         Thought Content: Thought content normal.       Pelvic Exam:  V: No lesions. No palpable nodes.   Va:  No discharge or bleeding  Meatus:No caruncle or stenosis  Urethra: Non tender. No suburethral masses.  Cx/Cuff: Normal   Uterus:  surgically absent  Ad: No mass or tenderness.  Levators :Symmetrical. Normal tone. Non tender.  BL: Non tender  RV: No hemorrhoids.        POP-Q  Aa: 3 Ba: 5 C: 5   GH: 6 PB: 4 TVL: 9   Ap: -1 Bp: -1 D:              Assessment:       1. Urinary frequency    2. Mixed incontinence urge and stress    3. Feeling of incomplete bladder emptying    4. Vaginal vault prolapse    5. Cystocele, midline    6. Pessary maintenance    7. Atrophic vaginitis          Procedure note-   After betadine irrigation of the vagina, a #4 ring pessary was reinserted into the vagina.  Pt ambulated in the room and denies any discomfort.  Patient was able to do various exercises in the room and retain the pessary today.  NP reminded pt that the first 24-48 hours are  the most likely time for the pessary to fall out and to monitor the toilet before flushing.  Pt verbalized understanding.      Plan:       Urinary frequency monitor    Mixed incontinence urge and stress monitor    Feeling of incomplete bladder emptying monitor    Vaginal vault prolapse trial of #4 ring pessary again but may need to consider a different type of pessary    Cystocele, midline as noted above    Pessary maintenance as noted above    Atrophic vaginitis continue Estrace cream    RTC 3 months or PRN

## 2025-04-28 ENCOUNTER — TELEPHONE (OUTPATIENT)
Dept: FAMILY MEDICINE | Facility: CLINIC | Age: 75
End: 2025-04-28
Payer: COMMERCIAL

## 2025-04-28 NOTE — TELEPHONE ENCOUNTER
----- Message from Hortencia sent at 4/28/2025  7:33 AM CDT -----  - 4/25-12:09 pm- pt would like to see demetrio next week [phone] (

## 2025-04-28 NOTE — TELEPHONE ENCOUNTER
Spoke to pt and she is having stomach issues still. She thought it was just indigestion but nothing is helping. Wants to come in this week and see Verna. Symptoms have been going on 2 weeks

## 2025-04-29 ENCOUNTER — CLINICAL SUPPORT (OUTPATIENT)
Dept: UROGYNECOLOGY | Facility: CLINIC | Age: 75
End: 2025-04-29
Payer: COMMERCIAL

## 2025-04-29 DIAGNOSIS — N81.9 VAGINAL VAULT PROLAPSE: Primary | ICD-10-CM

## 2025-04-29 NOTE — PROGRESS NOTES
Unable to keep  pessary in place was replaced with # 3 cube pessary. And ambulated around the room was able to stay in.

## 2025-05-01 ENCOUNTER — OFFICE VISIT (OUTPATIENT)
Dept: FAMILY MEDICINE | Facility: CLINIC | Age: 75
End: 2025-05-01
Payer: COMMERCIAL

## 2025-05-01 VITALS
DIASTOLIC BLOOD PRESSURE: 64 MMHG | SYSTOLIC BLOOD PRESSURE: 124 MMHG | BODY MASS INDEX: 27.04 KG/M2 | HEIGHT: 63 IN | WEIGHT: 152.63 LBS | HEART RATE: 70 BPM | OXYGEN SATURATION: 99 %

## 2025-05-01 DIAGNOSIS — N99.3 PELVIC RELAXATION DUE TO VAGINAL VAULT PROLAPSE, POSTHYSTERECTOMY: ICD-10-CM

## 2025-05-01 DIAGNOSIS — E78.2 MIXED HYPERLIPIDEMIA: ICD-10-CM

## 2025-05-01 DIAGNOSIS — G47.30 SLEEP APNEA, UNSPECIFIED TYPE: ICD-10-CM

## 2025-05-01 DIAGNOSIS — I10 HYPERTENSION, UNSPECIFIED TYPE: ICD-10-CM

## 2025-05-01 DIAGNOSIS — K21.00 GASTROESOPHAGEAL REFLUX DISEASE WITH ESOPHAGITIS WITHOUT HEMORRHAGE: ICD-10-CM

## 2025-05-01 DIAGNOSIS — Z95.1 S/P CABG (CORONARY ARTERY BYPASS GRAFT): ICD-10-CM

## 2025-05-01 DIAGNOSIS — E11.69 TYPE 2 DIABETES MELLITUS WITH OTHER SPECIFIED COMPLICATION, WITHOUT LONG-TERM CURRENT USE OF INSULIN: Primary | ICD-10-CM

## 2025-05-01 LAB — HBA1C MFR BLD: 7.1 %

## 2025-05-01 PROCEDURE — 3051F HG A1C>EQUAL 7.0%<8.0%: CPT | Mod: CPTII,S$GLB,, | Performed by: NURSE PRACTITIONER

## 2025-05-01 PROCEDURE — 99214 OFFICE O/P EST MOD 30 MIN: CPT | Mod: S$GLB,,, | Performed by: NURSE PRACTITIONER

## 2025-05-01 PROCEDURE — 1160F RVW MEDS BY RX/DR IN RCRD: CPT | Mod: CPTII,S$GLB,, | Performed by: NURSE PRACTITIONER

## 2025-05-01 PROCEDURE — 3288F FALL RISK ASSESSMENT DOCD: CPT | Mod: CPTII,S$GLB,, | Performed by: NURSE PRACTITIONER

## 2025-05-01 PROCEDURE — 4010F ACE/ARB THERAPY RXD/TAKEN: CPT | Mod: CPTII,S$GLB,, | Performed by: NURSE PRACTITIONER

## 2025-05-01 PROCEDURE — 3074F SYST BP LT 130 MM HG: CPT | Mod: CPTII,S$GLB,, | Performed by: NURSE PRACTITIONER

## 2025-05-01 PROCEDURE — 1159F MED LIST DOCD IN RCRD: CPT | Mod: CPTII,S$GLB,, | Performed by: NURSE PRACTITIONER

## 2025-05-01 PROCEDURE — 3008F BODY MASS INDEX DOCD: CPT | Mod: CPTII,S$GLB,, | Performed by: NURSE PRACTITIONER

## 2025-05-01 PROCEDURE — 1101F PT FALLS ASSESS-DOCD LE1/YR: CPT | Mod: CPTII,S$GLB,, | Performed by: NURSE PRACTITIONER

## 2025-05-01 PROCEDURE — 3078F DIAST BP <80 MM HG: CPT | Mod: CPTII,S$GLB,, | Performed by: NURSE PRACTITIONER

## 2025-05-01 PROCEDURE — 1125F AMNT PAIN NOTED PAIN PRSNT: CPT | Mod: CPTII,S$GLB,, | Performed by: NURSE PRACTITIONER

## 2025-05-01 RX ORDER — PANTOPRAZOLE SODIUM 40 MG/1
40 TABLET, DELAYED RELEASE ORAL DAILY
Qty: 90 TABLET | Refills: 3 | Status: SHIPPED | OUTPATIENT
Start: 2025-05-01 | End: 2026-05-01

## 2025-05-01 RX ORDER — FAMOTIDINE 40 MG/1
40 TABLET, FILM COATED ORAL NIGHTLY PRN
Qty: 90 TABLET | Refills: 1 | Status: SHIPPED | OUTPATIENT
Start: 2025-05-01 | End: 2025-05-01

## 2025-05-01 RX ORDER — PANTOPRAZOLE SODIUM 40 MG/1
40 TABLET, DELAYED RELEASE ORAL DAILY
Qty: 90 TABLET | Refills: 3 | Status: SHIPPED | OUTPATIENT
Start: 2025-05-01 | End: 2025-05-01

## 2025-05-01 RX ORDER — FAMOTIDINE 40 MG/1
40 TABLET, FILM COATED ORAL NIGHTLY PRN
Qty: 90 TABLET | Refills: 1 | Status: SHIPPED | OUTPATIENT
Start: 2025-05-01 | End: 2026-05-01

## 2025-05-05 ENCOUNTER — TELEPHONE (OUTPATIENT)
Dept: UROGYNECOLOGY | Facility: CLINIC | Age: 75
End: 2025-05-05
Payer: COMMERCIAL

## 2025-05-05 ENCOUNTER — TELEPHONE (OUTPATIENT)
Dept: FAMILY MEDICINE | Facility: CLINIC | Age: 75
End: 2025-05-05
Payer: COMMERCIAL

## 2025-05-05 NOTE — TELEPHONE ENCOUNTER
Called and states that  friday the pessary stayed in , saturday it was coming down, but when she went to bed it had moved back up. Then sunday she feels some of the prolapse coming out around the pessary , scheduled for wednesday,

## 2025-05-05 NOTE — TELEPHONE ENCOUNTER
----- Message from Marli sent at 5/5/2025  9:31 AM CDT -----  Contact: self  Type:  Needs Medical AdviceWho Called: pt Symptoms (please be specific):  How long has patient had these symptoms:  Pharmacy name and phone #:  Would the patient rather a call back or a response via [facility]? callBest Call Back Number: [phone] Information: pt states she would like for the office to give her a call   Please call back to advise. Thanks!

## 2025-05-05 NOTE — TELEPHONE ENCOUNTER
----- Message from Hortencia sent at 5/5/2025  9:43 AM CDT -----  - pt is calling back to let demetrio know about her stomach issues. She is doing great and needs to know if she needs to continue the medication or go back to prevacid [phone]

## 2025-05-06 LAB
LEFT EYE DM RETINOPATHY: NEGATIVE
RIGHT EYE DM RETINOPATHY: NEGATIVE

## 2025-05-07 ENCOUNTER — OFFICE VISIT (OUTPATIENT)
Dept: UROGYNECOLOGY | Facility: CLINIC | Age: 75
End: 2025-05-07
Payer: COMMERCIAL

## 2025-05-07 DIAGNOSIS — N81.9 VAGINAL VAULT PROLAPSE: ICD-10-CM

## 2025-05-07 DIAGNOSIS — Z46.89 PESSARY MAINTENANCE: ICD-10-CM

## 2025-05-07 DIAGNOSIS — N81.11 CYSTOCELE, MIDLINE: ICD-10-CM

## 2025-05-07 DIAGNOSIS — N39.46 MIXED INCONTINENCE URGE AND STRESS: ICD-10-CM

## 2025-05-07 DIAGNOSIS — N95.2 ATROPHIC VAGINITIS: ICD-10-CM

## 2025-05-07 DIAGNOSIS — R35.0 URINARY FREQUENCY: Primary | ICD-10-CM

## 2025-05-07 PROCEDURE — 99999 PR PBB SHADOW E&M-EST. PATIENT-LVL III: CPT | Mod: PBBFAC,,, | Performed by: NURSE PRACTITIONER

## 2025-05-07 NOTE — PROGRESS NOTES
Subjective:       Patient ID: Estefanía Ray is a 74 y.o. female.    Chief Complaint: Pessary Check    HPI  Estefanía Ray is a 74 y.o. female.  Who presents today for pessary concerns.  She was last seen in our office on 04/29/2025.  At that particular visit we placed a #3 cube pessary.  She felt that the pessary has been staying in place okay.  However, if she is standing for long periods of time or if she is doing any of her normal ADLs, she feels that the bladder is coming around the pessary and it would is getting to the point where it is uncomfortable/painful.  She ends up having to go lay down and then the pessary and prolapse get in a better position and by the morning she is fine.  She is trying to figure out what can be done as she really wants a pessary to work.    Review of Systems   Constitutional:  Negative for activity change, fever and unexpected weight change.   HENT:  Negative for hearing loss.    Eyes:  Negative for visual disturbance.   Respiratory:  Negative for shortness of breath and wheezing.    Cardiovascular:  Negative for chest pain, palpitations and leg swelling.   Gastrointestinal:  Negative for abdominal pain, constipation and diarrhea.   Genitourinary:  Positive for frequency and urgency. Negative for dyspareunia, dysuria, vaginal bleeding and vaginal discharge.   Musculoskeletal:  Negative for gait problem and neck pain.   Skin:  Negative for rash and wound.   Allergic/Immunologic: Negative for immunocompromised state.   Neurological:  Negative for tremors, speech difficulty and weakness.   Hematological:  Does not bruise/bleed easily.   Psychiatric/Behavioral:  Negative for agitation and confusion.        Objective:      Physical Exam  Vitals reviewed. Exam conducted with a chaperone present.   Constitutional:       General: She is not in acute distress.     Appearance: She is well-developed.   HENT:      Head: Normocephalic and atraumatic.   Neck:      Thyroid: No thyromegaly.    Pulmonary:      Effort: Pulmonary effort is normal. No respiratory distress.   Abdominal:      Palpations: Abdomen is soft.      Tenderness: There is no abdominal tenderness.      Hernia: No hernia is present.   Musculoskeletal:         General: Normal range of motion.      Cervical back: Normal range of motion.   Skin:     General: Skin is warm and dry.      Findings: No rash.   Neurological:      Mental Status: She is alert and oriented to person, place, and time.   Psychiatric:         Mood and Affect: Mood normal.         Behavior: Behavior normal.         Thought Content: Thought content normal.       Pelvic Exam:  V: No lesions. No palpable nodes.   Va:  No discharge or bleeding.  Some bulging noted around the pessary  Meatus:No caruncle or stenosis  Urethra: Non tender. No suburethral masses.  Cx/Cuff: Normal   Uterus:  Surgically absent  Ad: No mass or tenderness.  Levators :Symmetrical. Normal tone. Non tender.  BL: Non tender  RV: No hemorrhoids.      POP-Q  Aa: 0 Ba: 1 C: 1   GH: 6 PB: 4 TVL: 9   Ap: -1 Bp: -1 D:            Assessment:       1. Urinary frequency    2. Mixed incontinence urge and stress    3. Vaginal vault prolapse    4. Cystocele, midline    5. Pessary maintenance    6. Atrophic vaginitis          Procedure note- #3 cube  pessary removed with forceps and cleaned with soap and water and sent home with the patient.  After betadine irrigation of the vagina, #5 cube pessary was reinserted into the vagina.  Pt ambulated in the room and has some very minor discomfort.  She is able to sit and move but does feel that the pessary is more noticeable at this point than the prior 1.  NP reminded pt that the first 24-48 hours are the most likely time for the pessary to fall out and to monitor the toilet before flushing.  Pt verbalized understanding.      Plan:       Urinary frequency monitor    Mixed incontinence urge and stress monitor    Vaginal vault prolapse trial of #5 cube  pessary    Cystocele, midline as noted above    Pessary maintenance as noted above    Atrophic vaginitis continue Estrace cream    RTC one-month

## 2025-05-12 NOTE — PROGRESS NOTES
SUBJECTIVE:    Patient ID: Estefanía Ray is a 74 y.o. female.    Chief Complaint: Gastroesophageal Reflux (No bottles//Pt is here for ongoing indigestion for 7-10 days, and constant constipation//Eye exam scheduled this month just not sure when exactly//KE)      History of Present Illness    CHIEF COMPLAINT:  74 year old female presents today with abdominal pain and burning.    GASTROINTESTINAL:  She reports large area of burning abdominal pain extending across a significant portion of the abdomen. Symptoms are exacerbated by certain foods, with a severe episode after consuming crab cake and coleslaw. She denies associated belching. She continues Prevacid, occasionally requiring twice daily dosing, and has history of taking Nexium. She reports ongoing constipation managed with Benefiber and stool softener. She uses gummy laxatives but finds their timing unpredictable with variable onset of action (6-8 hours). She had previous adverse reaction to Linzess resulting in severe diarrhea lasting one month that was unresponsive to the recommended antidote.    IMAGING:  CT urogram in February showed evidence of chronic gallbladder disease.    MEDICATIONS:  She takes insulin 20 units and is currently on second prescription of Mounjaro after first prescription was ineffective.      ROS:  General: -fever, -chills, -fatigue, -weight gain, -weight loss  Eyes: -vision changes, -redness, -discharge  ENT: -ear pain, -nasal congestion, -sore throat  Cardiovascular: -chest pain, -palpitations, -lower extremity edema  Respiratory: -cough, -shortness of breath  Gastrointestinal: +abdominal pain, -nausea, -vomiting, -diarrhea, +constipation, -blood in stool, +indigestion  Genitourinary: -dysuria, -hematuria, -frequency  Musculoskeletal: -joint pain, -muscle pain  Skin: -rash, -lesion  Neurological: -headache, -dizziness, -numbness, -tingling  Psychiatric: -anxiety, -depression, -sleep difficulty              Office Visit on 05/01/2025    Component Date Value Ref Range Status    Hemoglobin A1C, POC 05/01/2025 7.1  % Final   Office Visit on 04/09/2025   Component Date Value Ref Range Status    Urine Culture 04/09/2025 >100,000 cfu/ml Coagulase-negative Staphylococcus species (A)   Final   Hospital Outpatient Visit on 02/27/2025   Component Date Value Ref Range Status    POC Creatinine 02/27/2025 0.9  0.5 - 1.4 mg/dL Final    Sample 02/27/2025 VENOUS   Final   Office Visit on 12/12/2024   Component Date Value Ref Range Status    Hemoglobin A1C, POC 12/12/2024 6.7  % Final       Past Medical History:   Diagnosis Date    Allergy     Diabetes mellitus, type 2     GERD (gastroesophageal reflux disease)     Hyperlipidemia     Hypertension     JOHN (obstructive sleep apnea)      Past Surgical History:   Procedure Laterality Date    HYSTERECTOMY      KNEE SURGERY Left     TONSILLECTOMY       No family history on file.    Tests to Keep You Healthy    Mammogram: Met on 12/5/2024  Eye Exam: DUE  Colon Cancer Screening: Met on 9/7/2016  Last Blood Pressure <= 139/89 (5/1/2025): Yes  Last HbA1c < 8 (05/01/2025): Yes      The 10-year CVD risk score (D'Agostino, et al., 2008) is: 20.5%    Values used to calculate the score:      Age: 74 years      Sex: Female      Diabetic: Yes      Tobacco smoker: No      Systolic Blood Pressure: 124 mmHg      Is BP treated: Yes      HDL Cholesterol: 40 mg/dL      Total Cholesterol: 145 mg/dL     Marital Status:   Alcohol History:  reports no history of alcohol use.  Tobacco History:  reports that she has never smoked. She has never used smokeless tobacco.  Drug History:  reports no history of drug use.    Health Maintenance Topics with due status: Not Due       Topic Last Completion Date    Colorectal Cancer Screening 09/07/2016    Diabetes Urine Screening 11/21/2024    Lipid Panel 11/21/2024    Mammogram 12/05/2024    DEXA Scan 12/05/2024    Foot Exam 03/30/2025    Hemoglobin A1c 05/01/2025    High Dose Statin 05/07/2025  "   Aspirin/Antiplatelet Therapy 05/07/2025     Immunization History   Administered Date(s) Administered    Influenza (FLUAD) - Quadrivalent - Adjuvanted - PF *Preferred* (65+) 11/29/2023    Influenza - Quadrivalent - High Dose - PF (65 years and older) 11/03/2022    Influenza - Trivalent - Fluad - Adjuvanted - PF (65 years and older 11/25/2024    Influenza - Trivalent - Fluzone High Dose - PF (65 years and older) 09/09/2016    Pneumococcal Conjugate - 13 Valent 03/10/2017    Pneumococcal Polysaccharide - 23 Valent 04/12/2018    Tdap 04/07/2015       Review of patient's allergies indicates:   Allergen Reactions    Empagliflozin Other (See Comments)    Semaglutide Hives    Sitagliptin phos-metformin Other (See Comments)     Other reaction(s): diarrhea    Ezetimibe Nausea And Vomiting     Other reaction(s): Other (See Comments), Unknown    Feels terrible when taking medication    Other reaction(s): Other (See Comments)   Feels terrible when taking medication    Other reaction(s): Other (See Comments), Unknown   Feels terrible when taking medication    Farxiga [dapagliflozin] Rash     Genital rash    Keflex [cephalexin] Nausea Only and Other (See Comments)     Felt bad    Linaclotide Diarrhea and Nausea And Vomiting     Current Medications[1]        Objective:      Vitals:    05/01/25 1143   BP: 124/64   Pulse: 70   SpO2: 99%   Weight: 69.2 kg (152 lb 9.6 oz)   Height: 5' 3" (1.6 m)       Physical Exam  Vitals and nursing note reviewed.   Constitutional:       General: She is not in acute distress.     Appearance: Normal appearance. She is well-developed.   HENT:      Right Ear: External ear normal.      Left Ear: External ear normal.   Neck:      Vascular: No JVD.   Cardiovascular:      Rate and Rhythm: Normal rate and regular rhythm.      Heart sounds: No murmur heard.  Pulmonary:      Effort: Pulmonary effort is normal.      Breath sounds: Normal breath sounds.   Abdominal:      General: Bowel sounds are normal.      " Palpations: Abdomen is soft.      Tenderness: There is abdominal tenderness in the epigastric area. There is no right CVA tenderness or left CVA tenderness.   Musculoskeletal:         General: Normal range of motion.      Cervical back: Normal range of motion and neck supple.   Lymphadenopathy:      Cervical: No cervical adenopathy.   Skin:     General: Skin is warm and dry.      Findings: No rash.   Neurological:      Mental Status: She is alert and oriented to person, place, and time.      Gait: Gait normal.   Psychiatric:         Speech: Speech normal.         Behavior: Behavior normal.          Assessment:       1. Type 2 diabetes mellitus with other specified complication, without long-term current use of insulin    2. Hypertension, unspecified type    3. Mixed hyperlipidemia    4. Gastroesophageal reflux disease with esophagitis without hemorrhage    5. Sleep apnea, unspecified type    6. S/P CABG (coronary artery bypass graft)    7. 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  - A1C slightly elevated at 7.1, higher than previous levels.  - CT urogram from February showed evidence of chronic gallbladder disease, but symptoms not consistent with typical gallbladder pain.  - Considered change in acid reflux medication regimen due to persistent symptoms.  - Evaluated constipation management, considering alternatives to previous treatments.    TYPE 2 DIABETES MELLITUS WITH HYPERGLYCEMIA:  - Noted the patient's A1C has increased to 7.1, which is higher than previous levels.  - Confirmed the patient is currently taking 20 units of insulin.    GASTRO-ESOPHAGEAL REFLUX DISEASE (GERD):  - Noted the patient experiences burning pain from the stomach area, sometimes related to certain foods like crab cake and coleslaw.  - Assessed that the pain presentation is  atypical for GERD, as it extends further than usual.  - Estefanía is currently on Prevacid for acid management.  - Explained the difference between PPI (proton pump inhibitors) and H2 blockers in managing acid reflux.  - Discontinued Prevacid and prescribed Protonix (pending insurance coverage; if not covered, will prescribe Nexium instead) and Pepcid at bedtime.    CHRONIC GALLBLADDER DISEASE:  - Reviewed CT urogram from February which showed evidence of chronic gallbladder disease.  - Explained that chronic gallbladder disease involves changes over time, not necessarily causing current symptoms, and discussed its typical presentation.    CHRONIC IDIOPATHIC CONSTIPATION:  - Estefanía reports ongoing constipation, currently taking Benefiber and a stool softener.  - Documented that the patient had a severe reaction to Linzess, causing diarrhea for 1 month.  - Recommend trying probiotic supplement ([employer]) for constipation relief and provided information on prebiotics and probiotics for gut health.    GENERALIZED ABDOMINAL PAIN:  - Estefanía describes burning pain from the stomach area, sometimes related to certain foods.  - This symptom is being addressed under the GERD management plan.    FOLLOW-UP:  - Follow up in a few days if symptoms do not improve with medication changes.        Plan:       1. Type 2 diabetes mellitus with other specified complication, without long-term current use of insulin  Comments:  hga1c 7.1. up from previous  Orders:  -     POCT HEMOGLOBIN A1C    2. Hypertension, unspecified type  Comments:  bp is controlled    3. Mixed hyperlipidemia  Comments:  lipitor 40    4. Gastroesophageal reflux disease with esophagitis without hemorrhage  Comments:  start protonix and pepcid. gerd diet discussed    5. Sleep apnea, unspecified type    6. S/P CABG (coronary artery bypass graft)  Comments:  folllowed by cardio    7. 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  Dispense: 90 tablet; Refill: 3  -     Discontinue: famotidine (PEPCID) 40 MG tablet; Take 1 tablet (40 mg total) by mouth nightly as needed for Heartburn.  Dispense: 90 tablet; Refill: 1  -     famotidine (PEPCID) 40 MG tablet; Take 1 tablet (40 mg total) by mouth nightly as needed for Heartburn.  Dispense: 90 tablet; Refill: 1  -     pantoprazole (PROTONIX) 40 MG tablet; Take 1 tablet (40 mg total) by mouth once daily.  Dispense: 90 tablet; Refill: 3      Follow up in about 3 months (around 8/1/2025), or if symptoms worsen or fail to improve, for medication management, Diabetic Check-Up.          Counseled on age and gender appropriate medical preventative services, including cancer screenings, immunizations, overall nutritional health, need for a consistent exercise regimen and an overall push towards maintaining a vigorous and active lifestyle.      This note was generated with the assistance of ambient listening technology. Verbal consent was obtained by the patient and accompanying visitor(s) for the recording of patient appointment to facilitate this note. I attest to having reviewed and edited the generated note for accuracy, though some syntax or spelling errors may persist. Please contact the author of this note for any clarification.       5/12/2025 Verna Shah NP         [1]   Current Outpatient Medications:     aspirin (ECOTRIN) 81 MG EC tablet, Aspir-81 mg tablet,delayed release  Take 1 tablet every day by oral route., Disp: , Rfl:     atorvastatin (LIPITOR) 40 MG tablet, Take 1 tablet (40 mg total) by mouth every evening., Disp: 90 tablet, Rfl: 3    cyclobenzaprine (FLEXERIL) 10 MG tablet, Take 1 tablet (10 mg total) by mouth 3 (three) times daily as needed for Muscle spasms., Disp: 270 tablet, Rfl: 1    estradioL (ESTRACE) 0.01 % (0.1 mg/gram) vaginal cream,  Place 1 g vaginally 3 (three) times a week., Disp: 42.5 g, Rfl: 4    flash glucose sensor (FREESTYLE KARINA 14 DAY SENSOR) Kit, 1 kit by Misc.(Non-Drug; Combo Route) route once daily., Disp: 1 kit, Rfl: 11    insulin detemir U-100, Levemir, (LEVEMIR FLEXPEN) 100 unit/mL (3 mL) InPn pen, Inject 10 Units into the skin every evening., Disp: , Rfl:     levocetirizine (XYZAL) 5 MG tablet, Take 5 mg by mouth as needed., Disp: , Rfl:     lisinopriL (PRINIVIL,ZESTRIL) 20 MG tablet, Take 1 tablet (20 mg total) by mouth once daily., Disp: 90 tablet, Rfl: 1    metoprolol succinate (TOPROL-XL) 50 MG 24 hr tablet, Take 1 tablet (50 mg total) by mouth once daily., Disp: 90 tablet, Rfl: 1    mirabegron (MYRBETRIQ) 50 mg Tb24, Take 1 tablet (50 mg total) by mouth once daily., Disp: 30 tablet, Rfl: 11    nystatin (MYCOSTATIN) powder, Apply topically 3 (three) times daily., Disp: 180 g, Rfl: 3    ondansetron (ZOFRAN-ODT) 4 MG TbDL, Take 1 tablet (4 mg total) by mouth every 8 (eight) hours as needed., Disp: 30 tablet, Rfl: 0    tirzepatide 10 mg/0.5 mL PnIj, Inject 10 mg into the skin every 7 days., Disp: 4 Pen, Rfl: 3    vitamin D (VITAMIN D3) 1000 units Tab, Take 5,000 Units by mouth., Disp: , Rfl:     famotidine (PEPCID) 40 MG tablet, Take 1 tablet (40 mg total) by mouth nightly as needed for Heartburn., Disp: 90 tablet, Rfl: 1    nitroGLYCERIN (NITROSTAT) 0.4 MG SL tablet, Place 0.4 mg under the tongue., Disp: , Rfl:     pantoprazole (PROTONIX) 40 MG tablet, Take 1 tablet (40 mg total) by mouth once daily., Disp: 90 tablet, Rfl: 3

## 2025-05-14 ENCOUNTER — PATIENT OUTREACH (OUTPATIENT)
Dept: ADMINISTRATIVE | Facility: HOSPITAL | Age: 75
End: 2025-05-14
Payer: COMMERCIAL

## 2025-05-27 ENCOUNTER — TELEPHONE (OUTPATIENT)
Dept: FAMILY MEDICINE | Facility: CLINIC | Age: 75
End: 2025-05-27
Payer: COMMERCIAL

## 2025-05-27 ENCOUNTER — OFFICE VISIT (OUTPATIENT)
Dept: URGENT CARE | Facility: CLINIC | Age: 75
End: 2025-05-27
Payer: COMMERCIAL

## 2025-05-27 VITALS
TEMPERATURE: 97 F | WEIGHT: 148 LBS | OXYGEN SATURATION: 99 % | DIASTOLIC BLOOD PRESSURE: 84 MMHG | RESPIRATION RATE: 14 BRPM | HEIGHT: 63 IN | SYSTOLIC BLOOD PRESSURE: 169 MMHG | BODY MASS INDEX: 26.22 KG/M2 | HEART RATE: 75 BPM

## 2025-05-27 DIAGNOSIS — B97.89 VIRAL RESPIRATORY ILLNESS: Primary | ICD-10-CM

## 2025-05-27 DIAGNOSIS — R05.9 COUGH, UNSPECIFIED TYPE: ICD-10-CM

## 2025-05-27 DIAGNOSIS — J98.8 VIRAL RESPIRATORY ILLNESS: Primary | ICD-10-CM

## 2025-05-27 DIAGNOSIS — R09.81 NASAL CONGESTION: ICD-10-CM

## 2025-05-27 PROCEDURE — 99214 OFFICE O/P EST MOD 30 MIN: CPT | Mod: S$GLB,,,

## 2025-05-27 RX ORDER — PROMETHAZINE HYDROCHLORIDE AND DEXTROMETHORPHAN HYDROBROMIDE 6.25; 15 MG/5ML; MG/5ML
5 SYRUP ORAL EVERY 8 HOURS PRN
Qty: 120 ML | Refills: 0 | Status: SHIPPED | OUTPATIENT
Start: 2025-05-27

## 2025-05-27 RX ORDER — PROMETHAZINE HYDROCHLORIDE AND DEXTROMETHORPHAN HYDROBROMIDE 6.25; 15 MG/5ML; MG/5ML
5 SYRUP ORAL EVERY 8 HOURS PRN
Qty: 120 ML | Refills: 0 | Status: SHIPPED | OUTPATIENT
Start: 2025-05-27 | End: 2025-05-27

## 2025-05-27 RX ORDER — IPRATROPIUM BROMIDE 21 UG/1
2 SPRAY, METERED NASAL 2 TIMES DAILY
Qty: 30 ML | Refills: 0 | Status: SHIPPED | OUTPATIENT
Start: 2025-05-27

## 2025-05-27 RX ORDER — IPRATROPIUM BROMIDE 21 UG/1
2 SPRAY, METERED NASAL 2 TIMES DAILY
Qty: 30 ML | Refills: 0 | Status: SHIPPED | OUTPATIENT
Start: 2025-05-27 | End: 2025-05-27

## 2025-05-27 NOTE — PROGRESS NOTES
"Subjective:      Patient ID: Estefanía Ray is a 74 y.o. female.    Vitals:  height is 5' 3" (1.6 m) and weight is 67.1 kg (148 lb). Her temperature is 97.3 °F (36.3 °C). Her blood pressure is 169/84 (abnormal) and her pulse is 75. Her respiration is 14 and oxygen saturation is 99%.     Chief Complaint: Sinus Problem    Pt states that for the last 3 days she has been having sore throat, nasal congestion, sinus pressure and cough. Pt states that she has been taking zrytec for her symptoms at this time. Pt states that she is not having any fever, sob or chest pain at this time. Pt states that she has not been exposed to any illness. Pt declines testing    Sinus Problem  This is a new problem. The current episode started in the past 7 days (3 days). The problem is unchanged. There has been no fever. Associated symptoms include congestion, coughing, sinus pressure and a sore throat. Past treatments include oral decongestants and nasal decongestants. The treatment provided mild relief.       Constitution: Negative.   HENT:  Positive for congestion, postnasal drip, sinus pressure and sore throat.    Neck: neck negative.   Cardiovascular: Negative.    Eyes: Negative.    Respiratory:  Positive for cough.    Gastrointestinal: Negative.    Endocrine: negative.   Genitourinary: Negative.    Musculoskeletal: Negative.    Skin: Negative.    Allergic/Immunologic: Negative.    Neurological: Negative.    Hematologic/Lymphatic: Negative.    Psychiatric/Behavioral: Negative.        Objective:     Physical Exam   Constitutional: She is oriented to person, place, and time. She is cooperative.   HENT:   Head: Normocephalic and atraumatic.   Ears:   Right Ear: Tympanic membrane, external ear and ear canal normal.   Left Ear: Tympanic membrane, external ear and ear canal normal.   Nose: Congestion present.   Mouth/Throat: Posterior oropharyngeal erythema present.   Eyes: Conjunctivae are normal. Pupils are equal, round, and reactive to " light. Extraocular movement intact   Neck: Neck supple.   Cardiovascular: Normal rate, regular rhythm, normal heart sounds and normal pulses.   Pulmonary/Chest: Effort normal and breath sounds normal.   Abdominal: Normal appearance.   Musculoskeletal: Normal range of motion.         General: Normal range of motion.   Neurological: no focal deficit. She is alert, oriented to person, place, and time and at baseline.   Skin: Skin is warm.   Psychiatric: Her behavior is normal. Mood, judgment and thought content normal.   Nursing note and vitals reviewed.      Assessment:     1. Viral respiratory illness    2. Nasal congestion    3. Cough, unspecified type        Plan:       Viral respiratory illness    Nasal congestion    Cough, unspecified type    Other orders  -     promethazine-dextromethorphan (PROMETHAZINE-DM) 6.25-15 mg/5 mL Syrp; Take 5 mLs by mouth every 8 (eight) hours as needed (cough).  Dispense: 120 mL; Refill: 0  -     ipratropium (ATROVENT) 21 mcg (0.03 %) nasal spray; 2 sprays by Each Nostril route 2 (two) times daily.  Dispense: 30 mL; Refill: 0

## 2025-05-27 NOTE — TELEPHONE ENCOUNTER
----- Message from Med Assistant Hammond sent at 5/27/2025 11:04 AM CDT -----   10:22amPatient has a sore throat, sinus pain, and her right nostril is hurting. Has been taking claritin and xyzal every day [phone]

## 2025-05-27 NOTE — TELEPHONE ENCOUNTER
Spoke to pt and let her know let we would recommend urgent care since she is having symptoms. Pt verbalized understanding

## 2025-05-28 ENCOUNTER — OFFICE VISIT (OUTPATIENT)
Dept: OBSTETRICS AND GYNECOLOGY | Facility: CLINIC | Age: 75
End: 2025-05-28
Payer: COMMERCIAL

## 2025-05-28 VITALS
WEIGHT: 152.69 LBS | DIASTOLIC BLOOD PRESSURE: 70 MMHG | HEIGHT: 63 IN | BODY MASS INDEX: 27.05 KG/M2 | SYSTOLIC BLOOD PRESSURE: 130 MMHG

## 2025-05-28 DIAGNOSIS — Z01.419 WELL WOMAN EXAM: Primary | ICD-10-CM

## 2025-05-28 PROCEDURE — 99999 PR PBB SHADOW E&M-EST. PATIENT-LVL III: CPT | Mod: PBBFAC,,, | Performed by: GENERAL PRACTICE

## 2025-05-28 RX ORDER — MULTIVITAMIN
TABLET ORAL
COMMUNITY

## 2025-05-28 NOTE — Clinical Note
Just wanted to touch base on this patient.  I did not remove her pessary today for a pelvic exam since she's been seen in your clinic several times recently.  Pessary felt snug, and she is pleased with it, so I left it in place! I couldn't tell if she still has her cervix - in any event per her history, she wouldn't need PAP testing any more based on age/history. Thanks!

## 2025-05-28 NOTE — PROGRESS NOTES
ASSESSMENT AND PLAN   2025  74 y.o.  for [employer].  No acute concerns.  Did not do a full pelvic exam since she is pleased with the current pessary and has scheduled f/u with Urogyn.    Cervical Cancer screening: PAP testing no longer indicated; recommend periodic pelvic exams with visual inspection and palpation  Mammogram: up to date  Encouraged self breast awareness; RTC for breast concerns  Return to clinic: for periodic GYN wellness exam, every 1-3 years per patient preference and comfort level    Leslie L Weeks, MD [facility]    SUBJECTIVE   2025  Estefanía Ray is a 74 y.o. here for [employer].  Used to see Dr. Moreno.  Wears a pessary, follows with Dr. Mckeon and KIERA Waller.  Has this particular one for about a month now.  Pleased with the results.  Ones prior were falling out, or prolapse was still occurring around them.  Just recently started to use the vaginal estrogen.  A few weeks ago had RLQ pain, about a 3/10 that lasted about an hour.  Hasn't happened since.  Normal poor appetite (on Monjauro) and baseline chronic constipation.  No bloating.    G'sP's:   LMP: Menopause age 49, abdominal hyst age 36 for endometriosis (still has ovaries)  Relationship:  53yrs, not sexually active  PAP Hx: no h/o abnormals  MMG (24) = negative, recommend 1-yr f/u  HRT: never used   BLEEDING: denies     OBJECTIVE   PHYSICAL EXAM  Vitals:    25 1333   BP: 130/70     GEN = alert/oriented, nad, pleasant  HEENT = sclera anicteric, EOM grossly normal  BREASTS = nontender, no suspicious masses palpated, no suspicious skin changes, no nipple discharge, large midline chest scar noted  CV = BP and HR as per vitals  PULM = normal respiratory effort   =      External: NEFG, moderate atrophy, multiple small cherry hemangiomas     Vagina: no lesions, normal, urethral meatus normal, exam limited by presence of pessary - did not remove d/t patient discomfort / lack of symptoms  / recent normal pelvic exams     Discharge: minimal    HISTORY   Date taken or verified: 05/28/2025     GYNECOLOGIC HISTORY  PAP Hx: no h/o abnormals  Genital HSV: No  Other STD Hx: denies    Menarche: 13yoa  Pain -- Non-menstrual pelvic pain: No / Dyspareunia: n/a  Other -- Vasomotor Sxs: denies / Vaginal dryness: yes    OBSTETRIC HISTORY  Living children: 3  Vaginal deliveries: 3  Miscarriages <20wks: 1    SOCIAL HISTORY  Lives with:   Smoker: non-smoker / Alcohol: denies / Drugs: denies  Domestic Violence: No  Occupation: homemaker    FAMILY HISTORY  BLEEDING or CLOTTING DISORDERS: none  BREAST CA: none / UTERINE CA: none / OVARIAN CA: none  COLON CA: none     PAST MEDICAL HISTORY  -------------------------------------    Allergy    Diabetes mellitus, type 2    GERD (gastroesophageal reflux disease)    Heart disease    Hyperlipidemia    Hypertension    JOHN (obstructive sleep apnea)     PAST SURGICAL HISTORY  ----------------------------    Coronary artery bypass graft (cabg)    4-vessel age 55    Hysterectomy    Knee surgery    Tonsillectomy     ALLERGIES  Review of patient's allergies indicates:   Allergen Reactions    Empagliflozin Other (See Comments)    Semaglutide Hives    Sitagliptin phos-metformin Other (See Comments)     Other reaction(s): diarrhea    Ezetimibe Nausea And Vomiting     Other reaction(s): Other (See Comments), Unknown    Feels terrible when taking medication    Other reaction(s): Other (See Comments)   Feels terrible when taking medication    Other reaction(s): Other (See Comments), Unknown   Feels terrible when taking medication    Farxiga [dapagliflozin] Rash     Genital rash    Keflex [cephalexin] Nausea Only and Other (See Comments)     Felt bad    Linaclotide Diarrhea and Nausea And Vomiting     MEDICATIONS  Current Outpatient Medications   Medication Instructions    aspirin (ECOTRIN) 81 MG EC tablet Aspir-81 mg tablet,delayed release   Take 1 tablet every day by oral route.     atorvastatin (LIPITOR) 40 mg, Oral, Nightly    cyclobenzaprine (FLEXERIL) 10 mg, Oral, 3 times daily PRN    estradioL (ESTRACE) 1 g, Vaginal, Three times weekly    famotidine (PEPCID) 40 mg, Oral, Nightly PRN    flash glucose sensor (FREESTYLE KARINA 14 DAY SENSOR) Kit 1 kit, Misc.(Non-Drug; Combo Route), Daily    ipratropium (ATROVENT) 21 mcg (0.03 %) nasal spray 2 sprays, Each Nostril, 2 times daily    LEVEMIR FLEXPEN 10 Units, Subcutaneous, Nightly    levocetirizine (XYZAL) 5 mg, As needed (PRN)    lisinopriL (PRINIVIL,ZESTRIL) 20 mg, Oral, Daily    metoprolol succinate (TOPROL-XL) 50 mg, Oral, Daily    mirabegron (MYRBETRIQ) 50 mg, Oral, Daily    multivitamin (THERAGRAN) per tablet Oral    nitroGLYCERIN (NITROSTAT) 0.4 mg    nystatin (MYCOSTATIN) powder Topical (Top), 3 times daily    pantoprazole (PROTONIX) 40 mg, Oral, Daily    promethazine-dextromethorphan (PROMETHAZINE-DM) 6.25-15 mg/5 mL Syrp 5 mLs, Oral, Every 8 hours PRN    tirzepatide 10 mg, Subcutaneous, Every 7 days    vitamin D (VITAMIN D3) 5,000 Units

## 2025-06-02 ENCOUNTER — TELEPHONE (OUTPATIENT)
Dept: FAMILY MEDICINE | Facility: CLINIC | Age: 75
End: 2025-06-02
Payer: COMMERCIAL

## 2025-06-13 ENCOUNTER — OFFICE VISIT (OUTPATIENT)
Dept: PODIATRY | Facility: CLINIC | Age: 75
End: 2025-06-13
Payer: COMMERCIAL

## 2025-06-13 VITALS — RESPIRATION RATE: 16 BRPM | WEIGHT: 150.81 LBS | BODY MASS INDEX: 26.72 KG/M2 | HEIGHT: 63 IN

## 2025-06-13 DIAGNOSIS — M20.42 HAMMER TOES OF BOTH FEET: Primary | ICD-10-CM

## 2025-06-13 DIAGNOSIS — M20.41 HAMMER TOES OF BOTH FEET: Primary | ICD-10-CM

## 2025-06-13 DIAGNOSIS — M79.674 PAIN IN TOES OF BOTH FEET: ICD-10-CM

## 2025-06-13 DIAGNOSIS — M79.675 PAIN IN TOES OF BOTH FEET: ICD-10-CM

## 2025-06-13 PROCEDURE — 99999 PR PBB SHADOW E&M-EST. PATIENT-LVL IV: CPT | Mod: PBBFAC,,, | Performed by: PODIATRIST

## 2025-06-13 RX ORDER — DOXYCYCLINE 100 MG/1
100 CAPSULE ORAL EVERY 12 HOURS
Qty: 14 CAPSULE | Refills: 0 | Status: SHIPPED | OUTPATIENT
Start: 2025-06-13 | End: 2025-06-20

## 2025-06-13 NOTE — PROGRESS NOTES
"    [address] [facility] [address] [place] [address]  Phone: [phone]   [phone]    Patient's PCP:Verna Shah NP  Referring Provider: No ref. provider found    Subjective:      Chief Complaint:: Hammer Toe (Bilateral hammer toes here for flexor tenotomy)    HPI  Estefanía Ray is a 75 y.o. female who presents today for follow-up of hammertoes bilaterally.  She states the 2nd toes on both feet are the main cause of her issues.  She states that she is noticing more prominence of the joints in the 2nd toes bilaterally.    Vitals:    06/13/25 1055   Resp: 16   Weight: 68.4 kg (150 lb 12.7 oz)   Height: 5' 3" (1.6 m)   PainSc: 0-No pain      Shoe Size:     Past Surgical History:   Procedure Laterality Date    CORONARY ARTERY BYPASS GRAFT (CABG)      4-vessel age 55    HYSTERECTOMY      KNEE SURGERY Left     TONSILLECTOMY       Past Medical History:   Diagnosis Date    Allergy     Diabetes mellitus, type 2     GERD (gastroesophageal reflux disease)     Heart disease     Hyperlipidemia     Hypertension     JOHN (obstructive sleep apnea)      No family history on file.     Social History:   Marital Status:   Alcohol History:  reports no history of alcohol use.  Tobacco History:  reports that she has never smoked. She has never used smokeless tobacco.  Drug History:  reports no history of drug use.    Review of patient's allergies indicates:   Allergen Reactions    Empagliflozin Other (See Comments)    Semaglutide Hives    Sitagliptin phos-metformin Other (See Comments)     Other reaction(s): diarrhea    Ezetimibe Nausea And Vomiting     Other reaction(s): Other (See Comments), Unknown    Feels terrible when taking medication    Other reaction(s): Other (See Comments)   Feels terrible when taking medication    Other reaction(s): Other (See Comments), Unknown   Feels terrible when taking medication    Farxiga [dapagliflozin] Rash     Genital rash    Keflex [cephalexin] Nausea Only and Other (See Comments)     " Felt bad    Linaclotide Diarrhea and Nausea And Vomiting       Current Medications[1]    Review of Systems   Constitutional:  Negative for chills, fatigue, fever and unexpected weight change.   HENT:  Negative for hearing loss and trouble swallowing.    Eyes:  Negative for photophobia and visual disturbance.   Respiratory:  Negative for cough, shortness of breath and wheezing.    Cardiovascular:  Negative for chest pain, palpitations and leg swelling.   Gastrointestinal:  Negative for abdominal pain and nausea.   Genitourinary:  Negative for dysuria and frequency.   Musculoskeletal:  Positive for joint swelling. Negative for arthralgias, back pain, gait problem, myalgias and neck pain.   Skin:  Negative for rash and wound.   Neurological:  Negative for tremors, seizures, weakness, numbness and headaches.   Hematological:  Does not bruise/bleed easily.   Psychiatric/Behavioral:  Negative for hallucinations.          Objective:        Physical Exam:   Foot Exam    General  General Appearance: appears stated age and healthy   Orientation: alert and oriented to person, place, and time   Affect: appropriate   Gait: unimpaired       Right Foot/Ankle     Inspection and Palpation  Ecchymosis: none  Tenderness: none   Swelling: none   Arch: normal  Hammertoes: second toe, third toe, fourth toe and fifth toe  Skin Exam: dry skin;   Neurovascular  Dorsalis pedis: 2+  Posterior tibial: 1+  Capillary Refill: 3+  Varicose veins: not present  Saphenous nerve sensation: normal  Tibial nerve sensation: normal  Superficial peroneal nerve sensation: normal  Deep peroneal nerve sensation: normal  Sural nerve sensation: normal    Edema  Type of edema: non-pitting    Muscle Strength  Ankle dorsiflexion: 5  Ankle plantar flexion: 5  Ankle inversion: 5  Ankle eversion: 5  Great toe extension: 5  Great toe flexion: 5    Range of Motion    Normal right ankle ROM    Tests  Anterior drawer: negative   Talar tilt: negative   PT Tinel's sign:  negative    Paresthesia: negative    Left Foot/Ankle      Inspection and Palpation  Ecchymosis: none  Tenderness: none   Swelling: none   Arch: normal  Hammertoes: second toe, third toe, fourth toe and fifth toe  Skin Exam: dry skin;   Neurovascular  Dorsalis pedis: 2+  Posterior tibial: 1+  Capillary refill: 3+  Varicose veins: not present  Saphenous nerve sensation: normal  Tibial nerve sensation: normal  Superficial peroneal nerve sensation: normal  Deep peroneal nerve sensation: normal  Sural nerve sensation: normal    Edema  Type of edema: non-pitting    Muscle Strength  Ankle dorsiflexion: 5  Ankle plantar flexion: 5  Ankle inversion: 5  Ankle eversion: 5  Great toe extension: 5  Great toe flexion: 5    Range of Motion    Normal left ankle ROM    Tests  Anterior drawer: negative   Talar tilt: negative   PT Tinel's sign: negative  Paresthesia: negative      Physical Exam  Cardiovascular:      Pulses:           Dorsalis pedis pulses are 2+ on the right side and 2+ on the left side.        Posterior tibial pulses are 1+ on the right side and 1+ on the left side.   Feet:      Right foot:      Skin integrity: Dry skin present.      Left foot:      Skin integrity: Dry skin present.               Right Ankle/Foot Exam     Range of Motion   The patient has normal right ankle ROM.    Left Ankle/Foot Exam     Range of Motion   The patient has normal left ankle ROM.       Muscle Strength   Right Lower Extremity   Ankle Dorsiflexion:  5   Plantar flexion:  5/5  Left Lower Extremity   Ankle Dorsiflexion:  5   Plantar flexion:  5/5     Vascular Exam     Right Pulses  Dorsalis Pedis:      2+  Posterior Tibial:      1+        Left Pulses  Dorsalis Pedis:      2+  Posterior Tibial:      1+           Imaging: none            Assessment:       1. Hammer toes of both feet    2. Pain in toes of both feet      Plan:   Hammer toes of both feet  -     SURGERY SHOE FOR HOME USE  -     doxycycline (MONODOX) 100 MG capsule; Take 1  capsule (100 mg total) by mouth every 12 (twelve) hours. for 7 days  Dispense: 14 capsule; Refill: 0    Pain in toes of both feet      Follow up in about 2 weeks (around 6/27/2025), or if symptoms worsen or fail to improve.    We discussed different ongoing treatments for her hammertoes bilaterally.  We once again reviewed conservative management versus surgical intervention.  We did discuss flexor tenotomy versus arthrodesis of the joint.  I discussed the patient that given the flexible nature of her deformities bilaterally she would be a good candidate for flexor tenotomy.  Given that the 2nd toes on both feet are the main cause of her issues we will do flexor tenotomies on the bilateral 2nd toes today.    Procedures informed written consent was obtained.  The 2nd toe of the left and right foot was marked time-out was called.  The left and right 2nd toe were anesthetized utilizing 5 cc of 2% lidocaine plain.  They were then prepped and draped in normal sterile fashion.  Once local anesthesia was ensured a stab incision was made in the plantar aspect of the 2nd toe bilaterally utilizing an 18 gauge needle.  Once this was done the needle was utilized to perform a percutaneous tenotomy of the long flexor tendon.  Care was taken to ensure complete release of the tendon.  Once this was done there was noted to be significant improvement in the hammer deformity.  The toe was cleansed and dressed with antibiotic ointment and a sterile gauze dressing.  Patient was placed in a surgical shoe.  Patient tolerated the procedure well.  She had no immediate complications.  She was given instructions for follow-up care.  I am going to send in a short course of prophylactic antibiotics for her.  She can remove the dressing on Monday and return to normal shoe gear as her symptoms allow.          Counseling:     I provided patient education verbally regarding:   Patient diagnosis, treatment options, as well as alternatives, risks,  and benefits.     This note was created using Dragon voice recognition software that occasionally misinterpreted phrases or words.                    [1]   Current Outpatient Medications   Medication Sig Dispense Refill    aspirin (ECOTRIN) 81 MG EC tablet Aspir-81 mg tablet,delayed release   Take 1 tablet every day by oral route.      atorvastatin (LIPITOR) 40 MG tablet Take 1 tablet (40 mg total) by mouth every evening. 90 tablet 3    cyclobenzaprine (FLEXERIL) 10 MG tablet Take 1 tablet (10 mg total) by mouth 3 (three) times daily as needed for Muscle spasms. 270 tablet 1    doxycycline (MONODOX) 100 MG capsule Take 1 capsule (100 mg total) by mouth every 12 (twelve) hours. for 7 days 14 capsule 0    estradioL (ESTRACE) 0.01 % (0.1 mg/gram) vaginal cream Place 1 g vaginally 3 (three) times a week. 42.5 g 4    famotidine (PEPCID) 40 MG tablet Take 1 tablet (40 mg total) by mouth nightly as needed for Heartburn. 90 tablet 1    flash glucose sensor ([employer]STYLE KARINA 14 DAY SENSOR) Kit 1 kit by Misc.(Non-Drug; Combo Route) route once daily. 1 kit 11    insulin detemir U-100, Levemir, (LEVEMIR FLEXPEN) 100 unit/mL (3 mL) InPn pen Inject 10 Units into the skin every evening.      ipratropium (ATROVENT) 21 mcg (0.03 %) nasal spray 2 sprays by Each Nostril route 2 (two) times daily. 30 mL 0    levocetirizine (XYZAL) 5 MG tablet Take 5 mg by mouth as needed.      lisinopriL (PRINIVIL,ZESTRIL) 20 MG tablet Take 1 tablet (20 mg total) by mouth once daily. 90 tablet 1    metoprolol succinate (TOPROL-XL) 50 MG 24 hr tablet Take 1 tablet (50 mg total) by mouth once daily. 90 tablet 1    mirabegron (MYRBETRIQ) 50 mg Tb24 Take 1 tablet (50 mg total) by mouth once daily. 30 tablet 11    multivitamin (THERAGRAN) per tablet Take by mouth.      nitroGLYCERIN (NITROSTAT) 0.4 MG SL tablet Place 0.4 mg under the tongue.      nystatin (MYCOSTATIN) powder Apply topically 3 (three) times daily. 180 g 3    pantoprazole (PROTONIX) 40 MG  tablet Take 1 tablet (40 mg total) by mouth once daily. 90 tablet 3    promethazine-dextromethorphan (PROMETHAZINE-DM) 6.25-15 mg/5 mL Syrp Take 5 mLs by mouth every 8 (eight) hours as needed (cough). (Patient not taking: Reported on 5/28/2025) 120 mL 0    tirzepatide 10 mg/0.5 mL PnIj Inject 10 mg into the skin every 7 days. 4 Pen 3    vitamin D (VITAMIN D3) 1000 units Tab Take 5,000 Units by mouth.       No current facility-administered medications for this visit.

## 2025-06-26 ENCOUNTER — OFFICE VISIT (OUTPATIENT)
Dept: FAMILY MEDICINE | Facility: CLINIC | Age: 75
End: 2025-06-26
Payer: COMMERCIAL

## 2025-06-26 VITALS
HEIGHT: 63 IN | HEART RATE: 74 BPM | WEIGHT: 153 LBS | BODY MASS INDEX: 27.11 KG/M2 | DIASTOLIC BLOOD PRESSURE: 66 MMHG | SYSTOLIC BLOOD PRESSURE: 112 MMHG | OXYGEN SATURATION: 98 %

## 2025-06-26 DIAGNOSIS — E11.69 TYPE 2 DIABETES MELLITUS WITH OTHER SPECIFIED COMPLICATION, WITHOUT LONG-TERM CURRENT USE OF INSULIN: Primary | ICD-10-CM

## 2025-06-26 DIAGNOSIS — K59.00 CONSTIPATION, UNSPECIFIED CONSTIPATION TYPE: ICD-10-CM

## 2025-06-26 DIAGNOSIS — Z95.1 S/P CABG (CORONARY ARTERY BYPASS GRAFT): ICD-10-CM

## 2025-06-26 DIAGNOSIS — K21.00 GASTROESOPHAGEAL REFLUX DISEASE WITH ESOPHAGITIS WITHOUT HEMORRHAGE: ICD-10-CM

## 2025-06-26 DIAGNOSIS — E78.2 MIXED HYPERLIPIDEMIA: ICD-10-CM

## 2025-06-26 DIAGNOSIS — G47.30 SLEEP APNEA, UNSPECIFIED TYPE: ICD-10-CM

## 2025-06-26 DIAGNOSIS — I10 HYPERTENSION, UNSPECIFIED TYPE: ICD-10-CM

## 2025-06-26 DIAGNOSIS — Z78.0 MENOPAUSE: ICD-10-CM

## 2025-06-26 LAB — HBA1C MFR BLD: 7.1 %

## 2025-06-26 PROCEDURE — 3288F FALL RISK ASSESSMENT DOCD: CPT | Mod: CPTII,S$GLB,, | Performed by: NURSE PRACTITIONER

## 2025-06-26 PROCEDURE — 4010F ACE/ARB THERAPY RXD/TAKEN: CPT | Mod: CPTII,S$GLB,, | Performed by: NURSE PRACTITIONER

## 2025-06-26 PROCEDURE — 3078F DIAST BP <80 MM HG: CPT | Mod: CPTII,S$GLB,, | Performed by: NURSE PRACTITIONER

## 2025-06-26 PROCEDURE — 1159F MED LIST DOCD IN RCRD: CPT | Mod: CPTII,S$GLB,, | Performed by: NURSE PRACTITIONER

## 2025-06-26 PROCEDURE — 99214 OFFICE O/P EST MOD 30 MIN: CPT | Mod: S$GLB,,, | Performed by: NURSE PRACTITIONER

## 2025-06-26 PROCEDURE — 3051F HG A1C>EQUAL 7.0%<8.0%: CPT | Mod: CPTII,S$GLB,, | Performed by: NURSE PRACTITIONER

## 2025-06-26 PROCEDURE — 1101F PT FALLS ASSESS-DOCD LE1/YR: CPT | Mod: CPTII,S$GLB,, | Performed by: NURSE PRACTITIONER

## 2025-06-26 PROCEDURE — 83036 HEMOGLOBIN GLYCOSYLATED A1C: CPT | Mod: QW,,, | Performed by: NURSE PRACTITIONER

## 2025-06-26 PROCEDURE — 3008F BODY MASS INDEX DOCD: CPT | Mod: CPTII,S$GLB,, | Performed by: NURSE PRACTITIONER

## 2025-06-26 PROCEDURE — 1160F RVW MEDS BY RX/DR IN RCRD: CPT | Mod: CPTII,S$GLB,, | Performed by: NURSE PRACTITIONER

## 2025-06-26 PROCEDURE — G2211 COMPLEX E/M VISIT ADD ON: HCPCS | Mod: S$GLB,,, | Performed by: NURSE PRACTITIONER

## 2025-06-26 PROCEDURE — 3074F SYST BP LT 130 MM HG: CPT | Mod: CPTII,S$GLB,, | Performed by: NURSE PRACTITIONER

## 2025-06-26 PROCEDURE — 2023F DILAT RTA XM W/O RTNOPTHY: CPT | Mod: CPTII,S$GLB,, | Performed by: NURSE PRACTITIONER

## 2025-06-26 RX ORDER — BLOOD-GLUCOSE SENSOR
EACH MISCELLANEOUS
COMMUNITY
Start: 2025-05-12

## 2025-06-26 RX ORDER — METOPROLOL SUCCINATE 50 MG/1
50 TABLET, EXTENDED RELEASE ORAL DAILY
Qty: 90 TABLET | Refills: 1 | Status: SHIPPED | OUTPATIENT
Start: 2025-06-26

## 2025-06-26 RX ORDER — NYSTATIN 100000 [USP'U]/G
POWDER TOPICAL 3 TIMES DAILY
Qty: 180 G | Refills: 3 | Status: SHIPPED | OUTPATIENT
Start: 2025-06-26

## 2025-06-26 RX ORDER — DIPHENOXYLATE HYDROCHLORIDE AND ATROPINE SULFATE 2.5; .025 MG/1; MG/1
1 TABLET ORAL 4 TIMES DAILY PRN
COMMUNITY

## 2025-06-26 RX ORDER — INSULIN DEGLUDEC 100 U/ML
25 INJECTION, SOLUTION SUBCUTANEOUS DAILY
Qty: 9 ML | Refills: 1 | Status: SHIPPED | OUTPATIENT
Start: 2025-06-26 | End: 2026-06-26

## 2025-06-26 RX ORDER — CALCIUM CARBONATE 500(1250)
1 TABLET ORAL DAILY
COMMUNITY

## 2025-06-26 RX ORDER — CYCLOBENZAPRINE HCL 10 MG
10 TABLET ORAL 3 TIMES DAILY PRN
Qty: 270 TABLET | Refills: 1 | Status: SHIPPED | OUTPATIENT
Start: 2025-06-26

## 2025-06-26 RX ORDER — MULTIVITAMIN WITH IRON
TABLET ORAL DAILY
COMMUNITY

## 2025-06-30 NOTE — PROGRESS NOTES
SUBJECTIVE:    Patient ID: Estefanía Ray is a 75 y.o. female.    Chief Complaint: Diabetes (A1c ordered, brought bottles, discuss about meds ,need refills, abc)      History of Present Illness    CHIEF COMPLAINT:  75year presents today for follow-up    DIABETES MANAGEMENT:  She reports variable blood sugar readings, with glucose levels fluctuating around 200. Morning fasting blood sugar is consistently under 150 approximately 97% of the time. She continues Levemir insulin at 22 units. She reports decreased effectiveness of Mounjaro with recent delayed injection from Sunday to Tuesday due to forgetfulness. She notes continued appetite suppression, eating only part of a tomato sandwich and feeling less hungry. She acknowledges need for dietary modifications to better manage glucose.    WEIGHT MANAGEMENT:  She reports current weight of 148-149 lbs at home, noting variation between home and clinic scales. She prefers clinic scale measurement for consistency.    GI CONCERNS:  She reports ongoing constipation and persistent bowel movement difficulties. She previously obtained but did not use a constipation treatment product.    SURGICAL HISTORY:  She underwent toe surgery two weeks ago. The surgical site is healing well with no current concerns.    CARDIOLOGY:  She plans to establish care with new cardiologist Dr. Stovall for cardiac evaluation and anticipates diagnostic testing during initial consultation.    UROGYNECOLOGY:  She reports third treatment option for uroGYN condition is currently effective and declines surgical intervention at this time.    CURRENT MEDICATIONS:  She continues Flexeril, Metoprolol, Nystatin, and allergy medication.    ALLERGIES:  She reports ongoing allergies managed with medication.      ROS:  General: -fever, -chills, -fatigue, -weight gain, -weight loss  Eyes: -vision changes, -redness, -discharge  ENT: -ear pain, -nasal congestion, -sore throat  Cardiovascular: -chest pain, -palpitations,  -lower extremity edema  Respiratory: -cough, -shortness of breath  Gastrointestinal: -abdominal pain, -nausea, -vomiting, -diarrhea, +constipation, -blood in stool, +loss of appetite  Genitourinary: -dysuria, -hematuria, -frequency  Musculoskeletal: -joint pain, -muscle pain  Skin: -rash, -lesion  Neurological: -headache, -dizziness, -numbness, -tingling  Psychiatric: -anxiety, -depression, -sleep difficulty  Allergic: +allergic reactions              Office Visit on 06/26/2025   Component Date Value Ref Range Status    Hemoglobin A1C, POC 06/26/2025 7.1  % Final   Patient Outreach on 05/14/2025   Component Date Value Ref Range Status    Left Eye DM Retinopathy 05/06/2025 Negative  Negative Final    Right Eye DM Retinopathy 05/06/2025 Negative  Negative Final   Office Visit on 05/01/2025   Component Date Value Ref Range Status    Hemoglobin A1C, POC 05/01/2025 7.1  % Final   Office Visit on 04/09/2025   Component Date Value Ref Range Status    Urine Culture 04/09/2025 >100,000 cfu/ml Coagulase-negative Staphylococcus species (A)   Final   Hospital Outpatient Visit on 02/27/2025   Component Date Value Ref Range Status    POC Creatinine 02/27/2025 0.9  0.5 - 1.4 mg/dL Final    Sample 02/27/2025 VENOUS   Final       Past Medical History:   Diagnosis Date    Allergy     Diabetes mellitus, type 2     GERD (gastroesophageal reflux disease)     Heart disease     Hyperlipidemia     Hypertension     JOHN (obstructive sleep apnea)      Past Surgical History:   Procedure Laterality Date    CORONARY ARTERY BYPASS GRAFT (CABG)      4-vessel age 55    HYSTERECTOMY      KNEE SURGERY Left     TONSILLECTOMY       No family history on file.    All of your core healthy metrics are met.      The 10-year CVD risk score (D'Agostino, et al., 2008) is: 16.3%    Values used to calculate the score:      Age: 75 years      Sex: Female      Diabetic: Yes      Tobacco smoker: No      Systolic Blood Pressure: 112 mmHg      Is BP treated: Yes       HDL Cholesterol: 40 mg/dL      Total Cholesterol: 145 mg/dL     Marital Status:   Alcohol History:  reports no history of alcohol use.  Tobacco History:  reports that she has never smoked. She has never used smokeless tobacco.  Drug History:  reports no history of drug use.    Health Maintenance Topics with due status: Not Due       Topic Last Completion Date    Colorectal Cancer Screening 09/07/2016    Diabetes Urine Screening 11/21/2024    Lipid Panel 11/21/2024    Mammogram 12/05/2024    DEXA Scan 12/05/2024    Foot Exam 03/30/2025    Diabetic Eye Exam 05/06/2025    High Dose Statin 06/26/2025    Aspirin/Antiplatelet Therapy 06/26/2025    Hemoglobin A1c 06/26/2025     Immunization History   Administered Date(s) Administered    Influenza (FLUAD) - Quadrivalent - Adjuvanted - PF *Preferred* (65+) 11/29/2023    Influenza - Quadrivalent - High Dose - PF (65 years and older) 11/03/2022    Influenza - Trivalent - Fluad - Adjuvanted - PF (65 years and older 11/25/2024    Influenza - Trivalent - Fluzone High Dose - PF (65 years and older) 09/09/2016    Pneumococcal Conjugate - 13 Valent 03/10/2017    Pneumococcal Polysaccharide - 23 Valent 04/12/2018    Tdap 04/07/2015       Review of patient's allergies indicates:   Allergen Reactions    Empagliflozin Other (See Comments)    Semaglutide Hives    Sitagliptin phos-metformin Other (See Comments)     Other reaction(s): diarrhea    Ezetimibe Nausea And Vomiting     Other reaction(s): Other (See Comments), Unknown    Feels terrible when taking medication    Other reaction(s): Other (See Comments)   Feels terrible when taking medication    Other reaction(s): Other (See Comments), Unknown   Feels terrible when taking medication    Farxiga [dapagliflozin] Rash     Genital rash    Keflex [cephalexin] Nausea Only and Other (See Comments)     Felt bad    Linaclotide Diarrhea and Nausea And Vomiting     Current Medications[1]        Objective:      Vitals:    06/26/25 1240  "  BP: 112/66   Pulse: 74   SpO2: 98%   Weight: 69.4 kg (153 lb)   Height: 5' 3" (1.6 m)       Physical Exam  Nursing note reviewed.   Constitutional:       General: She is not in acute distress.     Appearance: Normal appearance. She is well-developed.   HENT:      Right Ear: External ear normal.      Left Ear: External ear normal.   Eyes:      Conjunctiva/sclera: Conjunctivae normal.   Neck:      Vascular: No JVD.   Cardiovascular:      Rate and Rhythm: Normal rate and regular rhythm.      Heart sounds: No murmur heard.  Pulmonary:      Effort: Pulmonary effort is normal.      Breath sounds: Normal breath sounds.   Abdominal:      General: Bowel sounds are normal.      Palpations: Abdomen is soft.   Musculoskeletal:         General: No deformity. Normal range of motion.      Cervical back: Normal range of motion and neck supple.   Lymphadenopathy:      Cervical: No cervical adenopathy.   Skin:     General: Skin is warm and dry.      Findings: No rash.   Neurological:      Mental Status: She is alert and oriented to person, place, and time.      Gait: Gait normal.   Psychiatric:         Speech: Speech normal.         Behavior: Behavior normal.          Assessment:       1. Type 2 diabetes mellitus with other specified complication, without long-term current use of insulin    2. Hypertension, unspecified type    3. Mixed hyperlipidemia    4. Gastroesophageal reflux disease with esophagitis without hemorrhage    5. Sleep apnea, unspecified type    6. Menopause    7. S/P CABG (coronary artery bypass graft)    8. Constipation, unspecified constipation type           Assessment & Plan    - A1C elevated at 7.1.  - Fasting and post-prandial glucose levels indicate current medication dosage may be appropriate but diet needs adjustment.  - Evaluated effectiveness of current constipation treatment.    TYPE 2 DIABETES MELLITUS:  - A1C is 7.1, unchanged from a month ago.  - Estefanía is monitoring blood sugar, with readings " sometimes reaching 200, and fasting blood sugar usually less than 150 in the morning.  - Ordered annual labs including CBC, CMP, lipid panel, thyroid panel, and urine test for renal function.  - Continuing Mounjaro at current dose, with potential increase next month if needed for better glucose control.  - Educated patient on target glucose levels: fasting should be less than 150 mg/dL and 2 hours post-prandial should be less than 170 mg/dL.  - Explained that elevated post-prandial levels with normal fasting glucose are likely due to dietary choices.  - Recommend dietary changes to improve glucose control.  - Instructed patient to monitor glucose levels, particularly fasting and 2 hours post-meal, and to report readings to discuss effectiveness.  - Currently using Levemir insulin up to 22 units daily.  - Due to insurance formulary changes, prescribed Tresiba 22 units daily to replace Levemir.  - Estefanía to continue Levemir until Tresiba coverage is confirmed and to contact office regarding insurance coverage and cost after pharmacy processes new prescription.    CONSTIPATION:  - Estefanía continues to experience constipation.  - Discussed and recommended trying a new medication suggested by a gastroenterologist from [place].    FOLLOW-UP:  - Scheduled follow up in 1 week to complete labs and additional follow-up before next Mounjaro refill to determine if dose adjustment is needed.        Plan:       1. Type 2 diabetes mellitus with other specified complication, without long-term current use of insulin  Comments:  hga1c 7.1  Orders:  -     CBC Auto Differential; Future; Expected date: 06/26/2025  -     Comprehensive Metabolic Panel; Future; Expected date: 06/26/2025  -     Lipid Panel; Future; Expected date: 06/26/2025  -     TSH w/reflex to FT4; Future; Expected date: 06/26/2025  -     Microalbumin/Creatinine Ratio, Urine; Future; Expected date: 06/26/2025  -     Urinalysis, Reflex to Urine Culture Urine, Clean Catch;  Future; Expected date: 06/26/2025    2. Hypertension, unspecified type  Comments:  bp controlled  Orders:  -     metoprolol succinate (TOPROL-XL) 50 MG 24 hr tablet; Take 1 tablet (50 mg total) by mouth once daily.  Dispense: 90 tablet; Refill: 1  -     Hemoglobin A1C, POCT  -     CBC Auto Differential; Future; Expected date: 06/26/2025  -     Comprehensive Metabolic Panel; Future; Expected date: 06/26/2025  -     Lipid Panel; Future; Expected date: 06/26/2025  -     TSH w/reflex to FT4; Future; Expected date: 06/26/2025  -     Microalbumin/Creatinine Ratio, Urine; Future; Expected date: 06/26/2025  -     Urinalysis, Reflex to Urine Culture Urine, Clean Catch; Future; Expected date: 06/26/2025    3. Mixed hyperlipidemia  Comments:  lipitor 40  Orders:  -     CBC Auto Differential; Future; Expected date: 06/26/2025  -     Comprehensive Metabolic Panel; Future; Expected date: 06/26/2025  -     Lipid Panel; Future; Expected date: 06/26/2025  -     TSH w/reflex to FT4; Future; Expected date: 06/26/2025  -     Microalbumin/Creatinine Ratio, Urine; Future; Expected date: 06/26/2025  -     Urinalysis, Reflex to Urine Culture Urine, Clean Catch; Future; Expected date: 06/26/2025    4. Gastroesophageal reflux disease with esophagitis without hemorrhage  Comments:  protonix  Orders:  -     CBC Auto Differential; Future; Expected date: 06/26/2025  -     Comprehensive Metabolic Panel; Future; Expected date: 06/26/2025  -     Lipid Panel; Future; Expected date: 06/26/2025  -     TSH w/reflex to FT4; Future; Expected date: 06/26/2025  -     Microalbumin/Creatinine Ratio, Urine; Future; Expected date: 06/26/2025  -     Urinalysis, Reflex to Urine Culture Urine, Clean Catch; Future; Expected date: 06/26/2025    5. Sleep apnea, unspecified type    6. Menopause    7. S/P CABG (coronary artery bypass graft)    8. Constipation, unspecified constipation type  Comments:  will start supplement    Other orders  -     cyclobenzaprine  (FLEXERIL) 10 MG tablet; Take 1 tablet (10 mg total) by mouth 3 (three) times daily as needed for Muscle spasms.  Dispense: 270 tablet; Refill: 1  -     nystatin (MYCOSTATIN) powder; Apply topically 3 (three) times daily.  Dispense: 180 g; Refill: 3  -     insulin degludec (TRESIBA FLEXTOUCH U-100) 100 unit/mL (3 mL) insulin pen; Inject 25 Units into the skin once daily.  Dispense: 9 mL; Refill: 1      Follow up in about 3 months (around 9/26/2025) for medication management.          Counseled on age and gender appropriate medical preventative services, including cancer screenings, immunizations, overall nutritional health, need for a consistent exercise regimen and an overall push towards maintaining a vigorous and active lifestyle.           6/30/2025 Verna Shah NP         [1]   Current Outpatient Medications:     aspirin (ECOTRIN) 81 MG EC tablet, Aspir-81 mg tablet,delayed release  Take 1 tablet every day by oral route., Disp: , Rfl:     atorvastatin (LIPITOR) 40 MG tablet, Take 1 tablet (40 mg total) by mouth every evening., Disp: 90 tablet, Rfl: 3    calcium carbonate (OS-SYED) 500 mg calcium (1,250 mg) tablet, Take 1 tablet by mouth once daily., Disp: , Rfl:     diphenoxylate-atropine 2.5-0.025 mg (LOMOTIL) 2.5-0.025 mg per tablet, Take 1 tablet by mouth 4 (four) times daily as needed for Diarrhea., Disp: , Rfl:     estradioL (ESTRACE) 0.01 % (0.1 mg/gram) vaginal cream, Place 1 g vaginally 3 (three) times a week., Disp: 42.5 g, Rfl: 4    famotidine (PEPCID) 40 MG tablet, Take 1 tablet (40 mg total) by mouth nightly as needed for Heartburn., Disp: 90 tablet, Rfl: 1    flash glucose sensor (FREESTYLE KARINA 14 DAY SENSOR) Kit, 1 kit by Misc.(Non-Drug; Combo Route) route once daily., Disp: 1 kit, Rfl: 11    FREESTYLE KARINA 3 SENSOR Kina, SMARTSIG:As Directed, Disp: , Rfl:     ipratropium (ATROVENT) 21 mcg (0.03 %) nasal spray, 2 sprays by Each Nostril route 2 (two) times daily., Disp: 30 mL, Rfl: 0     levocetirizine (XYZAL) 5 MG tablet, Take 5 mg by mouth as needed., Disp: , Rfl:     lisinopriL (PRINIVIL,ZESTRIL) 20 MG tablet, Take 1 tablet (20 mg total) by mouth once daily., Disp: 90 tablet, Rfl: 1    magnesium aspartate HCl 61 mg (615 mg) TbEC, Take by mouth once., Disp: , Rfl:     mirabegron (MYRBETRIQ) 50 mg Tb24, Take 1 tablet (50 mg total) by mouth once daily., Disp: 30 tablet, Rfl: 11    multivitamin (THERAGRAN) per tablet, Take by mouth., Disp: , Rfl:     omega-3 fatty acids/fish oil (FISH OIL-OMEGA-3 FATTY ACIDS) 300-1,000 mg capsule, Take by mouth once daily., Disp: , Rfl:     pantoprazole (PROTONIX) 40 MG tablet, Take 1 tablet (40 mg total) by mouth once daily., Disp: 90 tablet, Rfl: 3    promethazine-dextromethorphan (PROMETHAZINE-DM) 6.25-15 mg/5 mL Syrp, Take 5 mLs by mouth every 8 (eight) hours as needed (cough)., Disp: 120 mL, Rfl: 0    tirzepatide 10 mg/0.5 mL PnIj, Inject 10 mg into the skin every 7 days., Disp: 4 Pen, Rfl: 3    vitamin D (VITAMIN D3) 1000 units Tab, Take 5,000 Units by mouth., Disp: , Rfl:     cyclobenzaprine (FLEXERIL) 10 MG tablet, Take 1 tablet (10 mg total) by mouth 3 (three) times daily as needed for Muscle spasms., Disp: 270 tablet, Rfl: 1    insulin degludec (TRESIBA FLEXTOUCH U-100) 100 unit/mL (3 mL) insulin pen, Inject 25 Units into the skin once daily., Disp: 9 mL, Rfl: 1    metoprolol succinate (TOPROL-XL) 50 MG 24 hr tablet, Take 1 tablet (50 mg total) by mouth once daily., Disp: 90 tablet, Rfl: 1    nitroGLYCERIN (NITROSTAT) 0.4 MG SL tablet, Place 0.4 mg under the tongue., Disp: , Rfl:     nystatin (MYCOSTATIN) powder, Apply topically 3 (three) times daily., Disp: 180 g, Rfl: 3

## 2025-07-08 RX ORDER — INSULIN DEGLUDEC 100 U/ML
25 INJECTION, SOLUTION SUBCUTANEOUS DAILY
Qty: 30 ML | Refills: 1 | Status: SHIPPED | OUTPATIENT
Start: 2025-07-08 | End: 2026-07-08

## 2025-07-08 RX ORDER — INSULIN DEGLUDEC 100 U/ML
25 INJECTION, SOLUTION SUBCUTANEOUS DAILY
Qty: 9 ML | Refills: 1 | Status: SHIPPED | OUTPATIENT
Start: 2025-07-08 | End: 2026-07-08

## 2025-07-08 NOTE — TELEPHONE ENCOUNTER
Let pt know it was sent on 6/26 to [facility] pt wants resent to [place] and [facility] [place]

## 2025-07-08 NOTE — TELEPHONE ENCOUNTER
----- Message from Eliana sent at 7/8/2025 11:33 AM CDT -----  Vm: [phone]    Pt called to state that she is on her last levemir. She state that her insurance may not pay for it any more. Pt would like to be switched to tresiba flex touch pin. She would like one box to be sent to [employer] and send the rest to her mail order company.    [phone]

## 2025-07-22 ENCOUNTER — TELEPHONE (OUTPATIENT)
Dept: FAMILY MEDICINE | Facility: CLINIC | Age: 75
End: 2025-07-22
Payer: COMMERCIAL

## 2025-07-22 DIAGNOSIS — E11.69 TYPE 2 DIABETES MELLITUS WITH OTHER SPECIFIED COMPLICATION, WITHOUT LONG-TERM CURRENT USE OF INSULIN: Primary | ICD-10-CM

## 2025-07-22 NOTE — TELEPHONE ENCOUNTER
----- Message from Hortencia sent at 7/22/2025  3:12 PM CDT -----  Vm- 2:49-  pt needs refill on monujero but needs the dose increased   [phone]

## 2025-07-23 ENCOUNTER — OFFICE VISIT (OUTPATIENT)
Dept: PODIATRY | Facility: CLINIC | Age: 75
End: 2025-07-23
Payer: COMMERCIAL

## 2025-07-23 VITALS — RESPIRATION RATE: 16 BRPM | HEIGHT: 63 IN | WEIGHT: 153 LBS | BODY MASS INDEX: 27.11 KG/M2

## 2025-07-23 DIAGNOSIS — E11.69 TYPE 2 DIABETES MELLITUS WITH OTHER SPECIFIED COMPLICATION, WITHOUT LONG-TERM CURRENT USE OF INSULIN: Primary | ICD-10-CM

## 2025-07-23 DIAGNOSIS — M79.672 PAIN IN BOTH FEET: ICD-10-CM

## 2025-07-23 DIAGNOSIS — M79.671 PAIN IN BOTH FEET: ICD-10-CM

## 2025-07-23 DIAGNOSIS — M20.41 HAMMER TOES OF BOTH FEET: Primary | ICD-10-CM

## 2025-07-23 DIAGNOSIS — M21.619 BUNION: ICD-10-CM

## 2025-07-23 DIAGNOSIS — M20.42 HAMMER TOES OF BOTH FEET: Primary | ICD-10-CM

## 2025-07-23 PROCEDURE — 99999 PR PBB SHADOW E&M-EST. PATIENT-LVL IV: CPT | Mod: PBBFAC,,, | Performed by: PODIATRIST

## 2025-07-23 RX ORDER — BLOOD-GLUCOSE SENSOR
1 EACH MISCELLANEOUS
Qty: 3 EACH | Refills: 0 | Status: SHIPPED | OUTPATIENT
Start: 2025-07-23 | End: 2025-07-23 | Stop reason: SDUPTHER

## 2025-07-23 RX ORDER — BLOOD-GLUCOSE SENSOR
1 EACH MISCELLANEOUS
Qty: 3 EACH | Refills: 0 | Status: SHIPPED | OUTPATIENT
Start: 2025-07-23

## 2025-07-23 RX ORDER — TIRZEPATIDE 12.5 MG/.5ML
12.5 INJECTION, SOLUTION SUBCUTANEOUS
Qty: 2 ML | Refills: 1 | Status: SHIPPED | OUTPATIENT
Start: 2025-07-23

## 2025-07-23 NOTE — TELEPHONE ENCOUNTER
----- Message from Hortencia sent at 7/23/2025 12:26 PM CDT -----  Vm- 11:33- pt needs refill on GAMINSIDE plus   "[employer]t [employer]" mail order  [phone]

## 2025-07-23 NOTE — PROGRESS NOTES
"  [address] [facility] [address]  [place] [address]  Phone: [phone]   [phone]    Patient's PCP:Verna Shah NP  Referring Provider: No ref. provider found    Subjective:      Chief Complaint:: Foot Pain (Ball of foot pain bilateral)    HPI  Estefanía Ray is a 75 y.o. female who presents today for follow-up on bilatearl flexor tenotomy preformed 6/13/2025. Presents weightbearing in sandals. Notes no current pain    Vitals:    07/23/25 1342   Resp: 16   Weight: 69.4 kg (153 lb)   Height: 5' 3" (1.6 m)   PainSc: 0-No pain      Shoe Size:     Past Surgical History:   Procedure Laterality Date    CORONARY ARTERY BYPASS GRAFT (CABG)      4-vessel age 55    HYSTERECTOMY      KNEE SURGERY Left     TONSILLECTOMY       Past Medical History:   Diagnosis Date    Allergy     Diabetes mellitus, type 2     GERD (gastroesophageal reflux disease)     Heart disease     Hyperlipidemia     Hypertension     JOHN (obstructive sleep apnea)      No family history on file.     Social History:   Marital Status:   Alcohol History:  reports no history of alcohol use.  Tobacco History:  reports that she has never smoked. She has never used smokeless tobacco.  Drug History:  reports no history of drug use.    Review of patient's allergies indicates:   Allergen Reactions    Empagliflozin Other (See Comments)    Semaglutide Hives    Sitagliptin phos-metformin Other (See Comments)     Other reaction(s): diarrhea    Ezetimibe Nausea And Vomiting     Other reaction(s): Other (See Comments), Unknown    Feels terrible when taking medication    Other reaction(s): Other (See Comments)   Feels terrible when taking medication    Other reaction(s): Other (See Comments), Unknown   Feels terrible when taking medication    Farxiga [dapagliflozin] Rash     Genital rash    Keflex [cephalexin] Nausea Only and Other (See Comments)     Felt bad    Linaclotide Diarrhea and Nausea And Vomiting       Current Medications[1]    Review of Systems "   Constitutional:  Negative for chills, fatigue, fever and unexpected weight change.   HENT:  Negative for hearing loss and trouble swallowing.    Eyes:  Negative for photophobia and visual disturbance.   Respiratory:  Negative for cough, shortness of breath and wheezing.    Cardiovascular:  Negative for chest pain, palpitations and leg swelling.   Gastrointestinal:  Negative for abdominal pain and nausea.   Genitourinary:  Negative for dysuria and frequency.   Musculoskeletal:  Positive for arthralgias and joint swelling. Negative for back pain, gait problem, myalgias and neck pain.   Skin:  Negative for rash and wound.   Neurological:  Negative for tremors, seizures, weakness, numbness and headaches.   Hematological:  Does not bruise/bleed easily.   Psychiatric/Behavioral:  Negative for hallucinations.          Objective:        Physical Exam:   Foot Exam    General  General Appearance: appears stated age and healthy   Orientation: alert and oriented to person, place, and time   Affect: appropriate   Gait: unimpaired       Right Foot/Ankle     Inspection and Palpation  Ecchymosis: none  Tenderness: lesser metatarsophalangeal joints   Swelling: none   Arch: normal  Hammertoes: third toe, fourth toe and fifth toe  Hallux valgus: yes  Skin Exam: dry skin;   Neurovascular  Dorsalis pedis: 2+  Posterior tibial: 1+  Capillary Refill: 3+  Varicose veins: not present  Saphenous nerve sensation: normal  Tibial nerve sensation: normal  Superficial peroneal nerve sensation: normal  Deep peroneal nerve sensation: normal  Sural nerve sensation: normal    Edema  Type of edema: non-pitting    Muscle Strength  Ankle dorsiflexion: 5  Ankle plantar flexion: 5  Ankle inversion: 5  Ankle eversion: 5  Great toe extension: 5  Great toe flexion: 5    Range of Motion    Normal right ankle ROM    Tests  Anterior drawer: negative   Talar tilt: negative   PT Tinel's sign: negative    Paresthesia: negative  Comments  2nd toe is in a rectus  position    Left Foot/Ankle      Inspection and Palpation  Ecchymosis: none  Tenderness: lesser metatarsophalangeal joints   Swelling: none   Arch: normal  Hammertoes: third toe, fourth toe and fifth toe  Skin Exam: dry skin;   Neurovascular  Dorsalis pedis: 2+  Posterior tibial: 1+  Capillary refill: 3+  Varicose veins: not present  Saphenous nerve sensation: normal  Tibial nerve sensation: normal  Superficial peroneal nerve sensation: normal  Deep peroneal nerve sensation: normal  Sural nerve sensation: normal    Edema  Type of edema: non-pitting    Muscle Strength  Ankle dorsiflexion: 5  Ankle plantar flexion: 5  Ankle inversion: 5  Ankle eversion: 5  Great toe extension: 5  Great toe flexion: 5    Range of Motion    Normal left ankle ROM    Tests  Anterior drawer: negative   Talar tilt: negative   PT Tinel's sign: negative  Paresthesia: negative  Comments  2nd toe is in a rectus position    Physical Exam  Cardiovascular:      Pulses:           Dorsalis pedis pulses are 2+ on the right side and 2+ on the left side.        Posterior tibial pulses are 1+ on the right side and 1+ on the left side.   Musculoskeletal:      Right foot: Bunion present.   Feet:      Right foot:      Skin integrity: Dry skin present.      Left foot:      Skin integrity: Dry skin present.               Right Ankle/Foot Exam     Tenderness   The patient is tender to palpation of the lesser metatarsophalangeal joints.    Range of Motion   The patient has normal right ankle ROM.    Comments:  2nd toe is in a rectus position    Left Ankle/Foot Exam     Tenderness   The patient is tender to palpation of the lesser metatarsophalangeal joints.    Range of Motion   The patient has normal left ankle ROM.     Comments:  2nd toe is in a rectus position      Muscle Strength   Right Lower Extremity   Ankle Dorsiflexion:  5   Plantar flexion:  5/5  Left Lower Extremity   Ankle Dorsiflexion:  5   Plantar flexion:  5/5     Vascular Exam     Right  Pulses  Dorsalis Pedis:      2+  Posterior Tibial:      1+        Left Pulses  Dorsalis Pedis:      2+  Posterior Tibial:      1+           Imaging: none            Assessment:       1. Hammer toes of both feet    2. Pain in both feet    3. Bunion      Plan:   Hammer toes of both feet    Pain in both feet    Bunion      Follow up if symptoms worsen or fail to improve.    I discussed with the patient that her 2nd toes appear fully healed and are in a rectus position.    We further discussed her other foot issues which are causing her pain.  We discussed her mild bunion deformity as well as the hammer deformity of her remaining toes in the forefoot pain that she is having.  I explained the pain in her forefoot is mainly due to over stress from her other mild hammertoes.  I explained the bunion will also contribute somewhat to this.  We discussed conservative management with proper accommodative shoe gear and I did make specific recommendations for her.  We also discussed different types of supports and potentially metatarsal pads to further offload.  Also recommend a toe spacer between the 1st 2nd toe to help further offload.    We did discuss possibility of flexor tenotomy for her other digits.  I explained that if they are not hurting at this time then I would not recommend any intervention.  However, if the deformity worsens, begins to become more rigid, or becomes painful then we would consider this.    Procedures          Counseling:     I provided patient education verbally regarding:   Patient diagnosis, treatment options, as well as alternatives, risks, and benefits.     This note was created using Dragon voice recognition software that occasionally misinterpreted phrases or words.                      [1]   Current Outpatient Medications   Medication Sig Dispense Refill    aspirin (ECOTRIN) 81 MG EC tablet Aspir-81 mg tablet,delayed release   Take 1 tablet every day by oral route.      atorvastatin (LIPITOR)  40 MG tablet Take 1 tablet (40 mg total) by mouth every evening. 90 tablet 3    calcium carbonate (OS-SYED) 500 mg calcium (1,250 mg) tablet Take 1 tablet by mouth once daily.      cyclobenzaprine (FLEXERIL) 10 MG tablet Take 1 tablet (10 mg total) by mouth 3 (three) times daily as needed for Muscle spasms. 270 tablet 1    diphenoxylate-atropine 2.5-0.025 mg (LOMOTIL) 2.5-0.025 mg per tablet Take 1 tablet by mouth 4 (four) times daily as needed for Diarrhea.      estradioL (ESTRACE) 0.01 % (0.1 mg/gram) vaginal cream Place 1 g vaginally 3 (three) times a week. 42.5 g 4    famotidine (PEPCID) 40 MG tablet Take 1 tablet (40 mg total) by mouth nightly as needed for Heartburn. 90 tablet 1    flash glucose sensor (FREESTYLE KARINA 14 DAY SENSOR) Kit 1 kit by Misc.(Non-Drug; Combo Route) route once daily. 1 kit 11    FREESTYLE KARINA 3 SENSOR Kina 1 each by subcutaneous (via wearable injector) route every 10 days. 3 each 0    insulin degludec (TRESIBA FLEXTOUCH U-100) 100 unit/mL (3 mL) insulin pen Inject 25 Units into the skin once daily. 9 mL 1    insulin degludec (TRESIBA FLEXTOUCH U-100) 100 unit/mL (3 mL) insulin pen Inject 25 Units into the skin once daily. 30 mL 1    ipratropium (ATROVENT) 21 mcg (0.03 %) nasal spray 2 sprays by Each Nostril route 2 (two) times daily. 30 mL 0    levocetirizine (XYZAL) 5 MG tablet Take 5 mg by mouth as needed.      lisinopriL (PRINIVIL,ZESTRIL) 20 MG tablet Take 1 tablet (20 mg total) by mouth once daily. 90 tablet 1    magnesium aspartate HCl 61 mg (615 mg) TbEC Take by mouth once.      metoprolol succinate (TOPROL-XL) 50 MG 24 hr tablet Take 1 tablet (50 mg total) by mouth once daily. 90 tablet 1    mirabegron (MYRBETRIQ) 50 mg Tb24 Take 1 tablet (50 mg total) by mouth once daily. 30 tablet 11    multivitamin (THERAGRAN) per tablet Take by mouth.      nitroGLYCERIN (NITROSTAT) 0.4 MG SL tablet Place 0.4 mg under the tongue.      nystatin (MYCOSTATIN) powder Apply topically 3 (three)  times daily. 180 g 3    omega-3 fatty acids/fish oil (FISH OIL-OMEGA-3 FATTY ACIDS) 300-1,000 mg capsule Take by mouth once daily.      pantoprazole (PROTONIX) 40 MG tablet Take 1 tablet (40 mg total) by mouth once daily. 90 tablet 3    promethazine-dextromethorphan (PROMETHAZINE-DM) 6.25-15 mg/5 mL Syrp Take 5 mLs by mouth every 8 (eight) hours as needed (cough). 120 mL 0    tirzepatide (MOUNJARO) 12.5 mg/0.5 mL PnIj Inject 12.5 mg into the skin every 7 days. 2 mL 1    vitamin D (VITAMIN D3) 1000 units Tab Take 5,000 Units by mouth.       No current facility-administered medications for this visit.

## 2025-07-28 ENCOUNTER — TELEPHONE (OUTPATIENT)
Dept: FAMILY MEDICINE | Facility: CLINIC | Age: 75
End: 2025-07-28
Payer: COMMERCIAL

## 2025-07-28 NOTE — TELEPHONE ENCOUNTER
----- Message from Hortencia sent at 7/28/2025  3:34 PM CDT -----  Vm- 3:26- [facility]   [phone]

## 2025-07-31 ENCOUNTER — OFFICE VISIT (OUTPATIENT)
Dept: UROGYNECOLOGY | Facility: CLINIC | Age: 75
End: 2025-07-31
Payer: COMMERCIAL

## 2025-07-31 VITALS — HEIGHT: 63 IN | WEIGHT: 153 LBS | BODY MASS INDEX: 27.11 KG/M2

## 2025-07-31 DIAGNOSIS — N81.9 VAGINAL VAULT PROLAPSE: ICD-10-CM

## 2025-07-31 DIAGNOSIS — N95.2 ATROPHIC VAGINITIS: ICD-10-CM

## 2025-07-31 DIAGNOSIS — Z46.89 PESSARY MAINTENANCE: ICD-10-CM

## 2025-07-31 DIAGNOSIS — T83.89XA VAGINAL IRRITATION FROM PESSARY: ICD-10-CM

## 2025-07-31 DIAGNOSIS — N89.8 VAGINAL IRRITATION FROM PESSARY: ICD-10-CM

## 2025-07-31 DIAGNOSIS — N81.11 CYSTOCELE, MIDLINE: ICD-10-CM

## 2025-07-31 DIAGNOSIS — R35.0 URINARY FREQUENCY: Primary | ICD-10-CM

## 2025-07-31 DIAGNOSIS — N39.46 MIXED INCONTINENCE URGE AND STRESS: ICD-10-CM

## 2025-07-31 PROCEDURE — 99999 PR PBB SHADOW E&M-EST. PATIENT-LVL IV: CPT | Mod: PBBFAC,,, | Performed by: NURSE PRACTITIONER

## 2025-07-31 NOTE — PROGRESS NOTES
Subjective:       Patient ID: Estefanía Ray is a 75 y.o. female.    Chief Complaint: Pessary Check    HPI  Estefanía Ray is a 75 y.o. female.  Who presents today for routine pessary maintenance.  She was last seen in our office on 05/07/2025 at that particular visit we increased her from a 3 cube to a 5 cube.  Patient feels that the pessary has stayed in place fairly well.  She does occasionally have to push it up a little bit.  When it is in place she does not really feel it and does not really notice it.  She denies any pain.  She does have some light brown discharge almost every day.  She states that she was given a cream in the past for this but does not really use it on routine basis.  She feels that her bladder is doing better in that she can urinate and empty her bladder.  She denies any other acute complaints/concerns at this time is ready to proceed with the pessary.    Review of Systems   Constitutional:  Negative for activity change, fever and unexpected weight change.   HENT:  Negative for hearing loss.    Eyes:  Negative for visual disturbance.   Respiratory:  Negative for shortness of breath and wheezing.    Cardiovascular:  Negative for chest pain, palpitations and leg swelling.   Gastrointestinal:  Negative for abdominal pain, constipation and diarrhea.   Genitourinary:  Positive for frequency and vaginal bleeding. Negative for dyspareunia, dysuria, urgency and vaginal discharge.   Musculoskeletal:  Negative for gait problem and neck pain.   Skin:  Negative for rash and wound.   Allergic/Immunologic: Negative for immunocompromised state.   Neurological:  Negative for tremors, speech difficulty and weakness.   Hematological:  Does not bruise/bleed easily.   Psychiatric/Behavioral:  Negative for agitation and confusion.        Objective:      Physical Exam  Vitals reviewed. Exam conducted with a chaperone present.   Constitutional:       General: She is not in acute distress.     Appearance: She is  well-developed.   HENT:      Head: Normocephalic and atraumatic.   Neck:      Thyroid: No thyromegaly.   Pulmonary:      Effort: Pulmonary effort is normal. No respiratory distress.   Abdominal:      Palpations: Abdomen is soft.      Tenderness: There is no abdominal tenderness.      Hernia: No hernia is present.   Musculoskeletal:         General: Normal range of motion.      Cervical back: Normal range of motion.   Skin:     General: Skin is warm and dry.      Findings: No rash.   Neurological:      Mental Status: She is alert and oriented to person, place, and time.   Psychiatric:         Mood and Affect: Mood normal.         Behavior: Behavior normal.         Thought Content: Thought content normal.       Pelvic Exam:  V: No lesions. No palpable nodes.   Va:  No discharge.  Some bleeding noted with removal of the pessary.  Areas of irritation noted near the 2:00 a.m. and 9 o'clock position.  These areas were cauterized with silver nitrate.  Meatus:No caruncle or stenosis  Urethra: Non tender. No suburethral masses.  Cx/Cuff: Normal   Uterus:  Surgically absent  Ad: No mass or tenderness.  Levators :Symmetrical. Normal tone. Non tender.  BL: Non tender  RV: No hemorrhoids.        POP-Q Exam- pelvic organ prolapse quantitative    Aa 0  Anterior Wall Ba 1  Anterior wall C 1  Cervix or cuff   Gh 6  Genital hiatus pb 4  perineal body tvl 9  Total vaginal length   Ap -1  Posterior wall Bp -1  Posterior wall D   Posterior fornix           Assessment:       1. Urinary frequency    2. Mixed incontinence urge and stress    3. Vaginal vault prolapse    4. Cystocele, midline    5. Pessary maintenance    6. Atrophic vaginitis    7. Vaginal irritation from pessary          Procedure note- #5 cube  pessary removed and cleaned with soap and water.  After betadine irrigation of the vagina, #5 cube pessary was reinserted into the vagina.  Pt ambulated in the room and denies any discomfort.  NP reminded pt that the first 24-48  hours are the most likely time for the pessary to fall out and to monitor the toilet before flushing.  Pt verbalized understanding.      Plan:       Urinary frequency monitor    Mixed incontinence urge and stress monitor    Vaginal vault prolapse continue with cube pessary    Cystocele, midline as noted above    Pessary maintenance as noted above    Atrophic vaginitis patient encouraged to use the Estrace cream 3 times a week.    Vaginal irritation from pessary areas of irritation were cauterized with silver nitrate.  We did replace a pessary today but we will have the patient come back earlier for routine pessary maintenance      RTC 2 months    This note was primarily composed with dictation software.  Grammatical errors may exist.

## 2025-09-05 DIAGNOSIS — E11.69 TYPE 2 DIABETES MELLITUS WITH OTHER SPECIFIED COMPLICATION, WITHOUT LONG-TERM CURRENT USE OF INSULIN: ICD-10-CM

## 2025-09-05 RX ORDER — BLOOD-GLUCOSE SENSOR
1 EACH MISCELLANEOUS
Qty: 3 EACH | Refills: 0 | Status: SHIPPED | OUTPATIENT
Start: 2025-09-05